# Patient Record
Sex: FEMALE | Race: WHITE | NOT HISPANIC OR LATINO
[De-identification: names, ages, dates, MRNs, and addresses within clinical notes are randomized per-mention and may not be internally consistent; named-entity substitution may affect disease eponyms.]

---

## 2017-04-17 ENCOUNTER — APPOINTMENT (OUTPATIENT)
Dept: PHYSICAL MEDICINE AND REHAB | Facility: CLINIC | Age: 77
End: 2017-04-17

## 2017-04-25 ENCOUNTER — APPOINTMENT (OUTPATIENT)
Dept: PHYSICAL MEDICINE AND REHAB | Facility: CLINIC | Age: 77
End: 2017-04-25

## 2017-04-25 VITALS
TEMPERATURE: 99.1 F | HEART RATE: 89 BPM | DIASTOLIC BLOOD PRESSURE: 85 MMHG | SYSTOLIC BLOOD PRESSURE: 138 MMHG | OXYGEN SATURATION: 96 %

## 2017-04-25 DIAGNOSIS — M65.319 TRIGGER THUMB, UNSPECIFIED THUMB: ICD-10-CM

## 2017-04-25 DIAGNOSIS — M65.331 TRIGGER FINGER, RIGHT MIDDLE FINGER: ICD-10-CM

## 2017-04-25 RX ORDER — VORTIOXETINE 20 MG/1
TABLET, FILM COATED ORAL
Refills: 0 | Status: ACTIVE | COMMUNITY

## 2017-06-01 ENCOUNTER — APPOINTMENT (OUTPATIENT)
Dept: PHYSICAL MEDICINE AND REHAB | Facility: CLINIC | Age: 77
End: 2017-06-01

## 2017-06-05 ENCOUNTER — EMERGENCY (EMERGENCY)
Facility: HOSPITAL | Age: 77
LOS: 1 days | Discharge: HOME | End: 2017-06-05
Attending: EMERGENCY MEDICINE | Admitting: EMERGENCY MEDICINE
Payer: MEDICARE

## 2017-06-05 VITALS
OXYGEN SATURATION: 94 % | DIASTOLIC BLOOD PRESSURE: 81 MMHG | TEMPERATURE: 100 F | RESPIRATION RATE: 18 BRPM | SYSTOLIC BLOOD PRESSURE: 138 MMHG | HEART RATE: 80 BPM

## 2017-06-05 DIAGNOSIS — H26.9 UNSPECIFIED CATARACT: Chronic | ICD-10-CM

## 2017-06-05 DIAGNOSIS — Z90.89 ACQUIRED ABSENCE OF OTHER ORGANS: ICD-10-CM

## 2017-06-05 DIAGNOSIS — E78.5 HYPERLIPIDEMIA, UNSPECIFIED: ICD-10-CM

## 2017-06-05 DIAGNOSIS — E03.9 HYPOTHYROIDISM, UNSPECIFIED: ICD-10-CM

## 2017-06-05 DIAGNOSIS — L03.115 CELLULITIS OF RIGHT LOWER LIMB: ICD-10-CM

## 2017-06-05 DIAGNOSIS — Z90.89 ACQUIRED ABSENCE OF OTHER ORGANS: Chronic | ICD-10-CM

## 2017-06-05 DIAGNOSIS — Z98.890 OTHER SPECIFIED POSTPROCEDURAL STATES: Chronic | ICD-10-CM

## 2017-06-05 DIAGNOSIS — I10 ESSENTIAL (PRIMARY) HYPERTENSION: ICD-10-CM

## 2017-06-05 DIAGNOSIS — Z79.899 OTHER LONG TERM (CURRENT) DRUG THERAPY: ICD-10-CM

## 2017-06-05 DIAGNOSIS — M79.604 PAIN IN RIGHT LEG: ICD-10-CM

## 2017-06-05 PROCEDURE — 99283 EMERGENCY DEPT VISIT LOW MDM: CPT | Mod: GC

## 2017-06-05 NOTE — ED ADULT NURSE NOTE - DISCHARGE TEACHING
pt is to f/u with pcp tomorrow and take doxycycline as directed and return for any new or worsening s/s

## 2017-06-05 NOTE — ED ADULT NURSE NOTE - OBJECTIVE STATEMENT
76 y.o female pmh hypothyroidism, kidney disease, and depression c/o R. ankle and foot cellulitis. pt states she is currently on flagyl q. 8 hours for intestinal infection and noticed that the inside of her right ankle is progressively getting red, spreading to the back of her ankle. pt states she had cellulitis before in the same area with similar s/s. came to ED because she did not want it to get worse and was unsure if she required different abx. r. lower leg and inner ankle with increased redness and warmth upon assessment when compared to the left. denies any recent fever, chills, body aches, lethargy, cp, sob or difficulty ambulating. son at bedside. VS stable, pt afebrile. safety and fall precautions maintained.

## 2017-06-06 VITALS
DIASTOLIC BLOOD PRESSURE: 77 MMHG | SYSTOLIC BLOOD PRESSURE: 145 MMHG | RESPIRATION RATE: 18 BRPM | OXYGEN SATURATION: 100 % | TEMPERATURE: 99 F | HEART RATE: 78 BPM

## 2017-06-06 PROCEDURE — 99283 EMERGENCY DEPT VISIT LOW MDM: CPT

## 2017-06-06 RX ADMIN — Medication 100 MILLIGRAM(S): at 02:38

## 2017-06-06 NOTE — ED PROVIDER NOTE - OBJECTIVE STATEMENT
76 year old female, hypothyroid, depression, HTN, hyperlipidemia, who presents to the ED for RLE redness and pain for 2 days. Patient is concerned that she has cellulitis as this has happened before. Was started on Flagyl for 2 weeks for diarrhea, generalized . No fevers or chills. No nausea, vomiting. No chest pain, shortness of breath, abdominal pain.     primary medical doctor: Eligio

## 2017-06-06 NOTE — ED PROVIDER NOTE - PLAN OF CARE
1) Take doxycycline as prescribed, avoid direct sunlight and take with food  2) Take Tylenol 325mg tablet, take 2 tablets every 6-8 hours as needed for pain   3) Follow up with your primary medical doctor, Dr. Del Valle, tomorrow as scheduled   4) For your diarrhea follow up with Dr. Kaur in 2-3 days for reevaluation, if you cannot see him please follow up in the clinic, call [phone] for appointment,   5) Return to the ED for severe pain, fever greater than 100.4, worsening redness, unable to walk, diarrhea, constipation, abdominal pain, chest pain, shortness of breath, or if you have any other new, worsening, or concerning symptoms.

## 2017-06-06 NOTE — ED PROVIDER NOTE - ATTENDING CONTRIBUTION TO CARE
patient presenting with  RLE redness and pain for 2 days. Patient is concerned that she has cellulitis as this has happened before. no systemic symptoms. no streaking or lymphadenopathy. plan for trial of oral abx and strict return precautions.

## 2017-06-06 NOTE — ED PROVIDER NOTE - CARE PLAN
Principal Discharge DX:	Cellulitis  Instructions for follow-up, activity and diet:	1) Take doxycycline as prescribed, avoid direct sunlight and take with food  2) Take Tylenol 325mg tablet, take 2 tablets every 6-8 hours as needed for pain   3) Follow up with your primary medical doctor, Dr. Del Valle, tomorrow as scheduled   4) For your diarrhea follow up with Dr. Kaur in 2-3 days for reevaluation, if you cannot see him please follow up in the clinic, call [phone] for appointment,   5) Return to the ED for severe pain, fever greater than 100.4, worsening redness, unable to walk, diarrhea, constipation, abdominal pain, chest pain, shortness of breath, or if you have any other new, worsening, or concerning symptoms. Principal Discharge DX:	Cellulitis  Instructions for follow-up, activity and diet:	1) Take doxycycline as prescribed, avoid direct sunlight and take with food  2) Take Tylenol 325mg tablet, take 2 tablets every 6-8 hours as needed for pain   3) Follow up with your primary medical doctor, Dr. Del Valle, tomorrow as scheduled   4) For your diarrhea follow up with Dr. Kaur in 2-3 days for reevaluation, if you cannot see him please follow up in the clinic, call [phone] for appointment,   5) Return to the ED for severe pain, fever greater than 100.4, worsening redness, unable to walk, diarrhea, constipation, abdominal pain, chest pain, shortness of breath, or if you have any other new, worsening, or concerning symptoms.

## 2017-06-06 NOTE — ED PROVIDER NOTE - MEDICAL DECISION MAKING DETAILS
no fevers or chills. LLE redness likely early cellulitis, on flagyl which is not good coverage. patient has appointment tomorrow with primary medical doctor. Will start PO doxy and d/c with strict return precautions,

## 2018-10-18 ENCOUNTER — CHART COPY (OUTPATIENT)
Age: 78
End: 2018-10-18

## 2018-10-18 PROBLEM — N28.9 DISORDER OF KIDNEY AND URETER, UNSPECIFIED: Chronic | Status: ACTIVE | Noted: 2017-06-05

## 2018-10-18 PROBLEM — E03.9 HYPOTHYROIDISM, UNSPECIFIED: Chronic | Status: ACTIVE | Noted: 2017-06-05

## 2018-10-18 PROBLEM — F32.9 MAJOR DEPRESSIVE DISORDER, SINGLE EPISODE, UNSPECIFIED: Chronic | Status: ACTIVE | Noted: 2017-06-05

## 2018-10-25 ENCOUNTER — APPOINTMENT (OUTPATIENT)
Dept: PHYSICAL MEDICINE AND REHAB | Facility: CLINIC | Age: 78
End: 2018-10-25
Payer: MEDICARE

## 2018-10-25 VITALS — DIASTOLIC BLOOD PRESSURE: 75 MMHG | HEART RATE: 98 BPM | SYSTOLIC BLOOD PRESSURE: 125 MMHG

## 2018-10-25 DIAGNOSIS — M21.70 UNEQUAL LIMB LENGTH (ACQUIRED), UNSPECIFIED SITE: ICD-10-CM

## 2018-10-25 PROCEDURE — 99213 OFFICE O/P EST LOW 20 MIN: CPT

## 2018-10-30 ENCOUNTER — FORM ENCOUNTER (OUTPATIENT)
Age: 78
End: 2018-10-30

## 2018-10-31 ENCOUNTER — APPOINTMENT (OUTPATIENT)
Dept: RADIOLOGY | Facility: CLINIC | Age: 78
End: 2018-10-31
Payer: MEDICARE

## 2018-10-31 ENCOUNTER — OUTPATIENT (OUTPATIENT)
Dept: OUTPATIENT SERVICES | Facility: HOSPITAL | Age: 78
LOS: 1 days | End: 2018-10-31
Payer: MEDICARE

## 2018-10-31 DIAGNOSIS — Z98.890 OTHER SPECIFIED POSTPROCEDURAL STATES: Chronic | ICD-10-CM

## 2018-10-31 DIAGNOSIS — H26.9 UNSPECIFIED CATARACT: Chronic | ICD-10-CM

## 2018-10-31 DIAGNOSIS — Z00.8 ENCOUNTER FOR OTHER GENERAL EXAMINATION: ICD-10-CM

## 2018-10-31 DIAGNOSIS — Z90.89 ACQUIRED ABSENCE OF OTHER ORGANS: Chronic | ICD-10-CM

## 2018-10-31 PROCEDURE — 73502 X-RAY EXAM HIP UNI 2-3 VIEWS: CPT

## 2018-10-31 PROCEDURE — 73502 X-RAY EXAM HIP UNI 2-3 VIEWS: CPT | Mod: 26,RT

## 2019-06-12 ENCOUNTER — APPOINTMENT (OUTPATIENT)
Dept: PHYSICAL MEDICINE AND REHAB | Facility: CLINIC | Age: 79
End: 2019-06-12
Payer: MEDICARE

## 2019-06-12 VITALS — HEART RATE: 105 BPM | SYSTOLIC BLOOD PRESSURE: 113 MMHG | OXYGEN SATURATION: 98 % | DIASTOLIC BLOOD PRESSURE: 71 MMHG

## 2019-06-12 PROCEDURE — 99213 OFFICE O/P EST LOW 20 MIN: CPT

## 2019-06-12 NOTE — REVIEW OF SYSTEMS
[Joint Pain] : joint pain [Difficulty Walking] : difficulty walking [Negative] : Respiratory [Fever] : no fever [Incontinence] : no incontinence [Muscle Weakness] : no muscle weakness

## 2019-06-12 NOTE — ASSESSMENT
[FreeTextEntry1] : Patient is a 76-year-old female history of bipolar disorder, bilateral knee osteoarthritis, right hip osteoarthritis with some antalgic gait due to her hip osteoarthritis. No radicular or myelopathic dysfunction noted on clinical exam. Discussed benefits of using a SAC in the left hand and demonstrated proper use. Prescription provided full course of outpatient physical therapy with emphasis on balance exercises, core strengthening exercises and gait training with SAC, see Rx. Prescription provided for an adjustable SAC. Will consider refocusing physical therapy on left shoulder once gait is improved

## 2019-06-12 NOTE — PHYSICAL EXAM
[FreeTextEntry1] : General: Well developed female in no apparent distress. Patient is awake, alert, oriented x3. Cooperative.\par Extremities: No pedal edema.\par \par Cerebellar: No nystagmus, no dysmetria FNF.\par Cranial nerves: Extraoral motions intact, tongue midline, no facial asymmetry.\par \par Left shoulder: flexion to 120deg, ext rot to 40deg. (+)crepitus. (+)reverse Booth, (+)Booth test. (-)TTP\par \par Motor:\par Both upper extremities: Tone normal, active range of motion within functional limits with 5/5 motor power throughout. Thumbs digit opposition intact bilaterally.\par Both lower; tone normal, active range of motion within functional limits with 5/5 motor power throughout.\par Right hip with 5° internal rotation, 30° external rotation without pain. Left hip with 30° internal rotation, 50° external rotation without pain.\par \par Sensory: Intact to light touch and pinprick and both lower extremities. Proprioception intact at both MTP joints.\par Muscle stretch reflexes: +2 biceps, +2 brachioradialis and triceps, symmetric. Negative inverted radial reflex. +2 KJ, +2 AJ and symmetric. Negative Babinski.\par \par Functional status: Heel and toe walk intact with close contact guard. Tandem gait with close contact guard. Patient ambulates with good heel strike bilaterally. Negative Trendelenburg test. Patient does trake short step length and slight trendleberg to the right.

## 2019-07-29 ENCOUNTER — APPOINTMENT (OUTPATIENT)
Dept: PHYSICAL MEDICINE AND REHAB | Facility: CLINIC | Age: 79
End: 2019-07-29
Payer: MEDICARE

## 2019-07-29 VITALS — DIASTOLIC BLOOD PRESSURE: 68 MMHG | OXYGEN SATURATION: 98 % | HEART RATE: 87 BPM | SYSTOLIC BLOOD PRESSURE: 110 MMHG

## 2019-07-29 PROCEDURE — 99213 OFFICE O/P EST LOW 20 MIN: CPT

## 2019-07-29 RX ORDER — LEVOTHYROXINE SODIUM 0.1 MG/1
100 TABLET ORAL
Qty: 90 | Refills: 0 | Status: ACTIVE | COMMUNITY
Start: 2019-02-19

## 2019-07-29 NOTE — REVIEW OF SYSTEMS
[Joint Pain] : joint pain [Difficulty Walking] : difficulty walking [Negative] : Gastrointestinal [Fever] : no fever [Incontinence] : no incontinence [Muscle Weakness] : no muscle weakness

## 2019-07-29 NOTE — PHYSICAL EXAM
[FreeTextEntry1] : General: Well developed female in no apparent distress. Patient is awake, alert, oriented x3. Cooperative.\par Extremities: No pedal edema.\par \par Cerebellar: No nystagmus, no dysmetria FNF.\par Cranial nerves: Extraoral motions intact, tongue midline, no facial asymmetry.\par \par Left shoulder: flexion to 120deg, ext rot to 40deg. (+)crepitus. (+)reverse Booth, (+)Booth test. (-)TTP\par \par Motor:\par Both upper extremities: Tone normal, active range of motion within functional limits with 5/5 motor power throughout. Thumbs digit opposition intact bilaterally.\par Both lower; tone normal, active range of motion within functional limits with 5/5 motor power throughout.\par Right hip with 5° internal rotation, 30° external rotation without pain. Left hip with 30° internal rotation, 50° external rotation without pain.\par \par Sensory: Intact to light touch and pinprick and both lower extremities. Proprioception intact at both MTP joints.\par Muscle stretch reflexes: +2 KJ, +2 AJ and symmetric. Negative Babinski.\par \par Functional status: Heel and toe walk intact with close contact guard. Tandem gait with close contact guard. Patient ambulates with good heel strike bilaterally. Negative Trendelenburg test. Patient does take short step length and min trendleberg to the right.

## 2019-07-29 NOTE — HISTORY OF PRESENT ILLNESS
[FreeTextEntry1] : Patient is a 78-year-old female history of bipolar disorder, chronic neck pain who presents today with complaints of balance impairment with mild to minimal right hip pain. No pain at rest.  Patient has been ambulating independently without an assistive device. Patient denies any focal motor weakness numbness, or bowel/ bladder incontinence. Patient currently denies neck pain or back pain. Patient does note history of left shoulder fracture and arthritis of the left shoulder. No falls report. Patient states that she was not able to start physical therapy since last visit. Patient has a scheduled initial evaluation for August 5, 2019.

## 2019-07-29 NOTE — ASSESSMENT
[FreeTextEntry1] : Patient is a 78-year-old female history of bipolar disorder, bilateral knee osteoarthritis, right hip osteoarthritis with some antalgic gait due to her hip osteoarthritis. No radicular or myelopathic dysfunction noted on clinical exam. Discussed benefits of using a SAC in the left hand and demonstrated proper use. Prescription provided for course of outpatient physical therapy with emphasis on balance exercises, core strengthening exercises and gait training with SAC, see Rx. Will consider refocusing physical therapy on left shoulder once gait is improved

## 2019-08-08 NOTE — ED PROVIDER NOTE - SKIN TURGOR
LMOM patient does not need to hold medications for blood work. Patient to continue same medication schedule. Patient may take a few sips of water with meds. Patient to fast for 8-10 hours.  
PT NEEDS TO GO FOR BLOOD WORK . HE HAS TO FAST FOR HE NEEDS TO KNOW IF IT'S OK TO BE OFF HIS MEDS. HE IS REQUESTING A CALL BACK..  
resilient/elastic

## 2019-09-16 ENCOUNTER — APPOINTMENT (OUTPATIENT)
Dept: PHYSICAL MEDICINE AND REHAB | Facility: CLINIC | Age: 79
End: 2019-09-16

## 2019-10-10 ENCOUNTER — OUTPATIENT (OUTPATIENT)
Dept: OUTPATIENT SERVICES | Facility: HOSPITAL | Age: 79
LOS: 1 days | Discharge: HOME | End: 2019-10-10
Payer: MEDICARE

## 2019-10-10 DIAGNOSIS — H26.9 UNSPECIFIED CATARACT: Chronic | ICD-10-CM

## 2019-10-10 DIAGNOSIS — Z98.890 OTHER SPECIFIED POSTPROCEDURAL STATES: Chronic | ICD-10-CM

## 2019-10-10 DIAGNOSIS — Z90.89 ACQUIRED ABSENCE OF OTHER ORGANS: Chronic | ICD-10-CM

## 2019-10-15 PROCEDURE — 99205 OFFICE O/P NEW HI 60 MIN: CPT

## 2019-12-30 PROCEDURE — 90870 ELECTROCONVULSIVE THERAPY: CPT

## 2019-12-30 RX ORDER — MIDAZOLAM HYDROCHLORIDE 1 MG/ML
2 INJECTION, SOLUTION INTRAMUSCULAR; INTRAVENOUS ONCE
Refills: 0 | Status: DISCONTINUED | OUTPATIENT
Start: 2019-12-30 | End: 2019-12-30

## 2019-12-30 RX ORDER — FLUMAZENIL 0.1 MG/ML
0.2 VIAL (ML) INTRAVENOUS ONCE
Refills: 0 | Status: COMPLETED | OUTPATIENT
Start: 2019-12-30 | End: 2019-12-30

## 2019-12-30 RX ADMIN — Medication 0.2 MILLIGRAM(S): at 11:58

## 2020-01-03 PROCEDURE — 90870 ELECTROCONVULSIVE THERAPY: CPT

## 2020-01-03 RX ORDER — FLUMAZENIL 0.1 MG/ML
0.2 VIAL (ML) INTRAVENOUS ONCE
Refills: 0 | Status: COMPLETED | OUTPATIENT
Start: 2020-01-03 | End: 2020-01-03

## 2020-01-03 RX ADMIN — Medication 0.2 MILLIGRAM(S): at 09:46

## 2020-01-07 PROCEDURE — 90870 ELECTROCONVULSIVE THERAPY: CPT

## 2020-01-07 RX ORDER — FLUMAZENIL 0.1 MG/ML
0.2 VIAL (ML) INTRAVENOUS ONCE
Refills: 0 | Status: COMPLETED | OUTPATIENT
Start: 2020-01-07 | End: 2020-01-07

## 2020-01-07 RX ORDER — MIDAZOLAM HYDROCHLORIDE 1 MG/ML
1 INJECTION, SOLUTION INTRAMUSCULAR; INTRAVENOUS ONCE
Refills: 0 | Status: DISCONTINUED | OUTPATIENT
Start: 2020-01-07 | End: 2020-01-07

## 2020-01-07 RX ADMIN — MIDAZOLAM HYDROCHLORIDE 1 MILLIGRAM(S): 1 INJECTION, SOLUTION INTRAMUSCULAR; INTRAVENOUS at 10:26

## 2020-01-07 RX ADMIN — Medication 0.2 MILLIGRAM(S): at 10:22

## 2020-01-10 PROCEDURE — 90870 ELECTROCONVULSIVE THERAPY: CPT

## 2020-01-10 RX ORDER — FLUMAZENIL 0.1 MG/ML
0.2 VIAL (ML) INTRAVENOUS ONCE
Refills: 0 | Status: COMPLETED | OUTPATIENT
Start: 2020-01-10 | End: 2020-01-10

## 2020-01-10 RX ORDER — MIDAZOLAM HYDROCHLORIDE 1 MG/ML
1 INJECTION, SOLUTION INTRAMUSCULAR; INTRAVENOUS ONCE
Refills: 0 | Status: DISCONTINUED | OUTPATIENT
Start: 2020-01-10 | End: 2020-01-10

## 2020-01-10 RX ADMIN — Medication 0.2 MILLIGRAM(S): at 09:52

## 2020-01-10 RX ADMIN — MIDAZOLAM HYDROCHLORIDE 1 MILLIGRAM(S): 1 INJECTION, SOLUTION INTRAMUSCULAR; INTRAVENOUS at 09:55

## 2020-01-14 PROCEDURE — 90870 ELECTROCONVULSIVE THERAPY: CPT

## 2020-01-14 RX ORDER — FLUMAZENIL 0.1 MG/ML
0.2 VIAL (ML) INTRAVENOUS ONCE
Refills: 0 | Status: COMPLETED | OUTPATIENT
Start: 2020-01-14 | End: 2020-01-14

## 2020-01-14 RX ORDER — MIDAZOLAM HYDROCHLORIDE 1 MG/ML
1 INJECTION, SOLUTION INTRAMUSCULAR; INTRAVENOUS ONCE
Refills: 0 | Status: DISCONTINUED | OUTPATIENT
Start: 2020-01-14 | End: 2020-01-14

## 2020-01-14 RX ADMIN — Medication 0.2 MILLIGRAM(S): at 09:55

## 2020-01-14 RX ADMIN — MIDAZOLAM HYDROCHLORIDE 1 MILLIGRAM(S): 1 INJECTION, SOLUTION INTRAMUSCULAR; INTRAVENOUS at 09:59

## 2020-01-17 PROCEDURE — 90870 ELECTROCONVULSIVE THERAPY: CPT

## 2020-01-17 RX ORDER — MIDAZOLAM HYDROCHLORIDE 1 MG/ML
1 INJECTION, SOLUTION INTRAMUSCULAR; INTRAVENOUS ONCE
Refills: 0 | Status: DISCONTINUED | OUTPATIENT
Start: 2020-01-17 | End: 2020-01-17

## 2020-01-17 RX ORDER — FLUMAZENIL 0.1 MG/ML
0.2 VIAL (ML) INTRAVENOUS ONCE
Refills: 0 | Status: COMPLETED | OUTPATIENT
Start: 2020-01-17 | End: 2020-01-17

## 2020-01-17 RX ADMIN — MIDAZOLAM HYDROCHLORIDE 1 MILLIGRAM(S): 1 INJECTION, SOLUTION INTRAMUSCULAR; INTRAVENOUS at 10:33

## 2020-01-17 RX ADMIN — Medication 0.2 MILLIGRAM(S): at 10:26

## 2020-01-21 PROCEDURE — 90870 ELECTROCONVULSIVE THERAPY: CPT

## 2020-01-21 RX ORDER — MIDAZOLAM HYDROCHLORIDE 1 MG/ML
1 INJECTION, SOLUTION INTRAMUSCULAR; INTRAVENOUS ONCE
Refills: 0 | Status: DISCONTINUED | OUTPATIENT
Start: 2020-01-21 | End: 2020-01-21

## 2020-01-21 RX ORDER — FLUMAZENIL 0.1 MG/ML
0.2 VIAL (ML) INTRAVENOUS ONCE
Refills: 0 | Status: COMPLETED | OUTPATIENT
Start: 2020-01-21 | End: 2020-01-21

## 2020-01-21 RX ADMIN — MIDAZOLAM HYDROCHLORIDE 1 MILLIGRAM(S): 1 INJECTION, SOLUTION INTRAMUSCULAR; INTRAVENOUS at 10:20

## 2020-01-21 RX ADMIN — Medication 0.2 MILLIGRAM(S): at 10:15

## 2020-01-22 DIAGNOSIS — F31.81 BIPOLAR II DISORDER: ICD-10-CM

## 2020-01-23 PROCEDURE — 90870 ELECTROCONVULSIVE THERAPY: CPT

## 2020-01-23 RX ORDER — FLUMAZENIL 0.1 MG/ML
0.2 VIAL (ML) INTRAVENOUS ONCE
Refills: 0 | Status: COMPLETED | OUTPATIENT
Start: 2020-01-23 | End: 2020-01-23

## 2020-01-23 RX ORDER — MIDAZOLAM HYDROCHLORIDE 1 MG/ML
1 INJECTION, SOLUTION INTRAMUSCULAR; INTRAVENOUS ONCE
Refills: 0 | Status: DISCONTINUED | OUTPATIENT
Start: 2020-01-23 | End: 2020-01-23

## 2020-01-23 RX ADMIN — MIDAZOLAM HYDROCHLORIDE 1 MILLIGRAM(S): 1 INJECTION, SOLUTION INTRAMUSCULAR; INTRAVENOUS at 09:05

## 2020-01-23 RX ADMIN — Medication 0.2 MILLIGRAM(S): at 08:58

## 2020-01-27 PROCEDURE — 90870 ELECTROCONVULSIVE THERAPY: CPT

## 2020-01-27 RX ORDER — FLUMAZENIL 0.1 MG/ML
0.2 VIAL (ML) INTRAVENOUS ONCE
Refills: 0 | Status: COMPLETED | OUTPATIENT
Start: 2020-01-27 | End: 2020-01-27

## 2020-01-27 RX ORDER — MIDAZOLAM HYDROCHLORIDE 1 MG/ML
1 INJECTION, SOLUTION INTRAMUSCULAR; INTRAVENOUS ONCE
Refills: 0 | Status: DISCONTINUED | OUTPATIENT
Start: 2020-01-27 | End: 2020-01-27

## 2020-01-27 RX ADMIN — Medication 0.2 MILLIGRAM(S): at 08:39

## 2020-01-27 RX ADMIN — MIDAZOLAM HYDROCHLORIDE 1 MILLIGRAM(S): 1 INJECTION, SOLUTION INTRAMUSCULAR; INTRAVENOUS at 08:42

## 2020-01-29 PROCEDURE — 90870 ELECTROCONVULSIVE THERAPY: CPT

## 2020-01-29 RX ORDER — FLUMAZENIL 0.1 MG/ML
0.2 VIAL (ML) INTRAVENOUS ONCE
Refills: 0 | Status: COMPLETED | OUTPATIENT
Start: 2020-01-29 | End: 2020-01-29

## 2020-01-29 RX ORDER — MIDAZOLAM HYDROCHLORIDE 1 MG/ML
1 INJECTION, SOLUTION INTRAMUSCULAR; INTRAVENOUS ONCE
Refills: 0 | Status: DISCONTINUED | OUTPATIENT
Start: 2020-01-29 | End: 2020-01-29

## 2020-01-29 RX ADMIN — Medication 0.2 MILLIGRAM(S): at 08:17

## 2020-01-29 RX ADMIN — MIDAZOLAM HYDROCHLORIDE 1 MILLIGRAM(S): 1 INJECTION, SOLUTION INTRAMUSCULAR; INTRAVENOUS at 08:24

## 2020-07-14 NOTE — ED ADULT NURSE NOTE - THOUGHTS OF HOMICIDE/VIOLENCE TOWARDS OTHERS YN, MLM
well developed, well nourished , in no acute distress , ambulating without difficulty , normal communication ability
No

## 2020-12-07 DIAGNOSIS — M25.531 PAIN IN RIGHT WRIST: ICD-10-CM

## 2020-12-08 ENCOUNTER — OUTPATIENT (OUTPATIENT)
Dept: OUTPATIENT SERVICES | Facility: HOSPITAL | Age: 80
LOS: 1 days | End: 2020-12-08
Payer: MEDICARE

## 2020-12-08 ENCOUNTER — APPOINTMENT (OUTPATIENT)
Dept: RADIOLOGY | Facility: CLINIC | Age: 80
End: 2020-12-08
Payer: MEDICARE

## 2020-12-08 DIAGNOSIS — Z90.89 ACQUIRED ABSENCE OF OTHER ORGANS: Chronic | ICD-10-CM

## 2020-12-08 DIAGNOSIS — Z98.890 OTHER SPECIFIED POSTPROCEDURAL STATES: Chronic | ICD-10-CM

## 2020-12-08 DIAGNOSIS — H26.9 UNSPECIFIED CATARACT: Chronic | ICD-10-CM

## 2020-12-08 DIAGNOSIS — Z00.8 ENCOUNTER FOR OTHER GENERAL EXAMINATION: ICD-10-CM

## 2020-12-08 PROCEDURE — 73110 X-RAY EXAM OF WRIST: CPT | Mod: 26,RT

## 2020-12-08 PROCEDURE — 73110 X-RAY EXAM OF WRIST: CPT

## 2020-12-09 ENCOUNTER — APPOINTMENT (OUTPATIENT)
Dept: PHYSICAL MEDICINE AND REHAB | Facility: CLINIC | Age: 80
End: 2020-12-09
Payer: MEDICARE

## 2020-12-09 PROCEDURE — 99213 OFFICE O/P EST LOW 20 MIN: CPT

## 2020-12-09 NOTE — PHYSICAL EXAM
[FreeTextEntry1] : General: Well developed female in no apparent distress. Patient is awake, alert, oriented x3. Cooperative.\par Extremities: min pedal edema.\par \par right wrist: no ecchymosis, no edema, no erythema. (+) CMC grind test, (+)TTP at the CMC joint. No pain with ROM at wrist. Hand  5/5mp\par \par both knees no effusion, mild crepitus\par \par Motor:\par Both upper extremities: Tone normal, active range of motion within functional limits with 5/5 motor power throughout. Thumbs digit opposition intact bilaterally.\par Both lower; tone normal, active range of motion within functional limits with 5/5 motor power throughout.\par \par Sensory: Intact to light touch BUE\par \par Functional status: Patient ambulates with good heel strike bilaterally.

## 2020-12-09 NOTE — ASSESSMENT
[FreeTextEntry1] : Patient is an 80-year-old female history of CKD, bipolar disorder, chronic neck pain with complaints of pain toward the base of the right thumb. Reviewed x-ray results with patient, no fracture or dislocation. Patient with moderate right CMC DJD with flair. Prescription provided for a right thumb spica splint. No NSAIDs secondary to CKD (Creat 2.2), encourage patient to discontinue Advil and followup with PMD. Recommend standing regimen of Tylenol 500 mg p.o. t.i.d. If Tylenol does not help knee pain, will consider a series of 3 Hyalgan injections to both knees..

## 2020-12-09 NOTE — HISTORY OF PRESENT ILLNESS
[FreeTextEntry1] : Patient is an 80-year-old female history of CKD, bipolar disorder, chronic neck pain who presents today after suffering a fall at home on Saturday morning December 5, 2020. Patient states she tripped over a CAT and hit her head with some bleeding noted, no ER visit, no loss of consciousness, no HA. Patient's main complaint is right wrist pain on the radial side of the wrist toward the base of the thumb. Pain is exacerbated with moving her thumb and gripping objects. Pain score at rest 2/10, pain score up to 7-8/10. Patient does feel that her pain has improved. Minimal swelling, no warmth, no redness. No ecchymosis. Patient has been self treating with Advil 2 times per day. Patient had x-ray of her right wrist on December 7.

## 2020-12-09 NOTE — REVIEW OF SYSTEMS
[Joint Pain] : joint pain [Negative] : Gastrointestinal [Fever] : no fever [Muscle Weakness] : no muscle weakness [Skin Wound] : no skin wound [Difficulty Walking] : no difficulty walking

## 2021-10-06 ENCOUNTER — APPOINTMENT (OUTPATIENT)
Dept: PHYSICAL MEDICINE AND REHAB | Facility: CLINIC | Age: 81
End: 2021-10-06
Payer: MEDICARE

## 2021-10-06 VITALS
DIASTOLIC BLOOD PRESSURE: 88 MMHG | OXYGEN SATURATION: 98 % | SYSTOLIC BLOOD PRESSURE: 133 MMHG | HEART RATE: 95 BPM | TEMPERATURE: 96.9 F

## 2021-10-06 PROCEDURE — 99213 OFFICE O/P EST LOW 20 MIN: CPT

## 2021-10-06 NOTE — REVIEW OF SYSTEMS
[Incontinence] : incontinence [Joint Pain] : joint pain [Difficulty Walking] : difficulty walking [Negative] : Gastrointestinal [Fever] : no fever [Muscle Weakness] : no muscle weakness

## 2021-10-06 NOTE — HISTORY OF PRESENT ILLNESS
[FreeTextEntry1] : Patient is an 81-year-old female history CKD, bipolar disorder, chronic neck pain, bilateral knee OA who reports generalize flare of her arthritis in August, 2021 which last' 3 weeks. Patient denies any joint swelling, redness and warmth. Currently she continues to complain of pain predominantly at the right groin with standing and walking, no pain at rest. Patient has less pain at the at knees,typically more towards the medial aspect. Patient denies focal motor weakness numbness. Patient has had chronic bladder incontinence for many years. Patient self treated with Tylenol, no NSAIDS secondary to CKD. Functionally the patient independent ambulated with a SAC.

## 2021-10-06 NOTE — PHYSICAL EXAM
[FreeTextEntry1] : General: Well developed female in no apparent distress. Patient is awake, alert, oriented x3. Cooperative.\par Extremities: min pedal edema.\par \par both knees no effusion, mild crepitus\par right hip with 5-10 deg int rotation, 30 deg ext rotation\par left hip with 45 deg internal rotation, 60 deg ext rotation, without pain\par \par Motor:\par Both upper extremities: Tone normal, active range of motion within functional limits with 5/5 motor power throughout. Thumbs digit opposition intact bilaterally.\par Both lower; tone normal, active range of motion within functional limits with 5/5 motor power throughout.\par \par Sensory: Intact to light touch BUE\par \par Functional status: Patient ambulates with SAC good heel strike bilaterally.

## 2021-10-09 ENCOUNTER — OUTPATIENT (OUTPATIENT)
Dept: OUTPATIENT SERVICES | Facility: HOSPITAL | Age: 81
LOS: 1 days | End: 2021-10-09
Payer: MEDICARE

## 2021-10-09 ENCOUNTER — APPOINTMENT (OUTPATIENT)
Dept: RADIOLOGY | Facility: CLINIC | Age: 81
End: 2021-10-09
Payer: MEDICARE

## 2021-10-09 DIAGNOSIS — Z90.89 ACQUIRED ABSENCE OF OTHER ORGANS: Chronic | ICD-10-CM

## 2021-10-09 DIAGNOSIS — H26.9 UNSPECIFIED CATARACT: Chronic | ICD-10-CM

## 2021-10-09 DIAGNOSIS — M16.11 UNILATERAL PRIMARY OSTEOARTHRITIS, RIGHT HIP: ICD-10-CM

## 2021-10-09 DIAGNOSIS — Z98.890 OTHER SPECIFIED POSTPROCEDURAL STATES: Chronic | ICD-10-CM

## 2021-10-09 PROCEDURE — 73502 X-RAY EXAM HIP UNI 2-3 VIEWS: CPT | Mod: 26,RT

## 2021-10-09 PROCEDURE — 73502 X-RAY EXAM HIP UNI 2-3 VIEWS: CPT

## 2021-11-10 ENCOUNTER — APPOINTMENT (OUTPATIENT)
Dept: PHYSICAL MEDICINE AND REHAB | Facility: CLINIC | Age: 81
End: 2021-11-10
Payer: MEDICARE

## 2021-11-10 VITALS
OXYGEN SATURATION: 97 % | DIASTOLIC BLOOD PRESSURE: 88 MMHG | TEMPERATURE: 97 F | HEART RATE: 107 BPM | SYSTOLIC BLOOD PRESSURE: 170 MMHG

## 2021-11-10 DIAGNOSIS — M17.0 BILATERAL PRIMARY OSTEOARTHRITIS OF KNEE: ICD-10-CM

## 2021-11-10 PROCEDURE — 99213 OFFICE O/P EST LOW 20 MIN: CPT

## 2021-11-10 NOTE — HISTORY OF PRESENT ILLNESS
[FreeTextEntry1] : Patient is an 81-year-old female history CKD, bipolar disorder, chronic neck pain, bilateral knee OA, right knee OA who was last seen Oct. 6, 2021. Patient obtained x-ray of the right hip which demonstrated moderate right hip osteoarthritis. Patient had been taking Tylenol 500 mg 2-3 times a day regularly which has improved her hip and knee pain. Patient reports no pain sitting in the office and did not have pain walking in from the parking lot. Patient's main complaint is that she feels that her right hip will episodically "give out". This tends to occur when she is carrying something or turning. These episodes occur 3-4 times per month. No falls reported. Patient denies focal motor weakness, numbness, (+) chronic bladder incontinence. No NSAIDS secondary to CKD. Functionally the patient independent ambulated with +/- SAC.

## 2021-11-10 NOTE — PHYSICAL EXAM
[FreeTextEntry1] : General: Well developed female in no apparent distress. Patient is awake, alert, oriented x3. Cooperative.\par Extremities: min pedal edema.\par \par both knees no effusion, mild crepitus\par right hip with 5-10 deg int rotation, 30 deg ext rotation without pain.\par left hip with 45 deg internal rotation, 60 deg ext rotation, without pain\par \par Motor:\par Both upper extremities: Tone normal, active range of motion within functional limits with 5/5 motor power throughout. Thumbs digit opposition intact bilaterally.\par Both lower extremities; tone normal, active range of motion within functional limits with 5/5 motor power throughout.\par \par Sensory: Intact to light touch BUE\par \par Functional status: Patient ambulates with SAC good heel strike bilaterally. Negative Trendlenberg test (B).

## 2021-11-10 NOTE — REVIEW OF SYSTEMS
[Incontinence] : incontinence [Difficulty Walking] : difficulty walking [Negative] : Gastrointestinal [Fever] : no fever [Joint Pain] : no joint pain [Muscle Weakness] : no muscle weakness

## 2021-11-10 NOTE — ASSESSMENT
[FreeTextEntry1] : Patient is an 81-year-old female history CKD, bipolar disorder, chronic neck pain, bilateral knee OA whose right hip OA pain is significantly improved on a standing regimen of Tylenol. Discussed with patient/Dtr that her feeling of her right hip giving out may be due to the diminished range of motion at her hip. Emphasize the importance of starting outpatient physical therapy with emphasis on gait training with various assistive devices to improve balance and security during ambulation. Physical therapy will also work on core strengthening exercises and balance exercises, see Rx. If knee pain exacerbated would consider a series of 3 Hyalgan injections to both knees. No steroids or NSAIDS.\par \par I spent a total of 20 minutes on the date of the encounter evaluating and treating the patient including a discussion of treatment options.

## 2022-02-03 ENCOUNTER — APPOINTMENT (OUTPATIENT)
Dept: PHYSICAL MEDICINE AND REHAB | Facility: CLINIC | Age: 82
End: 2022-02-03
Payer: MEDICARE

## 2022-02-03 VITALS — DIASTOLIC BLOOD PRESSURE: 76 MMHG | OXYGEN SATURATION: 97 % | HEART RATE: 101 BPM | SYSTOLIC BLOOD PRESSURE: 126 MMHG

## 2022-02-03 DIAGNOSIS — R26.9 UNSPECIFIED ABNORMALITIES OF GAIT AND MOBILITY: ICD-10-CM

## 2022-02-03 DIAGNOSIS — M25.511 PAIN IN RIGHT SHOULDER: ICD-10-CM

## 2022-02-03 DIAGNOSIS — M16.11 UNILATERAL PRIMARY OSTEOARTHRITIS, RIGHT HIP: ICD-10-CM

## 2022-02-03 PROCEDURE — 99213 OFFICE O/P EST LOW 20 MIN: CPT

## 2022-02-03 NOTE — REVIEW OF SYSTEMS
[Joint Pain] : joint pain [Joint Stiffness] : joint stiffness [Negative] : Gastrointestinal [Fever] : no fever [Incontinence] : no incontinence [Muscle Weakness] : no muscle weakness [Difficulty Walking] : no difficulty walking

## 2022-02-03 NOTE — ASSESSMENT
[FreeTextEntry1] : Patient is an 81-year-old female history of CKD, bipolar disorder, chronic neck pain, bilateral knee OA, right hip OA whose balance, gait mechanics and overall conditioning have significantly improved with home physical therapy. Recommend home physical therapy to continue addressing her right hip and balance issues. Clinical exam notable for left shoulder capsular tightness, patient reports a history of a shoulder fracture ~5-6 years ago. Will obtain an x-ray of the left shoulder, 2 views. Once she completes her home physical therapy for her balance and hip, will redirect a home physical therapy to address her left shoulder. No NSAIDs or steroids.\par \par I spent a total of 20 minutes on the date of the encounter evaluating and treating the patient including a discussion of treatment options.

## 2022-02-03 NOTE — HISTORY OF PRESENT ILLNESS
[FreeTextEntry1] : Patient is an 81-year-old female history of CKD, bipolar disorder, chronic neck pain, bilateral knee OA, right hip OA who was last seen November 10, 2021. Patient did not start outpatient physical therapy secondary to Covid pandemic. Patient started home for physical therapy which she's been receiving 2 times per week for the past 5 weeks. Patient feels she's made excellent gains with home therapy. Patient feels her balance has improved, she feels her strength has improved and she is more flexible and able to pick objects off the floor independently. Episodes of the feeling of her "leg going to give out" significantly decreased as well. No falls since last visit. No near falls. Patient denies focal motor weakness and numbness. Functionally the patient is ambulating independently +/- straight axis cane. Patient has been complaining of left shoulder pain and stiffness

## 2022-02-03 NOTE — PHYSICAL EXAM
[FreeTextEntry1] : General: Well developed female in no apparent distress. Patient is awake, alert, oriented x3. Cooperative.\par Extremities: no pedal edema.\par \par right shoulder: (-)TTP at anterior humeral head, anterior acromium, ACJ. Shoulder flexion to 90°, shoulder external rotation to 30° with pain on and range of motion. Full passive shoulder internal rotation without pain\par \par both knees no effusion, mild crepitus\par right hip with 5-10 deg int rotation, 30 deg ext rotation without pain.\par left hip with 45 deg internal rotation, 60 deg ext rotation, without pain\par \par Motor:\par Both upper extremities: Tone normal, active range of motion within functional limits with 5/5 motor power throughout. Thumbs digit opposition intact bilaterally.\par Both lower extremities; tone normal, active range of motion within functional limits with 5/5 motor power throughout.\par \par Sensory: Intact to light touch BUE\par \par Functional status: Patient ambulates without AD, good heel strike bilaterally. Negative Trendlenberg test (B).

## 2022-02-08 DIAGNOSIS — M25.512 PAIN IN LEFT SHOULDER: ICD-10-CM

## 2022-02-15 ENCOUNTER — APPOINTMENT (OUTPATIENT)
Dept: RADIOLOGY | Facility: CLINIC | Age: 82
End: 2022-02-15

## 2022-03-03 ENCOUNTER — APPOINTMENT (OUTPATIENT)
Dept: RADIOLOGY | Facility: CLINIC | Age: 82
End: 2022-03-03
Payer: MEDICARE

## 2022-03-03 ENCOUNTER — NON-APPOINTMENT (OUTPATIENT)
Age: 82
End: 2022-03-03

## 2022-03-03 PROCEDURE — 73030 X-RAY EXAM OF SHOULDER: CPT | Mod: LT

## 2022-04-11 ENCOUNTER — APPOINTMENT (OUTPATIENT)
Dept: PHYSICAL MEDICINE AND REHAB | Facility: CLINIC | Age: 82
End: 2022-04-11
Payer: MEDICARE

## 2022-04-11 VITALS
SYSTOLIC BLOOD PRESSURE: 148 MMHG | OXYGEN SATURATION: 97 % | DIASTOLIC BLOOD PRESSURE: 79 MMHG | TEMPERATURE: 95 F | HEART RATE: 104 BPM

## 2022-04-11 DIAGNOSIS — M19.031 PRIMARY OSTEOARTHRITIS, RIGHT WRIST: ICD-10-CM

## 2022-04-11 DIAGNOSIS — M19.012 PRIMARY OSTEOARTHRITIS, LEFT SHOULDER: ICD-10-CM

## 2022-04-11 PROCEDURE — 99213 OFFICE O/P EST LOW 20 MIN: CPT

## 2022-04-11 RX ORDER — HYALURONATE SODIUM 10 MG/ML
20 VIAL (ML) INTRAARTICULAR
Qty: 3 | Refills: 0 | Status: ACTIVE | OUTPATIENT
Start: 2022-04-11

## 2022-04-11 NOTE — ASSESSMENT
[FreeTextEntry1] : Patient is an 81-year-old female history of CKD, bipolar disorder, chronic neck pain, bilateral knee OA, right hip OA, hx of a left shoulder fracture 5 to 6 years ago.  Reviewed x-ray results of left shoulder with patient which demonstrated moderate left glenohumeral degenerative joint disease.  No NSAIDs or steroids.  Patient to start a standing regimen of Tylenol 500 mg p.o. 3 times daily for 2 to 3 weeks.  Patient prefers home physical therapy over outpatient physical therapy and prescription provided.  Patient informed that the course is likely to be short.  We will seek authorization for a series of 3 Hyalgan injections to the left shoulder.  Patient has been also complaining of pain at the base of the thumb and has a history of CMC arthritis.  Prescription provided for a comfort thumb support.\par \par I spent a total of 20 minutes on the date of the encounter evaluating and treating the patient including reviewing her x-ray results and discussion of treatment options.

## 2022-04-11 NOTE — HISTORY OF PRESENT ILLNESS
[FreeTextEntry1] : Patient is an 81-year-old female history of CKD, bipolar disorder, chronic neck pain, bilateral knee OA, right hip OA who was last seen Feb. 3, 2022. Patient completed home PT for her balance program. did not start outpatient physical therapy secondary to Covid pandemic. Patient feels her balance has improved, though she reports one fall 2 weeks ago losing her balance transferring from bed, no injury or ER visit.  No near falls.  Patient's main complaint is continued left shoulder pain.  Pain is exacerbated with lifting her arm and putting on her jacket.  Pain involves the entire shoulder.  No pain at rest, pain score up to 2–5/10. Patient reports hx of left shoulder fx.  Patient denies focal motor weakness and numbness. Functionally the patient is ambulating independently with a RW.

## 2022-04-11 NOTE — PHYSICAL EXAM
[FreeTextEntry1] : General: Well developed female in no apparent distress. Patient is awake, alert, oriented x3. Cooperative.\par Extremities: no pedal edema.\par \par right shoulder: (-)TTP at anterior humeral head, anterior acromium, ACJ. Shoulder flexion to 90°, shoulder external rotation to 30° with pain on and range of motion. Full passive shoulder internal rotation without pain\par \par Motor:\par Both upper extremities: Tone normal, active range of motion within functional limits with 5/5 motor power throughout. Thumbs digit opposition intact bilaterally.\par Both lower extremities; tone normal, active range of motion within functional limits with 5/5 motor power throughout.\par \par Sensory: Intact to light touch BUE\par \par Functional status: Patient ambulates with SAC, good heel strike bilaterally.

## 2022-07-29 ENCOUNTER — INPATIENT (INPATIENT)
Facility: HOSPITAL | Age: 82
LOS: 4 days | Discharge: NURSING, REHAB OR LONG-TERM CARE | DRG: 481 | End: 2022-08-03
Attending: ORTHOPAEDIC SURGERY | Admitting: ORTHOPAEDIC SURGERY
Payer: MEDICARE

## 2022-07-29 ENCOUNTER — TRANSCRIPTION ENCOUNTER (OUTPATIENT)
Age: 82
End: 2022-07-29

## 2022-07-29 VITALS
HEIGHT: 66 IN | SYSTOLIC BLOOD PRESSURE: 137 MMHG | DIASTOLIC BLOOD PRESSURE: 82 MMHG | RESPIRATION RATE: 18 BRPM | OXYGEN SATURATION: 97 % | HEART RATE: 76 BPM | WEIGHT: 169.98 LBS

## 2022-07-29 DIAGNOSIS — S72.001A FRACTURE OF UNSPECIFIED PART OF NECK OF RIGHT FEMUR, INITIAL ENCOUNTER FOR CLOSED FRACTURE: ICD-10-CM

## 2022-07-29 DIAGNOSIS — Z98.890 OTHER SPECIFIED POSTPROCEDURAL STATES: Chronic | ICD-10-CM

## 2022-07-29 DIAGNOSIS — E03.9 HYPOTHYROIDISM, UNSPECIFIED: ICD-10-CM

## 2022-07-29 DIAGNOSIS — F32.9 MAJOR DEPRESSIVE DISORDER, SINGLE EPISODE, UNSPECIFIED: ICD-10-CM

## 2022-07-29 DIAGNOSIS — H26.9 UNSPECIFIED CATARACT: Chronic | ICD-10-CM

## 2022-07-29 DIAGNOSIS — N18.9 CHRONIC KIDNEY DISEASE, UNSPECIFIED: ICD-10-CM

## 2022-07-29 DIAGNOSIS — Z90.89 ACQUIRED ABSENCE OF OTHER ORGANS: Chronic | ICD-10-CM

## 2022-07-29 DIAGNOSIS — S72.141A DISPLACED INTERTROCHANTERIC FRACTURE OF RIGHT FEMUR, INITIAL ENCOUNTER FOR CLOSED FRACTURE: ICD-10-CM

## 2022-07-29 LAB
ALBUMIN SERPL ELPH-MCNC: 4 G/DL — SIGNIFICANT CHANGE UP (ref 3.3–5)
ALP SERPL-CCNC: 78 U/L — SIGNIFICANT CHANGE UP (ref 40–120)
ALT FLD-CCNC: 8 U/L — LOW (ref 10–45)
ANION GAP SERPL CALC-SCNC: 12 MMOL/L — SIGNIFICANT CHANGE UP (ref 5–17)
ANION GAP SERPL CALC-SCNC: 14 MMOL/L — SIGNIFICANT CHANGE UP (ref 5–17)
APPEARANCE UR: CLEAR — SIGNIFICANT CHANGE UP
APTT BLD: 26.7 SEC — LOW (ref 27.5–35.5)
AST SERPL-CCNC: 14 U/L — SIGNIFICANT CHANGE UP (ref 10–40)
BASOPHILS # BLD AUTO: 0.08 K/UL — SIGNIFICANT CHANGE UP (ref 0–0.2)
BASOPHILS NFR BLD AUTO: 0.7 % — SIGNIFICANT CHANGE UP (ref 0–2)
BILIRUB SERPL-MCNC: 0.3 MG/DL — SIGNIFICANT CHANGE UP (ref 0.2–1.2)
BILIRUB UR-MCNC: NEGATIVE — SIGNIFICANT CHANGE UP
BLD GP AB SCN SERPL QL: NEGATIVE — SIGNIFICANT CHANGE UP
BUN SERPL-MCNC: 46 MG/DL — HIGH (ref 7–23)
BUN SERPL-MCNC: 54 MG/DL — HIGH (ref 7–23)
CALCIUM SERPL-MCNC: 8.3 MG/DL — LOW (ref 8.4–10.5)
CALCIUM SERPL-MCNC: 9.1 MG/DL — SIGNIFICANT CHANGE UP (ref 8.4–10.5)
CHLORIDE SERPL-SCNC: 106 MMOL/L — SIGNIFICANT CHANGE UP (ref 96–108)
CHLORIDE SERPL-SCNC: 107 MMOL/L — SIGNIFICANT CHANGE UP (ref 96–108)
CO2 SERPL-SCNC: 18 MMOL/L — LOW (ref 22–31)
CO2 SERPL-SCNC: 19 MMOL/L — LOW (ref 22–31)
COLOR SPEC: COLORLESS — SIGNIFICANT CHANGE UP
CREAT ?TM UR-MCNC: 34 MG/DL — SIGNIFICANT CHANGE UP
CREAT SERPL-MCNC: 3.01 MG/DL — HIGH (ref 0.5–1.3)
CREAT SERPL-MCNC: 3.29 MG/DL — HIGH (ref 0.5–1.3)
DIFF PNL FLD: NEGATIVE — SIGNIFICANT CHANGE UP
EGFR: 13 ML/MIN/1.73M2 — LOW
EGFR: 15 ML/MIN/1.73M2 — LOW
EOSINOPHIL # BLD AUTO: 0.06 K/UL — SIGNIFICANT CHANGE UP (ref 0–0.5)
EOSINOPHIL NFR BLD AUTO: 0.5 % — SIGNIFICANT CHANGE UP (ref 0–6)
FLUAV AG NPH QL: SIGNIFICANT CHANGE UP
FLUBV AG NPH QL: SIGNIFICANT CHANGE UP
GLUCOSE SERPL-MCNC: 103 MG/DL — HIGH (ref 70–99)
GLUCOSE SERPL-MCNC: 74 MG/DL — SIGNIFICANT CHANGE UP (ref 70–99)
GLUCOSE UR QL: NEGATIVE — SIGNIFICANT CHANGE UP
HCT VFR BLD CALC: 36.8 % — SIGNIFICANT CHANGE UP (ref 34.5–45)
HGB BLD-MCNC: 11.7 G/DL — SIGNIFICANT CHANGE UP (ref 11.5–15.5)
IMM GRANULOCYTES NFR BLD AUTO: 0.8 % — SIGNIFICANT CHANGE UP (ref 0–1.5)
INR BLD: 1 RATIO — SIGNIFICANT CHANGE UP (ref 0.88–1.16)
KETONES UR-MCNC: NEGATIVE — SIGNIFICANT CHANGE UP
LEUKOCYTE ESTERASE UR-ACNC: NEGATIVE — SIGNIFICANT CHANGE UP
LYMPHOCYTES # BLD AUTO: 0.96 K/UL — LOW (ref 1–3.3)
LYMPHOCYTES # BLD AUTO: 8 % — LOW (ref 13–44)
MCHC RBC-ENTMCNC: 29.7 PG — SIGNIFICANT CHANGE UP (ref 27–34)
MCHC RBC-ENTMCNC: 31.8 GM/DL — LOW (ref 32–36)
MCV RBC AUTO: 93.4 FL — SIGNIFICANT CHANGE UP (ref 80–100)
MONOCYTES # BLD AUTO: 1.1 K/UL — HIGH (ref 0–0.9)
MONOCYTES NFR BLD AUTO: 9.2 % — SIGNIFICANT CHANGE UP (ref 2–14)
NEUTROPHILS # BLD AUTO: 9.71 K/UL — HIGH (ref 1.8–7.4)
NEUTROPHILS NFR BLD AUTO: 80.8 % — HIGH (ref 43–77)
NITRITE UR-MCNC: NEGATIVE — SIGNIFICANT CHANGE UP
NRBC # BLD: 0 /100 WBCS — SIGNIFICANT CHANGE UP (ref 0–0)
PH UR: 6.5 — SIGNIFICANT CHANGE UP (ref 5–8)
PLATELET # BLD AUTO: 159 K/UL — SIGNIFICANT CHANGE UP (ref 150–400)
POTASSIUM SERPL-MCNC: 3.7 MMOL/L — SIGNIFICANT CHANGE UP (ref 3.5–5.3)
POTASSIUM SERPL-MCNC: 4 MMOL/L — SIGNIFICANT CHANGE UP (ref 3.5–5.3)
POTASSIUM SERPL-SCNC: 3.7 MMOL/L — SIGNIFICANT CHANGE UP (ref 3.5–5.3)
POTASSIUM SERPL-SCNC: 4 MMOL/L — SIGNIFICANT CHANGE UP (ref 3.5–5.3)
PROT ?TM UR-MCNC: 11 MG/DL — SIGNIFICANT CHANGE UP (ref 0–12)
PROT SERPL-MCNC: 7.2 G/DL — SIGNIFICANT CHANGE UP (ref 6–8.3)
PROT UR-MCNC: ABNORMAL
PROT/CREAT UR-RTO: 0.3 RATIO — HIGH (ref 0–0.2)
PROTHROM AB SERPL-ACNC: 11.6 SEC — SIGNIFICANT CHANGE UP (ref 10.5–13.4)
RBC # BLD: 3.94 M/UL — SIGNIFICANT CHANGE UP (ref 3.8–5.2)
RBC # FLD: 14 % — SIGNIFICANT CHANGE UP (ref 10.3–14.5)
RH IG SCN BLD-IMP: POSITIVE — SIGNIFICANT CHANGE UP
RSV RNA NPH QL NAA+NON-PROBE: SIGNIFICANT CHANGE UP
SARS-COV-2 RNA SPEC QL NAA+PROBE: SIGNIFICANT CHANGE UP
SODIUM SERPL-SCNC: 137 MMOL/L — SIGNIFICANT CHANGE UP (ref 135–145)
SODIUM SERPL-SCNC: 139 MMOL/L — SIGNIFICANT CHANGE UP (ref 135–145)
SP GR SPEC: 1.01 — LOW (ref 1.01–1.02)
UROBILINOGEN FLD QL: NEGATIVE — SIGNIFICANT CHANGE UP
WBC # BLD: 12.01 K/UL — HIGH (ref 3.8–10.5)
WBC # FLD AUTO: 12.01 K/UL — HIGH (ref 3.8–10.5)

## 2022-07-29 PROCEDURE — 72125 CT NECK SPINE W/O DYE: CPT | Mod: 26,MA

## 2022-07-29 PROCEDURE — 99285 EMERGENCY DEPT VISIT HI MDM: CPT | Mod: 25,GC

## 2022-07-29 PROCEDURE — 73610 X-RAY EXAM OF ANKLE: CPT | Mod: 26,RT

## 2022-07-29 PROCEDURE — 73522 X-RAY EXAM HIPS BI 3-4 VIEWS: CPT | Mod: 26

## 2022-07-29 PROCEDURE — 73630 X-RAY EXAM OF FOOT: CPT | Mod: 26,RT

## 2022-07-29 PROCEDURE — 73552 X-RAY EXAM OF FEMUR 2/>: CPT | Mod: 26,RT

## 2022-07-29 PROCEDURE — 71045 X-RAY EXAM CHEST 1 VIEW: CPT | Mod: 26

## 2022-07-29 PROCEDURE — 70450 CT HEAD/BRAIN W/O DYE: CPT | Mod: 26,MA

## 2022-07-29 PROCEDURE — 73560 X-RAY EXAM OF KNEE 1 OR 2: CPT | Mod: 26,RT

## 2022-07-29 PROCEDURE — 73590 X-RAY EXAM OF LOWER LEG: CPT | Mod: 26,RT

## 2022-07-29 PROCEDURE — 93010 ELECTROCARDIOGRAM REPORT: CPT

## 2022-07-29 RX ORDER — ACETAMINOPHEN 500 MG
975 TABLET ORAL EVERY 8 HOURS
Refills: 0 | Status: DISCONTINUED | OUTPATIENT
Start: 2022-07-29 | End: 2022-07-30

## 2022-07-29 RX ORDER — MAGNESIUM HYDROXIDE 400 MG/1
30 TABLET, CHEWABLE ORAL DAILY
Refills: 0 | Status: DISCONTINUED | OUTPATIENT
Start: 2022-07-29 | End: 2022-07-30

## 2022-07-29 RX ORDER — VENLAFAXINE HCL 75 MG
300 CAPSULE, EXT RELEASE 24 HR ORAL DAILY
Refills: 0 | Status: DISCONTINUED | OUTPATIENT
Start: 2022-07-29 | End: 2022-07-29

## 2022-07-29 RX ORDER — DIVALPROEX SODIUM 500 MG/1
250 TABLET, DELAYED RELEASE ORAL AT BEDTIME
Refills: 0 | Status: DISCONTINUED | OUTPATIENT
Start: 2022-07-29 | End: 2022-07-30

## 2022-07-29 RX ORDER — DIVALPROEX SODIUM 500 MG/1
250 TABLET, DELAYED RELEASE ORAL DAILY
Refills: 0 | Status: DISCONTINUED | OUTPATIENT
Start: 2022-07-29 | End: 2022-07-30

## 2022-07-29 RX ORDER — ACETAMINOPHEN 500 MG
975 TABLET ORAL ONCE
Refills: 0 | Status: COMPLETED | OUTPATIENT
Start: 2022-07-29 | End: 2022-07-29

## 2022-07-29 RX ORDER — VENLAFAXINE HCL 75 MG
375 CAPSULE, EXT RELEASE 24 HR ORAL DAILY
Refills: 0 | Status: DISCONTINUED | OUTPATIENT
Start: 2022-07-29 | End: 2022-07-30

## 2022-07-29 RX ORDER — VENLAFAXINE HCL 75 MG
75 CAPSULE, EXT RELEASE 24 HR ORAL DAILY
Refills: 0 | Status: DISCONTINUED | OUTPATIENT
Start: 2022-07-29 | End: 2022-07-29

## 2022-07-29 RX ORDER — TETANUS TOXOID, REDUCED DIPHTHERIA TOXOID AND ACELLULAR PERTUSSIS VACCINE, ADSORBED 5; 2.5; 8; 8; 2.5 [IU]/.5ML; [IU]/.5ML; UG/.5ML; UG/.5ML; UG/.5ML
0.5 SUSPENSION INTRAMUSCULAR ONCE
Refills: 0 | Status: COMPLETED | OUTPATIENT
Start: 2022-07-29 | End: 2022-07-29

## 2022-07-29 RX ORDER — OXYCODONE HYDROCHLORIDE 5 MG/1
5 TABLET ORAL EVERY 4 HOURS
Refills: 0 | Status: DISCONTINUED | OUTPATIENT
Start: 2022-07-29 | End: 2022-07-30

## 2022-07-29 RX ORDER — LANOLIN ALCOHOL/MO/W.PET/CERES
3 CREAM (GRAM) TOPICAL AT BEDTIME
Refills: 0 | Status: DISCONTINUED | OUTPATIENT
Start: 2022-07-29 | End: 2022-07-30

## 2022-07-29 RX ORDER — SODIUM CHLORIDE 9 MG/ML
1000 INJECTION INTRAMUSCULAR; INTRAVENOUS; SUBCUTANEOUS
Refills: 0 | Status: DISCONTINUED | OUTPATIENT
Start: 2022-07-29 | End: 2022-07-30

## 2022-07-29 RX ORDER — HEPARIN SODIUM 5000 [USP'U]/ML
5000 INJECTION INTRAVENOUS; SUBCUTANEOUS ONCE
Refills: 0 | Status: COMPLETED | OUTPATIENT
Start: 2022-07-29 | End: 2022-07-29

## 2022-07-29 RX ORDER — BUPROPION HYDROCHLORIDE 150 MG/1
150 TABLET, EXTENDED RELEASE ORAL DAILY
Refills: 0 | Status: DISCONTINUED | OUTPATIENT
Start: 2022-07-29 | End: 2022-07-30

## 2022-07-29 RX ORDER — VENLAFAXINE HCL 75 MG
37.5 CAPSULE, EXT RELEASE 24 HR ORAL DAILY
Refills: 0 | Status: DISCONTINUED | OUTPATIENT
Start: 2022-07-29 | End: 2022-07-29

## 2022-07-29 RX ORDER — OXYCODONE HYDROCHLORIDE 5 MG/1
2.5 TABLET ORAL EVERY 4 HOURS
Refills: 0 | Status: DISCONTINUED | OUTPATIENT
Start: 2022-07-29 | End: 2022-07-30

## 2022-07-29 RX ORDER — DIVALPROEX SODIUM 500 MG/1
500 TABLET, DELAYED RELEASE ORAL DAILY
Refills: 0 | Status: DISCONTINUED | OUTPATIENT
Start: 2022-07-29 | End: 2022-07-29

## 2022-07-29 RX ORDER — BUPROPION HYDROCHLORIDE 150 MG/1
75 TABLET, EXTENDED RELEASE ORAL AT BEDTIME
Refills: 0 | Status: DISCONTINUED | OUTPATIENT
Start: 2022-07-29 | End: 2022-07-30

## 2022-07-29 RX ORDER — SENNA PLUS 8.6 MG/1
2 TABLET ORAL AT BEDTIME
Refills: 0 | Status: DISCONTINUED | OUTPATIENT
Start: 2022-07-29 | End: 2022-07-30

## 2022-07-29 RX ORDER — BUPROPION HYDROCHLORIDE 150 MG/1
150 TABLET, EXTENDED RELEASE ORAL DAILY
Refills: 0 | Status: DISCONTINUED | OUTPATIENT
Start: 2022-07-29 | End: 2022-07-29

## 2022-07-29 RX ORDER — LEVOTHYROXINE SODIUM 125 MCG
100 TABLET ORAL DAILY
Refills: 0 | Status: DISCONTINUED | OUTPATIENT
Start: 2022-07-29 | End: 2022-07-30

## 2022-07-29 RX ORDER — BUPROPION HYDROCHLORIDE 150 MG/1
200 TABLET, EXTENDED RELEASE ORAL DAILY
Refills: 0 | Status: DISCONTINUED | OUTPATIENT
Start: 2022-07-29 | End: 2022-07-29

## 2022-07-29 RX ADMIN — BUPROPION HYDROCHLORIDE 75 MILLIGRAM(S): 150 TABLET, EXTENDED RELEASE ORAL at 22:53

## 2022-07-29 RX ADMIN — Medication 975 MILLIGRAM(S): at 13:04

## 2022-07-29 RX ADMIN — TETANUS TOXOID, REDUCED DIPHTHERIA TOXOID AND ACELLULAR PERTUSSIS VACCINE, ADSORBED 0.5 MILLILITER(S): 5; 2.5; 8; 8; 2.5 SUSPENSION INTRAMUSCULAR at 06:25

## 2022-07-29 RX ADMIN — Medication 975 MILLIGRAM(S): at 22:53

## 2022-07-29 RX ADMIN — DIVALPROEX SODIUM 250 MILLIGRAM(S): 500 TABLET, DELAYED RELEASE ORAL at 22:53

## 2022-07-29 RX ADMIN — Medication 1 MILLIGRAM(S): at 18:11

## 2022-07-29 RX ADMIN — DIVALPROEX SODIUM 250 MILLIGRAM(S): 500 TABLET, DELAYED RELEASE ORAL at 11:56

## 2022-07-29 RX ADMIN — Medication 375 MILLIGRAM(S): at 11:56

## 2022-07-29 RX ADMIN — Medication 975 MILLIGRAM(S): at 06:25

## 2022-07-29 RX ADMIN — Medication 975 MILLIGRAM(S): at 23:23

## 2022-07-29 RX ADMIN — SENNA PLUS 2 TABLET(S): 8.6 TABLET ORAL at 22:53

## 2022-07-29 RX ADMIN — Medication 100 MICROGRAM(S): at 11:55

## 2022-07-29 RX ADMIN — BUPROPION HYDROCHLORIDE 150 MILLIGRAM(S): 150 TABLET, EXTENDED RELEASE ORAL at 11:55

## 2022-07-29 NOTE — ED ADULT NURSE REASSESSMENT NOTE - NS ED NURSE REASSESS COMMENT FT1
Patient remains A&Ox4, in no distress, denies CP or SOB, respirations even and unlabored on room air, at present expressing R hip pain relief but feels slight pain to R knee, would like to hold off on any more medications. Encouraged to notify RN if pain worsens or she changes her mind about receiving additional medicine for pain. Patient aware of plan of care.

## 2022-07-29 NOTE — ED PROVIDER NOTE - PHYSICAL EXAMINATION
General: Alert and Orientated x 3. No apparent distress.  Head: Normocephalic and atraumatic.  Eyes: PERRLA with EOMI.  Neck: Supple. Trachea midline.   Cardiac: Normal S1 and S2 w/ RRR. No murmurs appreciated.   Pulmonary: Vesicular breath sounds bilaterally. No increased WOB. No wheezes or crackles.  Abdominal: Soft, non-tender. (+) bowel sounds appreciated in all 4 quadrants. No hepatosplenomegaly.   Neurologic: No focal sensory or motor deficits. cn2-12 grossly intact. unable to assess gait.   Musculoskeletal: RLE: Length discrepancy when compared to L. Externally rotates. Unable to range due to pain. Sensation intact. TTP at greater trochanter. Pedal pulse intact.   Skin: Color appropriate for race. Intact, warm, and well-perfused. R elbow abrasion.   Psychiatric: Appropriate mood and affect. No apparent risk to self or others.

## 2022-07-29 NOTE — CONSULT NOTE ADULT - PROBLEM SELECTOR RECOMMENDATION 9
Patient scheduled for an Open reduction and internal fixation of the right hip  No contraindication to scheduled procedure  NPO after MN  DVT and GI prophylaxis   pain meds as needed  follow for oversedation  GI regime to prevent constipation

## 2022-07-29 NOTE — H&P ADULT - HISTORY OF PRESENT ILLNESS
81[facility]pping getting groceries from her driveway during the night c/o severe R hip pain and inability to ambulate.  Patient denies headstrike or LOC. Patient denies radiation of pain. Patient denies numbness/tingling/burning in the RLE. No other bone/joint complaints. Patient is a community ambulator at baseline with use of both cane and occasionally a walker    PAST MEDICAL & SURGICAL HISTORY:  Depression      Hypothyroid      Kidney disease      Cataract      History of rectal surgery      History of tonsillectomy        MEDICATIONS  (STANDING):  acetaminophen     Tablet .. 975 milliGRAM(s) Oral every 8 hours  senna 2 Tablet(s) Oral at bedtime  sodium chloride 0.9%. 1000 milliLiter(s) (100 mL/Hr) IV Continuous <Continuous>    MEDICATIONS  (PRN):  magnesium hydroxide Suspension 30 milliLiter(s) Oral daily PRN Constipation  melatonin 3 milliGRAM(s) Oral at bedtime PRN Insomnia  oxyCODONE    IR 2.5 milliGRAM(s) Oral every 4 hours PRN Moderate Pain (4 - 6)  oxyCODONE    IR 5 milliGRAM(s) Oral every 4 hours PRN Severe Pain (7 - 10)    Allergies    azithromycin (Rash)    Intolerances                              11.7   12.01 )-----------( 159      ( 29 Jul 2022 06:40 )             36.8     07-29    139  |  107  |  54<H>  ----------------------------<  103<H>  4.0   |  18<L>  |  3.29<H>    Ca    9.1      29 Jul 2022 06:40    TPro  7.2  /  Alb  4.0  /  TBili  0.3  /  DBili  x   /  AST  14  /  ALT  8<L>  /  AlkPhos  78  07-29        T(C): 37.3 (07-29-22 @ 06:41), Max: 37.3 (07-29-22 @ 06:41)  HR: 77 (07-29-22 @ 07:23) (76 - 77)  BP: 131/68 (07-29-22 @ 07:23) (129/56 - 141/75)  RR: 17 (07-29-22 @ 07:23) (17 - 18)  SpO2: 99% (07-29-22 @ 07:23) (97% - 99%)  Wt(kg): --    PE   RLE:  Skin intact; No ecchymosis/soft tissue swelling  Compartments soft; + TTP about hip. No TTP to knee/leg/ankle/foot   ROM lmited 2/2 pain   Unable to SLR; + Log Roll/Heel Strike  Motor intact GS/TA/FHL/EHL  SILT L2-S1  +dp    LLE/BUE:   No bony TTP; Good ROM w/o pain; Exam Unremarkable    Imaging:  XR demonstrating R basicervical FN fx    81y Female with R basicervical FN fx    - Pain control  - NPO at midnight/IVF  - FU labs  - Medical clearance  - Plan for OR for IMN 7/30

## 2022-07-29 NOTE — ED PROVIDER NOTE - ATTENDING CONTRIBUTION TO CARE
Attending MD Flores: I personally have seen and examined this patient.  Resident note reviewed and agree on plan of care and except where noted.  See below for details.     seen in [place]    81F with PMH/PSH including HTN HLD hypothyroidism presents to the ED with R hip pain s/p fall.  Reports was outside and tripped getting groceries barefoot from her driveway during the night.  Denies preceding dizziness, weakness, sensory changes.  Denies LOC, hitting head.  Reports she pressed her own life alert. Reports typically uses cane or walker.  Denies abdominal pain, nausea, vomiting, diarrhea, bloody or black stools. Denies dysuria, hematuria, change in urinary habits including frequency, urgency. Denies chest pain, shortness of breath, palpitations. Denies recent illness, fevers, chills.    Exam:   General: NAD  HENT: head NCAT, airway patent  Eyes: no conjunctival injection   Lungs: lungs CTAB with good inspiratory effort, no wheezing, no rhonchi, no rales  Cardiac: +S1S2  GI: abdomen soft with +BS, NT, ND  : no CVAT  MSK: FROM at neck, no tenderness to midline palpation, no stepoffs along length of spine, RLE foreshortened, +tenderness to R hip, pelvis stable, +2 DPs, no breaks in skin, compartments soft, R elbow abrasion  Neuro: CN 2-12 grossly intact, moving all extremities spontaneously, sensory grossly intact, no gross neuro deficits    A/P: 81F with RLE pain s/p fall suspect fx, will obtain labs XRs for bony injury eval, will give pain control, ortho as needed, CTH/CTCSp, dave  Psych: normal mood and affect

## 2022-07-29 NOTE — ED ADULT NURSE NOTE - NSIMPLEMENTINTERV_GEN_ALL_ED
Implemented All Fall Risk Interventions:  [place] to call system. Call bell, personal items and telephone within reach. Instruct patient to call for assistance. Room bathroom lighting operational. Non-slip footwear when patient is off stretcher. Physically safe environment: no spills, clutter or unnecessary equipment. Stretcher in lowest position, wheels locked, appropriate side rails in place. Provide visual cue, wrist band, yellow gown, etc. Monitor gait and stability. Monitor for mental status changes and reorient to person, place, and time. Review medications for side effects contributing to fall risk. Reinforce activity limits and safety measures with patient and family.

## 2022-07-29 NOTE — CONSULT NOTE ADULT - SUBJECTIVE AND OBJECTIVE BOX
[facility]  [phone]  NEPHROLOGY      INITIAL CONSULT NOTE  --------------------------------------------------------------------------------  HPI:      81y Female with hx of depression with Lithium use for almost 10 years no Li use recently presents s/p mech fall after tripping getting groceries from her driveway during the night c/o severe R hip pain and inability to ambulate.  Dx with R FN fx. now for OR.  has hx of ckd and when her cr is at 4 "then it is too high ".   has known ckd. states increase thirst and urination         PAST HISTORY  --------------------------------------------------------------------------------  PAST MEDICAL & SURGICAL HISTORY:  Depression      Hypothyroid      Kidney disease      Cataract      History of rectal surgery      History of tonsillectomy        FAMILY HISTORY:    PAST SOCIAL HISTORY:    ALLERGIES & MEDICATIONS  --------------------------------------------------------------------------------  Allergies    azithromycin (Rash)    Intolerances      Standing Inpatient Medications  acetaminophen     Tablet .. 975 milliGRAM(s) Oral every 8 hours  buPROPion . 75 milliGRAM(s) Oral at bedtime  buPROPion XL (24-Hour) . 150 milliGRAM(s) Oral daily  diVALproex  milliGRAM(s) Oral daily  diVALproex  milliGRAM(s) Oral daily  levothyroxine 100 MICROGram(s) Oral daily  LORazepam     Tablet 0.5 milliGRAM(s) Oral daily  LORazepam     Tablet 1 milliGRAM(s) Oral daily  senna 2 Tablet(s) Oral at bedtime  sodium chloride 0.9%. 1000 milliLiter(s) IV Continuous <Continuous>  venlafaxine XR. 375 milliGRAM(s) Oral daily    PRN Inpatient Medications  magnesium hydroxide Suspension 30 milliLiter(s) Oral daily PRN  melatonin 3 milliGRAM(s) Oral at bedtime PRN  oxyCODONE    IR 2.5 milliGRAM(s) Oral every 4 hours PRN  oxyCODONE    IR 5 milliGRAM(s) Oral every 4 hours PRN      REVIEW OF SYSTEMS  --------------------------------------------------------------------------------  Gen: No  fevers/chills   Skin: No rashes  Head/Eyes/Ears/Mouth: No headache; Normal hearing;  No sinus pain/discomfort, sore throat  Respiratory: No dyspnea, cough, wheezing  CV: No chest pain, orthopnea  GI: No abdominal pain, diarrhea, nausea, vomiting, melena  : No dysuria, decrease urination or hesitancy urinating  hematuria, nocturia  MSK:  + right hip  joint pain/swelling  Neuro: No dizziness/lightheadedness\  also with no edema         VITALS/PHYSICAL EXAM  --------------------------------------------------------------------------------  T(C): 36.9 (07-29-22 @ 17:16), Max: 37.3 (07-29-22 @ 06:41)  HR: 74 (07-29-22 @ 17:16) (74 - 79)  BP: 109/68 (07-29-22 @ 17:16) (99/79 - 141/75)  RR: 18 (07-29-22 @ 17:16) (17 - 18)  SpO2: 98% (07-29-22 @ 17:16) (96% - 99%)  Wt(kg): --  Height (cm): 167.6 (07-29-22 @ 03:16)  Weight (kg): 77.1 (07-29-22 @ 03:16)  BMI (kg/m2): 27.4 (07-29-22 @ 03:16)  BSA (m2): 1.87 (07-29-22 @ 03:16)      07-29-22 @ 07:01  -  07-29-22 @ 19:05  --------------------------------------------------------  IN: 260 mL / OUT: 700 mL / NET: -440 mL      Physical Exam:  	Gen: Non toxic comfortable appearing   	no jvd  	Pulm: decrease bs  no rales or ronchi or wheezing  	CV: RRR, S1S2; no rub  	Abd: +BS, soft, nontender/nondistended  	: No suprapubic tenderness  	UE: Warm, no cyanosis  no clubbing,  no edema  	LE: Warm, no cyanosis  no clubbing, no edema except ecchymosis left toes   	Neuro: alert and oriented. speech coherent   	Psych:  emotion labile   	Skin: Warm, no decrease skin turgor   	  LABS/STUDIES  --------------------------------------------------------------------------------              11.7   12.01 >-----------<  159      [07-29-22 @ 06:40]              36.8     139  |  107  |  54  ----------------------------<  103      [07-29-22 @ 06:40]  4.0   |  18  |  3.29        Ca     9.1     [07-29-22 @ 06:40]    TPro  7.2  /  Alb  4.0  /  TBili  0.3  /  DBili  x   /  AST  14  /  ALT  8   /  AlkPhos  78  [07-29-22 @ 06:40]    PT/INR: PT 11.6 , INR 1.00       [07-29-22 @ 06:40]  PTT: 26.7       [07-29-22 @ 06:40]      Creatinine Trend:  SCr 3.29 [07-29 @ 06:40]

## 2022-07-29 NOTE — CONSULT NOTE ADULT - PROBLEM SELECTOR RECOMMENDATION 2
continue to monitor Creatinine  start gentle hydration  Avoid nephrotoxic agents  Nephrology to see the Pt

## 2022-07-29 NOTE — ED ADULT NURSE NOTE - OBJECTIVE STATEMENT
80 y/o female BIBEMS c/o fall. As per EMS report "Pt coming in from home s/p mechanical slip and fall while getting groceries out of her car at 2am this morning, pt was found in between 2 cars, no LOC, no blood thinners, pt endorsing RLE pain, wouldn't extend legs, son at bedside". Pt presents to [facility] A&Ox3, pt endorsing mechanical slip and fall in driveway this morning while getting groceries out of the car at 2 am, pt fell landing on R. side and was unable to get up, used life alert button to call EMS, pt unable to extend R. leg due to pain, endorsing R. hip pain, ecchymosis noted to R. shin. PMHx kidney disease, hypothyroidism, depression. Pt denies chest pain, SOB/difficulty breathing, fever/chills, HA, abd pain, N/V/D, lightheadedness/dizziness, numbness/tingling. Pt A&Ox3, breathing spontaneous and unlabored, bed locked and in lowest position, report given to Primary RN Rona.

## 2022-07-29 NOTE — CONSULT NOTE ADULT - SUBJECTIVE AND OBJECTIVE BOX
Patient is a 82 y/o F w/ PMHx sig for HTN, HLD, and Hypothyroidism who presents to the ED with R hip pain. Around 2AM patient was walking outside house when she fell on to her right side. Used life alert to get help. No head injury loc, amnesia. Unwitnessed. Unable to ambulate since then. No hx of hip surgery. Severe R hip pain, non-radiating. Also c/o R elbow abrasion. Patient was brought to [facility] for further evaluation and treatment. In the ED she was found to have a right hip fracture. Patient is scheduled for an Open reduction and internal fixation of the right hip. Patient seen in ED resting comfortably.       PAST MEDICAL & SURGICAL HISTORY:  Depression      Hypothyroid      chronic Kidney disease      Cataract      History of rectal surgery      History of tonsillectomy            MEDICATIONS  (STANDING):  acetaminophen     Tablet .. 975 milliGRAM(s) Oral every 8 hours  buPROPion . 75 milliGRAM(s) Oral at bedtime  buPROPion XL (24-Hour) . 150 milliGRAM(s) Oral daily  diVALproex  milliGRAM(s) Oral daily  diVALproex  milliGRAM(s) Oral daily  levothyroxine 100 MICROGram(s) Oral daily  LORazepam     Tablet 0.5 milliGRAM(s) Oral daily  LORazepam     Tablet 1 milliGRAM(s) Oral daily  senna 2 Tablet(s) Oral at bedtime  sodium chloride 0.9%. 1000 milliLiter(s) (100 mL/Hr) IV Continuous <Continuous>  venlafaxine XR. 375 milliGRAM(s) Oral daily    MEDICATIONS  (PRN):  magnesium hydroxide Suspension 30 milliLiter(s) Oral daily PRN Constipation  melatonin 3 milliGRAM(s) Oral at bedtime PRN Insomnia  oxyCODONE    IR 2.5 milliGRAM(s) Oral every 4 hours PRN Moderate Pain (4 - 6)  oxyCODONE    IR 5 milliGRAM(s) Oral every 4 hours PRN Severe Pain (7 - 10)    Social Hx:  Tobacco: Neg  ETOH: Neg  Drugs:  Neg    Family Hx:  As per my conversation with the patient, non contributory     ROS  CONSTITUTIONAL: No weakness, fevers or chills  EYES/ENT: No visual changes;  No vertigo or throat pain   NECK: No pain or stiffness  RESPIRATORY: No cough, wheezing, hemoptysis; No shortness of breath  CARDIOVASCULAR: No chest pain or palpitations  GASTROINTESTINAL: No abdominal or epigastric pain. No nausea, vomiting, or hematemesis; No diarrhea or constipation. No melena or hematochezia.  GENITOURINARY: No dysuria, frequency or hematuria  NEUROLOGICAL: No numbness or weakness  SKIN: No itching, burning, rashes, or lesions   MUSCULOSKELETAL: right hip pain      INTERVAL HPI/OVERNIGHT EVENTS:  T(C): 36.5 (07-29-22 @ 14:38), Max: 37.3 (07-29-22 @ 06:41)  HR: 75 (07-29-22 @ 14:38) (74 - 79)  BP: 106/61 (07-29-22 @ 14:38) (99/79 - 141/75)  RR: 18 (07-29-22 @ 14:38) (17 - 18)  SpO2: 96% (07-29-22 @ 14:38) (96% - 99%)  Wt(kg): --  I&O's Summary      PHYSICAL EXAM:  GENERAL: NAD, well-groomed, well-developed  HEAD:  Atraumatic, Normocephalic  EYES: EOMI, PERRLA, conjunctiva and sclera clear  ENMT: No tonsillar erythema, exudates, or enlargement; Moist mucous membranes, Good dentition, No lesions  NECK: Supple, No JVD, Normal thyroid  NERVOUS SYSTEM:  Alert & Oriented X3, Good concentration; Motor Strength 5/5 B/L upper and lower extremities; DTRs 2+ intact and symmetric  CHEST/LUNG: Clear to percussion bilaterally; No rales, rhonchi, wheezing, or rubs  HEART: Regular rate and rhythm; No murmurs, rubs, or gallops  ABDOMEN: Soft, Nontender, Nondistended; Bowel sounds present  EXTREMITIES:  2+ Peripheral Pulses, No clubbing, cyanosis, or edema  LYMPH: No lymphadenopathy noted  SKIN: No rashes or lesions        LABS:                        11.7   12.01 )-----------( 159      ( 29 Jul 2022 06:40 )             36.8     07-29    139  |  107  |  54<H>  ----------------------------<  103<H>  4.0   |  18<L>  |  3.29<H>    Ca    9.1      29 Jul 2022 06:40    TPro  7.2  /  Alb  4.0  /  TBili  0.3  /  DBili  x   /  AST  14  /  ALT  8<L>  /  AlkPhos  78  07-29    PT/INR - ( 29 Jul 2022 06:40 )   PT: 11.6 sec;   INR: 1.00 ratio         PTT - ( 29 Jul 2022 06:40 )  PTT:26.7 sec    CAPILLARY BLOOD GLUCOSE                Radiology reports: Patient is a 82 y/o F w/ PMHx sig for HTN, HLD, and Hypothyroidism who presents to the ED with R hip pain. Around 2AM patient was walking outside house when she fell on to her right side. Used life alert to get help. No head injury loc, amnesia. Unwitnessed. Unable to ambulate since then. No hx of hip surgery. Severe R hip pain, non-radiating. Also c/o R elbow abrasion. Patient was brought to [facility] for further evaluation and treatment. In the ED she was found to have a right hip fracture. Patient is scheduled for an Open reduction and internal fixation of the right hip. Patient seen in ED resting comfortably.       PAST MEDICAL & SURGICAL HISTORY:  Depression      Hypothyroid      chronic Kidney disease      Cataract      History of rectal surgery      History of tonsillectomy            MEDICATIONS  (STANDING):  acetaminophen     Tablet .. 975 milliGRAM(s) Oral every 8 hours  buPROPion . 75 milliGRAM(s) Oral at bedtime  buPROPion XL (24-Hour) . 150 milliGRAM(s) Oral daily  diVALproex  milliGRAM(s) Oral daily  diVALproex  milliGRAM(s) Oral daily  levothyroxine 100 MICROGram(s) Oral daily  LORazepam     Tablet 0.5 milliGRAM(s) Oral daily  LORazepam     Tablet 1 milliGRAM(s) Oral daily  senna 2 Tablet(s) Oral at bedtime  sodium chloride 0.9%. 1000 milliLiter(s) (100 mL/Hr) IV Continuous <Continuous>  venlafaxine XR. 375 milliGRAM(s) Oral daily    MEDICATIONS  (PRN):  magnesium hydroxide Suspension 30 milliLiter(s) Oral daily PRN Constipation  melatonin 3 milliGRAM(s) Oral at bedtime PRN Insomnia  oxyCODONE    IR 2.5 milliGRAM(s) Oral every 4 hours PRN Moderate Pain (4 - 6)  oxyCODONE    IR 5 milliGRAM(s) Oral every 4 hours PRN Severe Pain (7 - 10)    Social Hx:  Tobacco: Neg  ETOH: Neg  Drugs:  Neg    Family Hx:  As per my conversation with the patient, non contributory     ROS  CONSTITUTIONAL: No weakness, fevers or chills  EYES/ENT: No visual changes;  No vertigo or throat pain   NECK: No pain or stiffness  RESPIRATORY: No cough, wheezing, hemoptysis; No shortness of breath  CARDIOVASCULAR: No chest pain or palpitations  GASTROINTESTINAL: No abdominal or epigastric pain. No nausea, vomiting, or hematemesis; No diarrhea or constipation. No melena or hematochezia.  GENITOURINARY: No dysuria, frequency or hematuria  NEUROLOGICAL: No numbness or weakness  SKIN: No itching, burning, rashes, or lesions   MUSCULOSKELETAL: right hip pain      INTERVAL HPI/OVERNIGHT EVENTS:  T(C): 36.5 (07-29-22 @ 14:38), Max: 37.3 (07-29-22 @ 06:41)  HR: 75 (07-29-22 @ 14:38) (74 - 79)  BP: 106/61 (07-29-22 @ 14:38) (99/79 - 141/75)  RR: 18 (07-29-22 @ 14:38) (17 - 18)  SpO2: 96% (07-29-22 @ 14:38) (96% - 99%)  Wt(kg): --  I&O's Summary      PHYSICAL EXAM:  GENERAL: NAD, well-groomed, well-developed  HEAD:  Atraumatic, Normocephalic  EYES: EOMI, PERRLA, conjunctiva and sclera clear  ENMT: No tonsillar erythema, exudates, or enlargement; Moist mucous membranes, Good dentition, No lesions  NECK: Supple, No JVD, Normal thyroid  NERVOUS SYSTEM:  Alert & Oriented X3, Good concentration; Motor Strength 5/5 B/L upper and lower extremities; DTRs 2+ intact and symmetric  CHEST/LUNG: Clear to percussion bilaterally; No rales, rhonchi, wheezing, or rubs  HEART: Regular rate and rhythm; No murmurs, rubs, or gallops  ABDOMEN: Soft, Nontender, Nondistended; Bowel sounds present  EXTREMITIES:  2+ Peripheral Pulses, No clubbing, cyanosis, or edema  LYMPH: No lymphadenopathy noted  SKIN: No rashes or lesions        LABS:                        11.7   12.01 )-----------( 159      ( 29 Jul 2022 06:40 )             36.8     07-29    139  |  107  |  54<H>  ----------------------------<  103<H>  4.0   |  18<L>  |  3.29<H>    Ca    9.1      29 Jul 2022 06:40    TPro  7.2  /  Alb  4.0  /  TBili  0.3  /  DBili  x   /  AST  14  /  ALT  8<L>  /  AlkPhos  78  07-29    PT/INR - ( 29 Jul 2022 06:40 )   PT: 11.6 sec;   INR: 1.00 ratio         PTT - ( 29 Jul 2022 06:40 )  PTT:26.7 sec    EKG Sinus rhythm @ 75 1st degree AVB LAFB, LVH

## 2022-07-29 NOTE — ED PROVIDER NOTE - OBJECTIVE STATEMENT
Patient is a 82 y/o F w/ PMHx sig for HTN, HLD, and Hypothryoidism who presents to the ED with R hip pain. Around 2AM patient was walking outside house when she fell on to her right side. Used life alert to get help. No head inury, loc, amnesia. Unwitnessed. Unable to ambulate since then. No hx of hip surgery. Severe R hip pain, non-radiating. Also c/o R elbow abrasion. Unknown last tetanus.

## 2022-07-29 NOTE — ED PROVIDER NOTE - CLINICAL SUMMARY MEDICAL DECISION MAKING FREE TEXT BOX
80 y/o F p/w R hip pain s/p fall, unwitnessed. Vitals stable. Physical exam w/ limb length discrepancy and externally rotated extremity. Concern for fracture. will get xr of entire rle and b/l hips. ct head and cervical spine. pre-op

## 2022-07-29 NOTE — CONSULT NOTE ADULT - ASSESSMENT
81y Female with hx of depression with Lithium use for almost 10 years no Li use recently presents s/p mech fall. Dx with R FN fx. now for OR. states has hx of ckd and when her cr is at 4 "then it is too high ".  states has known ckd. also with  increase thirst and urination     1- CKD IV suspected   2- polydipsia   3- hx Lithium use  4- leukocytosis       to have urinalysis and urine pro/cr ratio     to have renal sono when stable   will stand at risk for worsening renal function this was d/w pt and her family at bedside  avoid nsaids and nephrotoxic agents   pt is on nacl ivf check repeat na level to evaluate for hypernatremia in this pt who has hx of lithium use and polydipsia   urinalysis to also r/o UTI   d/w ortho team     
82yo woman presents after a fall at home with a right hip fracture. Patient is scheduled for an Open reduction and internal fixation of the right hip.

## 2022-07-30 LAB
ANION GAP SERPL CALC-SCNC: 11 MMOL/L — SIGNIFICANT CHANGE UP (ref 5–17)
ANION GAP SERPL CALC-SCNC: 12 MMOL/L — SIGNIFICANT CHANGE UP (ref 5–17)
APTT BLD: 27.5 SEC — SIGNIFICANT CHANGE UP (ref 27.5–35.5)
BLD GP AB SCN SERPL QL: NEGATIVE — SIGNIFICANT CHANGE UP
BUN SERPL-MCNC: 40 MG/DL — HIGH (ref 7–23)
BUN SERPL-MCNC: 42 MG/DL — HIGH (ref 7–23)
CALCIUM SERPL-MCNC: 8.2 MG/DL — LOW (ref 8.4–10.5)
CALCIUM SERPL-MCNC: 8.3 MG/DL — LOW (ref 8.4–10.5)
CHLORIDE SERPL-SCNC: 109 MMOL/L — HIGH (ref 96–108)
CHLORIDE SERPL-SCNC: 109 MMOL/L — HIGH (ref 96–108)
CO2 SERPL-SCNC: 17 MMOL/L — LOW (ref 22–31)
CO2 SERPL-SCNC: 18 MMOL/L — LOW (ref 22–31)
CREAT SERPL-MCNC: 2.74 MG/DL — HIGH (ref 0.5–1.3)
CREAT SERPL-MCNC: 2.87 MG/DL — HIGH (ref 0.5–1.3)
EGFR: 16 ML/MIN/1.73M2 — LOW
EGFR: 17 ML/MIN/1.73M2 — LOW
GLUCOSE SERPL-MCNC: 59 MG/DL — LOW (ref 70–99)
GLUCOSE SERPL-MCNC: 82 MG/DL — SIGNIFICANT CHANGE UP (ref 70–99)
HCT VFR BLD CALC: 33.2 % — LOW (ref 34.5–45)
HCT VFR BLD CALC: 35 % — SIGNIFICANT CHANGE UP (ref 34.5–45)
HGB BLD-MCNC: 10.5 G/DL — LOW (ref 11.5–15.5)
HGB BLD-MCNC: 11 G/DL — LOW (ref 11.5–15.5)
INR BLD: 1.03 RATIO — SIGNIFICANT CHANGE UP (ref 0.88–1.16)
MCHC RBC-ENTMCNC: 29.7 PG — SIGNIFICANT CHANGE UP (ref 27–34)
MCHC RBC-ENTMCNC: 30.1 PG — SIGNIFICANT CHANGE UP (ref 27–34)
MCHC RBC-ENTMCNC: 31.4 GM/DL — LOW (ref 32–36)
MCHC RBC-ENTMCNC: 31.6 GM/DL — LOW (ref 32–36)
MCV RBC AUTO: 94.1 FL — SIGNIFICANT CHANGE UP (ref 80–100)
MCV RBC AUTO: 95.6 FL — SIGNIFICANT CHANGE UP (ref 80–100)
NRBC # BLD: 0 /100 WBCS — SIGNIFICANT CHANGE UP (ref 0–0)
NRBC # BLD: 0 /100 WBCS — SIGNIFICANT CHANGE UP (ref 0–0)
PLATELET # BLD AUTO: 148 K/UL — LOW (ref 150–400)
PLATELET # BLD AUTO: 148 K/UL — LOW (ref 150–400)
POTASSIUM SERPL-MCNC: 3.9 MMOL/L — SIGNIFICANT CHANGE UP (ref 3.5–5.3)
POTASSIUM SERPL-MCNC: 4.3 MMOL/L — SIGNIFICANT CHANGE UP (ref 3.5–5.3)
POTASSIUM SERPL-SCNC: 3.9 MMOL/L — SIGNIFICANT CHANGE UP (ref 3.5–5.3)
POTASSIUM SERPL-SCNC: 4.3 MMOL/L — SIGNIFICANT CHANGE UP (ref 3.5–5.3)
PROTHROM AB SERPL-ACNC: 12 SEC — SIGNIFICANT CHANGE UP (ref 10.5–13.4)
RBC # BLD: 3.53 M/UL — LOW (ref 3.8–5.2)
RBC # BLD: 3.66 M/UL — LOW (ref 3.8–5.2)
RBC # FLD: 14 % — SIGNIFICANT CHANGE UP (ref 10.3–14.5)
RBC # FLD: 14.2 % — SIGNIFICANT CHANGE UP (ref 10.3–14.5)
RH IG SCN BLD-IMP: POSITIVE — SIGNIFICANT CHANGE UP
SODIUM SERPL-SCNC: 137 MMOL/L — SIGNIFICANT CHANGE UP (ref 135–145)
SODIUM SERPL-SCNC: 139 MMOL/L — SIGNIFICANT CHANGE UP (ref 135–145)
WBC # BLD: 11.26 K/UL — HIGH (ref 3.8–10.5)
WBC # BLD: 7.82 K/UL — SIGNIFICANT CHANGE UP (ref 3.8–10.5)
WBC # FLD AUTO: 11.26 K/UL — HIGH (ref 3.8–10.5)
WBC # FLD AUTO: 7.82 K/UL — SIGNIFICANT CHANGE UP (ref 3.8–10.5)

## 2022-07-30 PROCEDURE — 27245 TREAT THIGH FRACTURE: CPT | Mod: RT

## 2022-07-30 DEVICE — IMPLANTABLE DEVICE: Type: IMPLANTABLE DEVICE | Site: RIGHT | Status: FUNCTIONAL

## 2022-07-30 DEVICE — SCREW LCK 5X36MM TI: Type: IMPLANTABLE DEVICE | Site: RIGHT | Status: FUNCTIONAL

## 2022-07-30 DEVICE — BLADE HELICAL 11MMX95MM: Type: IMPLANTABLE DEVICE | Site: RIGHT | Status: FUNCTIONAL

## 2022-07-30 RX ORDER — ENOXAPARIN SODIUM 100 MG/ML
40 INJECTION SUBCUTANEOUS EVERY 24 HOURS
Refills: 0 | Status: DISCONTINUED | OUTPATIENT
Start: 2022-07-30 | End: 2022-07-30

## 2022-07-30 RX ORDER — ACETAMINOPHEN 500 MG
975 TABLET ORAL EVERY 8 HOURS
Refills: 0 | Status: DISCONTINUED | OUTPATIENT
Start: 2022-07-30 | End: 2022-07-30

## 2022-07-30 RX ORDER — CEFAZOLIN SODIUM 1 G
2000 VIAL (EA) INJECTION EVERY 8 HOURS
Refills: 0 | Status: DISCONTINUED | OUTPATIENT
Start: 2022-07-30 | End: 2022-07-30

## 2022-07-30 RX ORDER — ENOXAPARIN SODIUM 100 MG/ML
30 INJECTION SUBCUTANEOUS EVERY 24 HOURS
Refills: 0 | Status: DISCONTINUED | OUTPATIENT
Start: 2022-07-31 | End: 2022-08-03

## 2022-07-30 RX ORDER — POLYETHYLENE GLYCOL 3350 17 G/17G
17 POWDER, FOR SOLUTION ORAL DAILY
Refills: 0 | Status: DISCONTINUED | OUTPATIENT
Start: 2022-07-30 | End: 2022-08-03

## 2022-07-30 RX ORDER — ACETAMINOPHEN 500 MG
1000 TABLET ORAL ONCE
Refills: 0 | Status: COMPLETED | OUTPATIENT
Start: 2022-07-30 | End: 2022-07-30

## 2022-07-30 RX ORDER — ONDANSETRON 8 MG/1
4 TABLET, FILM COATED ORAL ONCE
Refills: 0 | Status: DISCONTINUED | OUTPATIENT
Start: 2022-07-30 | End: 2022-07-30

## 2022-07-30 RX ORDER — BUPROPION HYDROCHLORIDE 150 MG/1
150 TABLET, EXTENDED RELEASE ORAL DAILY
Refills: 0 | Status: DISCONTINUED | OUTPATIENT
Start: 2022-07-30 | End: 2022-07-31

## 2022-07-30 RX ORDER — MAGNESIUM HYDROXIDE 400 MG/1
30 TABLET, CHEWABLE ORAL DAILY
Refills: 0 | Status: DISCONTINUED | OUTPATIENT
Start: 2022-07-30 | End: 2022-08-03

## 2022-07-30 RX ORDER — SODIUM CHLORIDE 9 MG/ML
1000 INJECTION INTRAMUSCULAR; INTRAVENOUS; SUBCUTANEOUS
Refills: 0 | Status: DISCONTINUED | OUTPATIENT
Start: 2022-07-30 | End: 2022-07-31

## 2022-07-30 RX ORDER — ACETAMINOPHEN 500 MG
975 TABLET ORAL EVERY 8 HOURS
Refills: 0 | Status: DISCONTINUED | OUTPATIENT
Start: 2022-07-31 | End: 2022-08-03

## 2022-07-30 RX ORDER — OXYCODONE HYDROCHLORIDE 5 MG/1
2.5 TABLET ORAL EVERY 4 HOURS
Refills: 0 | Status: DISCONTINUED | OUTPATIENT
Start: 2022-07-30 | End: 2022-07-31

## 2022-07-30 RX ORDER — VENLAFAXINE HCL 75 MG
375 CAPSULE, EXT RELEASE 24 HR ORAL DAILY
Refills: 0 | Status: DISCONTINUED | OUTPATIENT
Start: 2022-07-30 | End: 2022-08-03

## 2022-07-30 RX ORDER — OXYCODONE HYDROCHLORIDE 5 MG/1
5 TABLET ORAL EVERY 4 HOURS
Refills: 0 | Status: DISCONTINUED | OUTPATIENT
Start: 2022-07-30 | End: 2022-07-31

## 2022-07-30 RX ORDER — SENNA PLUS 8.6 MG/1
2 TABLET ORAL AT BEDTIME
Refills: 0 | Status: DISCONTINUED | OUTPATIENT
Start: 2022-07-30 | End: 2022-08-03

## 2022-07-30 RX ORDER — DIVALPROEX SODIUM 500 MG/1
250 TABLET, DELAYED RELEASE ORAL AT BEDTIME
Refills: 0 | Status: DISCONTINUED | OUTPATIENT
Start: 2022-07-30 | End: 2022-07-31

## 2022-07-30 RX ORDER — TRAMADOL HYDROCHLORIDE 50 MG/1
50 TABLET ORAL EVERY 8 HOURS
Refills: 0 | Status: DISCONTINUED | OUTPATIENT
Start: 2022-07-30 | End: 2022-07-31

## 2022-07-30 RX ORDER — LEVOTHYROXINE SODIUM 125 MCG
100 TABLET ORAL DAILY
Refills: 0 | Status: DISCONTINUED | OUTPATIENT
Start: 2022-07-30 | End: 2022-08-03

## 2022-07-30 RX ORDER — HYDROMORPHONE HYDROCHLORIDE 2 MG/ML
0.25 INJECTION INTRAMUSCULAR; INTRAVENOUS; SUBCUTANEOUS
Refills: 0 | Status: DISCONTINUED | OUTPATIENT
Start: 2022-07-30 | End: 2022-07-30

## 2022-07-30 RX ORDER — CEFAZOLIN SODIUM 1 G
2000 VIAL (EA) INJECTION ONCE
Refills: 0 | Status: COMPLETED | OUTPATIENT
Start: 2022-07-30 | End: 2022-07-30

## 2022-07-30 RX ORDER — ONDANSETRON 8 MG/1
4 TABLET, FILM COATED ORAL EVERY 6 HOURS
Refills: 0 | Status: DISCONTINUED | OUTPATIENT
Start: 2022-07-30 | End: 2022-08-03

## 2022-07-30 RX ORDER — LANOLIN ALCOHOL/MO/W.PET/CERES
3 CREAM (GRAM) TOPICAL AT BEDTIME
Refills: 0 | Status: DISCONTINUED | OUTPATIENT
Start: 2022-07-30 | End: 2022-08-03

## 2022-07-30 RX ADMIN — Medication 100 MICROGRAM(S): at 06:00

## 2022-07-30 RX ADMIN — DIVALPROEX SODIUM 250 MILLIGRAM(S): 500 TABLET, DELAYED RELEASE ORAL at 22:43

## 2022-07-30 RX ADMIN — Medication 1000 MILLIGRAM(S): at 23:14

## 2022-07-30 RX ADMIN — Medication 400 MILLIGRAM(S): at 22:44

## 2022-07-30 RX ADMIN — Medication 975 MILLIGRAM(S): at 06:01

## 2022-07-30 RX ADMIN — Medication 975 MILLIGRAM(S): at 06:31

## 2022-07-30 RX ADMIN — Medication 1000 MILLIGRAM(S): at 16:25

## 2022-07-30 RX ADMIN — Medication 375 MILLIGRAM(S): at 13:10

## 2022-07-30 RX ADMIN — Medication 100 MILLIGRAM(S): at 20:54

## 2022-07-30 RX ADMIN — Medication 400 MILLIGRAM(S): at 15:57

## 2022-07-30 RX ADMIN — BUPROPION HYDROCHLORIDE 150 MILLIGRAM(S): 150 TABLET, EXTENDED RELEASE ORAL at 13:09

## 2022-07-30 RX ADMIN — SENNA PLUS 2 TABLET(S): 8.6 TABLET ORAL at 22:43

## 2022-07-30 RX ADMIN — DIVALPROEX SODIUM 250 MILLIGRAM(S): 500 TABLET, DELAYED RELEASE ORAL at 06:01

## 2022-07-30 NOTE — CHART NOTE - NSCHARTNOTEFT_GEN_A_CORE
POC    Resting without complaint in PACU  Family member @ bedside    No Chest Pain, SOB, N/V.    T(C): 36 (07-30-22 @ 09:14), Max: 36.9 (07-29-22 @ 17:16)  HR: 99 (07-30-22 @ 10:15) (74 - 104)  BP: 119/58 (07-30-22 @ 10:15) (99/79 - 156/69)  RR: 16 (07-30-22 @ 10:15) (16 - 18)  SpO2: 98% (07-30-22 @ 10:15) (94% - 99%)  Wt(kg): --    Exam:  Alert and [place], No Acute Distress  Card: +S1/S2, RRR  Pulm: CTAB  Abdomen soft / benign  Arnold  (n)   EXT        Dressing (s) x 2C/D  [x ]            Calves soft       (+) DF  PF  FHL/ EHL 5/5        No Sensory Deficits noted        1+ pulses                          11.0<L>  11.26<H> )-----------( 148<L>    ( 30 Jul 2022 10:03 )             35.0      07-30    137  |  109<H>  |  42<H>  ----------------------------<  59<L>  3.9   |  17<L>  |  2.87<H>    A/P: S/p Intramedullary nailing of femur    -PT/OT-WBAT-  -Chk AM Labs  -DVT PPx: lovenox QD  -Pain Control PO/IV Pain Rx  -Continue Current Tx  -Dispo planning: TBD      ***See Above  Dejon COHN  Orthopedics  B: [id number]  S: [phone] POC    Resting without complaint in PACU  Family member @ bedside    No Chest Pain, SOB, N/V.    T(C): 36 (07-30-22 @ 09:14), Max: 36.9 (07-29-22 @ 17:16)  HR: 99 (07-30-22 @ 10:15) (74 - 104)  BP: 119/58 (07-30-22 @ 10:15) (99/79 - 156/69)  RR: 16 (07-30-22 @ 10:15) (16 - 18)  SpO2: 98% (07-30-22 @ 10:15) (94% - 99%)  Wt(kg): --    Exam:  Alert and [place], No Acute Distress  Card: +S1/S2, RRR  Pulm: CTAB  Abdomen soft / benign  Arnold  (n)   EXT        Dressing (s) x 2C/D  [x ]            Calves soft       (+) DF  PF  FHL/ EHL 5/5        No Sensory Deficits noted        1+ pulses                          11.0<L>  11.26<H> )-----------( 148<L>    ( 30 Jul 2022 10:03 )             35.0      07-30    137  |  109<H>  |  42<H>  ----------------------------<  59<L>  3.9   |  17<L>  |  2.87<H>    A/P: S/p Intramedullary nailing of femur    -PT/OT-WBAT-  -Chk AM Labs  -DVT PPx: lovenox QD  -Pain Control PO/IV Pain Rx  -Continue Current Tx      Follow up NA levels      d/w Renal  -Dispo planning: TBD      ***See Above  Dejon COHN  Orthopedics  B: [id number]  S: [phone]

## 2022-07-31 LAB
ANION GAP SERPL CALC-SCNC: 18 MMOL/L — HIGH (ref 5–17)
B-OH-BUTYR SERPL-SCNC: 0.1 MMOL/L — SIGNIFICANT CHANGE UP
BUN SERPL-MCNC: 47 MG/DL — HIGH (ref 7–23)
CALCIUM SERPL-MCNC: 8.5 MG/DL — SIGNIFICANT CHANGE UP (ref 8.4–10.5)
CHLORIDE SERPL-SCNC: 107 MMOL/L — SIGNIFICANT CHANGE UP (ref 96–108)
CO2 SERPL-SCNC: 11 MMOL/L — LOW (ref 22–31)
CREAT SERPL-MCNC: 2.97 MG/DL — HIGH (ref 0.5–1.3)
EGFR: 15 ML/MIN/1.73M2 — LOW
GAS PNL BLDA: SIGNIFICANT CHANGE UP
GLUCOSE SERPL-MCNC: 105 MG/DL — HIGH (ref 70–99)
HCT VFR BLD CALC: 30.1 % — LOW (ref 34.5–45)
HGB BLD-MCNC: 9.4 G/DL — LOW (ref 11.5–15.5)
MCHC RBC-ENTMCNC: 30.3 PG — SIGNIFICANT CHANGE UP (ref 27–34)
MCHC RBC-ENTMCNC: 31.2 GM/DL — LOW (ref 32–36)
MCV RBC AUTO: 97.1 FL — SIGNIFICANT CHANGE UP (ref 80–100)
NRBC # BLD: 0 /100 WBCS — SIGNIFICANT CHANGE UP (ref 0–0)
PLATELET # BLD AUTO: 132 K/UL — LOW (ref 150–400)
POTASSIUM SERPL-MCNC: 4.9 MMOL/L — SIGNIFICANT CHANGE UP (ref 3.5–5.3)
POTASSIUM SERPL-SCNC: 4.9 MMOL/L — SIGNIFICANT CHANGE UP (ref 3.5–5.3)
RBC # BLD: 3.1 M/UL — LOW (ref 3.8–5.2)
RBC # FLD: 14.3 % — SIGNIFICANT CHANGE UP (ref 10.3–14.5)
SODIUM SERPL-SCNC: 135 MMOL/L — SIGNIFICANT CHANGE UP (ref 135–145)
WBC # BLD: 11.77 K/UL — HIGH (ref 3.8–10.5)
WBC # FLD AUTO: 11.77 K/UL — HIGH (ref 3.8–10.5)

## 2022-07-31 RX ORDER — BUPROPION HYDROCHLORIDE 150 MG/1
150 TABLET, EXTENDED RELEASE ORAL ONCE
Refills: 0 | Status: COMPLETED | OUTPATIENT
Start: 2022-07-31 | End: 2022-07-31

## 2022-07-31 RX ORDER — OXYCODONE HYDROCHLORIDE 5 MG/1
2.5 TABLET ORAL EVERY 4 HOURS
Refills: 0 | Status: DISCONTINUED | OUTPATIENT
Start: 2022-07-31 | End: 2022-08-03

## 2022-07-31 RX ORDER — DIVALPROEX SODIUM 500 MG/1
250 TABLET, DELAYED RELEASE ORAL DAILY
Refills: 0 | Status: DISCONTINUED | OUTPATIENT
Start: 2022-07-31 | End: 2022-07-31

## 2022-07-31 RX ORDER — SODIUM BICARBONATE 1 MEQ/ML
0.07 SYRINGE (ML) INTRAVENOUS
Qty: 75 | Refills: 0 | Status: DISCONTINUED | OUTPATIENT
Start: 2022-07-31 | End: 2022-08-01

## 2022-07-31 RX ORDER — BUPROPION HYDROCHLORIDE 150 MG/1
150 TABLET, EXTENDED RELEASE ORAL
Refills: 0 | Status: DISCONTINUED | OUTPATIENT
Start: 2022-08-01 | End: 2022-08-03

## 2022-07-31 RX ORDER — DIVALPROEX SODIUM 500 MG/1
500 TABLET, DELAYED RELEASE ORAL DAILY
Refills: 0 | Status: DISCONTINUED | OUTPATIENT
Start: 2022-07-31 | End: 2022-08-03

## 2022-07-31 RX ORDER — TRAMADOL HYDROCHLORIDE 50 MG/1
25 TABLET ORAL EVERY 6 HOURS
Refills: 0 | Status: DISCONTINUED | OUTPATIENT
Start: 2022-07-31 | End: 2022-08-03

## 2022-07-31 RX ORDER — DIVALPROEX SODIUM 500 MG/1
250 TABLET, DELAYED RELEASE ORAL DAILY
Refills: 0 | Status: DISCONTINUED | OUTPATIENT
Start: 2022-07-31 | End: 2022-08-03

## 2022-07-31 RX ADMIN — ENOXAPARIN SODIUM 30 MILLIGRAM(S): 100 INJECTION SUBCUTANEOUS at 17:45

## 2022-07-31 RX ADMIN — SENNA PLUS 2 TABLET(S): 8.6 TABLET ORAL at 21:25

## 2022-07-31 RX ADMIN — Medication 975 MILLIGRAM(S): at 14:52

## 2022-07-31 RX ADMIN — Medication 100 MICROGRAM(S): at 06:17

## 2022-07-31 RX ADMIN — Medication 975 MILLIGRAM(S): at 21:26

## 2022-07-31 RX ADMIN — Medication 70 MEQ/KG/HR: at 11:46

## 2022-07-31 RX ADMIN — DIVALPROEX SODIUM 500 MILLIGRAM(S): 500 TABLET, DELAYED RELEASE ORAL at 21:25

## 2022-07-31 RX ADMIN — Medication 975 MILLIGRAM(S): at 15:43

## 2022-07-31 RX ADMIN — Medication 375 MILLIGRAM(S): at 11:50

## 2022-07-31 RX ADMIN — Medication 975 MILLIGRAM(S): at 06:16

## 2022-07-31 RX ADMIN — TRAMADOL HYDROCHLORIDE 25 MILLIGRAM(S): 50 TABLET ORAL at 12:56

## 2022-07-31 RX ADMIN — Medication 1 MILLIGRAM(S): at 21:25

## 2022-07-31 RX ADMIN — Medication 975 MILLIGRAM(S): at 21:56

## 2022-07-31 RX ADMIN — TRAMADOL HYDROCHLORIDE 25 MILLIGRAM(S): 50 TABLET ORAL at 13:55

## 2022-07-31 RX ADMIN — DIVALPROEX SODIUM 250 MILLIGRAM(S): 500 TABLET, DELAYED RELEASE ORAL at 11:49

## 2022-07-31 RX ADMIN — Medication 975 MILLIGRAM(S): at 06:46

## 2022-07-31 RX ADMIN — BUPROPION HYDROCHLORIDE 150 MILLIGRAM(S): 150 TABLET, EXTENDED RELEASE ORAL at 10:07

## 2022-07-31 NOTE — PROGRESS NOTE ADULT - NUTRITIONAL ASSESSMENT
PT/OT-WBAT RLE  Appreciate Nephrology following, pending Renal US.  Appreciate Medicine following.  IS  DVT PPx: Lovenox and SCDs  Pain Control  Continue Current Tx.  Dispo planning pending PT recommendations    Sergio Avila PA-C  Orthopedic Surgery Team  Team Pager: [phone]

## 2022-07-31 NOTE — PHYSICAL THERAPY INITIAL EVALUATION ADULT - ADDITIONAL COMMENTS
[place], [place] stairs to negotiate. Previously independent with all functional mobility, owns RW/cane; has [employer]HA 4-6/day on Monday/Thursday to assist with light housework.

## 2022-07-31 NOTE — OCCUPATIONAL THERAPY INITIAL EVALUATION ADULT - PERTINENT HX OF CURRENT PROBLEM, REHAB EVAL
81[facility]pping getting groceries from her driveway during the night c/o severe R hip pain and inability to ambulate.  Patient denies headstrike or LOC. Patient denies radiation of pain. See below

## 2022-07-31 NOTE — PHYSICAL THERAPY INITIAL EVALUATION ADULT - GENERAL OBSERVATIONS, REHAB EVAL
pt received semi-supine in bed in NAD, pre-medicated by JAKE Arellano, family at bedside, +IV, pure-wick, continuous pulse ox monitoring, 2LO2 NC

## 2022-07-31 NOTE — PHYSICAL THERAPY INITIAL EVALUATION ADULT - PRECAUTIONS/LIMITATIONS, REHAB EVAL
Pt denies numbness/tingling/burning in the RLE. No other bone/joint complaints. Pt is a community ambulator at baseline with use of both cane and occasionally a walker. Xray RLE: Acute intertrochanteric fracture of the right femur with mild superior displacement of the distal fracture fragment. No left hip fracture.  Moderate right hip osteoarthritis. Head CT: No displaced calvarial fracture or acute intracranial hemorrhage. Cervical spondylosis.Cervical spine CT: No acute fracture. CXR: (-) XRayPelvis: Redemonstrated slightly offset proximal right femoral intertrochanteric fracture. No dislocations or additional fractures.Intact pelvic and obturator rings and symmetrically aligned and spaced SI joints. Degenerative pubic symphysis narrowing. Moderately advanced right hip osteoarthritis. Preserved left hip joint space.Generalized osteopenia otherwise no discrete lytic or blastic lesions./fall precautions

## 2022-07-31 NOTE — OCCUPATIONAL THERAPY INITIAL EVALUATION ADULT - STANDING BALANCE: STATIC
poor balance
premature rupture of membranes prior to labor/septic
Mohs Method Verbiage: An incision at a 45 degree angle following the standard Mohs approach was done and the specimen was harvested as a microscopic controlled layer.

## 2022-07-31 NOTE — PHYSICAL THERAPY INITIAL EVALUATION ADULT - ACTIVE RANGE OF MOTION EXAMINATION, REHAB EVAL
RLE limited by pain/bilateral upper extremity Active ROM was WFL (within functional limits)/bilateral  lower extremity Active ROM was WFL (within functional limits)

## 2022-07-31 NOTE — OCCUPATIONAL THERAPY INITIAL EVALUATION ADULT - PRECAUTIONS/LIMITATIONS, REHAB EVAL
Patient denies numbness/tingling/burning in the RLE. No other bone/joint complaints. Patient is a community ambulator at baseline with use of both cane and occasionally a walker. Pt s/p R femur IMN Patient denies numbness/tingling/burning in the RLE. No other bone/joint complaints. Patient is a community ambulator at baseline with use of both cane and occasionally a walker. Pt s/p R femur IMN/fall precautions

## 2022-07-31 NOTE — OCCUPATIONAL THERAPY INITIAL EVALUATION ADULT - ADDITIONAL COMMENTS
Xray RLE: Acute intertrochanteric fracture of the right femur with mild superior displacement of the distal fracture fragment. No left hip fracture.  Moderate right hip osteoarthritis.

## 2022-07-31 NOTE — PHYSICAL THERAPY INITIAL EVALUATION ADULT - PERTINENT HX OF CURRENT PROBLEM, REHAB EVAL
81y F presents s/p mech fall after tripping getting groceries from her driveway during the night c/o severe R hip pain and inability to ambulate.  Patient denies headstrike or LOC. Patient denies radiation of pain. See below

## 2022-07-31 NOTE — OCCUPATIONAL THERAPY INITIAL EVALUATION ADULT - LIVES WITH, PROFILE
Pt lives alone in condo, no steps to enter, walk in shower with grab bars and bench. Pt I in ADLs and ambulation prior to admission. Pt states she has aide 2 days a week, unsure how many hours, assists with IADLs

## 2022-08-01 ENCOUNTER — TRANSCRIPTION ENCOUNTER (OUTPATIENT)
Age: 82
End: 2022-08-01

## 2022-08-01 LAB
ANION GAP SERPL CALC-SCNC: 11 MMOL/L — SIGNIFICANT CHANGE UP (ref 5–17)
BUN SERPL-MCNC: 49 MG/DL — HIGH (ref 7–23)
CALCIUM SERPL-MCNC: 8.4 MG/DL — SIGNIFICANT CHANGE UP (ref 8.4–10.5)
CHLORIDE SERPL-SCNC: 108 MMOL/L — SIGNIFICANT CHANGE UP (ref 96–108)
CO2 SERPL-SCNC: 18 MMOL/L — LOW (ref 22–31)
CREAT SERPL-MCNC: 3.12 MG/DL — HIGH (ref 0.5–1.3)
EGFR: 14 ML/MIN/1.73M2 — LOW
GLUCOSE SERPL-MCNC: 69 MG/DL — LOW (ref 70–99)
POTASSIUM SERPL-MCNC: 3.5 MMOL/L — SIGNIFICANT CHANGE UP (ref 3.5–5.3)
POTASSIUM SERPL-SCNC: 3.5 MMOL/L — SIGNIFICANT CHANGE UP (ref 3.5–5.3)
SODIUM SERPL-SCNC: 137 MMOL/L — SIGNIFICANT CHANGE UP (ref 135–145)

## 2022-08-01 PROCEDURE — 76775 US EXAM ABDO BACK WALL LIM: CPT | Mod: 26

## 2022-08-01 RX ORDER — BUPROPION HYDROCHLORIDE 150 MG/1
1 TABLET, EXTENDED RELEASE ORAL
Qty: 0 | Refills: 0 | DISCHARGE
Start: 2022-08-01

## 2022-08-01 RX ORDER — BUPROPION HYDROCHLORIDE 150 MG/1
200 TABLET, EXTENDED RELEASE ORAL
Qty: 0 | Refills: 0 | DISCHARGE

## 2022-08-01 RX ORDER — SODIUM BICARBONATE 1 MEQ/ML
650 SYRINGE (ML) INTRAVENOUS THREE TIMES A DAY
Refills: 0 | Status: DISCONTINUED | OUTPATIENT
Start: 2022-08-01 | End: 2022-08-03

## 2022-08-01 RX ORDER — DIVALPROEX SODIUM 500 MG/1
0 TABLET, DELAYED RELEASE ORAL
Qty: 0 | Refills: 0 | DISCHARGE

## 2022-08-01 RX ORDER — LANOLIN ALCOHOL/MO/W.PET/CERES
1 CREAM (GRAM) TOPICAL
Qty: 0 | Refills: 0 | DISCHARGE
Start: 2022-08-01

## 2022-08-01 RX ORDER — SENNA PLUS 8.6 MG/1
2 TABLET ORAL
Qty: 0 | Refills: 0 | DISCHARGE
Start: 2022-08-01

## 2022-08-01 RX ORDER — VENLAFAXINE HCL 75 MG
0 CAPSULE, EXT RELEASE 24 HR ORAL
Qty: 0 | Refills: 0 | DISCHARGE

## 2022-08-01 RX ORDER — LEVOTHYROXINE SODIUM 125 MCG
0 TABLET ORAL
Qty: 0 | Refills: 0 | DISCHARGE

## 2022-08-01 RX ORDER — LEVOTHYROXINE SODIUM 125 MCG
1 TABLET ORAL
Qty: 0 | Refills: 0 | DISCHARGE
Start: 2022-08-01

## 2022-08-01 RX ORDER — ACETAMINOPHEN 500 MG
0 TABLET ORAL
Qty: 0 | Refills: 0 | DISCHARGE

## 2022-08-01 RX ORDER — VENLAFAXINE HCL 75 MG
2 CAPSULE, EXT RELEASE 24 HR ORAL
Refills: 0 | DISCHARGE
Start: 2022-08-01

## 2022-08-01 RX ORDER — POLYETHYLENE GLYCOL 3350 17 G/17G
17 POWDER, FOR SOLUTION ORAL
Qty: 0 | Refills: 0 | DISCHARGE
Start: 2022-08-01

## 2022-08-01 RX ADMIN — TRAMADOL HYDROCHLORIDE 25 MILLIGRAM(S): 50 TABLET ORAL at 11:43

## 2022-08-01 RX ADMIN — ENOXAPARIN SODIUM 30 MILLIGRAM(S): 100 INJECTION SUBCUTANEOUS at 06:18

## 2022-08-01 RX ADMIN — Medication 650 MILLIGRAM(S): at 22:04

## 2022-08-01 RX ADMIN — Medication 100 MICROGRAM(S): at 06:14

## 2022-08-01 RX ADMIN — Medication 975 MILLIGRAM(S): at 22:04

## 2022-08-01 RX ADMIN — Medication 975 MILLIGRAM(S): at 16:51

## 2022-08-01 RX ADMIN — Medication 1 MILLIGRAM(S): at 11:19

## 2022-08-01 RX ADMIN — MAGNESIUM HYDROXIDE 30 MILLILITER(S): 400 TABLET, CHEWABLE ORAL at 16:59

## 2022-08-01 RX ADMIN — SENNA PLUS 2 TABLET(S): 8.6 TABLET ORAL at 22:04

## 2022-08-01 RX ADMIN — DIVALPROEX SODIUM 250 MILLIGRAM(S): 500 TABLET, DELAYED RELEASE ORAL at 11:19

## 2022-08-01 RX ADMIN — Medication 375 MILLIGRAM(S): at 11:20

## 2022-08-01 RX ADMIN — Medication 975 MILLIGRAM(S): at 22:22

## 2022-08-01 RX ADMIN — Medication 70 MEQ/KG/HR: at 06:49

## 2022-08-01 RX ADMIN — DIVALPROEX SODIUM 500 MILLIGRAM(S): 500 TABLET, DELAYED RELEASE ORAL at 16:50

## 2022-08-01 RX ADMIN — Medication 975 MILLIGRAM(S): at 06:15

## 2022-08-01 RX ADMIN — Medication 975 MILLIGRAM(S): at 06:45

## 2022-08-01 RX ADMIN — Medication 975 MILLIGRAM(S): at 16:57

## 2022-08-01 RX ADMIN — BUPROPION HYDROCHLORIDE 150 MILLIGRAM(S): 150 TABLET, EXTENDED RELEASE ORAL at 06:15

## 2022-08-01 RX ADMIN — TRAMADOL HYDROCHLORIDE 25 MILLIGRAM(S): 50 TABLET ORAL at 11:19

## 2022-08-01 RX ADMIN — POLYETHYLENE GLYCOL 3350 17 GRAM(S): 17 POWDER, FOR SOLUTION ORAL at 11:18

## 2022-08-01 NOTE — PROGRESS NOTE ADULT - NS ATTEND AMEND GEN_ALL_CORE FT
Gynecology    SUBJECTIVE:     Chief Complaint: Breast Mass (left breast) and Urinary Frequency       History of Present Illness:  66 year old who presents with symptoms of incontinence as well as a breast lump that has been presents for about one month.  Patient has had cysts in her breasts in the past, but this one feels different.  It is about 1 cm in size, nontender.  She did notice a little yellow breast discharge as well.  No skin changes.  Her sister had breast cancer.    She also reports urge urinary incontinence.  Reports that she leaks urine- sometimes her full bladder with urgency.  She does think it may be related to her lemon drop cocktails that she likes to drink.     Review of Systems:  Review of Systems   Genitourinary: Positive for urgency. Negative for dysuria, frequency, pelvic pain, vaginal bleeding, vaginal discharge and vaginal pain.   Integumentary:  Positive for breast mass and nipple discharge. Negative for breast skin changes and breast tenderness.   Breast: Positive for mass and nipple discharge.Negative for lump, mastodynia, skin changes and tenderness       OBJECTIVE:     Physical Exam:  Physical Exam   Constitutional: She is oriented to person, place, and time. She appears well-developed and well-nourished.   Pulmonary/Chest: Effort normal. Right breast exhibits no inverted nipple, no mass, no nipple discharge, no skin change and no tenderness. Left breast exhibits mass. Left breast exhibits no inverted nipple, no nipple discharge, no skin change and no tenderness. No breast swelling, tenderness, discharge or bleeding. Breasts are symmetrical.       Genitourinary: No breast swelling, tenderness, discharge or bleeding.   Neurological: She is alert and oriented to person, place, and time.   Psychiatric: She has a normal mood and affect. Her behavior is normal. Judgment and thought content normal.   Nursing note and vitals reviewed.      ASSESSMENT:       ICD-10-CM ICD-9-CM    1. Breast  lump on left side at 2 o'clock position N63.21 611.72 Mammo Digital Diagnostic Left with Hugh   2. Urge urinary incontinence N39.41 788.31 Urine culture          Plan:      Luz Bailey was seen today for breast mass and urinary frequency.    Diagnoses and all orders for this visit:    Breast lump on left side at 2 o'clock position  -     Mammo Digital Diagnostic Left with Hugh; Future    Urge urinary incontinence  -     Urine culture    - diagnostic mammogram ordered  - urine culture; discussed bladder irritants (sent to [facility]) and timed voiding; reviewed anticholinergic options if behavior modifications are ineffective    Orders Placed This Encounter   Procedures    Urine culture    Mammo Digital Diagnostic Left with Hugh       Follow up for annual or prn.    Barbara Moss     Seen, examined with, formulated plan with and  agree with above as scribed by NP Ryan [Awilda]     cr steady range  family informed us baseline cr 2.86 recently   acidosis cont bicarb drip as above today

## 2022-08-01 NOTE — DISCHARGE NOTE PROVIDER - NSDCFUADDAPPT_GEN_ALL_CORE_FT
Follow up with Dr Love after discharge from rehab Follow up with Dr Love after discharge from rehab.

## 2022-08-01 NOTE — DISCHARGE NOTE PROVIDER - CARE PROVIDER_API CALL
Jose Love (MD)  Orthopaedic Surgery  [address], [address]  [place] [address]  Phone: [phone]  Fax: [phone]  Follow Up Time:     Juana Vallecillo (DO)  Nephrology  [address]  [place] [address]  Phone: [phone]  Fax: [phone]  Follow Up Time:

## 2022-08-01 NOTE — DISCHARGE NOTE PROVIDER - NSDCMRMEDTOKEN_GEN_ALL_CORE_FT
buPROPion 150 mg/24 hours (XL) oral tablet, extended release: 1 tab(s) orally   levothyroxine 100 mcg (0.1 mg) oral tablet: 1 tab(s) orally once a day  melatonin 3 mg oral tablet: 1 tab(s) orally once a day (at bedtime), As needed, Insomnia  polyethylene glycol 3350 oral powder for reconstitution: 17 gram(s) orally once a day  senna leaf extract oral tablet: 2 tab(s) orally once a day (at bedtime)  venlafaxine 75 mg oral capsule, extended release: 5 cap(s) orally once a day   acetaminophen 325 mg oral tablet: 3 tab(s) orally every 8 hours x 1 week and then as needed for mild pain  bisacodyl 10 mg rectal suppository: 1 suppository(ies) rectal once a day, As needed, Constipation  buPROPion 150 mg/24 hours (XL) oral tablet, extended release: 1 tab(s) orally once a day  divalproex sodium 250 mg oral tablet, extended release: 1 tab(s) orally once a day  divalproex sodium 500 mg oral tablet, extended release: 1 tab(s) orally once a day  enoxaparin: 30 milligram(s) subcutaneous once a day  lactulose 10 g/15 mL oral syrup: 30 milliliter(s) orally every 8 hours, As needed, Constipation  levothyroxine 100 mcg (0.1 mg) oral tablet: 1 tab(s) orally once a day  LORazepam 1 mg oral tablet: 1 tab(s) orally once a day  magnesium hydroxide 8% oral suspension: 30 milliliter(s) orally once a day, As needed, Constipation  melatonin 3 mg oral tablet: 1 tab(s) orally once a day (at bedtime), As needed, Insomnia  oxyCODONE: 2.5 milligram(s) orally every 4 hours, As Needed for severe pain  polyethylene glycol 3350 oral powder for reconstitution: 17 gram(s) orally once a day  senna leaf extract oral tablet: 2 tab(s) orally once a day (at bedtime)  sodium bicarbonate 650 mg oral tablet: 1 tab(s) orally 3 times a day  traMADol 50 mg oral tablet: 0.5 tab(s) orally every 6 hours, As needed, Moderate Pain (4 - 6)  venlafaxine 75 mg oral capsule, extended release: 5 cap(s) orally once a day

## 2022-08-01 NOTE — DISCHARGE NOTE PROVIDER - NSDCACTIVITY_GEN_ALL_CORE
Do not drive or operate machinery/Stairs allowed/Walking - Indoors allowed/No heavy lifting/straining/Walking - Outdoors allowed Showering with assist/Do not drive or operate machinery/Do not make important decisions/Stairs allowed/Walking - Indoors allowed/No heavy lifting/straining/Walking - Outdoors allowed

## 2022-08-01 NOTE — DISCHARGE NOTE PROVIDER - NSDCFUADDINST_GEN_ALL_CORE_FT
Keep dressing clean.  Dressing changes as needed.   Remove dressing POD#14(8/13/22)  D/C staples and sterile strip incisions  Lovenox 30mg sq daily x 6 weeks for DVT prophylaxis    Physical therapy for ambulation WBAT Keep dressing clean.  Dressing changes as needed.   Remove dressing POD#14(8/13/22)  D/C staples and sterile strip incisions  Lovenox 30mg sq daily x 6 weeks for DVT prophylaxis  Weight bearing as tolerated to RLE

## 2022-08-01 NOTE — DISCHARGE NOTE PROVIDER - HOSPITAL COURSE
History of Present Illness:   81y Female presents s/p mech fall after tripping getting groceries from her driveway during the night c/o severe R hip pain and inability to ambulate.  Patient denies headstrike or LOC. Patient denies radiation of pain. Patient denies numbness/tingling/burning in the RLE. No other bone/joint complaints. Patient is a community ambulator at baseline with use of both cane and occasionally a walker    PAST MEDICAL & SURGICAL HISTORY:  Depression  Hypothyroid  Kidney disease  Cataract      Admitted to [facility] with the dx of right hip fracture 7/29/22.  Medical clearance obtained.  Labs revealed elevated BUN/CR 54/3.29.  Nephrology consult obtained and the impression was CKD with TYRONE.  Placed on IVF,renal songram ordered.  S/P IMN right hip on 7/30/22.  Patient tolerated procedure well.  Physical therapy for ambulation WBAT.  PT recommended EMILIO.  ABG on 7/31/22  revealed acidosis.  Renal placed patient on IV Bicarb x 24hrs. Electrolytes being monitor.  When medically and renal cleared will discharge to rehab	    History of Present Illness:   81y Female presents s/p mech fall after tripping getting groceries from her driveway during the night c/o severe R hip pain and inability to ambulate.  Patient denies headstrike or LOC. Patient denies radiation of pain. Patient denies numbness/tingling/burning in the RLE. No other bone/joint complaints. Patient is a community ambulator at baseline with use of both cane and occasionally a walker    PAST MEDICAL & SURGICAL HISTORY:  Depression  Hypothyroid  Kidney disease  Cataract      Admitted to [facility] with the dx of right hip fracture 7/29/22.  Medical clearance obtained.  Labs revealed elevated BUN/CR 54/3.29.  Nephrology consult obtained and the impression was CKD with TYRONE.  Placed on IVF,renal songram ordered.  S/P IMN right hip on 7/30/22.  Patient tolerated procedure well.  Physical therapy for ambulation WBAT.  PT recommended EMILIO.  ABG on 7/31/22  revealed acidosis.  Renal placed patient on IV Bicarb x 24hrs and then changed to PO Bicarb.  Patients Bun/Cr improved and as per nephrology medically cleared for rehab.  Remainder of hospital stay unremarkable.

## 2022-08-01 NOTE — DISCHARGE NOTE PROVIDER - CARE PROVIDERS DIRECT ADDRESSES
,[email or web address]

## 2022-08-01 NOTE — DISCHARGE NOTE PROVIDER - NSDCCPCAREPLAN_GEN_ALL_CORE_FT
PRINCIPAL DISCHARGE DIAGNOSIS  Diagnosis: Intertrochanteric fracture of right hip  Assessment and Plan of Treatment:

## 2022-08-02 LAB
ANION GAP SERPL CALC-SCNC: 12 MMOL/L — SIGNIFICANT CHANGE UP (ref 5–17)
BUN SERPL-MCNC: 44 MG/DL — HIGH (ref 7–23)
CALCIUM SERPL-MCNC: 8.7 MG/DL — SIGNIFICANT CHANGE UP (ref 8.4–10.5)
CHLORIDE SERPL-SCNC: 108 MMOL/L — SIGNIFICANT CHANGE UP (ref 96–108)
CO2 SERPL-SCNC: 20 MMOL/L — LOW (ref 22–31)
CREAT SERPL-MCNC: 2.83 MG/DL — HIGH (ref 0.5–1.3)
EGFR: 16 ML/MIN/1.73M2 — LOW
GLUCOSE SERPL-MCNC: 57 MG/DL — LOW (ref 70–99)
POTASSIUM SERPL-MCNC: 4.1 MMOL/L — SIGNIFICANT CHANGE UP (ref 3.5–5.3)
POTASSIUM SERPL-SCNC: 4.1 MMOL/L — SIGNIFICANT CHANGE UP (ref 3.5–5.3)
SARS-COV-2 RNA SPEC QL NAA+PROBE: SIGNIFICANT CHANGE UP
SODIUM SERPL-SCNC: 140 MMOL/L — SIGNIFICANT CHANGE UP (ref 135–145)

## 2022-08-02 RX ORDER — LACTULOSE 10 G/15ML
20 SOLUTION ORAL EVERY 8 HOURS
Refills: 0 | Status: DISCONTINUED | OUTPATIENT
Start: 2022-08-02 | End: 2022-08-03

## 2022-08-02 RX ORDER — BNT162B2 0.23 MG/2.25ML
0.3 INJECTION, SUSPENSION INTRAMUSCULAR ONCE
Refills: 0 | Status: COMPLETED | OUTPATIENT
Start: 2022-08-02 | End: 2022-08-02

## 2022-08-02 RX ADMIN — Medication 650 MILLIGRAM(S): at 05:03

## 2022-08-02 RX ADMIN — Medication 375 MILLIGRAM(S): at 11:41

## 2022-08-02 RX ADMIN — POLYETHYLENE GLYCOL 3350 17 GRAM(S): 17 POWDER, FOR SOLUTION ORAL at 11:45

## 2022-08-02 RX ADMIN — Medication 10 MILLIGRAM(S): at 14:16

## 2022-08-02 RX ADMIN — MAGNESIUM HYDROXIDE 30 MILLILITER(S): 400 TABLET, CHEWABLE ORAL at 11:44

## 2022-08-02 RX ADMIN — Medication 650 MILLIGRAM(S): at 21:24

## 2022-08-02 RX ADMIN — Medication 975 MILLIGRAM(S): at 05:03

## 2022-08-02 RX ADMIN — Medication 975 MILLIGRAM(S): at 19:33

## 2022-08-02 RX ADMIN — DIVALPROEX SODIUM 500 MILLIGRAM(S): 500 TABLET, DELAYED RELEASE ORAL at 21:23

## 2022-08-02 RX ADMIN — SENNA PLUS 2 TABLET(S): 8.6 TABLET ORAL at 21:24

## 2022-08-02 RX ADMIN — TRAMADOL HYDROCHLORIDE 25 MILLIGRAM(S): 50 TABLET ORAL at 18:47

## 2022-08-02 RX ADMIN — BUPROPION HYDROCHLORIDE 150 MILLIGRAM(S): 150 TABLET, EXTENDED RELEASE ORAL at 05:04

## 2022-08-02 RX ADMIN — DIVALPROEX SODIUM 250 MILLIGRAM(S): 500 TABLET, DELAYED RELEASE ORAL at 11:41

## 2022-08-02 RX ADMIN — ENOXAPARIN SODIUM 30 MILLIGRAM(S): 100 INJECTION SUBCUTANEOUS at 05:03

## 2022-08-02 RX ADMIN — BNT162B2 0.3 MILLILITER(S): 0.23 INJECTION, SUSPENSION INTRAMUSCULAR at 18:56

## 2022-08-02 RX ADMIN — Medication 975 MILLIGRAM(S): at 18:47

## 2022-08-02 RX ADMIN — TRAMADOL HYDROCHLORIDE 25 MILLIGRAM(S): 50 TABLET ORAL at 19:34

## 2022-08-02 RX ADMIN — Medication 650 MILLIGRAM(S): at 14:20

## 2022-08-02 RX ADMIN — Medication 1 MILLIGRAM(S): at 21:24

## 2022-08-02 RX ADMIN — Medication 975 MILLIGRAM(S): at 06:48

## 2022-08-02 RX ADMIN — Medication 100 MICROGRAM(S): at 05:03

## 2022-08-03 ENCOUNTER — TRANSCRIPTION ENCOUNTER (OUTPATIENT)
Age: 82
End: 2022-08-03

## 2022-08-03 VITALS
DIASTOLIC BLOOD PRESSURE: 65 MMHG | TEMPERATURE: 98 F | OXYGEN SATURATION: 95 % | HEART RATE: 78 BPM | RESPIRATION RATE: 18 BRPM | SYSTOLIC BLOOD PRESSURE: 104 MMHG

## 2022-08-03 LAB
ANION GAP SERPL CALC-SCNC: 11 MMOL/L — SIGNIFICANT CHANGE UP (ref 5–17)
BUN SERPL-MCNC: 39 MG/DL — HIGH (ref 7–23)
CALCIUM SERPL-MCNC: 8.8 MG/DL — SIGNIFICANT CHANGE UP (ref 8.4–10.5)
CHLORIDE SERPL-SCNC: 112 MMOL/L — HIGH (ref 96–108)
CO2 SERPL-SCNC: 22 MMOL/L — SIGNIFICANT CHANGE UP (ref 22–31)
CREAT SERPL-MCNC: 2.67 MG/DL — HIGH (ref 0.5–1.3)
EGFR: 17 ML/MIN/1.73M2 — LOW
GLUCOSE SERPL-MCNC: 62 MG/DL — LOW (ref 70–99)
POTASSIUM SERPL-MCNC: 4.2 MMOL/L — SIGNIFICANT CHANGE UP (ref 3.5–5.3)
POTASSIUM SERPL-SCNC: 4.2 MMOL/L — SIGNIFICANT CHANGE UP (ref 3.5–5.3)
SODIUM SERPL-SCNC: 145 MMOL/L — SIGNIFICANT CHANGE UP (ref 135–145)

## 2022-08-03 PROCEDURE — 83605 ASSAY OF LACTIC ACID: CPT

## 2022-08-03 PROCEDURE — 85730 THROMBOPLASTIN TIME PARTIAL: CPT

## 2022-08-03 PROCEDURE — 86900 BLOOD TYPING SEROLOGIC ABO: CPT

## 2022-08-03 PROCEDURE — 82330 ASSAY OF CALCIUM: CPT

## 2022-08-03 PROCEDURE — 85018 HEMOGLOBIN: CPT

## 2022-08-03 PROCEDURE — 90715 TDAP VACCINE 7 YRS/> IM: CPT

## 2022-08-03 PROCEDURE — 80053 COMPREHEN METABOLIC PANEL: CPT

## 2022-08-03 PROCEDURE — 84132 ASSAY OF SERUM POTASSIUM: CPT

## 2022-08-03 PROCEDURE — 82803 BLOOD GASES ANY COMBINATION: CPT

## 2022-08-03 PROCEDURE — 84156 ASSAY OF PROTEIN URINE: CPT

## 2022-08-03 PROCEDURE — 72125 CT NECK SPINE W/O DYE: CPT | Mod: MA

## 2022-08-03 PROCEDURE — 97162 PT EVAL MOD COMPLEX 30 MIN: CPT

## 2022-08-03 PROCEDURE — U0003: CPT

## 2022-08-03 PROCEDURE — 71045 X-RAY EXAM CHEST 1 VIEW: CPT

## 2022-08-03 PROCEDURE — 76000 FLUOROSCOPY <1 HR PHYS/QHP: CPT

## 2022-08-03 PROCEDURE — 97116 GAIT TRAINING THERAPY: CPT

## 2022-08-03 PROCEDURE — 73552 X-RAY EXAM OF FEMUR 2/>: CPT

## 2022-08-03 PROCEDURE — 82010 KETONE BODYS QUAN: CPT

## 2022-08-03 PROCEDURE — 36600 WITHDRAWAL OF ARTERIAL BLOOD: CPT

## 2022-08-03 PROCEDURE — 82947 ASSAY GLUCOSE BLOOD QUANT: CPT

## 2022-08-03 PROCEDURE — 84295 ASSAY OF SERUM SODIUM: CPT

## 2022-08-03 PROCEDURE — 70450 CT HEAD/BRAIN W/O DYE: CPT | Mod: MA

## 2022-08-03 PROCEDURE — 85610 PROTHROMBIN TIME: CPT

## 2022-08-03 PROCEDURE — 81001 URINALYSIS AUTO W/SCOPE: CPT

## 2022-08-03 PROCEDURE — 73560 X-RAY EXAM OF KNEE 1 OR 2: CPT

## 2022-08-03 PROCEDURE — 90471 IMMUNIZATION ADMIN: CPT

## 2022-08-03 PROCEDURE — 86850 RBC ANTIBODY SCREEN: CPT

## 2022-08-03 PROCEDURE — U0005: CPT

## 2022-08-03 PROCEDURE — 85027 COMPLETE CBC AUTOMATED: CPT

## 2022-08-03 PROCEDURE — 80048 BASIC METABOLIC PNL TOTAL CA: CPT

## 2022-08-03 PROCEDURE — C1769: CPT

## 2022-08-03 PROCEDURE — 73590 X-RAY EXAM OF LOWER LEG: CPT

## 2022-08-03 PROCEDURE — 73610 X-RAY EXAM OF ANKLE: CPT

## 2022-08-03 PROCEDURE — 85025 COMPLETE CBC W/AUTO DIFF WBC: CPT

## 2022-08-03 PROCEDURE — 87637 SARSCOV2&INF A&B&RSV AMP PRB: CPT

## 2022-08-03 PROCEDURE — 36415 COLL VENOUS BLD VENIPUNCTURE: CPT

## 2022-08-03 PROCEDURE — C1889: CPT

## 2022-08-03 PROCEDURE — 73630 X-RAY EXAM OF FOOT: CPT

## 2022-08-03 PROCEDURE — 76775 US EXAM ABDO BACK WALL LIM: CPT

## 2022-08-03 PROCEDURE — 85014 HEMATOCRIT: CPT

## 2022-08-03 PROCEDURE — 97166 OT EVAL MOD COMPLEX 45 MIN: CPT

## 2022-08-03 PROCEDURE — C1713: CPT

## 2022-08-03 PROCEDURE — 82570 ASSAY OF URINE CREATININE: CPT

## 2022-08-03 PROCEDURE — 72170 X-RAY EXAM OF PELVIS: CPT

## 2022-08-03 PROCEDURE — 82435 ASSAY OF BLOOD CHLORIDE: CPT

## 2022-08-03 PROCEDURE — 99285 EMERGENCY DEPT VISIT HI MDM: CPT | Mod: 25

## 2022-08-03 PROCEDURE — 73522 X-RAY EXAM HIPS BI 3-4 VIEWS: CPT

## 2022-08-03 PROCEDURE — 97530 THERAPEUTIC ACTIVITIES: CPT

## 2022-08-03 PROCEDURE — 86901 BLOOD TYPING SEROLOGIC RH(D): CPT

## 2022-08-03 RX ORDER — SODIUM BICARBONATE 1 MEQ/ML
1 SYRINGE (ML) INTRAVENOUS
Qty: 0 | Refills: 0 | DISCHARGE
Start: 2022-08-03

## 2022-08-03 RX ORDER — MAGNESIUM HYDROXIDE 400 MG/1
30 TABLET, CHEWABLE ORAL
Qty: 0 | Refills: 0 | DISCHARGE
Start: 2022-08-03

## 2022-08-03 RX ORDER — LACTULOSE 10 G/15ML
30 SOLUTION ORAL
Qty: 0 | Refills: 0 | DISCHARGE
Start: 2022-08-03

## 2022-08-03 RX ORDER — DIVALPROEX SODIUM 500 MG/1
1 TABLET, DELAYED RELEASE ORAL
Qty: 0 | Refills: 0 | DISCHARGE
Start: 2022-08-03

## 2022-08-03 RX ORDER — TRAMADOL HYDROCHLORIDE 50 MG/1
0.5 TABLET ORAL
Qty: 0 | Refills: 0 | DISCHARGE
Start: 2022-08-03

## 2022-08-03 RX ORDER — ENOXAPARIN SODIUM 100 MG/ML
30 INJECTION SUBCUTANEOUS
Qty: 0 | Refills: 0 | DISCHARGE
Start: 2022-08-03 | End: 2022-08-26

## 2022-08-03 RX ORDER — ENOXAPARIN SODIUM 100 MG/ML
30 INJECTION SUBCUTANEOUS
Qty: 0 | Refills: 0 | DISCHARGE
Start: 2022-08-03 | End: 2022-09-10

## 2022-08-03 RX ORDER — OXYCODONE HYDROCHLORIDE 5 MG/1
2.5 TABLET ORAL
Qty: 0 | Refills: 0 | DISCHARGE
Start: 2022-08-03

## 2022-08-03 RX ORDER — ACETAMINOPHEN 500 MG
3 TABLET ORAL
Qty: 0 | Refills: 0 | DISCHARGE
Start: 2022-08-03

## 2022-08-03 RX ADMIN — POLYETHYLENE GLYCOL 3350 17 GRAM(S): 17 POWDER, FOR SOLUTION ORAL at 12:19

## 2022-08-03 RX ADMIN — LACTULOSE 20 GRAM(S): 10 SOLUTION ORAL at 12:21

## 2022-08-03 RX ADMIN — Medication 100 MICROGRAM(S): at 05:31

## 2022-08-03 RX ADMIN — Medication 650 MILLIGRAM(S): at 05:30

## 2022-08-03 RX ADMIN — Medication 975 MILLIGRAM(S): at 01:32

## 2022-08-03 RX ADMIN — Medication 3 MILLIGRAM(S): at 01:02

## 2022-08-03 RX ADMIN — ENOXAPARIN SODIUM 30 MILLIGRAM(S): 100 INJECTION SUBCUTANEOUS at 05:31

## 2022-08-03 RX ADMIN — Medication 650 MILLIGRAM(S): at 16:38

## 2022-08-03 RX ADMIN — Medication 975 MILLIGRAM(S): at 01:02

## 2022-08-03 RX ADMIN — BUPROPION HYDROCHLORIDE 150 MILLIGRAM(S): 150 TABLET, EXTENDED RELEASE ORAL at 05:30

## 2022-08-03 RX ADMIN — DIVALPROEX SODIUM 250 MILLIGRAM(S): 500 TABLET, DELAYED RELEASE ORAL at 12:20

## 2022-08-03 RX ADMIN — TRAMADOL HYDROCHLORIDE 25 MILLIGRAM(S): 50 TABLET ORAL at 16:50

## 2022-08-03 RX ADMIN — Medication 975 MILLIGRAM(S): at 09:34

## 2022-08-03 RX ADMIN — Medication 975 MILLIGRAM(S): at 10:20

## 2022-08-03 RX ADMIN — Medication 375 MILLIGRAM(S): at 12:20

## 2022-08-03 NOTE — PROGRESS NOTE ADULT - PROBLEM SELECTOR PLAN 2
continue to monitor Creatinine  Avoid nephrotoxic agents  Nephrology following  Probably due to lithium use in the past  renal function has been stable  To start sodium bicarb
continue to monitor Creatinine  Avoid nephrotoxic agents  Nephrology following  Probably due to lithium use in the past  renal function has been stable
continue to monitor Creatinine  Avoid nephrotoxic agents  Nephrology following  renal sono post op
continue to monitor Creatinine  Avoid nephrotoxic agents  Nephrology following  Probably due to lithium use in the past  renal function has been stable  Bicarb as per nephrology
continue to monitor Creatinine  Avoid nephrotoxic agents  Nephrology following  Probably due to lithium use in the past  renal function has been stable  To start sodium bicarb

## 2022-08-03 NOTE — PROGRESS NOTE ADULT - ATTENDING COMMENTS
PT. DVT PPX.  DC planning
For IMN R hip.  R/B/A discussed
PT. DVT ppx. f/u labs. DC planning
PT. DVT ppx. f/u labs. DC planning. f/u renal
PT. DVT ppx. f/u medicine.  DC planning

## 2022-08-03 NOTE — PROGRESS NOTE ADULT - PROVIDER SPECIALTY LIST ADULT
Nephrology
Orthopedics
Orthopedics
Internal Medicine
Nephrology
Orthopedics
Internal Medicine

## 2022-08-03 NOTE — DISCHARGE NOTE NURSING/CASE MANAGEMENT/SOCIAL WORK - NSDCVIVACCINE_GEN_ALL_CORE_FT
COVID-19, mRNA, LNP-S, PF, 30 mcg/0.3 mL dose, robin-sucrose ([employer]); 02-Aug-2022 18:56; Eileen Palomino (RN); [employer]; [id number] (Exp. Date: 20-Sep-2022); IntraMuscular; Deltoid Left.; 0.3 milliLiter(s);   Tdap; 29-Jul-2022 06:25; Rona Garg (RN); [employer]; [id number] (Exp. Date: 18-Apr-2024); IntraMuscular; Deltoid Left.; 0.5 milliLiter(s); VIS (VIS Published: 09-May-2013, VIS Presented: 29-Jul-2022);

## 2022-08-03 NOTE — PROGRESS NOTE ADULT - PROBLEM SELECTOR PLAN 3
continue home meds  Psych eval as needed.

## 2022-08-03 NOTE — PROGRESS NOTE ADULT - TIME BILLING
Discussed treatment plan with patient at bedside.  DC planning to rehab
Discussed treatment plan with patient and son at bedside.
Discussed treatment plan with patient and son at bedside.
Discussed treatment plan with patient at bedside.  DC planning to rehab
Discussed treatment plan with staff and patient at bedside.

## 2022-08-03 NOTE — DISCHARGE NOTE NURSING/CASE MANAGEMENT/SOCIAL WORK - NSDCPEFALRISK_GEN_ALL_CORE
For information on Fall & Injury Prevention, visit: [email or web address] OR  [email or web address] OR  https://www.cdc.gov/steadi/patient.html

## 2022-08-03 NOTE — PROGRESS NOTE ADULT - SUBJECTIVE AND OBJECTIVE BOX
ORTHO  Patient is a 81y old  Female who presents with a chief complaint of Hip fracture (29 Jul 2022 15:21)    Pt. resting without complaint    VS-  T(C): 36.9 (07-30-22 @ 07:30), Max: 36.9 (07-29-22 @ 17:16)  HR: 78 (07-30-22 @ 07:30) (74 - 79)  BP: 110/60 (07-30-22 @ 07:30) (99/79 - 118/72)  RR: 18 (07-30-22 @ 07:30) (18 - 18)  SpO2: 95% (07-30-22 @ 07:30) (94% - 99%)  Wt(kg): --    M.S. A&O  Extremity- Right hip mod tenderness, LE- shortened  Neuro-              Motor-(+) Ankle- DF/PF              Sensation- grossly intact to light touch              Calves- soft, nontender- PAS                               10.5   7.82  )-----------( 148      ( 30 Jul 2022 05:31 )             33.2     07-30    137  |  109<H>  |  42<H>  ----------------------------<  59<L>  3.9   |  17<L>  |  2.87<H>    Ca    8.3<L>      30 Jul 2022 05:32    TPro  7.2  /  Alb  4.0  /  TBili  0.3  /  DBili  x   /  AST  14  /  ALT  8<L>  /  AlkPhos  78  07-29                         
ORTHOPAEDICS DAILY PROGRESS NOTE:       SUBJECTIVE/ROS: POD 3. Seen and examined. Pain well controlled. Has been working with Pt. Denies CP/SOB/N/V.         MEDICATIONS  (STANDING):  acetaminophen     Tablet .. 975 milliGRAM(s) Oral every 8 hours  buPROPion XL (24-Hour) . 150 milliGRAM(s) Oral <User Schedule>  diVALproex  milliGRAM(s) Oral daily  diVALproex  milliGRAM(s) Oral daily  enoxaparin Injectable 30 milliGRAM(s) SubCutaneous every 24 hours  levothyroxine 100 MICROGram(s) Oral daily  LORazepam     Tablet 1 milliGRAM(s) Oral daily  polyethylene glycol 3350 17 Gram(s) Oral daily  senna 2 Tablet(s) Oral at bedtime  sodium bicarbonate 650 milliGRAM(s) Oral three times a day  venlafaxine XR. 375 milliGRAM(s) Oral daily    MEDICATIONS  (PRN):  magnesium hydroxide Suspension 30 milliLiter(s) Oral daily PRN Constipation  melatonin 3 milliGRAM(s) Oral at bedtime PRN Insomnia  ondansetron Injectable 4 milliGRAM(s) IV Push every 6 hours PRN Nausea and/or Vomiting  oxyCODONE    IR 2.5 milliGRAM(s) Oral every 4 hours PRN Severe Pain (7 - 10)  traMADol 25 milliGRAM(s) Oral every 6 hours PRN Moderate Pain (4 - 6)      OBJECTIVE:    Vital Signs Last 24 Hrs  T(C): 36.6 (02 Aug 2022 04:59), Max: 36.7 (01 Aug 2022 09:42)  T(F): 97.9 (02 Aug 2022 04:59), Max: 98 (01 Aug 2022 09:42)  HR: 79 (02 Aug 2022 04:59) (79 - 88)  BP: 104/59 (02 Aug 2022 04:59) (91/44 - 110/68)  BP(mean): --  RR: 18 (02 Aug 2022 04:59) (18 - 18)  SpO2: 98% (02 Aug 2022 04:59) (93% - 98%)    Parameters below as of 02 Aug 2022 04:59  Patient On (Oxygen Delivery Method): nasal cannula        I&O's Detail    31 Jul 2022 07:01  -  01 Aug 2022 07:00  --------------------------------------------------------  IN:    IV PiggyBack: 150 mL    Oral Fluid: 400 mL    Sodium Bicarbonate: 1190 mL  Total IN: 1740 mL    OUT:    Voided (mL): 700 mL  Total OUT: 700 mL    Total NET: 1040 mL      01 Aug 2022 07:01  -  02 Aug 2022 06:11  --------------------------------------------------------  IN:    Oral Fluid: 520 mL  Total IN: 520 mL    OUT:    Stool (mL): 1 mL    Voided (mL): 750 mL  Total OUT: 751 mL    Total NET: -231 mL          Daily     Daily     LABS:                        9.4    11.77 )-----------( 132      ( 31 Jul 2022 07:33 )             30.1     08-01    137  |  108  |  49<H>  ----------------------------<  69<L>  3.5   |  18<L>  |  3.12<H>    Ca    8.4      01 Aug 2022 07:25                    PHYSICAL EXAM:  nad  nonlabored resp  rle  dressing c/d/i  +gastroc/ta/ehl/fhl  silt s/s/sp/dp/t  +dp  
[facility]   [phone]  RENAL FOLLOW UP NOTE  --------------------------------------------------------------------------------  Chief Complaint:    24 hour events/subjective:        PAST HISTORY  --------------------------------------------------------------------------------  No significant changes to PMH, PSH, FHx, SHx, unless otherwise noted    ALLERGIES & MEDICATIONS  --------------------------------------------------------------------------------  Allergies    azithromycin (Rash)    Intolerances      Standing Inpatient Medications  acetaminophen   IVPB .. 1000 milliGRAM(s) IV Intermittent once  buPROPion XL (24-Hour) . 150 milliGRAM(s) Oral daily  diVALproex  milliGRAM(s) Oral at bedtime  levothyroxine 100 MICROGram(s) Oral daily  LORazepam     Tablet 0.5 milliGRAM(s) Oral <User Schedule>  LORazepam     Tablet 1 milliGRAM(s) Oral <User Schedule>  polyethylene glycol 3350 17 Gram(s) Oral daily  senna 2 Tablet(s) Oral at bedtime  sodium chloride 0.9%. 1000 milliLiter(s) IV Continuous <Continuous>  venlafaxine XR. 375 milliGRAM(s) Oral daily    PRN Inpatient Medications  magnesium hydroxide Suspension 30 milliLiter(s) Oral daily PRN  melatonin 3 milliGRAM(s) Oral at bedtime PRN  ondansetron Injectable 4 milliGRAM(s) IV Push every 6 hours PRN  oxyCODONE    IR 2.5 milliGRAM(s) Oral every 4 hours PRN  oxyCODONE    IR 5 milliGRAM(s) Oral every 4 hours PRN  traMADol 50 milliGRAM(s) Oral every 8 hours PRN      REVIEW OF SYSTEMS  --------------------------------------------------------------------------------    Gen: denies fatigue, fevers/chills,  CVS: denies chest pain/palpitations  Resp: denies SOB/Cough  GI: Denies N/V/Abd pain  : Denies dysuria/oliguria/hematuria    All other systems were reviewed and are negative, except as noted.    VITALS/PHYSICAL EXAM  --------------------------------------------------------------------------------  T(C): 36.4 (07-30-22 @ 20:06), Max: 37 (07-30-22 @ 12:00)  HR: 89 (07-30-22 @ 20:06) (78 - 104)  BP: 107/69 (07-30-22 @ 20:06) (107/69 - 156/69)  RR: 18 (07-30-22 @ 20:06) (16 - 18)  SpO2: 97% (07-30-22 @ 20:06) (93% - 100%)  Wt(kg): --  Height (cm): 167.6 (07-30-22 @ 07:30)  Weight (kg): 77.1 (07-30-22 @ 07:30)  BMI (kg/m2): 27.4 (07-30-22 @ 07:30)  BSA (m2): 1.87 (07-30-22 @ 07:30)      07-29-22 @ 07:01  -  07-30-22 @ 07:00  --------------------------------------------------------  IN: 1260 mL / OUT: 1400 mL / NET: -140 mL    07-30-22 @ 07:01  -  07-30-22 @ 22:34  --------------------------------------------------------  IN: 475 mL / OUT: 700 mL / NET: -225 mL      Physical Exam:  	    Gen: Non toxic comfortable appearing   	no jvd  	Pulm: decrease bs  no rales or ronchi or wheezing  	CV: RRR, S1S2; no rub  	Abd: +BS, soft, nontender/nondistended  	: No suprapubic tenderness  	UE: Warm, no cyanosis  no clubbing,  no edema  	LE: Warm, no cyanosis  no clubbing, no edema except ecchymosis left toes   	Neuro: alert and oriented. speech coherent       LABS/STUDIES  --------------------------------------------------------------------------------              11.0   11.26 >-----------<  148      [07-30-22 @ 10:03]              35.0     139  |  109  |  40  ----------------------------<  82      [07-30-22 @ 10:02]  4.3   |  18  |  2.74        Ca     8.2     [07-30-22 @ 10:02]    TPro  7.2  /  Alb  4.0  /  TBili  0.3  /  DBili  x   /  AST  14  /  ALT  8   /  AlkPhos  78  [07-29-22 @ 06:40]    PT/INR: PT 12.0 , INR 1.03       [07-30-22 @ 05:32]  PTT: 27.5       [07-30-22 @ 05:32]      Creatinine Trend:  SCr 2.74 [07-30 @ 10:02]  SCr 2.87 [07-30 @ 05:32]  SCr 3.01 [07-29 @ 21:44]  SCr 3.29 [07-29 @ 06:40]              Urinalysis - [07-29-22 @ 21:45]      Color Colorless / Appearance Clear / SG 1.009 / pH 6.5      Gluc Negative / Ketone Negative  / Bili Negative / Urobili Negative       Blood Negative / Protein Trace / Leuk Est Negative / Nitrite Negative      RBC 0 / WBC 1 / Hyaline  / Gran  / Sq Epi  / Non Sq Epi 1 / Bacteria Negative    Urine Creatinine 34      [07-29-22 @ 21:45]  Urine Protein 11      [07-29-22 @ 21:45]        
[facility]   [phone]  RENAL FOLLOW UP NOTE  --------------------------------------------------------------------------------  Chief Complaint:    24 hour events/subjective:    patient seen and examined with Dr. Avel rodrígeuz    PAST HISTORY  --------------------------------------------------------------------------------  No significant changes to PMH, PSH, FHx, SHx, unless otherwise noted    ALLERGIES & MEDICATIONS  --------------------------------------------------------------------------------  Allergies    azithromycin (Rash)    Intolerances      Standing Inpatient Medications  acetaminophen     Tablet .. 975 milliGRAM(s) Oral every 8 hours  buPROPion XL (24-Hour) . 150 milliGRAM(s) Oral <User Schedule>  diVALproex  milliGRAM(s) Oral daily  diVALproex  milliGRAM(s) Oral daily  enoxaparin Injectable 30 milliGRAM(s) SubCutaneous every 24 hours  levothyroxine 100 MICROGram(s) Oral daily  LORazepam     Tablet 1 milliGRAM(s) Oral daily  polyethylene glycol 3350 17 Gram(s) Oral daily  senna 2 Tablet(s) Oral at bedtime  sodium bicarbonate  Infusion 0.068 mEq/kG/Hr IV Continuous <Continuous>  venlafaxine XR. 375 milliGRAM(s) Oral daily    PRN Inpatient Medications  magnesium hydroxide Suspension 30 milliLiter(s) Oral daily PRN  melatonin 3 milliGRAM(s) Oral at bedtime PRN  ondansetron Injectable 4 milliGRAM(s) IV Push every 6 hours PRN  oxyCODONE    IR 2.5 milliGRAM(s) Oral every 4 hours PRN  traMADol 25 milliGRAM(s) Oral every 6 hours PRN      REVIEW OF SYSTEMS  --------------------------------------------------------------------------------    Gen: denies fevers/chills,  CVS: denies chest pain/palpitations  Resp: denies SOB/Cough  GI: Denies N/V/Abd pain  : Denies dysuria/oliguria/hematuria    VITALS/PHYSICAL EXAM  --------------------------------------------------------------------------------  T(C): 36.6 (08-01-22 @ 16:04), Max: 36.9 (07-31-22 @ 20:08)  HR: 82 (08-01-22 @ 16:04) (80 - 95)  BP: 91/44 (08-01-22 @ 16:04) (91/44 - 142/77)  RR: 18 (08-01-22 @ 16:04) (18 - 18)  SpO2: 93% (08-01-22 @ 16:04) (93% - 98%)  Wt(kg): --        07-31-22 @ 07:01  -  08-01-22 @ 07:00  --------------------------------------------------------  IN: 1740 mL / OUT: 700 mL / NET: 1040 mL    08-01-22 @ 07:01  -  08-01-22 @ 17:08  --------------------------------------------------------  IN: 520 mL / OUT: 751 mL / NET: -231 mL      Physical Exam:  	              Gen: Non toxic comfortable appearing   	Pulm: decrease bs  no rales or ronchi or wheezing  	CV: No JVD. RRR, S1S2; no rub  	Abd: +BS, soft, nontender/nondistended  	: No suprapubic tenderness  	UE: Warm, no cyanosis  no clubbing, no edema  	LE: Warm, no cyanosis  no clubbing, no edema except ecchymosis left toes   	Neuro: alert and oriented. speech coherent     LABS/STUDIES  --------------------------------------------------------------------------------              9.4    11.77 >-----------<  132      [07-31-22 @ 07:33]              30.1     137  |  108  |  49  ----------------------------<  69      [08-01-22 @ 07:25]  3.5   |  18  |  3.12        Ca     8.4     [08-01-22 @ 07:25]            Creatinine Trend:  SCr 3.12 [08-01 @ 07:25]  SCr 2.97 [07-31 @ 07:34]  SCr 2.74 [07-30 @ 10:02]  SCr 2.87 [07-30 @ 05:32]  SCr 3.01 [07-29 @ 21:44]              Urinalysis - [07-29-22 @ 21:45]      Color Colorless / Appearance Clear / SG 1.009 / pH 6.5      Gluc Negative / Ketone Negative  / Bili Negative / Urobili Negative       Blood Negative / Protein Trace / Leuk Est Negative / Nitrite Negative      RBC 0 / WBC 1 / Hyaline  / Gran  / Sq Epi  / Non Sq Epi 1 / Bacteria Negative    Urine Creatinine 34      [07-29-22 @ 21:45]  Urine Protein 11      [07-29-22 @ 21:45]      ACC: [id number] EXAM:  US KIDNEY(S)                          PROCEDURE DATE:  08/01/2022          INTERPRETATION:  CLINICAL INFORMATION: Creatinine is 2.87    COMPARISON: No previous imaging of the kidneys.    TECHNIQUE: Sonography of the kidneys andbladder.    FINDINGS:  Right kidney: 6.8 cm. No renal mass, hydronephrosis or calculi.   Echogenic. Atrophic.    Left kidney: Not visualized. Patient was unable to turn.    Urinary bladder: Within normal limits.    IMPRESSION:  Atrophic right kidney.No right-sided hydronephrosis.    Left kidney cannot be visualized. Please see above.    --- End of Report ---            HELADIO SOTO MD; Attending Radiologist  This document has been electronically signed. Aug  1 2022  3:17PM    
[facility]   [phone]  RENAL FOLLOW UP NOTE  --------------------------------------------------------------------------------  Chief Complaint:    24 hour events/subjective:    patient seen and examined.   srinivasan rodríguez cp  c/o constipation    PAST HISTORY  --------------------------------------------------------------------------------  No significant changes to PMH, PSH, FHx, SHx, unless otherwise noted    ALLERGIES & MEDICATIONS  --------------------------------------------------------------------------------  Allergies    azithromycin (Rash)    Intolerances      Standing Inpatient Medications  acetaminophen     Tablet .. 975 milliGRAM(s) Oral every 8 hours  buPROPion XL (24-Hour) . 150 milliGRAM(s) Oral <User Schedule>  coronavirus (EUA) Booster Vaccine (PFIZER) 0.3 milliLiter(s) IntraMuscular once  diVALproex  milliGRAM(s) Oral daily  diVALproex  milliGRAM(s) Oral daily  enoxaparin Injectable 30 milliGRAM(s) SubCutaneous every 24 hours  levothyroxine 100 MICROGram(s) Oral daily  LORazepam     Tablet 1 milliGRAM(s) Oral daily  polyethylene glycol 3350 17 Gram(s) Oral daily  senna 2 Tablet(s) Oral at bedtime  sodium bicarbonate 650 milliGRAM(s) Oral three times a day  venlafaxine XR. 375 milliGRAM(s) Oral daily    PRN Inpatient Medications  bisacodyl Suppository 10 milliGRAM(s) Rectal daily PRN  magnesium hydroxide Suspension 30 milliLiter(s) Oral daily PRN  melatonin 3 milliGRAM(s) Oral at bedtime PRN  ondansetron Injectable 4 milliGRAM(s) IV Push every 6 hours PRN  oxyCODONE    IR 2.5 milliGRAM(s) Oral every 4 hours PRN  traMADol 25 milliGRAM(s) Oral every 6 hours PRN      REVIEW OF SYSTEMS  --------------------------------------------------------------------------------    Gen: denies fevers/chills,  CVS: denies chest pain/palpitations  Resp: denies SOB/Cough  GI: Denies N/V/Abd pain  : Denies dysuria/oliguria/hematuria    VITALS/PHYSICAL EXAM  --------------------------------------------------------------------------------  T(C): 36.8 (08-02-22 @ 12:13), Max: 36.8 (08-02-22 @ 08:17)  HR: 83 (08-02-22 @ 12:13) (76 - 84)  BP: 125/67 (08-02-22 @ 12:13) (91/44 - 125/67)  RR: 18 (08-02-22 @ 12:13) (18 - 18)  SpO2: 100% (08-02-22 @ 12:13) (93% - 100%)  Wt(kg): --        08-01-22 @ 07:01  -  08-02-22 @ 07:00  --------------------------------------------------------  IN: 520 mL / OUT: 1451 mL / NET: -931 mL    08-02-22 @ 07:01  -  08-02-22 @ 15:22  --------------------------------------------------------  IN: 400 mL / OUT: 600 mL / NET: -200 mL      Physical Exam:  	              Gen: Non toxic comfortable appearing   	Pulm: decrease bs  no rales or ronchi or wheezing  	CV: No JVD. RRR, S1S2; no rub  	Abd: +BS, soft, nontender/nondistended  	: No suprapubic tenderness  	UE: Warm, no cyanosis  no clubbing, no edema  	LE: Warm, no cyanosis  no clubbing, no edema except ecchymosis left toes     LABS/STUDIES  --------------------------------------------------------------------------------    140  |  108  |  44  ----------------------------<  57      [08-02-22 @ 07:16]  4.1   |  20  |  2.83        Ca     8.7     [08-02-22 @ 07:16]            Creatinine Trend:  SCr 2.83 [08-02 @ 07:16]  SCr 3.12 [08-01 @ 07:25]  SCr 2.97 [07-31 @ 07:34]  SCr 2.74 [07-30 @ 10:02]  SCr 2.87 [07-30 @ 05:32]              Urinalysis - [07-29-22 @ 21:45]      Color Colorless / Appearance Clear / SG 1.009 / pH 6.5      Gluc Negative / Ketone Negative  / Bili Negative / Urobili Negative       Blood Negative / Protein Trace / Leuk Est Negative / Nitrite Negative      RBC 0 / WBC 1 / Hyaline  / Gran  / Sq Epi  / Non Sq Epi 1 / Bacteria Negative    Urine Creatinine 34      [07-29-22 @ 21:45]  Urine Protein 11      [07-29-22 @ 21:45]        
ORTHO ATTENDING POST OP    S/P IM FIXATION  R  HIP  WBAT R   LE  lovenox 40 QD  venodynes  ancef 1 g x 24 h  OOB to chair in AM  rehab consult   f/u medicine  CBC in RR and AM   
Post op Day [4 ]    Patient resting without complaints.  No chest pain, SOB, N/V.    T(C): 36.6 (08-03-22 @ 04:49), Max: 36.8 (08-02-22 @ 08:17)  HR: 84 (08-03-22 @ 04:49) (76 - 85)  BP: 118/77 (08-03-22 @ 04:49) (102/65 - 134/68)  RR: 18 (08-03-22 @ 04:49) (18 - 18)  SpO2: 100% (08-03-22 @ 04:49) (96% - 100%)  Wt(kg): --    Exam:  Alert and Oriented, No Acute Distress  R hip dsgs c/d/i with soft/compressible compartments  Calves Soft, Non-tender bilaterally  +PF/DF/EHL/FHL  SILT  +DP Pulse       08-02    140  |  108  |  44<H>  ----------------------------<  57<L>  4.1   |  20<L>  |  2.83<H>    Ca    8.7      02 Aug 2022 07:16        
Patient is a 82 y/o F w/ PMHx sig for HTN, HLD, and Hypothyroidism who presents to the ED with R hip pain. Around 2AM patient was walking outside house when she fell on to her right side. Used life alert to get help. No head injury loc, amnesia. Unwitnessed. Unable to ambulate since then. No hx of hip surgery. Severe R hip pain, non-radiating. Also c/o R elbow abrasion. Patient was brought to [facility] for further evaluation and treatment. In the ED she was found to have a right hip fracture. Patient is s/p an Open reduction and internal fixation of the right hip. Patient seen post op tolerated the procedure well     MEDICATIONS  (STANDING):  acetaminophen   IVPB .. 1000 milliGRAM(s) IV Intermittent once  acetaminophen   IVPB .. 1000 milliGRAM(s) IV Intermittent once  buPROPion XL (24-Hour) . 150 milliGRAM(s) Oral daily  ceFAZolin   IVPB 2000 milliGRAM(s) IV Intermittent once  diVALproex  milliGRAM(s) Oral at bedtime  levothyroxine 100 MICROGram(s) Oral daily  LORazepam     Tablet 0.5 milliGRAM(s) Oral <User Schedule>  LORazepam     Tablet 1 milliGRAM(s) Oral <User Schedule>  polyethylene glycol 3350 17 Gram(s) Oral daily  senna 2 Tablet(s) Oral at bedtime  sodium chloride 0.9%. 1000 milliLiter(s) (125 mL/Hr) IV Continuous <Continuous>  venlafaxine XR. 375 milliGRAM(s) Oral daily    MEDICATIONS  (PRN):  HYDROmorphone  Injectable 0.25 milliGRAM(s) IV Push every 10 minutes PRN Moderate Pain (4 - 6)  magnesium hydroxide Suspension 30 milliLiter(s) Oral daily PRN Constipation  melatonin 3 milliGRAM(s) Oral at bedtime PRN Insomnia  ondansetron Injectable 4 milliGRAM(s) IV Push once PRN Nausea and/or Vomiting  ondansetron Injectable 4 milliGRAM(s) IV Push every 6 hours PRN Nausea and/or Vomiting  oxyCODONE    IR 2.5 milliGRAM(s) Oral every 4 hours PRN Moderate Pain (4 - 6)  oxyCODONE    IR 5 milliGRAM(s) Oral every 4 hours PRN Severe Pain (7 - 10)  traMADol 50 milliGRAM(s) Oral every 8 hours PRN Mild Pain (1 - 3)          VITALS:   T(C): 36.4 (22 @ 11:00), Max: 36.9 (22 @ 17:16)  HR: 96 (22 @ 11:30) (74 - 104)  BP: 122/62 (22 @ 11:30) (99/79 - 156/69)  RR: 16 (22 @ 11:30) (16 - 18)  SpO2: 100% (22 @ 11:30) (94% - 100%)  Wt(kg): --      PHYSICAL EXAM:  GENERAL: NAD, well-groomed, well-developed  HEAD:  Atraumatic, Normocephalic  EYES: EOMI, PERRLA, conjunctiva and sclera clear  ENMT: No tonsillar erythema, exudates, or enlargement; Moist mucous membranes, Good dentition, No lesions  NECK: Supple, No JVD, Normal thyroid  NERVOUS SYSTEM:  Alert & Oriented X3, Good concentration; Motor Strength 5/5 B/L upper and lower extremities; DTRs 2+ intact and symmetric  CHEST/LUNG: Clear to percussion bilaterally; No rales, rhonchi, wheezing, or rubs  HEART: Regular rate and rhythm; No murmurs, rubs, or gallops  ABDOMEN: Soft, Nontender, Nondistended; Bowel sounds present  EXTREMITIES:  2+ Peripheral Pulses, No clubbing, cyanosis, or edema  LYMPH: No lymphadenopathy noted  SKIN: No rashes or lesions    LABS:        CBC Full  -  ( 2022 10:03 )  WBC Count : 11.26 K/uL  RBC Count : 3.66 M/uL  Hemoglobin : 11.0 g/dL  Hematocrit : 35.0 %  Platelet Count - Automated : 148 K/uL  Mean Cell Volume : 95.6 fl  Mean Cell Hemoglobin : 30.1 pg  Mean Cell Hemoglobin Concentration : 31.4 gm/dL  Auto Neutrophil # : x  Auto Lymphocyte # : x  Auto Monocyte # : x  Auto Eosinophil # : x  Auto Basophil # : x  Auto Neutrophil % : x  Auto Lymphocyte % : x  Auto Monocyte % : x  Auto Eosinophil % : x  Auto Basophil % : x        139  |  109<H>  |  40<H>  ----------------------------<  82  4.3   |  18<L>  |  2.74<H>    Ca    8.2<L>      2022 10:02    TPro  7.2  /  Alb  4.0  /  TBili  0.3  /  DBili  x   /  AST  14  /  ALT  8<L>  /  AlkPhos  78  07-29    LIVER FUNCTIONS - ( 2022 06:40 )  Alb: 4.0 g/dL / Pro: 7.2 g/dL / ALK PHOS: 78 U/L / ALT: 8 U/L / AST: 14 U/L / GGT: x           PT/INR - ( 2022 05:32 )   PT: 12.0 sec;   INR: 1.03 ratio         PTT - ( 2022 05:32 )  PTT:27.5 sec  Urinalysis Basic - ( 2022 21:45 )    Color: Colorless / Appearance: Clear / S.009 / pH: x  Gluc: x / Ketone: Negative  / Bili: Negative / Urobili: Negative   Blood: x / Protein: Trace / Nitrite: Negative   Leuk Esterase: Negative / RBC: 0 /hpf / WBC 1 /HPF   Sq Epi: x / Non Sq Epi: 1 /hpf / Bacteria: Negative      CAPILLARY BLOOD GLUCOSE          RADIOLOGY & ADDITIONAL TESTS:      
[facility]   [phone]  RENAL FOLLOW UP NOTE  --------------------------------------------------------------------------------  Chief Complaint:    24 hour events/subjective:    seen earlier   c/o pain hip      PAST HISTORY  --------------------------------------------------------------------------------  No significant changes to PMH, PSH, FHx, SHx, unless otherwise noted    ALLERGIES & MEDICATIONS  --------------------------------------------------------------------------------  Allergies    azithromycin (Rash)    Intolerances      Standing Inpatient Medications  acetaminophen     Tablet .. 975 milliGRAM(s) Oral every 8 hours  diVALproex  milliGRAM(s) Oral daily  diVALproex  milliGRAM(s) Oral daily  enoxaparin Injectable 30 milliGRAM(s) SubCutaneous every 24 hours  levothyroxine 100 MICROGram(s) Oral daily  LORazepam     Tablet 1 milliGRAM(s) Oral daily  polyethylene glycol 3350 17 Gram(s) Oral daily  senna 2 Tablet(s) Oral at bedtime  sodium bicarbonate  Infusion 0.068 mEq/kG/Hr IV Continuous <Continuous>  venlafaxine XR. 375 milliGRAM(s) Oral daily    PRN Inpatient Medications  magnesium hydroxide Suspension 30 milliLiter(s) Oral daily PRN  melatonin 3 milliGRAM(s) Oral at bedtime PRN  ondansetron Injectable 4 milliGRAM(s) IV Push every 6 hours PRN  oxyCODONE    IR 2.5 milliGRAM(s) Oral every 4 hours PRN  traMADol 25 milliGRAM(s) Oral every 6 hours PRN      REVIEW OF SYSTEMS  --------------------------------------------------------------------------------    Gen: denies fevers/chills,  CVS: denies chest pain/palpitations  Resp: denies SOB/Cough  GI: Denies N/V/Abd pain  : Denies dysuria      VITALS/PHYSICAL EXAM  --------------------------------------------------------------------------------  T(C): 36.6 (07-31-22 @ 16:08), Max: 36.8 (07-31-22 @ 08:54)  HR: 95 (07-31-22 @ 16:08) (78 - 95)  BP: 115/72 (07-31-22 @ 16:08) (99/62 - 125/60)  RR: 18 (07-31-22 @ 16:08) (18 - 18)  SpO2: 94% (07-31-22 @ 16:08) (93% - 100%)  Wt(kg): --  Height (cm): 167.6 (07-30-22 @ 07:30)  Weight (kg): 77.1 (07-30-22 @ 07:30)  BMI (kg/m2): 27.4 (07-30-22 @ 07:30)  BSA (m2): 1.87 (07-30-22 @ 07:30)      07-30-22 @ 07:01  -  07-31-22 @ 07:00  --------------------------------------------------------  IN: 1025 mL / OUT: 1400 mL / NET: -375 mL    07-31-22 @ 07:01  -  07-31-22 @ 17:46  --------------------------------------------------------  IN: 350 mL / OUT: 0 mL / NET: 350 mL      Physical Exam:  	    Gen: Non toxic comfortable appearing   	no jvd  	Pulm: decrease bs  no rales or ronchi or wheezing  	CV: RRR, S1S2; no rub  	Abd: +BS, soft, nontender/nondistended  	: No suprapubic tenderness  	UE: Warm, no cyanosis  no clubbing,  no edema  	LE: Warm, no cyanosis  no clubbing, no edema except ecchymosis left toes   	Neuro: alert and oriented. speech coherent       LABS/STUDIES  --------------------------------------------------------------------------------              9.4    11.77 >-----------<  132      [07-31-22 @ 07:33]              30.1     135  |  107  |  47  ----------------------------<  105      [07-31-22 @ 07:34]  4.9   |  11  |  2.97        Ca     8.5     [07-31-22 @ 07:34]      PT/INR: PT 12.0 , INR 1.03       [07-30-22 @ 05:32]  PTT: 27.5       [07-30-22 @ 05:32]      Creatinine Trend:  SCr 2.97 [07-31 @ 07:34]  SCr 2.74 [07-30 @ 10:02]  SCr 2.87 [07-30 @ 05:32]  SCr 3.01 [07-29 @ 21:44]  SCr 3.29 [07-29 @ 06:40]              Urinalysis - [07-29-22 @ 21:45]      Color Colorless / Appearance Clear / SG 1.009 / pH 6.5      Gluc Negative / Ketone Negative  / Bili Negative / Urobili Negative       Blood Negative / Protein Trace / Leuk Est Negative / Nitrite Negative      RBC 0 / WBC 1 / Hyaline  / Gran  / Sq Epi  / Non Sq Epi 1 / Bacteria Negative    Urine Creatinine 34      [07-29-22 @ 21:45]  Urine Protein 11      [07-29-22 @ 21:45]        
Patient is a 80 y/o F w/ PMHx sig for HTN, HLD, and Hypothyroidism who presents to the ED with R hip pain. Around 2AM patient was walking outside house when she fell on to her right side. Used life alert to get help. No head injury loc, amnesia. Unwitnessed. Unable to ambulate since then. No hx of hip surgery. Severe R hip pain, non-radiating. Also c/o R elbow abrasion. Patient was brought to [facility] for further evaluation and treatment. In the ED she was found to have a right hip fracture. Patient is s/p an Open reduction and internal fixation of the right hip. Patient seen post op tolerated the procedure well. Patient to start working with physical therapy.     MEDICATIONS  (STANDING):  acetaminophen     Tablet .. 975 milliGRAM(s) Oral every 8 hours  buPROPion XL (24-Hour) . 150 milliGRAM(s) Oral once  diVALproex  milliGRAM(s) Oral daily  diVALproex  milliGRAM(s) Oral daily  enoxaparin Injectable 30 milliGRAM(s) SubCutaneous every 24 hours  levothyroxine 100 MICROGram(s) Oral daily  LORazepam     Tablet 1 milliGRAM(s) Oral daily  polyethylene glycol 3350 17 Gram(s) Oral daily  senna 2 Tablet(s) Oral at bedtime  sodium chloride 0.9%. 1000 milliLiter(s) (125 mL/Hr) IV Continuous <Continuous>  venlafaxine XR. 375 milliGRAM(s) Oral daily    MEDICATIONS  (PRN):  magnesium hydroxide Suspension 30 milliLiter(s) Oral daily PRN Constipation  melatonin 3 milliGRAM(s) Oral at bedtime PRN Insomnia  ondansetron Injectable 4 milliGRAM(s) IV Push every 6 hours PRN Nausea and/or Vomiting  oxyCODONE    IR 2.5 milliGRAM(s) Oral every 4 hours PRN Severe Pain (7 - 10)  traMADol 25 milliGRAM(s) Oral every 6 hours PRN Moderate Pain (4 - 6)          VITALS:   T(C): 36.6 (22 @ 04:00), Max: 37 (22 @ 12:00)  HR: 80 (22 @ 04:00) (78 - 103)  BP: 111/62 (22 @ 04:00) (105/69 - 144/68)  RR: 18 (22 @ 04:00) (16 - 18)  SpO2: 99% (22 @ 04:00) (93% - 100%)  Wt(kg): --    PHYSICAL EXAM:  GENERAL: NAD, well-groomed, well-developed  HEAD:  Atraumatic, Normocephalic  EYES: EOMI, PERRLA, conjunctiva and sclera clear  ENMT: No tonsillar erythema, exudates, or enlargement; Moist mucous membranes, Good dentition, No lesions  NECK: Supple, No JVD, Normal thyroid  NERVOUS SYSTEM:  Alert & Oriented X3, Good concentration; Motor Strength 5/5 B/L upper and lower extremities; DTRs 2+ intact and symmetric  CHEST/LUNG: Clear to percussion bilaterally; No rales, rhonchi, wheezing, or rubs  HEART: Regular rate and rhythm; No murmurs, rubs, or gallops  ABDOMEN: Soft, Nontender, Nondistended; Bowel sounds present  EXTREMITIES:  2+ Peripheral Pulses, No clubbing, cyanosis, or edema  LYMPH: No lymphadenopathy noted  SKIN: No rashes or lesions    LABS:        CBC Full  -  ( 2022 07:33 )  WBC Count : 11.77 K/uL  RBC Count : 3.10 M/uL  Hemoglobin : 9.4 g/dL  Hematocrit : 30.1 %  Platelet Count - Automated : 132 K/uL  Mean Cell Volume : 97.1 fl  Mean Cell Hemoglobin : 30.3 pg  Mean Cell Hemoglobin Concentration : 31.2 gm/dL  Auto Neutrophil # : x  Auto Lymphocyte # : x  Auto Monocyte # : x  Auto Eosinophil # : x  Auto Basophil # : x  Auto Neutrophil % : x  Auto Lymphocyte % : x  Auto Monocyte % : x  Auto Eosinophil % : x  Auto Basophil % : x        135  |  107  |  47<H>  ----------------------------<  105<H>  4.9   |  11<L>  |  2.97<H>    Ca    8.5      2022 07:34        PT/INR - ( 2022 05:32 )   PT: 12.0 sec;   INR: 1.03 ratio         PTT - ( 2022 05:32 )  PTT:27.5 sec  Urinalysis Basic - ( 2022 21:45 )    Color: Colorless / Appearance: Clear / S.009 / pH: x  Gluc: x / Ketone: Negative  / Bili: Negative / Urobili: Negative   Blood: x / Protein: Trace / Nitrite: Negative   Leuk Esterase: Negative / RBC: 0 /hpf / WBC 1 /HPF   Sq Epi: x / Non Sq Epi: 1 /hpf / Bacteria: Negative      CAPILLARY BLOOD GLUCOSE          RADIOLOGY & ADDITIONAL TESTS:      
Patient is a 81y old  Female who presents with a chief complaint of R Femoral Neck Fx (31 Jul 2022 09:56)      POST OPERATIVE DAY #: 2  Patient comfortable  No complaints    VS:  T(C): 36.3 (08-01-22 @ 04:30), Max: 36.9 (07-31-22 @ 20:08)  T(F): 97.4 (08-01-22 @ 04:30), Max: 98.4 (07-31-22 @ 20:08)  HR: 88 (08-01-22 @ 04:30) (81 - 95)  BP: 142/77 (08-01-22 @ 04:30) (99/62 - 142/77)  RR: 18 (08-01-22 @ 04:30) (18 - 18)  SpO2: 97% (08-01-22 @ 04:30) (93% - 98%)  Wt(kg): --      PHYSICAL EXAM:  NAD, Alert  EXT:   Rt Hip: Dressings intact, slight bloody drainage noted on proximal dressing  No Calf Tenderness  (+) DF/PF; (+) Distal Pulses;  Sensation: No gross deficits noted  Pulses 2+   B/L, PAS     LABS:                        9.4<L>  11.77<H> )-----------( 132<L>    ( 31 Jul 2022 07:33 )             30.1<L>    07-31    135  |  107  |  47<H>  ----------------------------<  105<H>  4.9   |  11<L>  |  2.97<H>          RADIOLOGY & ADDITIONAL TESTS:      
Post op Day [1]    Patient resting without complaints.  No chest pain, SOB, N/V.    T(C): 36.8 (07-31-22 @ 08:54), Max: 37 (07-30-22 @ 12:00)  HR: 81 (07-31-22 @ 08:54) (78 - 100)  BP: 99/62 (07-31-22 @ 08:54) (99/62 - 131/64)  RR: 18 (07-31-22 @ 08:54) (16 - 18)  SpO2: 93% (07-31-22 @ 08:54) (93% - 100%)      Exam:  Alert and Oriented, No Acute Distress  Lower Extremities: R Hi[  Dressing: C/D/I w/ gauze and surgical film  Calves Soft, Non-tender bilaterally  +PF/DF/EHL/FHL  SILT  +DP Pulse                            9.4    11.77 )-----------( 132      ( 31 Jul 2022 07:33 )             30.1    07-31    135  |  107  |  47<H>  ----------------------------<  105<H>  4.9   |  11<L>  |  2.97<H>    Ca    8.5      31 Jul 2022 07:34        
Patient is a 82 y/o F w/ PMHx sig for HTN, HLD, and Hypothyroidism who presents to the ED with R hip pain. Around 2AM patient was walking outside house when she fell on to her right side. Used life alert to get help. No head injury loc, amnesia. Unwitnessed. Unable to ambulate since then. No hx of hip surgery. Severe R hip pain, non-radiating. Also c/o R elbow abrasion. Patient was [facility] for further evaluation and treatment. In the ED she was found to have a right hip fracture. Patient is s/p an Open reduction and internal fixation of the right hip. Patient seen post op tolerated the procedure well. Patient to start working with physical therapy.       MEDICATIONS  (STANDING):  acetaminophen     Tablet .. 975 milliGRAM(s) Oral every 8 hours  buPROPion XL (24-Hour) . 150 milliGRAM(s) Oral <User Schedule>  coronavirus (EUA) Booster Vaccine (PFIZER) 0.3 milliLiter(s) IntraMuscular once  diVALproex  milliGRAM(s) Oral daily  diVALproex  milliGRAM(s) Oral daily  enoxaparin Injectable 30 milliGRAM(s) SubCutaneous every 24 hours  levothyroxine 100 MICROGram(s) Oral daily  LORazepam     Tablet 1 milliGRAM(s) Oral daily  polyethylene glycol 3350 17 Gram(s) Oral daily  senna 2 Tablet(s) Oral at bedtime  sodium bicarbonate 650 milliGRAM(s) Oral three times a day  venlafaxine XR. 375 milliGRAM(s) Oral daily    MEDICATIONS  (PRN):  bisacodyl Suppository 10 milliGRAM(s) Rectal daily PRN Constipation  magnesium hydroxide Suspension 30 milliLiter(s) Oral daily PRN Constipation  melatonin 3 milliGRAM(s) Oral at bedtime PRN Insomnia  ondansetron Injectable 4 milliGRAM(s) IV Push every 6 hours PRN Nausea and/or Vomiting  oxyCODONE    IR 2.5 milliGRAM(s) Oral every 4 hours PRN Severe Pain (7 - 10)  traMADol 25 milliGRAM(s) Oral every 6 hours PRN Moderate Pain (4 - 6)          VITALS:   T(C): 36.8 (08-02-22 @ 12:13), Max: 36.8 (08-02-22 @ 08:17)  HR: 83 (08-02-22 @ 12:13) (76 - 88)  BP: 125/67 (08-02-22 @ 12:13) (91/44 - 125/67)  RR: 18 (08-02-22 @ 12:13) (18 - 18)  SpO2: 100% (08-02-22 @ 12:13) (93% - 100%)  Wt(kg): --    PHYSICAL EXAM:  GENERAL: NAD, well-groomed, well-developed  HEAD:  Atraumatic, Normocephalic  EYES: EOMI, PERRLA, conjunctiva and sclera clear  ENMT: No tonsillar erythema, exudates, or enlargement; Moist mucous membranes, Good dentition, No lesions  NECK: Supple, No JVD, Normal thyroid  NERVOUS SYSTEM:  Alert & Oriented X3, Good concentration; Motor Strength 5/5 B/L upper and lower extremities; DTRs 2+ intact and symmetric  CHEST/LUNG: Clear to percussion bilaterally; No rales, rhonchi, wheezing, or rubs  HEART: Regular rate and rhythm; No murmurs, rubs, or gallops  ABDOMEN: Soft, Nontender, Nondistended; Bowel sounds present  EXTREMITIES:  2+ Peripheral Pulses, No clubbing, cyanosis, or edema  LYMPH: No lymphadenopathy noted  SKIN: No rashes or lesions    LABS:          08-02    140  |  108  |  44<H>  ----------------------------<  57<L>  4.1   |  20<L>  |  2.83<H>    Ca    8.7      02 Aug 2022 07:16            CAPILLARY BLOOD GLUCOSE          RADIOLOGY & ADDITIONAL TESTS:      
Patient is a 80 y/o F w/ PMHx sig for HTN, HLD, and Hypothyroidism who presents to the ED with R hip pain. Around 2AM patient was walking outside house when she fell on to her right side. Used life alert to get help. No head injury loc, amnesia. Unwitnessed. Unable to ambulate since then. No hx of hip surgery. Severe R hip pain, non-radiating. Also c/o R elbow abrasion. Patient was brought to [facility] for further evaluation and treatment. In the ED she was found to have a right hip fracture. Patient is s/p an Open reduction and internal fixation of the right hip. Patient seen post op tolerated the procedure well. Patient to start working with physical therapy.       MEDICATIONS  (STANDING):  acetaminophen     Tablet .. 975 milliGRAM(s) Oral every 8 hours  buPROPion XL (24-Hour) . 150 milliGRAM(s) Oral <User Schedule>  diVALproex  milliGRAM(s) Oral daily  diVALproex  milliGRAM(s) Oral daily  enoxaparin Injectable 30 milliGRAM(s) SubCutaneous every 24 hours  levothyroxine 100 MICROGram(s) Oral daily  LORazepam     Tablet 1 milliGRAM(s) Oral daily  polyethylene glycol 3350 17 Gram(s) Oral daily  senna 2 Tablet(s) Oral at bedtime  sodium bicarbonate 650 milliGRAM(s) Oral three times a day  venlafaxine XR. 375 milliGRAM(s) Oral daily    MEDICATIONS  (PRN):  bisacodyl Suppository 10 milliGRAM(s) Rectal daily PRN Constipation  lactulose Syrup 20 Gram(s) Oral every 8 hours PRN Constipation  magnesium hydroxide Suspension 30 milliLiter(s) Oral daily PRN Constipation  melatonin 3 milliGRAM(s) Oral at bedtime PRN Insomnia  ondansetron Injectable 4 milliGRAM(s) IV Push every 6 hours PRN Nausea and/or Vomiting  oxyCODONE    IR 2.5 milliGRAM(s) Oral every 4 hours PRN Severe Pain (7 - 10)  traMADol 25 milliGRAM(s) Oral every 6 hours PRN Moderate Pain (4 - 6)          VITALS:   T(C): 36.7 (08-03-22 @ 13:41), Max: 36.8 (08-02-22 @ 23:47)  HR: 78 (08-03-22 @ 13:41) (78 - 85)  BP: 104/65 (08-03-22 @ 13:41) (103/54 - 134/68)  RR: 18 (08-03-22 @ 13:41) (18 - 18)  SpO2: 95% (08-03-22 @ 13:41) (95% - 100%)  Wt(kg): --    PHYSICAL EXAM:  GENERAL: NAD, well-groomed, well-developed  HEAD:  Atraumatic, Normocephalic  EYES: EOMI, PERRLA, conjunctiva and sclera clear  ENMT: No tonsillar erythema, exudates, or enlargement; Moist mucous membranes, Good dentition, No lesions  NECK: Supple, No JVD, Normal thyroid  NERVOUS SYSTEM:  Alert & Oriented X3, Good concentration; Motor Strength 5/5 B/L upper and lower extremities; DTRs 2+ intact and symmetric  CHEST/LUNG: Clear to percussion bilaterally; No rales, rhonchi, wheezing, or rubs  HEART: Regular rate and rhythm; No murmurs, rubs, or gallops  ABDOMEN: Soft, Nontender, Nondistended; Bowel sounds present  EXTREMITIES:  2+ Peripheral Pulses, No clubbing, cyanosis, or edema  LYMPH: No lymphadenopathy noted  SKIN: No rashes or lesions    LABS:          08-03    145  |  112<H>  |  39<H>  ----------------------------<  62<L>  4.2   |  22  |  2.67<H>    Ca    8.8      03 Aug 2022 09:27            CAPILLARY BLOOD GLUCOSE          RADIOLOGY & ADDITIONAL TESTS:      
Patient is a 80 y/o F w/ PMHx sig for HTN, HLD, and Hypothyroidism who presents to the ED with R hip pain. Around 2AM patient was walking outside house when she fell on to her right side. Used life alert to get help. No head injury loc, amnesia. Unwitnessed. Unable to ambulate since then. No hx of hip surgery. Severe R hip pain, non-radiating. Also c/o R elbow abrasion. Patient was brought to [facility] for further evaluation and treatment. In the ED she was found to have a right hip fracture. Patient is s/p an Open reduction and internal fixation of the right hip. Patient seen post op tolerated the procedure well. Patient to start working with physical therapy.     MEDICATIONS  (STANDING):  acetaminophen     Tablet .. 975 milliGRAM(s) Oral every 8 hours  buPROPion XL (24-Hour) . 150 milliGRAM(s) Oral <User Schedule>  diVALproex  milliGRAM(s) Oral daily  diVALproex  milliGRAM(s) Oral daily  enoxaparin Injectable 30 milliGRAM(s) SubCutaneous every 24 hours  levothyroxine 100 MICROGram(s) Oral daily  LORazepam     Tablet 1 milliGRAM(s) Oral daily  polyethylene glycol 3350 17 Gram(s) Oral daily  senna 2 Tablet(s) Oral at bedtime  sodium bicarbonate  Infusion 0.068 mEq/kG/Hr (70 mL/Hr) IV Continuous <Continuous>  venlafaxine XR. 375 milliGRAM(s) Oral daily    MEDICATIONS  (PRN):  magnesium hydroxide Suspension 30 milliLiter(s) Oral daily PRN Constipation  melatonin 3 milliGRAM(s) Oral at bedtime PRN Insomnia  ondansetron Injectable 4 milliGRAM(s) IV Push every 6 hours PRN Nausea and/or Vomiting  oxyCODONE    IR 2.5 milliGRAM(s) Oral every 4 hours PRN Severe Pain (7 - 10)  traMADol 25 milliGRAM(s) Oral every 6 hours PRN Moderate Pain (4 - 6)          VITALS:   T(C): 36.5 (08-01-22 @ 14:48), Max: 36.9 (07-31-22 @ 20:08)  HR: 88 (08-01-22 @ 14:48) (80 - 95)  BP: 110/68 (08-01-22 @ 14:48) (104/67 - 142/77)  RR: 18 (08-01-22 @ 14:48) (18 - 18)  SpO2: 95% (08-01-22 @ 14:48) (94% - 98%)  Wt(kg): --    PHYSICAL EXAM:  GENERAL: NAD, well-groomed, well-developed  HEAD:  Atraumatic, Normocephalic  EYES: EOMI, PERRLA, conjunctiva and sclera clear  ENMT: No tonsillar erythema, exudates, or enlargement; Moist mucous membranes, Good dentition, No lesions  NECK: Supple, No JVD, Normal thyroid  NERVOUS SYSTEM:  Alert & Oriented X3, Good concentration; Motor Strength 5/5 B/L upper and lower extremities; DTRs 2+ intact and symmetric  CHEST/LUNG: Clear to percussion bilaterally; No rales, rhonchi, wheezing, or rubs  HEART: Regular rate and rhythm; No murmurs, rubs, or gallops  ABDOMEN: Soft, Nontender, Nondistended; Bowel sounds present  EXTREMITIES:  2+ Peripheral Pulses, No clubbing, cyanosis, or edema  LYMPH: No lymphadenopathy noted  SKIN: No rashes or lesions      LABS:  ABG - ( 31 Jul 2022 13:35 )  pH, Arterial: 7.35  pH, Blood: x     /  pCO2: 28    /  pO2: 81    / HCO3: 16    / Base Excess: -9.1  /  SaO2: 98.2                  CBC Full  -  ( 31 Jul 2022 07:33 )  WBC Count : 11.77 K/uL  RBC Count : 3.10 M/uL  Hemoglobin : 9.4 g/dL  Hematocrit : 30.1 %  Platelet Count - Automated : 132 K/uL  Mean Cell Volume : 97.1 fl  Mean Cell Hemoglobin : 30.3 pg  Mean Cell Hemoglobin Concentration : 31.2 gm/dL  Auto Neutrophil # : x  Auto Lymphocyte # : x  Auto Monocyte # : x  Auto Eosinophil # : x  Auto Basophil # : x  Auto Neutrophil % : x  Auto Lymphocyte % : x  Auto Monocyte % : x  Auto Eosinophil % : x  Auto Basophil % : x    08-01    137  |  108  |  49<H>  ----------------------------<  69<L>  3.5   |  18<L>  |  3.12<H>    Ca    8.4      01 Aug 2022 07:25            CAPILLARY BLOOD GLUCOSE          RADIOLOGY & ADDITIONAL TESTS:

## 2022-08-03 NOTE — PROGRESS NOTE ADULT - ASSESSMENT
80yo woman presents after a fall at home with a right hip fracture. Patient is s/p an Open reduction and internal fixation of the right hip. 
80yo woman presents after a fall at home with a right hip fracture. Patient is scheduled for an Open reduction and internal fixation of the right hip. 
81y Female with hx of depression with Lithium use for almost 10 years no Li use recently presents s/p mech fall. Dx with R FN fx. now for OR. states has hx of ckd and when her cr is at 4 "then it is too high ".  states has known ckd. also with  increase thirst and urination     1- CKD III with TYRONE   2- polydipsia  has not effected na   3- hx Lithium use  4- leukocytosis   5- acidosis       cr steady range   abg requested and started on 1/2NS + 75 meq nahco3-/L   avoid nsaids.   anemia trend hb   d/w ortho team 
81y Female with hx of depression with Lithium use for almost 10 years no Li use recently presents s/p mech fall. Dx with R FN fx. now for OR. states has hx of ckd and when her cr is at 4 "then it is too high ".  states has known ckd. also with  increase thirst and urination     1- CKD III with TYRONE   2- polydipsia  has not effected na   3- hx Lithium use  4- leukocytosis   5- acidosis       creatinine steady, baseline creatinine 2.8 g/dl  renal sono with atrophic right kidney  acidosis improving  sodium bicarb tabs 650 mg TID  avoid nsaids.   anemia trend hb   trend creatinine and electrolytes daily
S/P IMN R Hip    Plan    dressing chg today  ck lytes  Receiving IV Bicarb drip x 24hrs as per nephology  Renal sonogram  OOB/PT/WBAT  D/C planning EMILIO Crowe PA-C   Beeper    [id number]  
81y Female with hx of depression with Lithium use for almost 10 years no Li use recently presents s/p mech fall. Dx with R FN fx. now for OR. states has hx of ckd and when her cr is at 4 "then it is too high ".  states has known ckd. also with  increase thirst and urination     1- CKD III with TYRONE   2- polydipsia   3- hx Lithium use  4- leukocytosis       cr is improving cont ivf   hypothyroid synthroid 100 mcg   dw pt daughter at bedside   avoid nsaids.   d/w ortho team       
A/p: 81y Female s/p R Femoral neck Fx IM Nail ORIF.  VSS. NAD.    
S/P IMN R Hip    Plan  pain control  OOB/PT/WBAT  appreciate nephro recs  dvt ppx  D/C planning EMILIO
81y Female with hx of depression with Lithium use for almost 10 years no Li use recently presents s/p mech fall. Dx with R FN fx. now for OR. states has hx of ckd and when her cr is at 4 "then it is too high ".  states has known ckd. also with  increase thirst and urination     1- CKD III with TYRONE   2- polydipsia  has not effected na   3- hx Lithium use  4- leukocytosis   5- acidosis       cr steady range   renal sono with atrophic right kidney  acidosis improving  transition to sodium bicarb tabs 650 mg TID  avoid nsaids.   anemia trend hb   trend creatinine and electrolytes daily
  Impression: Stable       Plan:   Continue present treatment                 Preop, NPO, surgery today                  Continue to monitor    Scotty Ordaz PA-C  Orthopaedic Surgery  Team pager [phone]  [phone]   
A/p: 81yFemale s/p R hip IM nail.  VSS. NAD.    
80yo woman presents after a fall at home with a right hip fracture. Patient is scheduled for an Open reduction and internal fixation of the right hip. 
82yo woman presents after a fall at home with a right hip fracture. Patient is s/p an Open reduction and internal fixation of the right hip. 
82yo woman presents after a fall at home with a right hip fracture. Patient is scheduled for an Open reduction and internal fixation of the right hip.

## 2022-08-03 NOTE — PROGRESS NOTE ADULT - PROBLEM SELECTOR PROBLEM 1
Hip fracture, right

## 2022-08-03 NOTE — PROGRESS NOTE ADULT - PROBLEM SELECTOR PLAN 4
continue current dose of levothyroxine.

## 2022-08-03 NOTE — CHART NOTE - NSCHARTNOTEFT_GEN_A_CORE
Spoke with Dr. Vallecillo from Nephrology, discussed patient plan for discharge to sub acute rehab today and as per Dr. Vallecillo patient is okay for discharge today.    Sergio Avila PA-C  Orthopedic Surgery Team  Team Pager #[phone]

## 2022-08-03 NOTE — PROGRESS NOTE ADULT - PROBLEM SELECTOR PLAN 1
Patient seen post op tolerated the procedure well  Pain has been managed  To start physical therapy as tolerated  DVT and GI prophylaxis
PT/OT-WBAT  IS  DVT PPx  Pain Control  Continue Current Tx.  Consultants appreciated    Tera Kimble PA-C  Team Pager: #[phone]
Patient seen post op tolerated the procedure well  Pain has been managed  To start physical therapy as tolerated  DVT and GI prophylaxis
continue physical therapy as tolerated  PO as tolerated  DVT and GI prophylaxis

## 2022-08-15 ENCOUNTER — APPOINTMENT (OUTPATIENT)
Dept: ORTHOPEDIC SURGERY | Facility: CLINIC | Age: 82
End: 2022-08-15

## 2022-08-15 PROCEDURE — 99024 POSTOP FOLLOW-UP VISIT: CPT

## 2022-09-12 ENCOUNTER — APPOINTMENT (OUTPATIENT)
Dept: ORTHOPEDIC SURGERY | Facility: CLINIC | Age: 82
End: 2022-09-12
Payer: MEDICARE

## 2022-09-12 VITALS
HEART RATE: 80 BPM | DIASTOLIC BLOOD PRESSURE: 70 MMHG | HEIGHT: 65 IN | SYSTOLIC BLOOD PRESSURE: 113 MMHG | BODY MASS INDEX: 26.66 KG/M2 | WEIGHT: 160 LBS

## 2022-09-12 PROCEDURE — 99024 POSTOP FOLLOW-UP VISIT: CPT

## 2022-09-12 PROCEDURE — 73502 X-RAY EXAM HIP UNI 2-3 VIEWS: CPT

## 2022-10-05 ENCOUNTER — EMERGENCY (EMERGENCY)
Facility: HOSPITAL | Age: 82
LOS: 1 days | Discharge: HOME | End: 2022-10-05
Attending: STUDENT IN AN ORGANIZED HEALTH CARE EDUCATION/TRAINING PROGRAM
Payer: MEDICARE

## 2022-10-05 VITALS
TEMPERATURE: 98 F | RESPIRATION RATE: 16 BRPM | SYSTOLIC BLOOD PRESSURE: 135 MMHG | HEART RATE: 96 BPM | OXYGEN SATURATION: 97 % | DIASTOLIC BLOOD PRESSURE: 65 MMHG

## 2022-10-05 VITALS
SYSTOLIC BLOOD PRESSURE: 136 MMHG | HEIGHT: 66 IN | HEART RATE: 86 BPM | OXYGEN SATURATION: 98 % | TEMPERATURE: 98 F | RESPIRATION RATE: 16 BRPM | DIASTOLIC BLOOD PRESSURE: 61 MMHG

## 2022-10-05 DIAGNOSIS — Z90.89 ACQUIRED ABSENCE OF OTHER ORGANS: Chronic | ICD-10-CM

## 2022-10-05 DIAGNOSIS — H26.9 UNSPECIFIED CATARACT: Chronic | ICD-10-CM

## 2022-10-05 DIAGNOSIS — Z98.890 OTHER SPECIFIED POSTPROCEDURAL STATES: Chronic | ICD-10-CM

## 2022-10-05 PROCEDURE — G1004: CPT

## 2022-10-05 PROCEDURE — 72125 CT NECK SPINE W/O DYE: CPT | Mod: 26,MG

## 2022-10-05 PROCEDURE — 99284 EMERGENCY DEPT VISIT MOD MDM: CPT | Mod: FS

## 2022-10-05 PROCEDURE — 72125 CT NECK SPINE W/O DYE: CPT | Mod: MG

## 2022-10-05 PROCEDURE — 73552 X-RAY EXAM OF FEMUR 2/>: CPT

## 2022-10-05 PROCEDURE — 72170 X-RAY EXAM OF PELVIS: CPT

## 2022-10-05 PROCEDURE — 72170 X-RAY EXAM OF PELVIS: CPT | Mod: 26

## 2022-10-05 PROCEDURE — 70450 CT HEAD/BRAIN W/O DYE: CPT | Mod: MG

## 2022-10-05 PROCEDURE — 70450 CT HEAD/BRAIN W/O DYE: CPT | Mod: 26,MG

## 2022-10-05 PROCEDURE — 99284 EMERGENCY DEPT VISIT MOD MDM: CPT | Mod: 25

## 2022-10-05 PROCEDURE — 73552 X-RAY EXAM OF FEMUR 2/>: CPT | Mod: 26,RT

## 2022-10-05 RX ORDER — ACETAMINOPHEN 500 MG
650 TABLET ORAL ONCE
Refills: 0 | Status: COMPLETED | OUTPATIENT
Start: 2022-10-05 | End: 2022-10-05

## 2022-10-05 RX ADMIN — Medication 650 MILLIGRAM(S): at 14:17

## 2022-10-05 NOTE — ED ADULT NURSE NOTE - OBJECTIVE STATEMENT
82 y female presents from [facility] s/p mechanical fall. denies LOC/ blood thinners. patient reports to getting up from bathroom and falling backwards; hit head. denies HA, acute vision changes, n/v. a&ox3. skin warm and dry- no signs of external trauma. neuro intact- 5/5 strength and sensation bilaterally. breathing comfortably in bed- no distress. speaking in full clear sentences. safety maintained- bed locked in lowest position. report given to JAKE Kate from break coverage RN at 1330.

## 2022-10-05 NOTE — ED PROVIDER NOTE - PROGRESS NOTE DETAILS
CTs and xray not acutely actionable. Pt feels well. Able to ambulate w/ walker (baseline since fall). Results explained to daughter Efren and pt at bedside. Pt wishes to go back to [facility] w/ her daughter, spoke w/ Margareth from [facility] who is aware pt returning, fine with daughter driving pt. Pt stable for discharge back to [facility]. Attending aware and cleared for discharge. - FABIAN HernándezC

## 2022-10-05 NOTE — ED PROVIDER NOTE - OBJECTIVE STATEMENT
81 yo female pmhx HTN, HLD, hypothyroidism, CKD, recent admission 07/2022 for R fem neck fx s/p ORIF presents to ED [facility] s/p mechanical fall at rehab. Pt states attempted to get up on her own to use the bathroom, at same time, states phone rang in room, turned around to get it and feet got tangled, states fell backwards onto carpeted floor, hit back of head on ground. No LOC, called out to staff who helped her up. Current c/o "bump" to back of head and dull ache in head. Denies hip pain, back pain, speech/visual changes, n/v/d, bowel/bladder incontinence, weakness, cp, sob, dizziness, lightheadedness, preceding sxs.

## 2022-10-05 NOTE — ED PROVIDER NOTE - ENMT, MLM
Detail Level: Detailed Quality 111:Pneumonia Vaccination Status For Older Adults: Pneumococcal Vaccination Previously Received Quality 474: Zoster Vaccination Status: Shingrix Vaccination Administered or Previously Received Head w/ 1.0 cm area of ecchymosis to R posterior parietal scalp with overlying swelling. No open laceration or wound. Airway patent, Nasal mucosa clear. Mouth with normal mucosa. Throat has no vesicles, no oropharyngeal exudates and uvula is midline.

## 2022-10-05 NOTE — ED ADULT NURSE NOTE - NSFALLRSKINDICTYPE_ED_ALL_ED
Progress Note - Dr. Tristian Yip - Internal Medicine  PCP: Tayo Palomo Lot, [address] [facility] / [facility] [address] Vashti Marinelli 1 Day: 2  Code Status: Full Code  Current Diet: ADULT DIET; Regular        CC: follow up on medical issues    Subjective:   Fabian Rodríguez is a 79 y.o. female. Pt seen and examined  Chart reviewed since last visit, labs and imaging below        Doing ok  A bit more alert  U/a pos for uti  Started on rocephin  Still on 3L o2, covid +    She denies chest pain, denies shortness of breath, denies nausea,  denies emesis. 10 system Review of Systems is reviewed with patient, and pertinent positives are noted in HPI above . Otherwise, Review of systems is negative. I have reviewed the patient's medical and social history in detail and updated the computerized patient record. To recap: She  has a past medical history of Arthritis, CHF (congestive heart failure) ([facility] [address] COPD (chronic obstructive pulmonary disease) ([facility] [address] Dementia ([facility] [address] Depression, and Hypertension. . She  has a past surgical history that includes Hysterectomy. . She  reports that she has quit smoking. She has never used smokeless tobacco. She reports that she does not drink alcohol. .        Active Hospital Problems    Diagnosis Date Noted    UTI (urinary tract infection) [N39.0] 02/01/2022    Depression [F32. A] 01/30/2022    Dementia ([facility] [address] [F03.90] 01/30/2022    Acute respiratory failure (HCC) [J96.00] 01/30/2022    COVID-19 [U07.1] 01/05/2021    Acute respiratory failure due to COVID-19 (HCC) [U07.1, J96.00] 01/04/2021    HTN (hypertension) [I10] 09/23/2018       Current Facility-Administered Medications: cefTRIAXone (ROCEPHIN) 1000 mg in sterile water 10 mL IV syringe, 1,000 mg, IntraVENous, Q24H  mineral oil-hydrophilic petrolatum (AQUAPHOR) ointment, , Topical, BID PRN  enoxaparin (LOVENOX) injection 40 mg, 40 mg, SubCUTAneous, BID  baricitinib (OLUMIANT) tablet 2 mg, 2 mg, Oral, Daily  venlafaxine (EFFEXOR XR) extended release capsule 150 mg, 150 mg, Oral, Daily  albuterol sulfate  (90 Base) MCG/ACT inhaler 2 puff, 2 puff, Inhalation, Q6H PRN  budesonide-formoterol (SYMBICORT) 160-4.5 MCG/ACT inhaler 2 puff, 2 puff, Inhalation, BID  gabapentin (NEURONTIN) capsule 100 mg, 100 mg, Oral, TID  traZODone (DESYREL) tablet 50 mg, 50 mg, Oral, Nightly  cetirizine (ZYRTEC) tablet 5 mg, 5 mg, Oral, Daily  0.9 % sodium chloride infusion, , IntraVENous, Continuous  sodium chloride flush 0.9 % injection 5-40 mL, 5-40 mL, IntraVENous, 2 times per day  sodium chloride flush 0.9 % injection 10 mL, 10 mL, IntraVENous, PRN  0.9 % sodium chloride infusion, 25 mL, IntraVENous, PRN  ondansetron (ZOFRAN-ODT) disintegrating tablet 4 mg, 4 mg, Oral, Q8H PRN **OR** ondansetron (ZOFRAN) injection 4 mg, 4 mg, IntraVENous, Q6H PRN  magnesium hydroxide (MILK OF MAGNESIA) 400 MG/5ML suspension 30 mL, 30 mL, Oral, Daily PRN  acetaminophen (TYLENOL) tablet 650 mg, 650 mg, Oral, Q6H PRN **OR** acetaminophen (TYLENOL) suppository 650 mg, 650 mg, Rectal, Q6H PRN  hydrALAZINE (APRESOLINE) injection 10 mg, 10 mg, IntraVENous, Q6H PRN  0.9 % sodium chloride bolus, 500 mL, IntraVENous, PRN  potassium chloride (KLOR-CON M) extended release tablet 40 mEq, 40 mEq, Oral, PRN **OR** potassium bicarb-citric acid (EFFER-K) effervescent tablet 40 mEq, 40 mEq, Oral, PRN **OR** potassium chloride 10 mEq/100 mL IVPB (Peripheral Line), 10 mEq, IntraVENous, PRN  dexamethasone (PF) (DECADRON) injection 6 mg, 6 mg, IntraVENous, Q24H  influenza quadrivalent split vaccine (FLUZONE;FLUARIX;FLULAVAL;AFLURIA) injection 0.5 mL, 0.5 mL, IntraMUSCular, Prior to discharge  ipratropium-albuterol (DUONEB) nebulizer solution 1 ampule, 1 ampule, Inhalation, TID         Objective:  /67   Pulse 81   Temp 97.4 °F (36.3 °C) (Axillary)   Resp 18   Ht 4' 11\" (1.499 m)   Wt 195 lb 6.4 oz (88.6 kg)   SpO2 94%   BMI 39.47 kg/m²      Patient Vitals for the past 24 hrs:   BP Temp Temp src Pulse Resp SpO2 Weight   02/01/22 0758 112/67 97.4 °F (36.3 °C) Axillary 81 18 94 % --   02/01/22 0441 117/66 97.6 °F (36.4 °C) Oral 77 18 97 % 195 lb 6.4 oz (88.6 kg)   01/31/22 2357 101/61 97.7 °F (36.5 °C) Oral 77 18 90 % --   01/31/22 2140 -- -- -- -- -- 94 % --   01/31/22 2030 118/66 97.5 °F (36.4 °C) Oral 78 18 91 % --   01/31/22 1637 -- -- -- -- 18 90 % --   01/31/22 1545 108/70 98.4 °F (36.9 °C) Axillary 84 16 90 % --   01/31/22 1211 (!) 109/53 98.5 °F (36.9 °C) Axillary 92 16 93 % --   01/31/22 1146 -- -- -- -- -- 94 % --   01/31/22 0817 107/72 97.8 °F (36.6 °C) Axillary 78 16 95 % --     Patient Vitals for the past 96 hrs (Last 3 readings):   Weight   02/01/22 0441 195 lb 6.4 oz (88.6 kg)   01/31/22 0557 192 lb 14.4 oz (87.5 kg)   01/30/22 1642 220 lb (99.8 kg)           Intake/Output Summary (Last 24 hours) at 2/1/2022 0809  Last data filed at 1/31/2022 1237  Gross per 24 hour   Intake --   Output 300 ml   Net -300 ml         Physical Exam:   Vitals as above  General appearance: alert, appears stated age and cooperative    Head: Normocephalic, without obvious abnormality, atraumatic    Lungs: crackles bilaterally    Heart: regular rate and rhythm, S1, S2 normal, no murmur    Abdomen: soft, non-tender; bowel sounds normal; no masses, no organomegaly    Extremities: extremities normal, atraumatic, no cyanosis, no edema      Labs:  Lab Results   Component Value Date    WBC 7.7 02/01/2022    HGB 12.6 02/01/2022    HCT 38.7 02/01/2022     02/01/2022    CHOL 203 (H) 08/23/2018    TRIG 144 08/23/2018    HDL 63 (H) 08/23/2018    ALT 6 (L) 01/31/2022    AST 12 (L) 01/31/2022     02/01/2022    K 4.8 02/01/2022     02/01/2022    CREATININE 1.2 02/01/2022    BUN 31 (H) 02/01/2022    CO2 25 02/01/2022    TSH 3.35 08/23/2018    INR 1.05 01/03/2021    LABMICR YES 01/31/2022     Lab Results   Component Value Date    CKTOTAL 97 01/03/2021    TROPONINI <0.01 01/30/2022 Recent Imaging Results are Reviewed:  XR CHEST PORTABLE    Result Date: 1/31/2022  EXAMINATION: ONE XRAY VIEW OF THE CHEST 1/31/2022 8:57 am COMPARISON: X-ray dated 01/30/2022 HISTORY: ORDERING SYSTEM PROVIDED HISTORY: pneumonia TECHNOLOGIST PROVIDED HISTORY: Reason for exam:->pneumonia Reason for Exam: pneumonia FINDINGS: Right basilar atelectasis. No focal lung consolidation. No pleural effusion or pneumothorax. Cardiac silhouette is unremarkable. Degenerative disease of the visualized osseous structures. Right basilar atelectasis. XR CHEST PORTABLE    Result Date: 1/30/2022  EXAMINATION: ONE XRAY VIEW OF THE CHEST 1/30/2022 4:46 pm COMPARISON: 01/04/2021. HISTORY: ORDERING SYSTEM PROVIDED HISTORY: SOB TECHNOLOGIST PROVIDED HISTORY: Reason for exam:->SOB Reason for Exam: Concern For COVID-19 (from home via [facility] c/o flu-like symptoms x 2 weeks with increased fever and shortness of breath today.) FINDINGS: Minimal left basilar likely scarring versus atelectasis. No focal consolidations, pleural effusions or pulmonary edema. No pneumothoraces. Cardiac and mediastinal silhouettes are within normal limits. No acute bony abnormality. No acute abnormality. Lab Results   Component Value Date    GLUCOSE 129 02/01/2022     Lab Results   Component Value Date    POCGLU 126 09/26/2018     /67   Pulse 81   Temp 97.4 °F (36.3 °C) (Axillary)   Resp 18   Ht 4' 11\" (1.499 m)   Wt 195 lb 6.4 oz (88.6 kg)   SpO2 94%   BMI 39.47 kg/m²     Assessment and Plan:  Principal Problem:    Acute respiratory failure due to COVID-19 [facility]) -Established problem. Stable. Still on o2  Plan:  Cont iv steroids, baricitnib. Active Problems:    HTN (hypertension) -Established problem. Stable. 112/67  Plan: cont home meds    COVID-19 - Established problem. Stable. Plan: as above    Depression -Established problem. Stable. Mood ok  Plan: Continue present orders/plan.      Dementia ([address] -Established problem. Stable. Plan: Continue present orders/plan. UTI (urinary tract infection) -New Problem to me. Plan: iv rocephin started, await cx.  Trend wbc    Pt/ot rec SNF when pt ready to go      (Please note that portions of this note were completed with a voice recognition program.  Efforts were made to edit the dictations but occasionally words are mis-transcribed.)        Jaqui Gamble MD  2/1/2022 Impaired Gait/Need for Mobility Assisted Device

## 2022-10-05 NOTE — ED PROVIDER NOTE - ATTENDING APP SHARED VISIT CONTRIBUTION OF CARE
I, Bernie Sims, performed a history and physical exam of the patient and discussed their management with the resident and /or advanced care provider. I reviewed the resident and /or ACP's note and agree with the documented findings and plan of care. I was present and available for all procedures.    ---    Please see MDM

## 2022-10-05 NOTE — ED PROVIDER NOTE - PATIENT PORTAL LINK FT
You can access the FollowMyHealth Patient Portal offered by [facility] by registering at the following website: [email or web address]. By joining [employer]’s FollowMyHealth portal, you will also be able to view your health information using other applications (apps) compatible with our system.

## 2022-10-05 NOTE — ED PROVIDER NOTE - SKIN, MLM
R posterior scalp ecchymosis as above. Otherwise skin normal color for race, warm, dry and intact. No evidence of rash.

## 2022-10-05 NOTE — ED PROVIDER NOTE - NS ED ATTENDING STATEMENT MOD
This was a shared visit with the NELIDA. I reviewed and verified the documentation and independently performed the documented:

## 2022-10-05 NOTE — ED PROVIDER NOTE - MUSCULOSKELETAL, MLM
Spine appears normal, no midline vertebral or paraspinal ttp diffuse spine. No deformity of extremities. No muscle or joint tenderness UE/LE b/l. Full AROM UE/LE b/l with 5/5 strength. Sensation intact.

## 2022-10-05 NOTE — ED PROVIDER NOTE - CLINICAL SUMMARY MEDICAL DECISION MAKING FREE TEXT BOX
82-year-old female with PMHx of HTN, HLD, hypothyroidism and CKD presents with recent ORIF of the right femoral neck on 7/2022 presents for mechanical fall at rehab.  She reports turning around getting her feet tangled and fell back onto carpeted floor.  No LOC and on any anticoagulation.  On exam, patient is ambulatory with walker and has no significant tenderness over the scalp, midline of C-spine, midline of T and L-spine or hip.  Neurovascularly intact.  Concern for fracture, contusion, dislocation.  Plan: X-ray, CT

## 2022-10-05 NOTE — ED PROVIDER NOTE - NSFOLLOWUPINSTRUCTIONS_ED_ALL_ED_FT
1. Follow up with your primary doctor in 1-2 days.    2. Rest, stay hydrated. Continue current home medications as previously prescribed.     3. For pain, take Tylenol (3 regular strength 325 mg tabs or 2 Extra strength 500 mg tablets) every 8 hours as needed.     4. Return to the Emergency Department if you develop any new/worsening symptoms including worsening pain despite over-the-counter pain medication usage as above, vomiting, dizziness/lightheadedness, difficulty walking, numbness/tingling, weakness, speech/vision changes or any other concerning symptoms.

## 2022-10-17 ENCOUNTER — APPOINTMENT (OUTPATIENT)
Dept: PHYSICAL MEDICINE AND REHAB | Facility: CLINIC | Age: 82
End: 2022-10-17

## 2022-10-31 ENCOUNTER — APPOINTMENT (OUTPATIENT)
Dept: ORTHOPEDIC SURGERY | Facility: CLINIC | Age: 82
End: 2022-10-31

## 2022-10-31 PROCEDURE — 73502 X-RAY EXAM HIP UNI 2-3 VIEWS: CPT

## 2022-10-31 PROCEDURE — 99213 OFFICE O/P EST LOW 20 MIN: CPT

## 2022-12-13 NOTE — ED PROVIDER NOTE - PROGRESS NOTE DETAILS
Omid, PGY2 - Received sign-out on patient. Introduced myself and updated patient on the medical evaluation process. Patient aware and in agreement. Waiting for ortho Omid, PGY2 - Received sign-out on patient. Introduced myself and updated patient on the medical evaluation process. Patient aware and in agreement. Waiting for ortho. Patient has hx of CKD, explains elevated creatinine No abnormalities noted

## 2023-02-07 ENCOUNTER — APPOINTMENT (OUTPATIENT)
Dept: ORTHOPEDIC SURGERY | Facility: CLINIC | Age: 83
End: 2023-02-07
Payer: MEDICARE

## 2023-02-07 PROCEDURE — 73502 X-RAY EXAM HIP UNI 2-3 VIEWS: CPT

## 2023-02-07 PROCEDURE — 99024 POSTOP FOLLOW-UP VISIT: CPT

## 2023-03-27 NOTE — ED PROVIDER NOTE - CROS ED GI ALL NEG
- - - Was The Patient On Physician Recommended Anticoagulation Therapy?: Please Select the Appropriate Response

## 2023-07-13 ENCOUNTER — EMERGENCY (EMERGENCY)
Facility: HOSPITAL | Age: 83
LOS: 1 days | Discharge: HOME | End: 2023-07-13
Attending: EMERGENCY MEDICINE
Payer: MEDICARE

## 2023-07-13 VITALS
DIASTOLIC BLOOD PRESSURE: 58 MMHG | TEMPERATURE: 99 F | HEART RATE: 86 BPM | OXYGEN SATURATION: 98 % | SYSTOLIC BLOOD PRESSURE: 110 MMHG | RESPIRATION RATE: 18 BRPM

## 2023-07-13 VITALS
SYSTOLIC BLOOD PRESSURE: 127 MMHG | HEIGHT: 65 IN | RESPIRATION RATE: 18 BRPM | DIASTOLIC BLOOD PRESSURE: 78 MMHG | WEIGHT: 145.06 LBS | TEMPERATURE: 98 F | HEART RATE: 84 BPM | OXYGEN SATURATION: 99 %

## 2023-07-13 DIAGNOSIS — Z90.89 ACQUIRED ABSENCE OF OTHER ORGANS: Chronic | ICD-10-CM

## 2023-07-13 DIAGNOSIS — H26.9 UNSPECIFIED CATARACT: Chronic | ICD-10-CM

## 2023-07-13 DIAGNOSIS — Z98.890 OTHER SPECIFIED POSTPROCEDURAL STATES: Chronic | ICD-10-CM

## 2023-07-13 PROCEDURE — 73552 X-RAY EXAM OF FEMUR 2/>: CPT | Mod: 26,RT

## 2023-07-13 PROCEDURE — 73552 X-RAY EXAM OF FEMUR 2/>: CPT

## 2023-07-13 PROCEDURE — 73590 X-RAY EXAM OF LOWER LEG: CPT

## 2023-07-13 PROCEDURE — 99284 EMERGENCY DEPT VISIT MOD MDM: CPT | Mod: 25

## 2023-07-13 PROCEDURE — 73502 X-RAY EXAM HIP UNI 2-3 VIEWS: CPT | Mod: 26,RT

## 2023-07-13 PROCEDURE — 70450 CT HEAD/BRAIN W/O DYE: CPT | Mod: MA

## 2023-07-13 PROCEDURE — 71045 X-RAY EXAM CHEST 1 VIEW: CPT | Mod: 26

## 2023-07-13 PROCEDURE — 93005 ELECTROCARDIOGRAM TRACING: CPT

## 2023-07-13 PROCEDURE — 70450 CT HEAD/BRAIN W/O DYE: CPT | Mod: 26,MA

## 2023-07-13 PROCEDURE — 73502 X-RAY EXAM HIP UNI 2-3 VIEWS: CPT

## 2023-07-13 PROCEDURE — 73590 X-RAY EXAM OF LOWER LEG: CPT | Mod: 26,RT

## 2023-07-13 PROCEDURE — 71045 X-RAY EXAM CHEST 1 VIEW: CPT

## 2023-07-13 PROCEDURE — 99285 EMERGENCY DEPT VISIT HI MDM: CPT | Mod: FS

## 2023-07-13 PROCEDURE — 99283 EMERGENCY DEPT VISIT LOW MDM: CPT

## 2023-07-13 PROCEDURE — 73562 X-RAY EXAM OF KNEE 3: CPT

## 2023-07-13 PROCEDURE — 73562 X-RAY EXAM OF KNEE 3: CPT | Mod: 26,RT

## 2023-07-13 RX ORDER — ACETAMINOPHEN 500 MG
975 TABLET ORAL ONCE
Refills: 0 | Status: COMPLETED | OUTPATIENT
Start: 2023-07-13 | End: 2023-07-13

## 2023-07-13 RX ADMIN — Medication 975 MILLIGRAM(S): at 13:38

## 2023-07-13 NOTE — ED PROVIDER NOTE - CARE PROVIDER_API CALL
Jose Love  Orthopaedic Surgery  [address], [address]  [place] [address]  Phone: [phone]  Fax: [phone]  Follow Up Time:

## 2023-07-13 NOTE — ED PROVIDER NOTE - PATIENT PORTAL LINK FT
You can access the FollowMyHealth Patient Portal offered by [facility] by registering at the following website: [email or web address]. By joining [employer]’s FollowMyHealth portal, you will also be able to view your health information using other applications (apps) compatible with our system.

## 2023-07-13 NOTE — ED PROVIDER NOTE - OBJECTIVE STATEMENT
81 y/o female with PMHx of HTN, HLD, hypothyroidism, CKD, right femur fx s/p ORIF 7/2022 presents to the ED s/p mechanical trip and fall. Patient states that she was sitting in her wheelchair today at the [facility] when she reached over to water plants and accidently fell out of the wheelchair. She states that she landed on the right side of her body. She did not hit her head or pass out. She was on the ground for about 15 minutes until an aide found her. She states that she has pain to the right knee. She does not have any other pain or injury. Does not take any blood thinners. Denies any recent illness, headache, fever, chills, chest pain, cough, n/v/d.

## 2023-07-13 NOTE — ED PROVIDER NOTE - SHIFT CHANGE DETAILS
Attending MD Flores: 82F from assisted living, was watering plants, fell out of wheelchair, +head trauma, no LOC, RLE ecchymosis, XRs reviewed, nonactionable, CXR noted, no respiratory symptoms, pending CTH

## 2023-07-13 NOTE — ED ADULT TRIAGE NOTE - GLASGOW COMA SCALE: EYE OPENING, MLM
(E4) spontaneous
I attest my time as attending is greater than 50% of the total combined time spent on qualifying patient care activities by the PA/NP and attending.

## 2023-07-13 NOTE — ED ADULT NURSE REASSESSMENT NOTE - NS ED NURSE REASSESS COMMENT FT1
Received report from Amrit JOSEPH. Pt. pending dispo at this time. Pt. does not appear to be in any acute distress - nonlabored breathing noted. Skin pink warm and dry. Family member at bedside. Safety and comfort provided.

## 2023-07-13 NOTE — ED ADULT NURSE NOTE - OBJECTIVE STATEMENT
82 y.o F BIB EMS from [place] p/w c/o unwitnessed fall. A+Ox4. Pt states was watering her plants while sitting in her wheel chair and reached forward, slipping out of wheel chair and landing on R hip/ R frontal head. No LOC/ blood thinners. Bruise noted on R knee, no other abrasions/ lacerations noted. Baseline ROM/ sensation intact. PMH femur fracture, hypothyroidism. Allergic to azithromycin.

## 2023-07-13 NOTE — ED PROVIDER NOTE - PR
She can try the volunteer office at [facility] AND [facility] [address]

## 2023-07-13 NOTE — ED ADULT NURSE NOTE - NS_ED_NURSE_TEACHING_TOPIC_ED_A_ED
This writer attempted to contact Carolin on 09/13/17      Reason for call pt will need to schedule either a office visit or a telephone visit with Dr. Townsend before medications can be sent to pt's pharmacy and left detailed message.      If patient calls back:   Schedule Office Visit appointment or telephone appt asap with PCP or any other available provider that can address pt's cough sore throat and a cold.        Tom Calvin   F/U PCP and ortho

## 2023-07-13 NOTE — ED PROVIDER NOTE - PHYSICAL EXAMINATION
CONSTITUTIONAL: Patient is awake, alert and oriented x 3. Patient is well appearing and in no acute distress.  HEAD: NCAT  ENT: Airway patent, Nasal mucosa clear.  NECK: Supple,   LUNGS: CTA B/L,  HEART: RRR.+S1S2  ABDOMEN: Soft, non-tender to palpation throughout all four quadrants,   MSK: No edema or calf tenderness b/l, FROM upper ext b/l, FROM of left LE, limited ROM of right hip/knee due to pain; edema noted to right knee;   SKIN: ecchymosis to right knee;   NEURO: No focal deficits, Sensation intact;

## 2023-07-13 NOTE — CONSULT NOTE ADULT - SUBJECTIVE AND OBJECTIVE BOX
82y Female presents with   R  knee pain s/p mechanical fall. Pt reports she was gardening when she fell to her side and landed on R knee.  Denies numbness/tingling in the affected extremity. Denies head strike/LOC/other orthopedic injuries at this time. Patient ambulates without assistance   at baseline.    PAST MEDICAL & SURGICAL HISTORY:  Depression      Hypothyroid      Kidney disease      Cataract      History of rectal surgery      History of tonsillectomy        Home Medications:  acetaminophen 325 mg oral tablet: 3 tab(s) orally every 8 hours x 1 week and then as needed for mild pain (03 Aug 2022 11:50)  bisacodyl 10 mg rectal suppository: 1 suppository(ies) rectal once a day, As needed, Constipation (03 Aug 2022 11:50)  buPROPion 150 mg/24 hours (XL) oral tablet, extended release: 1 tab(s) orally once a day (03 Aug 2022 11:50)  divalproex sodium 250 mg oral tablet, extended release: 1 tab(s) orally once a day (03 Aug 2022 11:50)  divalproex sodium 500 mg oral tablet, extended release: 1 tab(s) orally once a day (03 Aug 2022 11:50)  enoxaparin: 30 milligram(s) subcutaneous once a day (03 Aug 2022 11:52)  lactulose 10 g/15 mL oral syrup: 30 milliliter(s) orally every 8 hours, As needed, Constipation (03 Aug 2022 11:50)  levothyroxine 100 mcg (0.1 mg) oral tablet: 1 tab(s) orally once a day (01 Aug 2022 07:27)  LORazepam 1 mg oral tablet: 1 tab(s) orally once a day (03 Aug 2022 11:50)  magnesium hydroxide 8% oral suspension: 30 milliliter(s) orally once a day, As needed, Constipation (03 Aug 2022 11:50)  melatonin 3 mg oral tablet: 1 tab(s) orally once a day (at bedtime), As needed, Insomnia (01 Aug 2022 07:27)  oxyCODONE: 2.5 milligram(s) orally every 4 hours, As Needed for severe pain (03 Aug 2022 11:50)  polyethylene glycol 3350 oral powder for reconstitution: 17 gram(s) orally once a day (01 Aug 2022 07:27)  senna leaf extract oral tablet: 2 tab(s) orally once a day (at bedtime) (01 Aug 2022 07:27)  sodium bicarbonate 650 mg oral tablet: 1 tab(s) orally 3 times a day (03 Aug 2022 11:50)  traMADol 50 mg oral tablet: 0.5 tab(s) orally every 6 hours, As needed, Moderate Pain (4 - 6) (03 Aug 2022 11:50)  venlafaxine 75 mg oral capsule, extended release: 5 cap(s) orally once a day (01 Aug 2022 07:27)    Allergies    azithromycin (Rash)    Intolerances                        Vital Signs Last 24 Hrs  T(C): 36.9 (13 Jul 2023 15:43), Max: 36.9 (13 Jul 2023 15:43)  T(F): 98.4 (13 Jul 2023 15:43), Max: 98.4 (13 Jul 2023 15:43)  HR: 80 (13 Jul 2023 15:43) (80 - 84)  BP: 122/72 (13 Jul 2023 15:43) (122/72 - 127/78)  BP(mean): --  RR: 18 (13 Jul 2023 15:43) (18 - 18)  SpO2: 100% (13 Jul 2023 15:43) (99% - 100%)    Parameters below as of 13 Jul 2023 15:43  Patient On (Oxygen Delivery Method): room air        PHYSICAL EXAM  General: NAD, Awake and Alert    Right Lower Extremity:   Skin intact    - Ecchymosis of the proximal tibia    - Swelling of the proximal tibia  Minimal TTP at knee  NTTP over the bony prominences of the hip/ankle/foot/toes  L2-S1 SILT  +EHL/FHL/TA/GSC  + Straight leg raise  +DP pulses  Calf nontender  Compartments soft and compressible      .secondary      IMAGING:  XR  R Knee, Femur, Hip s/p R IMN without any fracture or dislocation              Assessment/Plan:  82y Female with  R knee pain s/p R IMN. Pt reassured no fx and hardware appears stable. Pain controlled and pt felt comfortable with discharge    -Pain control as needed  -  WBAT RLE  -No acute orthopedic intervention indicated at this time  -FU w/ Dr Love 1-2 wks after discharge

## 2023-07-13 NOTE — ED PROVIDER NOTE - CARE PLAN
1 Principal Discharge DX:	Accident due to mechanical fall without injury   Principal Discharge DX:	Right leg injury  Secondary Diagnosis:	Fall

## 2023-07-13 NOTE — ED PROVIDER NOTE - ATTENDING APP SHARED VISIT CONTRIBUTION OF CARE
Patient is an 81 yo F with history of HTN, hyperlipidemia, hypothyroidism, CKD here for evaluation after mechanical fall.    HTN, HLD, hypothyroidism, CKD, right femur fx s/p ORIF 7/2022 presents to the ED s/p mechanical trip and fall. Patient states that she was sitting in her wheelchair today at the [facility] when she reached over to water plants and accidently fell out of the wheelchair. She states that she landed on the right side of her body. She did not hit her head or pass out. She was on the ground for about 15 minutes until an aide found her. She states that she has pain to the right knee. She does not have any other pain or injury. Does not take any blood thinners. Denies any recent illness, headache, fever, chills, chest pain, cough, n/v/d. Patient is an 83 yo F with history of HTN, hyperlipidemia, hypothyroidism, CKD here for evaluation after mechanical fall.   Patient is coming today from [facility]. Patient states that she was in her wheelchair and watering her plants, going down the table.  Near the end of the table, she had to reach and she ended up she ended up falling out of the wheelchair.  She had the right side of her head.  She denies any loss of consciousness, nausea, vomiting.  She also hit her right knee and leg.   Patient reports that she was on the ground for about 15 minutes before the aide was able to help to help her.  She denies being on any blood thinners.  Patient has a history of right femur fracture s/p ORIF 7/2022.  At the time of my evaluation, patient denies any headache, vision changes, chest pain, shortness of breath.  She denies any dizziness.  She does have pain in her right knee and lower leg.  She denies any hip pain.  Patient denies any recent illnesses.  She denies any fevers, chills.    VS noted  Gen. no acute distress, Non toxic   HEENT: PERRL, EOMI, mmm, atraumatic  Spine: No midline C-T-L spine tenderness  Lungs: CTAB/L no C/ W /R   CVS: RRR   Abd; Soft non tender, non distended   Pelvis: stable, no ttp to bilateral hips, full ROM  Ext: right knee: bruising and ecchymosis below right knee on anterior leg, no laxity, mild swelling  Skin: no rash  Neuro AAOx3 non focal clear speech  a/p:   Status post mechanical fall.  Patient fell out of her wheelchair.  She had no preceding symptoms of dizziness, chest pain, palpitations.  She did hit her head.  She also sustained a sustained injury to her right knee and right lower leg.  On exam, patient has ecchymosis to her right anterior leg and pain with range of motion of her right knee.  Plan for pain control, x-ray imaging of the right leg.  Will get CT head given age.  If work-up is negative likely discharge back to [facility].  Patient's son is at bedside.  - Ankita DUQUE

## 2023-07-13 NOTE — ED PROVIDER NOTE - CLINICAL SUMMARY MEDICAL DECISION MAKING FREE TEXT BOX
Ankita DUQUE:  83 yo F with history of HTN, hyperlipidemia, hypothyroidism, CKD here for evaluation after mechanical fall.   Patient is coming today from [place]. Patient states that she was in her wheelchair and watering her plants, going down the table.  Near the end of the table, she had to reach and she ended up she ended up falling out of the wheelchair.  She had the right side of her head.  She denies any loss of consciousness, nausea, vomiting.  She also hit her right knee and leg.   Patient reports that she was on the ground for about 15 minutes before the aide was able to help to help her.  She denies being on any blood thinners.  Patient has a history of right femur fracture s/p ORIF 7/2022.  At the time of my evaluation, patient denies any headache, vision changes, chest pain, shortness of breath.  She denies any dizziness.  She does have pain in her right knee and lower leg.  She denies any hip pain.  Patient denies any recent illnesses.  She denies any fevers, chills. Patient fell out of her wheelchair.  She had no preceding symptoms of dizziness, chest pain, palpitations.  She did hit her head.  She also sustained a sustained injury to her right knee and right lower leg.  On exam, patient has ecchymosis to her right anterior leg and pain with range of motion of her right knee.  Plan for pain control, x-ray imaging of the right leg.  Will get CT head given age.  If work-up is negative likely discharge back to [place].  Patient's son is at bedside.

## 2023-07-13 NOTE — ED PROVIDER NOTE - PROGRESS NOTE DETAILS
Attending MD Flores: Patient re-evaluated and no acute issues at  this time.  Radiology tests reviewed with patient and family.  Patient stable for discharge. Follow up instructions given, importance of follow up emphasized, return to ED parameters reviewed and patient verbalized understanding.  All questions answered, all concerns addressed.

## 2023-07-13 NOTE — CONSULT NOTE ADULT - ATTENDING COMMENTS
R knee pain. 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 WBAT. RICE. NSAID. compressive wrap. f/u out pt. R hip hardware is unchanged and the prior R hip fx is healed

## 2023-07-13 NOTE — ED PROVIDER NOTE - NSFOLLOWUPINSTRUCTIONS_ED_ALL_ED_FT
1. Follow up with your PCP within 2-3 days. Follow up with orthopedic Dr. Love in 1-2 weeks for follow up. Call to schedule your appointment.   2. Continue home medications.   3. Take Tylenol as needed for pain.   4. Return to the emergency department if you have worsening pain, dizziness ,weakness, frequent falls or any other concerning symptoms.

## 2023-07-13 NOTE — ED ADULT NURSE NOTE - SUICIDE SCREENING QUESTION 2
Pharmacist chart review completed for refill of xtandi indicates no medication or significant health changes since last pharmacist counseling. No questions for the pharmacist. Communicated with patient over phone. Medication shipped on 7/21/21 via [employer] to [address] [place] [phone] for delivery in 1-2 business days.    [facility]  Phone: [phone]  [email or web address]           No

## 2023-07-31 ENCOUNTER — APPOINTMENT (OUTPATIENT)
Dept: ORTHOPEDIC SURGERY | Facility: CLINIC | Age: 83
End: 2023-07-31
Payer: MEDICARE

## 2023-07-31 VITALS
SYSTOLIC BLOOD PRESSURE: 110 MMHG | BODY MASS INDEX: 24.16 KG/M2 | HEIGHT: 65 IN | HEART RATE: 88 BPM | DIASTOLIC BLOOD PRESSURE: 71 MMHG | WEIGHT: 145 LBS

## 2023-07-31 DIAGNOSIS — S72.141D DISPLACED INTERTROCHANTERIC FRACTURE OF RIGHT FEMUR, SUBSEQUENT ENCOUNTER FOR CLOSED FRACTURE WITH ROUTINE HEALING: ICD-10-CM

## 2023-07-31 PROCEDURE — 99214 OFFICE O/P EST MOD 30 MIN: CPT

## 2023-07-31 PROCEDURE — 73502 X-RAY EXAM HIP UNI 2-3 VIEWS: CPT

## 2023-08-04 PROBLEM — S72.141D CLOSED DISPLACED INTERTROCHANTERIC FRACTURE OF RIGHT FEMUR WITH ROUTINE HEALING, SUBSEQUENT ENCOUNTER: Status: ACTIVE | Noted: 2022-08-12

## 2023-08-25 NOTE — ED ADULT TRIAGE NOTE - INTERNATIONAL TRAVEL
(CARDIZEM CD) 360 MG extended release capsule (Taking)    Sig - Route: Take 1 capsule by mouth daily - Oral    apixaban (ELIQUIS) 5 MG TABS tablet (Taking)    Sig - Route: Take 1 tablet by mouth 2 times daily - Oral    hydroCHLOROthiazide (MICROZIDE) 12.5 MG capsule (Taking)    Class: Historical Med                Past Medical History, Past Surgical History, Family history, Social History, and Medications were all reviewed with the patient today and updated as necessary. 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 HYSTERECTOMY (CERVIX STATUS UNKNOWN)  2011    urethra tube clipped during surgery and pt. had follow up bladder surgery. LAP,CHOLECYSTECTOMY      NEUROLOGICAL SURGERY  3/2009    lumbar herniated disc repair    ORTHOPEDIC SURGERY      total knee replacement-right     Family History   Problem Relation Age of Onset    Breast Cancer Paternal Grandmother     Heart Disease Father     Hypertension Mother     Diabetes Mother     Cancer Mother     Other Neg Hx     Post-op Cognitive Dysfunction Neg Hx     Emergence Delirium Neg Hx     Post-op Nausea/Vomiting Neg Hx     Delayed Awakening Neg Hx     Pseudochol.  Deficiency Neg Hx     Malig Hypertherm Neg Hx     Hypertension Father      Social History     Tobacco Use    Smoking status: Never    Smokeless tobacco: Never   Substance Use
No

## 2023-09-22 ENCOUNTER — EMERGENCY (EMERGENCY)
Facility: HOSPITAL | Age: 83
LOS: 1 days | Discharge: NURSING, REHAB OR LONG-TERM CARE | End: 2023-09-22
Attending: EMERGENCY MEDICINE | Admitting: EMERGENCY MEDICINE
Payer: MEDICARE

## 2023-09-22 VITALS
RESPIRATION RATE: 18 BRPM | SYSTOLIC BLOOD PRESSURE: 130 MMHG | HEART RATE: 90 BPM | DIASTOLIC BLOOD PRESSURE: 74 MMHG | TEMPERATURE: 98 F | OXYGEN SATURATION: 99 %

## 2023-09-22 VITALS
DIASTOLIC BLOOD PRESSURE: 82 MMHG | OXYGEN SATURATION: 100 % | WEIGHT: 145.06 LBS | HEART RATE: 106 BPM | TEMPERATURE: 99 F | SYSTOLIC BLOOD PRESSURE: 141 MMHG | RESPIRATION RATE: 18 BRPM

## 2023-09-22 DIAGNOSIS — Z98.890 OTHER SPECIFIED POSTPROCEDURAL STATES: Chronic | ICD-10-CM

## 2023-09-22 DIAGNOSIS — H26.9 UNSPECIFIED CATARACT: Chronic | ICD-10-CM

## 2023-09-22 DIAGNOSIS — Z90.89 ACQUIRED ABSENCE OF OTHER ORGANS: Chronic | ICD-10-CM

## 2023-09-22 PROCEDURE — 12002 RPR S/N/AX/GEN/TRNK2.6-7.5CM: CPT

## 2023-09-22 PROCEDURE — 99284 EMERGENCY DEPT VISIT MOD MDM: CPT | Mod: 25

## 2023-09-22 PROCEDURE — 70450 CT HEAD/BRAIN W/O DYE: CPT | Mod: 26,MA

## 2023-09-22 PROCEDURE — 70450 CT HEAD/BRAIN W/O DYE: CPT | Mod: MA

## 2023-09-22 RX ORDER — LACTULOSE 10 G/15ML
30 SOLUTION ORAL
Refills: 0 | DISCHARGE

## 2023-09-22 RX ORDER — ACETAMINOPHEN 500 MG
2 TABLET ORAL
Refills: 0 | DISCHARGE

## 2023-09-22 RX ORDER — LANOLIN ALCOHOL/MO/W.PET/CERES
1 CREAM (GRAM) TOPICAL
Refills: 0 | DISCHARGE

## 2023-09-22 RX ORDER — LEVOTHYROXINE SODIUM 125 MCG
1 TABLET ORAL
Refills: 0 | DISCHARGE

## 2023-09-22 RX ORDER — VENLAFAXINE HCL 75 MG
1 CAPSULE, EXT RELEASE 24 HR ORAL
Refills: 0 | DISCHARGE

## 2023-09-22 NOTE — ED PROVIDER NOTE - PATIENT PORTAL LINK FT
You can access the FollowMyHealth Patient Portal offered by [facility] by registering at the following website: [email or web address]. By joining [facility]’s FollowMyHealth portal, you will also be able to view your health information using other applications (apps) compatible with our system.

## 2023-09-22 NOTE — ED PROVIDER NOTE - CLINICAL SUMMARY MEDICAL DECISION MAKING FREE TEXT BOX
83-year-old female with mechanical fall resulting in scalp laceration.  CT head rule out intracranial pathology, lack repair.

## 2023-09-22 NOTE — ED ADULT NURSE NOTE - LATERALITY
64M from [place] (in [place]) admitted with fever, leucocytosis and meeting "sepsis" criteria. His PMH is remarkable for recurrent/persistent pancreatitis since July 2018. He was initially admitted to [facility] with pancreatitis complicated by abscess versus pseudocyst. Subsequently he has had 2-3 admissions at [facility] for recurrent episodes of abdominal pain (related to the pancreatic fluid collection- not necessarily pancreatitis) and has received antibiotics (one course up to 3 weeks- which son believes was ertapenem). He has had W/U including EUS. Abdominal imaging has shown peritoneal nodules, without lymphadenopathy and fluid collection is complex and may include a mass. No definitive etiology of the pancreatic disease has been established, malignancy is being addressed. The presentation is atypical for primary pancreatic Ca, differential may include intraductal lesions or possible infection. TB can cause peritoneal disease, and while he is from an endemic area, this would be an atypical presentation (no fever, adenopathy, scant ascites, no obvious ileocecal or lung disease).   MRI abdomen shows acute on chronic pancreatitis, peripancreatic fluid (possible pseudocyst), no necrosis, some adrenal nodules, no peritoneal nodules, suggested portal vein thrombosis, not confirmed by ultrasound.  Quantiferon gold- poor mitogen response, so not interpretable. Prior work-up and evaluations conducted at [facility] apparantly included a negative biopsy by report    Likely fever and elevated white count most likely due to acute on chronic pancreatitis, no necrosis on MRI, clinically improved, BCs negative  Can DC zosyn, observe off antibiotics   -Advance diet as tolerates.  I will be away on 10/3, my colleagues will be available at [phone]    Srinath Cassidy MD  pager [phone]  office [phone] 64M from [place] (in [place] 30 years) admitted with fever, leucocytosis and meeting "sepsis" criteria. His PMH is remarkable for recurrent/persistent pancreatitis since July 2018. He was initially admitted to [place] with pancreatitis complicated by abscess versus pseudocyst. Subsequently he has had 2-3 admissions at [facility] for recurrent episodes of abdominal pain (related to the pancreatic fluid collection- not necessarily pancreatitis) and has received antibiotics (one course up to 3 weeks- which son believes was ertapenem). He has had W/U including EUS. Abdominal imaging has shown peritoneal nodules, without lymphadenopathy and fluid collection is complex and may include a mass. No definitive etiology of the pancreatic disease has been established, malignancy is being addressed. The presentation is atypical for primary pancreatic Ca, differential may include intraductal lesions or possible infection. TB can cause peritoneal disease, and while he is from an endemic area, this would be an atypical presentation (no fever, adenopathy, scant ascites, no obvious ileocecal or lung disease).   MRI abdomen shows acute on chronic pancreatitis, peripancreatic fluid (possible pseudocyst), no necrosis, some adrenal nodules, no peritoneal nodules, suggested portal vein thrombosis, not confirmed by ultrasound.  Quantiferon gold- poor mitogen response, so not interpretable- NOT indicative of TB. Prior work-up and evaluations conducted at [facility] apparantly included a negative biopsy by report    Likely fever and elevated white count most likely due to acute on chronic pancreatitis, no necrosis on MRI, clinically improved, BCs negative  Can DC zosyn, observe off antibiotics   QTF is non-diagnostic- not due to elevated TB response but due to poor mitogen response, that is NOT indicative of TB.  CXR without infiltrate, no cough. Itraabdominal nodules are undiagnosed, malignancy versus TB versus fungal or other etiology. Best method of diagnosis would be biopsy, yield of sputum AFB would be low.   -Advance diet as tolerates.  I will be away on 10/3, my colleagues will be available at [phone]    Srinath Cassidy MD  pager [phone]  office [phone] right

## 2023-09-22 NOTE — ED ADULT NURSE NOTE - NSFALLRISKINTERV_ED_ALL_ED

## 2023-09-22 NOTE — ED ADULT NURSE NOTE - ISOLATION TYPE:
CONST: no fevers, no chills  EYES: +vision changes  ENT: no sore throat  CV: no chest pain, no leg swelling  RESP: no shortness of breath, no cough  ABD: no abdominal pain, no nausea, no vomiting, no diarrhea  : no dysuria, no flank pain, no hematuria  MSK: no back pain, no extremity pain  NEURO: +L arm/leg weakness  HEME: no easy bleeding  SKIN:  no rash None

## 2023-09-22 NOTE — ED ADULT NURSE NOTE - OBJECTIVE STATEMENT
Pt accidentally fell and hit the head on the corner of a table in the room. Lac stapled by MD, wound cleaned

## 2023-09-22 NOTE — ED PROVIDER NOTE - NSFOLLOWUPINSTRUCTIONS_ED_ALL_ED_FT
Please return in 10 days for staple removal. Please return in 10 days for staple removal.    Sutures, Staples, or Adhesive Wound Closure  Doctors use stitches (sutures), staples, skin glue (tissue adhesive), and skin tape (adhesive strips) to hold your skin together while it heals (wound closure). What your doctor will use depends on your wound. Your doctor also may use more than one way to close your wound.    In most cases, your wound will be closed right away (primary skin closure). Sometimes, it may be closed later so that it can be cleaned and then heal on its own (delayed wound closure).    What are the types of wound closure?  Skin glue    To use skin glue, your doctor will:  Hold the edges of the wound together.  Paint the glue onto your skin. You may need more than one layer.  Cover your wound with a bandage (dressing) after the glue is dry.  Skin glue may be used for:  Small wounds that are not deep (superficial wounds).  Wounds on the face.  Children's wounds.  Skin glue is not used inside of wounds, or on wounds that are:  Deep.  Uneven.  Bleeding.  Some benefits of skin glue are:  It leaves nothing that needs to be taken off.  You do not need medicine to numb the area.  You have less pain than with other types of closure.  Skin tape    Skin tape is:  Made of paper that is sticky (adhesive) and has many small holes in it.  Placed across your wound edges like a normal bandage.  Used to close very shallow wounds.  Sometimes used with sutures to help improve closure.  Sutures    Sutures come in many different materials, strengths, and sizes. Some sutures break down as your wound heals (absorbable). Other sutures need to be taken out (nonabsorbable).    To use sutures, your doctor will:  Sew your skin together with sutures and a needle.  Use one long stitch or separate stitches.  Tie and cut the sutures at the end.  Sutures can be used for all types of wounds, including under the skin. They can cause a skin reaction that can lead to infection.    Staples    Staples are often used to close cuts from surgery (incisions). To use staples, your doctor will:  Hold the edges of your wound close together.  Place a staple across the wound.  Use a tool to secure the staple to the skin.  Repeat this with as many staples as needed.  Staples are faster to use than sutures, and they cause less reaction from your skin. Staples need to be taken out using a tool that bends the staples away from your skin.    Follow these instructions at home:  Medicines    Take over-the-counter and prescription medicines only as told by your doctor.  If you were prescribed an antibiotic medicine, take it as told by your doctor. Do not stop taking it even if you start to feel better.  Wound care    Two stitched wounds. One is normal. The other is red with pus and infected.  Follow instructions from your doctor about how to take care of your wound and bandage.  Wash your hands with soap and water for at least 20 seconds before and after touching your wound or bandage. If you cannot use soap and water, use hand .  Do not try to take off or take out your wound closures unless your doctor tells you to do that. You may need a follow-up visit for your doctor to take out your closures.  Closures may stay in place for 2 weeks or longer.  Absorbable sutures may break down after a few days or weeks.  If skin tape edges start to loosen and curl up, you may trim the loose edges.  Do not pick at your wound. Picking can cause an infection or cause your wound to open up again.  Apply ointments or creams only as told by your doctor.  Check your wound every day for signs of infection. Check for:  Redness, swelling, or pain.  Fluid or blood.  New warmth, a rash, or hardness at the wound site.  Pus or a bad smell.  General instructions    Do not take baths, swim, or use a hot tub. Ask your doctor about taking showers or sponge baths.  Do not soak your wound in water.  Eat foods that include protein, vitamin A, and vitamin C. These nutrients help your wound heal.  Drink enough fluid to keep your pee (urine) pale yellow.  Keep all follow-up visits.  Contact a doctor if:  You have a fever or chills.  You have redness, swelling, or pain around your wound.  You have fluid or blood coming from your wound.  You have new warmth, a rash, or hardness around your wound.  You see that your wound becomes thick, raised, and darker in color after your sutures come out (scarring).  Get help right away if:  The edges of your wound start to separate.  Your wound opens up again.  You notice pus or a bad smell coming from your wound.  Summary  What your doctor uses to hold your skin together while it heals (wound closure) depends on your wound.  Your doctor may use stitches (sutures), staples, skin glue (tissue adhesive), or skin tape (adhesive strips).  Do not try to take off or take out your wound closures unless your doctor tells you to do that.  Do not soak your wound in water.  This information is not intended to replace advice given to you by your health care provider. Make sure you discuss any questions you have with your health care provider.

## 2023-09-22 NOTE — ED PROVIDER NOTE - OBJECTIVE STATEMENT
83-year-old female presents to the ED from [facility] resulting in head laceration.  Patient is on no blood thinners denies headache or LOC.  Tetanus up-to-date.

## 2023-12-11 NOTE — ED PROVIDER NOTE - INCIDENT LOCATION
Talk about HPV vaccine at home and make RN visit if you want to get it!!!        Your Child's Health  12 Year Old      Vikas Yoon  December 11, 2023    There were no vitals taken for this visit.  Weight:       Your Teen Check-Up Visit  Body Image  Teens are faced with a lot of pressure from the media to look a certain way. Many of the body images portrayed in the media are not healthy. The age old combination of healthy eating and being physically active is the best way to stay at a healthy weight.    Eating, Drinking & Exercise  As teens spend more time away from home, the temptation to eat more high-fat, high calorie convenience foods is common. (It's also tempting to eat more than you need, especially when hanging out with friends, but it's important to stop eating when you feel full.) Learn about how to make healthy choices when not at home, and consider carrying healthy snacks from home rather than relying on vending machines and fast food when out. (The [employer].gov is a great educational website about nutrition.) Getting enough vitamin D and calcium is important for good bone health. Teens need to get 3 to 4 servings of a good source of calcium daily (low-fat or skim milk, yogurt, cheese, calcium-fortified orange juice or other calcium-fortified foods). Vitamin D is needed along with calcium to optimize bone health; 600 IU of vitamin D daily is recommended. Most people cannot meet their vitamin D needs with their usual diet, so a multivitamin with iron supplement is a good way to get it. (A pure vitamin D supplement with 400 or 600 IU is also okay.)    Choosing to drink plenty of water and avoiding or limiting sodas, energy drinks, juices and other sweet drinks (iced tea, etc) is an easy and healthy choice to make. It is common for teenagers to skip breakfast due to time constraints and simply not feeling hungry in the morning. However, eating something healthy in the morning is important in  order to function best at school and avoid overeating later in the day. Teens should set a goal of being physically active for at least 60 minutes a day. This can be tough because of more homework and because gym classes are often not required in the higher grades. If you're not involved in sports, find activities that you like, such as biking, swimming, and dancing. [When participating in sports, make sure to use appropriate safety equipment (helmet, mouth guard, eye protection, wrist guards, elbow and knee pads, athletic supporter).] Choose biking and walking as your mode of transportation whenever it is safe to do so. Choosing to spend time on your phone or computer is a lot easier and a lot more tempting than putting your electronics down and making yourself move - but it is really important to your overall health.      Sleep   Teenagers need a lot of sleep. It can be difficult because most teens don't feel the need to go to sleep until later in the evening, and then they have to get up for school before they've had adequate rest. Keeping your phone, TV, and other electronic media out of your room and avoiding using them in the hour so before bedtime can help you sleep better. Also, avoid drinking a lot of caffeine, especially later in the day.     Dental  It is important to take good care of your permanent teeth - this is the last set you are going to get! Brush at least twice a day (always before bed) with fluoridated toothpaste, floss regularly and see a dentist twice a year. If your home water supply is from a well, let us know - fluoride supplements may be recommended up to 16 years of age.    Violence & Bullying  Violence is out there. Any exposure to violence (as a victim, witness or perpetrator) is dangerous and harmful (emotionally and physically). Do your best to avoid situations where you know there is a risk of violence, bullying or fighting. Try to stay in a group when you are going between places  or on outings. If you are being teased or bullied, stand up to the person doing it if you can--remember, bullies want to see their victims upset, so be calm, don’t appear flustered, tell them to stop it and walk away. Laugh at them if you can; joking will throw a bully off guard because that is not the response they expect. If you or someone you know is being bullied or harmed, ask a guidance counselor, , health care provider or other trusted adult about what you can do to resolve the situation. Most schools have strict policies in place to protect against bullying. Don’t start skipping school to avoid a bully; talk to someone because things can be better.     Be very careful about what you post online--to protect yourself from being attacked as well as to make sure something you post will not be interpreted as hurtful or critical. Nothing is truly private in cyberspace; make sure you do not post anything online (texts, photos, emails) that you would not be comfortable having read out loud in a public place.     If you are dating, violence should NEVER be tolerated in a relationship-- this includes physical violence, emotional abuse (shaming, embarrassing, threatening, controlling) or sexual abuse (forcing a partner to participate in a sex act when they do not or cannot consent to it).  If you are uncomfortable with anything in your relationship, talk about it now. If you can’t talk to your partner, talk to a trusted adult, guidance counselor, teacher, or health care provider. If you (or a friend) need help right away, there are hotlines are available. One is through [employer]ob[employer]respect: call 1-644.531.4891, chat at loveisrespect.org or text “loveis” to 59374 (available 24/7/365). You can also call the National Domestic Violence 1-143.991.5795.     Healthy Emotions  Being a teen can be tough. Demands of school, relationship challenges (friends, family, dating), and new responsibilities and expectations can  lead to stress that may sometimes seem overwhelming. Depression and anxiety can affect teens; they can interfere with your day to day functioning and keep you from enjoying things that you usually enjoy. When you're feeling stressed out and overwhelmed, the most important thing to do is talk to someone that you trust and who cares about you. Alcohol, drugs or self-harming acts will not solve any of your problems and will ultimately only make things worse. If you (or a friend) are ever feeling overwhelmed by sadness or fear or anxiety to the point that don't feel you can do what you need to do from day to day or that you wished you were not alive, you need to ask for help. If you don't have a trusted adult in your life, talk to a friend, a teacher, a counselor or health care provider. There is an anonymous emergency hotline (through National Suicide Prevention Lifeline) for teens who are feeling desperate enough to hurt themselves: chat at https://suicidepreventionlifeline.org/ or call 1-304.906.1777. Hopefully you will get through your teenage years without any significant emotional difficulties, but you may have friends who struggle. Be there for them, and help them get help if you can see that they need it.    Sex, Drugs, & Rock n Roll  Thinking about sex and relationships at your age is normal. Teens may have questions about their sexual orientation and gender identity. You can always talk to your healthcare providers when you have questions about these issues. You should know that everyone is entitled to complete, nonjudgmental and confidential health at this clinic. We also encourage you to talk to your family and friends, if you believe they will be supportive. If you are unable to talk to your family or if you need additional support, there are plenty of resources which we can refer you to. Good online resources include https://www.cdc.gov/lgbthealth/youth-resources.htm and  http://www.[facility].org/programs/youth/.    You are undoubtedly aware of the risks associated with having sex. Because teens who have babies or father a child are faced with the tremendous responsibilities and challenges of caring for a child, their own goals and dreams become more difficult to fulfill and may even be altered because of the responsibility of caring for a child. Also, pregnancy can carry high medical risks in young teens. There are several contraception (birth control) options for teens to help prevent pregnancy - but the most effective one is still choosing not to have sex. (Large national studies of teenagers show that most teens who had sex at a young age wish they had waited longer.) Sexually-transmitted infections (STIs) are another major risk of having sex. It is estimated that about 20 million new STIs occur every year, and about half of those are in teens and young adults between 15 to 24 years of age. Female teens, in particular, are at risk for complications from STIs; some of which can cause them to have problems with their fertility (their ability to have children) later in life when they want to be pregnant. To protect against STI's, condoms need to be used during every sexual encounter, even if a female is using another form of contraception. For teens who choose to be sexually active, it is very important that they have regular health care check-ups to discuss contraception and perform STI screening if indicated. Plus, you should see a health care provided any time you are concerned about a possible problem (pregnancy, STI) or want to discuss birth control. Despite what you see on TV and in the media, you should know that most teenagers your age choose NOT to have sex. There is a lot of pressure out there for teens to experiment sexually, so make decisions to avoid places (and people) where you know that kind of pressure might be put on you. For reliable information about sex and birth  control, good websites are safeteens.org and bedsider.org.    If you make the decision to become sexually active, you should know that in the [facility], teens who are age 14 or older are entitled to confidential reproductive health care - that means that you can talk to your providers and receive care for sex-related issues (contraception, STIs, pregnancy) without your parents being notified. We encourage teens to talk to their parents when they are considering starting to have sex, but not everyone can--that is why this law exists. (The only exception to confidential care would be if your providers believe you are at risk for harming yourself or someone else.) Sexually active teens should learn about emergency contraception (\"the morning after pill \"or \"Plan B) which can significantly reduce the chance of pregnancy if a young woman takes it shortly after having unprotected sex. You should also know that even if you make the decision to have sex once, you do not have to keep doing it if you didn’t feel like it was the right decision for you.     Most teens have heard plenty by now about the dangers and health risks of alcohol, drugs and smoking. Some people think vaping is a safe alternative to smoking, but there is no proof that it is. Any inhaled substance is going to cause harm to your lungs and most can cause harm to the brain as well. You should not take prescription medicines if they were not prescribed for you, and you should never let anyone else take your prescription drugs. Avoid situations where you know there is likely to be alcohol and drugs which you will be pressured to try; hang out with friends who share your decision not to use these substances. In addition to the dangers associated with drug use, legal involvement and drug testing policies mean using drugs today can interfere with sports, job and college opportunities. You can talk to your health care provider about drug use if you have  questions or concerns; good online resources include https://teens.drugabuse.gov/ and https://www.thecoolspot.gov/real_life4.aspx  .    Industries have rules about protecting workers from loud noises because they are known to cause hearing loss. Nearly 6% of teenagers were identified as having a mild level of permanent hearing loss due to loud music and ear bud use in a national study. To protect your hearing, avoid prolonged use of earbuds and music at loud volumes and protect yourself with earplugs or other hearing protection devices) when exposed to loud noises (concerts, lawnmowers, snowblowers, etc.).    Safety on Wheels  The leading cause of death for adolescents is motor vehicle accidents. Wearing a seatbelt significantly reduces your chance of serious injury or death when riding in a car. If you ever find yourself in a situation where do not feel safe with the  of your car (whether that is yourself or someone else), the right decision is to call for a ride from someone else. This could be a matter of life or death-do not hesitate to make this type of call.    Texting, calling or using any kind of electronic device is distracting to a  and the cause of many serious accidents. Whether it is you or someone else driving, drivers should never be using mobile devices when they are behind the wheel. Put your phone out of reach or in the trunk if you do not trust yourself to ignore it while driving.    Safety in the Sun  Protecting yourself from the sun’s UV radiation is your responsibility - your parent is no longer going to alen after you to apply sunscreen. Whether you tan or burn, spending time in the sun without sunscreen protection increases your risk of skin cancer and premature skin aging. To protect yourself, always wear a generous amount of sunscreen with an SPF of 15 or higher, reapply it frequently, avoid prolonged time in the sun between 11 AM and 3 PM (when the sun is the hottest), and  wear sunglasses and sun protective clothing when in the sun. Use of tanning beds further increases the risk of skin cancer; tanning bed use is illegal for teens under age 16 years old in [place] and many  want  to increase that law to apply to everyone under 18 years of age because of the health risks.    Helmets should always be worn in riding a skateboard, bike, motorcycle, or ATV. They should also be worn when skating or skateboarding. When boating or doing water sports, always wear a [employer] approved lifejacket, even if you feel you are a good swimmer.    Gun Safety  Firearms are dangerous. If someone you know has a firearm, you should not attempt handle it or use it. Gun safety courses are usually available for teens who want to hunt, but even with training, you should never handle or use a firearm without experienced supervision. Carrying a handgun for protection is not recommended because it is dangerous, and it is illegal under the age of 21.      MEDICATION FOR FEVER OR PAIN:   Acetaminophen liquid (e.g., Tylenol or Tempra) may be given every four hours as needed for pain or fever.  Acetaminophen liquid is less concentrated than the infant dropper bottle type.  Be sure to check which product CONCENTRATION you are using.      CHILDREN’S Tylenol/Acetaminophen  (160 MG/5 mL)    Child’s Weight:  Dose:  36 - 47 pounds:    240 mg (7.5 mL (1 1/2 Teaspoons))  48 - 59 pounds:    320 mg (10.0 mL (2 Teaspoons))  60 - 71 pounds:    400 mg (12.5 mL (2 1/2 Teaspoons))  72 - 95 pounds:    480 mg (15.0 mL (3 Teaspoons))  Greater than 96 pounds:   640 mg (20.0 mL (4 Teaspoons))    CHILDREN’S Tylenol/Acetaminophen MELTAWAYS ( 80 MG tablets)    Child’s Weight:  Dose:  36 - 47 pounds:    240 mg (3 meltaway tablets)  48 - 59 pounds:    320 mg (4 meltaway tablets)  60 - 71 pounds:    400 mg (5 meltaway tablets)  72 - 95 pounds:    480 mg (6 meltaway tablets)  Greater than 96 pounds:   640 mg (8 meltaway  tablets)     () Tylenol/Acetaminophen MELTAWAYS (160 MG tablets)    Child’s Weight:  Dose:  36 - 47 pounds:    240 mg (1 1/2 meltaway tablets)  48 - 59 pounds:    320 mg (2 meltaway tablets)  60 - 71 pounds:    400 mg (2 1/2 meltaway tablets)  72 - 95 pounds:    480 mg (3 meltaway tablets)  Greater than 96 pounds:   640 mg (4 meltaway tablets)      CHILDREN'S Ibuprofen (e.g., Advil or Motrin) may be given every six hours as needed for pain or fever.    48 - 59 pounds:                       200 mg (2 Teaspoons)  60 - 71 pounds:                       250 mg (2 1/2 Teaspoons)  72 - 95 pounds:                       300 mg (3 Teaspoons)    NEXT VISIT: 1 year      Thank you for entrusting your care to [facility][note 3].    Also, check out “Children’s Health” on the [facility][note 3] Blog for updates on timely topics regarding children’s health!   nursing home

## 2024-06-22 ENCOUNTER — EMERGENCY (EMERGENCY)
Facility: HOSPITAL | Age: 84
LOS: 1 days | Discharge: NURSING, REHAB OR LONG-TERM CARE | End: 2024-06-22
Attending: EMERGENCY MEDICINE | Admitting: EMERGENCY MEDICINE
Payer: MEDICARE

## 2024-06-22 VITALS
OXYGEN SATURATION: 98 % | DIASTOLIC BLOOD PRESSURE: 74 MMHG | RESPIRATION RATE: 14 BRPM | TEMPERATURE: 98 F | SYSTOLIC BLOOD PRESSURE: 122 MMHG | HEIGHT: 66 IN | WEIGHT: 139.99 LBS | HEART RATE: 89 BPM

## 2024-06-22 DIAGNOSIS — H26.9 UNSPECIFIED CATARACT: Chronic | ICD-10-CM

## 2024-06-22 DIAGNOSIS — Z98.890 OTHER SPECIFIED POSTPROCEDURAL STATES: Chronic | ICD-10-CM

## 2024-06-22 DIAGNOSIS — Z90.89 ACQUIRED ABSENCE OF OTHER ORGANS: Chronic | ICD-10-CM

## 2024-06-22 PROCEDURE — 99284 EMERGENCY DEPT VISIT MOD MDM: CPT | Mod: 25

## 2024-06-22 PROCEDURE — 99285 EMERGENCY DEPT VISIT HI MDM: CPT | Mod: FS

## 2024-06-22 PROCEDURE — 73030 X-RAY EXAM OF SHOULDER: CPT

## 2024-06-22 PROCEDURE — 70450 CT HEAD/BRAIN W/O DYE: CPT | Mod: 26,MC

## 2024-06-22 PROCEDURE — 73030 X-RAY EXAM OF SHOULDER: CPT | Mod: 26,LT

## 2024-06-22 PROCEDURE — 73562 X-RAY EXAM OF KNEE 3: CPT

## 2024-06-22 PROCEDURE — 73562 X-RAY EXAM OF KNEE 3: CPT | Mod: 26,LT

## 2024-06-22 PROCEDURE — 70450 CT HEAD/BRAIN W/O DYE: CPT | Mod: MC

## 2024-06-22 RX ORDER — ACETAMINOPHEN 500 MG
650 TABLET ORAL ONCE
Refills: 0 | Status: COMPLETED | OUTPATIENT
Start: 2024-06-22 | End: 2024-06-22

## 2024-06-22 RX ADMIN — Medication 650 MILLIGRAM(S): at 12:08

## 2024-06-22 NOTE — ED PROVIDER NOTE - CLINICAL SUMMARY MEDICAL DECISION MAKING FREE TEXT BOX
83-year-old female with history of anemia, hypothyroidism, depression, GERD, bipolar brought in by ambulance from [facility] for fall off of toilet prior to arrival.  Patient hit her head on the floor.  No LOC.  No blood thinners.  Patient complaining of mild headache, left shoulder pain and left knee pain after fall.  No additional injury.  Patient unsure how she fell off the toilet bowl however denies LOC, dizziness, chest pain.  Denies fever, chills, shortness of breath, abdominal pain, nausea, vomiting, upper or lower extremity weakness or paresthesias.    VSS Afebrile, NAD  HEENT - clear, NC/AT  PERRL EOMI  Neck supple, NT  lungs clear  Cor S1S2 RR - MGR  Abd soft nontender, no mass or HSM, no rebound  Ext FROM intact, no edema, AT  Neuro Intact, no deficits.  Skin Warm and dry no rash.    Imp- Fall, head injury  Plan CT, if neg DC with Head injury instructions

## 2024-06-22 NOTE — ED ADULT NURSE NOTE - OBJECTIVE STATEMENT
82 yo F pmh 82 yo F pmh of hypothyroidism, kidney disease and depression brought in via EMS to ED from the [facility] s/p mechanical fall c/o lef sided head pain, left knee pain and left shoulder pain. Pt denies LOC, CP, back pain, SOB, fevers/chills, n/v/d, lightheadedness, dizziness, changes in urinary or bowel habits.  A&Ox4, able to follow commands and answer questions correctly.  Skin w/d, noted bruising to the left frontal scalp and anterior, with no limited ROM.  Skin otherwise w/d/i.  Safety maintained.  Son present at bedside.

## 2024-06-22 NOTE — ED PROVIDER NOTE - PATIENT PORTAL LINK FT
You can access the FollowMyHealth Patient Portal offered by [facility] by registering at the following website: [email or web address]. By joining [email or web address]’s FollowMyHealth portal, you will also be able to view your health information using other applications (apps) compatible with our system.

## 2024-06-22 NOTE — ED PROVIDER NOTE - MUSCULOSKELETAL MINIMAL EXAM
+ minimal tenderness to left shoulder with FROM, + minimal tenderness to left knee with FROM. no midline cervical/thoracic/lumbar tenderness. no pelvic tenderness. FROM of all extremities. radial and dp pulses equal and intact bilaterally.

## 2024-06-22 NOTE — ED PROVIDER NOTE - DIFFERENTIAL DIAGNOSIS
ddx includes but not limited to: brain bleed, skull fracture, knee fx, shoulder fx, contusion Differential Diagnosis

## 2024-06-22 NOTE — ED ADULT NURSE NOTE - NSFALLHARMRISKINTERV_ED_ALL_ED

## 2024-06-22 NOTE — ED PROVIDER NOTE - CARE PROVIDER_API CALL
Petey Tubbs  Otolaryngology  [address]  [place] [address]  Phone: [phone]  Fax: [phone]  Follow Up Time: 1-3 Days

## 2024-06-22 NOTE — ED ADULT TRIAGE NOTE - PRO INTERPRETER NEED 2
Heart Scan Initial results provided to patient.    Very Low Risk: Reviewed result score of 0 as initial result    Discussed risk factors, patient history and importance of discussing results with PCP or ordering physician.    Provided patient with Simple 7 patient education materials.    Patient verbalized questions and understanding of importance of follow up.    Navigator contact information provided.           English

## 2024-06-22 NOTE — ED ADULT NURSE NOTE - CAS TRG GENERAL NORM CIRC DET
Problem: Oxygenation/Respiratory Function  Goal: Optimized respiratory function and gas exchange  Outcome: Outcome Not Met, Continue to Monitor  Note: Infant on 1.5L NC, 21-28%. Occasional desats throughout night, especially around feedings.     Problem: Skin Integrity  Goal: Skin integrity is maintained or improved (skin will be intact without erythma or breakdown)  Outcome: Outcome Not Met, Continue to Monitor  Note: Redness and pinpoint rash on buttocks. No breakdown. Antifungal cream being applied at every diaper change.      Problem: Nutrition  Goal: Consumes sufficient dietary intake without complications  Outcome: Outcome Not Met, Continue to Monitor  Note: Infant consuming partial feeds overnight.       Strong peripheral pulses/Capillary refill less/equal to 2 seconds

## 2024-06-22 NOTE — ED PROVIDER NOTE - OBJECTIVE STATEMENT
83-year-old female with history of anemia, hypothyroidism, depression, GERD, bipolar brought in by ambulance from [facility] for fall off of toilet prior to arrival.  Patient hit her head on the floor.  No LOC.  No blood thinners.  Patient complaining of mild headache, left shoulder pain and left knee pain after fall.  No additional injury.  Patient unsure how she fell off the toilet bowl however denies LOC, dizziness, chest pain.  Denies fever, chills, shortness of breath, abdominal pain, nausea, vomiting, upper or lower extremity weakness or paresthesias.

## 2024-07-31 ENCOUNTER — EMERGENCY (EMERGENCY)
Facility: HOSPITAL | Age: 84
LOS: 1 days | Discharge: HOME | End: 2024-07-31
Attending: EMERGENCY MEDICINE
Payer: MEDICARE

## 2024-07-31 VITALS
HEIGHT: 66 IN | OXYGEN SATURATION: 99 % | SYSTOLIC BLOOD PRESSURE: 123 MMHG | TEMPERATURE: 98 F | WEIGHT: 145.06 LBS | RESPIRATION RATE: 19 BRPM | HEART RATE: 83 BPM | DIASTOLIC BLOOD PRESSURE: 78 MMHG

## 2024-07-31 VITALS
SYSTOLIC BLOOD PRESSURE: 137 MMHG | RESPIRATION RATE: 18 BRPM | HEART RATE: 89 BPM | DIASTOLIC BLOOD PRESSURE: 59 MMHG | OXYGEN SATURATION: 98 % | TEMPERATURE: 98 F

## 2024-07-31 DIAGNOSIS — Z90.89 ACQUIRED ABSENCE OF OTHER ORGANS: Chronic | ICD-10-CM

## 2024-07-31 DIAGNOSIS — Z98.890 OTHER SPECIFIED POSTPROCEDURAL STATES: Chronic | ICD-10-CM

## 2024-07-31 DIAGNOSIS — H26.9 UNSPECIFIED CATARACT: Chronic | ICD-10-CM

## 2024-07-31 PROCEDURE — 99285 EMERGENCY DEPT VISIT HI MDM: CPT | Mod: FS,25

## 2024-07-31 PROCEDURE — 73090 X-RAY EXAM OF FOREARM: CPT | Mod: 26,LT

## 2024-07-31 PROCEDURE — 73070 X-RAY EXAM OF ELBOW: CPT

## 2024-07-31 PROCEDURE — 73070 X-RAY EXAM OF ELBOW: CPT | Mod: 26,RT

## 2024-07-31 PROCEDURE — 99284 EMERGENCY DEPT VISIT MOD MDM: CPT | Mod: 25

## 2024-07-31 PROCEDURE — 73564 X-RAY EXAM KNEE 4 OR MORE: CPT

## 2024-07-31 PROCEDURE — 70450 CT HEAD/BRAIN W/O DYE: CPT | Mod: MC

## 2024-07-31 PROCEDURE — 72170 X-RAY EXAM OF PELVIS: CPT

## 2024-07-31 PROCEDURE — 73564 X-RAY EXAM KNEE 4 OR MORE: CPT | Mod: 26,RT

## 2024-07-31 PROCEDURE — 72125 CT NECK SPINE W/O DYE: CPT | Mod: MC

## 2024-07-31 PROCEDURE — 73110 X-RAY EXAM OF WRIST: CPT | Mod: 26,LT

## 2024-07-31 PROCEDURE — 72170 X-RAY EXAM OF PELVIS: CPT | Mod: 26

## 2024-07-31 PROCEDURE — 12004 RPR S/N/AX/GEN/TRK7.6-12.5CM: CPT

## 2024-07-31 PROCEDURE — 73090 X-RAY EXAM OF FOREARM: CPT

## 2024-07-31 PROCEDURE — 70450 CT HEAD/BRAIN W/O DYE: CPT | Mod: 26,MC

## 2024-07-31 PROCEDURE — 73590 X-RAY EXAM OF LOWER LEG: CPT

## 2024-07-31 PROCEDURE — 73120 X-RAY EXAM OF HAND: CPT

## 2024-07-31 PROCEDURE — 73590 X-RAY EXAM OF LOWER LEG: CPT | Mod: 26,RT

## 2024-07-31 PROCEDURE — 73110 X-RAY EXAM OF WRIST: CPT

## 2024-07-31 PROCEDURE — 72125 CT NECK SPINE W/O DYE: CPT | Mod: 26,MC

## 2024-07-31 PROCEDURE — 73120 X-RAY EXAM OF HAND: CPT | Mod: 26,LT

## 2024-07-31 RX ORDER — LIDOCAINE HYDROCHLORIDE 20 MG/ML
10 INJECTION, SOLUTION EPIDURAL; INFILTRATION; INTRACAUDAL; PERINEURAL ONCE
Refills: 0 | Status: COMPLETED | OUTPATIENT
Start: 2024-07-31 | End: 2024-07-31

## 2024-07-31 RX ADMIN — LIDOCAINE HYDROCHLORIDE 10 MILLILITER(S): 20 INJECTION, SOLUTION EPIDURAL; INFILTRATION; INTRACAUDAL; PERINEURAL at 13:43

## 2024-07-31 NOTE — ED PROVIDER NOTE - NSFOLLOWUPCLINICS_GEN_ALL_ED_FT
[facility]  Plastic Surgery  [address]  [place] [address]  Phone: [phone]  Fax:

## 2024-07-31 NOTE — ED PROVIDER NOTE - PRO INTERPRETER NEED 2
[Potential consequences of obesity discussed] : Potential consequences of obesity discussed [Benefits of weight loss discussed] : Benefits of weight loss discussed [Encouraged to increase physical activity] : Encouraged to increase physical activity English

## 2024-07-31 NOTE — ED PROVIDER NOTE - PHYSICAL EXAMINATION
CONSTITUTIONAL: Patient is awake, alert and oriented x 3. Patient is well appearing and in no acute distress.  HEAD: NCAT  EYES: PERRL bilaterally, EOMI,   ENT: Airway patent, Nasal mucosa clear.   NECK: Supple,   LUNGS: CTA B/L,   HEART: RRR.+S1S2    MSK: (+) ttp right lateral forearm, ttp to left dorsal aspect of hand, FROM upper and lower ext b/l, distal pulses intact, no midline ttp of cervical, thoracic or lumbar spine;   SKIN: ecchymosis to right lateral thigh, (+) ecchymosis to left dorsum of hand, (+) large skin tear to right forearm, small abrasion to dorsum of left hand;   NEURO: No focal deficits, Sensation intact; CONSTITUTIONAL: Patient is awake, alert and oriented x 3. Patient is well appearing and in no acute distress.  HEAD: NCAT  EYES: PERRL bilaterally, EOMI,   ENT: Airway patent, Nasal mucosa clear.   NECK: Supple,   LUNGS: CTA B/L,   HEART: RRR.+S1S2    MSK: (+) ttp right lateral forearm, ttp to left dorsal aspect of hand, FROM upper and lower ext b/l, distal pulses intact, no midline ttp of cervical, thoracic or lumbar spine;   SKIN: ecchymosis to right lateral shin, (+) ecchymosis to left dorsum of hand, (+) large skin tear to right forearm, small abrasion to dorsum of left hand;   NEURO: No focal deficits, Sensation intact;

## 2024-07-31 NOTE — ED ADULT NURSE NOTE - NSFALLRISKINTERV_ED_ALL_ED

## 2024-07-31 NOTE — CHART NOTE - NSCHARTNOTEFT_GEN_A_CORE
EMERGENCY : MATHEW consulted by ED PA for patient and daughter requesting homecare arrangements be made for wound care for laceration repair performed in ED. LCSW reviewed patient’s chart. As per chart review patient is a “84 y/o female with pmhx of anemia, hypothyroidism, depression, GERD, bipolar presents to the ed s/p mechanical trip and fall last night. Patient was sitting in her wheelchair watching TV last night and she accidently slid out of the wheelchair. She is unsure if she hit her head or lost consciousness. She lives at the [facility]. She called her daughter and was brought to the hospital today. She states that she has skin tears and bruising to right forearm and left hand. She has some pain and bruising to right lateral thigh. Denies any other pain or injury. Mostly is wheelchair bound lately. Denies any headache, fever, chills, cough, n/v/d. tdap in 2022.”    GAILW met with patient and patient’s daughter and primary contact, Efren Chandler PH: [phone]. Patient is A&ox3, defers to daughter. Daughter states patient resides in the [facility] assisted living in [place], [place]. She states patient receives assistance with adl’s from staff but in addition has 5 hours of private hire HHA’s that are arranged with cluster care through the [facility]. She states that patient primarily uses her wheelchair, but also has a rolling walker. She states that patient is also set up with home physical therapy at the [facility] as well. She states she has been encouraging patient to increase private hire HHA hours and will continue to do so but that patient typically does not feel she requires additional assistance. Patient’s daughter requests a visiting nurse for wound care. LCSW provided education on [facility] referral process and typically visits are limited and for teaching purposes to which she verbalizes understanding. She states she can learn or that one of patient’s HHA’s can learn as well and she is also receptive to resources for private hire RN if they feel additional care is required. Patient’s PCP is through [facility] who comes to the patient’s home for visits. PCP is Dr. Carrie Masters PH: [phone]. SHALINI provided her with private hire RN resources for [employer] PH: [phone]. As well as additional private hire RN resources. SW spoke with Jenni at the [facility] in [place] who confirms that it is an independent living facility and any homecare agency can be used for wound care. LCSW sent referral via [facility] to [facility]. LCSW received response and referral rejected stating it is not a skilled need. LCSW then sent referral to [facility] as per request of patient’s daughter. Answer pending, handoff provided to incoming LMSW colleague for follow up on homecare referral. SW continues to follow and remain available.

## 2024-07-31 NOTE — ED PROCEDURE NOTE - ATTENDING APP SHARED VISIT CONTRIBUTION OF CARE
I, EM Attending, Enrique Connell was available for consultation and supervision for the procedure that was performed by the Resident/Fellow Physician or NELIAD.

## 2024-07-31 NOTE — ED PROVIDER NOTE - ATTENDING APP SHARED VISIT CONTRIBUTION OF CARE
I, Enrique Connell MD, Emergency Medicine Attending Physician, personally saw and examined the patient and I personally made/approve the management plan and take responsibility for the patient management.    MDM: 83-year-old female who has a history of anemia, hypothyroidism, depression, GERD, bipolar disorder who presents with right forearm and left hand skin tears after she slid out of her wheelchair while watching Olympics on TV last night, states that she dozed off.  States she has pain to the right scalp, right forearm and left hand.  Patient is wheelchair bound mostly, but able to ambulate with assistance, and was able to ambulate after the incident.  Tdap updated in 2022.  Denies being on antiplatelets or anticoagulants.    ROS: denies LOC, syncope, neck pain, chest pain, shortness of breath, abdominal pain, back pain, numbness, weakness, vomiting, lightheadedness, vision changes.    Vitals stable.  NAD, NCAT, GCS 15, PERRL, EOMI without diplopia or discomfort, trachea midline, no stridor, CTAB, NRRR.  No chest wall tenderness, deformity or crepitus.  No abdominal tenderness or guarding.  No signs of external trauma to chest and abdomen, no ecchymosis.  No tenderness or deformity to head, maxillo-facial, or midline of cervical/thoracic/lumbar vertebrae.  (+) Right forearm skin tear with flap, and left dorsum of hand with superficial small abrasions.  (+) Right lateral shin with mild ecchymosis.  Examination of pelvis and bilateral hip shows stable pelvis, no shortening or abnormal rotation, normal range of motion of hip, negative logroll, FADIR, and scour test.  Examination of bilateral knee shows normal range of motion, extensor mechanism intact, no bony tenderness or deformity, no gross ligamentous laxity.  No tenderness, deformity or reduced ROM to all joints of all four extremities.  Neurovascularly intact in all 4 extremities with 5/5 strength, normal sensation, equal pulses and brisk capillary refill, soft compartments.  Cranial nerves III-XII intact, no pronator drift, normal speech and gait.     My independent interpretation of the right forearm/elbow x-ray images shows no acute fracture or dislocation.   More details to be seen in the official radiologist read.     My independent interpretation of the right knee and tib-fib x-ray images shows no acute fracture or dislocation.   More details to be seen in the official radiologist read.     My independent interpretation of the left hand and wrist x-ray images shows no acute fracture or dislocation, but shows significant basal joint arthrosis, and also has scapholunate joint widening.  However patient is asymptomatic with testing of the SL ligament.  More details to be seen in the official radiologist read. I, Enrique Connell MD, Emergency Medicine Attending Physician, personally saw and examined the patient and I personally made/approve the management plan and take responsibility for the patient management.    MDM: 83-year-old female who has a history of anemia, hypothyroidism, depression, GERD, bipolar disorder who presents with right forearm and left hand skin tears after she slid out of her wheelchair while watching Olympics on TV last night, states that she dozed off.  States she has pain to the right scalp, right forearm and left hand.  Patient is wheelchair bound mostly, but able to ambulate with assistance, and was able to ambulate after the incident.  Tdap updated in 2022.  Denies being on antiplatelets or anticoagulants.    ROS: denies LOC, syncope, neck pain, chest pain, shortness of breath, abdominal pain, back pain, numbness, weakness, vomiting, lightheadedness, vision changes.    Vitals stable.  NAD, NCAT, GCS 15, PERRL, EOMI without diplopia or discomfort, trachea midline, no stridor, CTAB, NRRR.  No chest wall tenderness, deformity or crepitus.  No abdominal tenderness or guarding.  No signs of external trauma to chest and abdomen, no ecchymosis.  No tenderness or deformity to head, maxillo-facial, or midline of cervical/thoracic/lumbar vertebrae.  (+) Right forearm skin tear with flap, and left dorsum of hand with superficial small abrasions.  (+) Right lateral shin with mild ecchymosis.  Examination of pelvis and bilateral hip shows stable pelvis, no shortening or abnormal rotation, normal range of motion of hip, negative logroll, FADIR, and scour test.  Examination of bilateral knee shows normal range of motion, extensor mechanism intact, no bony tenderness or deformity, no gross ligamentous laxity.  No tenderness, deformity or reduced ROM to all joints of all four extremities.  Neurovascularly intact in all 4 extremities with 5/5 strength, normal sensation, equal pulses and brisk capillary refill, soft compartments.  Cranial nerves III-XII intact, no pronator drift, normal speech and gait.     My independent interpretation of the right forearm/elbow x-ray images shows no acute fracture or dislocation.   More details to be seen in the official radiologist read.     My independent interpretation of the right knee and tib-fib x-ray images shows no acute fracture or dislocation.   More details to be seen in the official radiologist read.     My independent interpretation of the left hand and wrist x-ray images shows no acute fracture or dislocation, but shows significant basal joint arthrosis, and also has scapholunate joint widening.  However patient is asymptomatic with testing of the SL ligament.  More details to be seen in the official radiologist read.    CT imaging reviewed, no ICH or C-spine Fx.     Patient seen by SW/ROSA who arranged for visiting RN to help with wound care and dressing changes and education.     At 1605, The patient was reassessed and they state that their condition has improved. Stable vitals. Repeat neuro exam is benign without any neuro deficits. Repeat cardiovascular examination shows NRRR, equal pulses in all 4 extremities. Repeat pulmonary examination shows equal bilateral lung sounds. Repeat abdominal examination shows no significant tenderness, no peritoneal signs including rebound or guarding. The patient was able to eat, drink, and ambulate with her baseline assistance in the ED. States she feels steady and safe to go back to [facility]. Patient is stable for discharge. Results and D/C instructions were printed and discussed with the patient. Additional verbal instructions were given. The patient agrees to return to the ED immediately for any new or concerning symptoms, or if they get worse. They show good understanding of their D/C instructions, printed results, and return precautions including alarm symptoms specific to their complaint and condition. They agree to follow-up with their PMD for repeat evaluation in the next 2-3 days. At the time of D/C, pt remained in stable condition with stable vital signs.

## 2024-07-31 NOTE — ED ADULT TRIAGE NOTE - CHIEF COMPLAINT QUOTE
slipped out of wheelchair at midnight/possibly fell asleep while watching TV, R arm/L hand skin tears - dressed by dtr, denies neck/body pain, no AC use

## 2024-07-31 NOTE — ED ADULT NURSE NOTE - NSFALLASSESSNEED_ED_ALL_ED
Patient called to be informed that an is needed for refill request. Patient scheduled provider next available in clinic, patient voiced understanding of appointment time and date.  ----- Message from Tamela Esparza sent at 10/9/2023  2:11 PM CDT -----  Contact: Anup   Requesting an RX refill or new RX.  Is this a refill or new RX: New   RX name and strength (copy/paste from chart):  glimepiride (AMARYL) 4 MG tablet  Is this a 30 day or 90 day RX: 90 day   Pharmacy name and phone # (copy/paste from chart):        [facility] [address] [place] [id number]  Phone: [phone] Fax: [phone]      The doctors have asked that we provide their patients with the following 2 reminders -- prescription refills can take up to 72 hours, and a friendly reminder that in the future you can use your [facility] account to request refills: yes      
no

## 2024-07-31 NOTE — ED ADULT NURSE NOTE - OBJECTIVE STATEMENT
83 y.o. female coming in from home via private car for fall x last night. pt states that she had a trip and fall last night, endorses head strike, denies LOC, noted to have lac on right forearm and left hand, bleeding noted at lac sites, denies AC use. PMH hypothyroid. A&Ox3, vss, pt denies CP/SOB/weakness/dizziness prior to fall, denies fevers/chills/N/V/D/urinary symptoms, bed in lowest position, call bell in reach and teaching on use completed. no other complaints at this time.

## 2024-07-31 NOTE — ED PROVIDER NOTE - PATIENT PORTAL LINK FT
You can access the FollowMyHealth Patient Portal offered by [facility] by registering at the following website: [email or web address]. By joining [facility]’s FollowMyHealth portal, you will also be able to view your health information using other applications (apps) compatible with our system.

## 2024-07-31 NOTE — ED PROVIDER NOTE - PROGRESS NOTE DETAILS
Sigrid will speak to [facility] staff and try to set up visiting nurse services to help with wound care as per daughters request. Instructions for wound care are as follows:   Please change dressing every 24 hours. Apply xeroform bandage covered by non-stick dressing and gauze wrap for the first 3 days. After 24 hours you may wash area with regular soap and water. Make sure wound is completely dry prior to covering with dressing. After 3 days please cover wound with a nonstick dressing and gauze wrap. Please change this dressing daily. Patient will need to return to ED or follow up with plastic surgery for suture removal in 7-10 days. Explained all recommendations to patient and daughter at bedside as well. Terri Carrasco PA-C Discussed all results and recommendations with patient and daughter. Stable for dc home. Terri Carrasco PA-C

## 2024-07-31 NOTE — ED PROVIDER NOTE - OBJECTIVE STATEMENT
84 y/o female with pmhx of anemia, hypothyroidism, depression, GERD, bipolar presents to the ed s/p mechanical trip and fall last night. Patient was sitting in her wheelchair watching TV last night and she accidently slid out of the wheelchair. She is unsure if she hit her head or lost consciousness. She lives at the [facility]. She called her daughter and was brought to the hospital today. She states that she has skin tears and bruising to right forearm and left hand. She has some pain and bruising to right lateral thigh. Denies any other pain or injury. Mostly is wheelchair bound lately. Denies any headache, fever, chills, cough, n/v/d. 84 y/o female with pmhx of anemia, hypothyroidism, depression, GERD, bipolar presents to the ed s/p mechanical trip and fall last night. Patient was sitting in her wheelchair watching TV last night and she accidently slid out of the wheelchair. She is unsure if she hit her head or lost consciousness. She lives at the [facility]. She called her daughter and was brought to the hospital today. She states that she has skin tears and bruising to right forearm and left hand. She has some pain and bruising to right lateral thigh. Denies any other pain or injury. Mostly is wheelchair bound lately. Denies any headache, fever, chills, cough, n/v/d. tdap in 2022. 82 y/o female with pmhx of anemia, hypothyroidism, depression, GERD, bipolar presents to the ed s/p mechanical trip and fall last night. Patient was sitting in her wheelchair watching TV last night and she accidently slid out of the wheelchair. She is unsure if she hit her head or lost consciousness. She lives at the [facility]. She called her daughter and was brought to the hospital today. She states that she has skin tears and bruising to right forearm and left hand. She has some pain and bruising to right lateral shin. Denies any other pain or injury. Mostly is wheelchair bound lately. Denies any headache, fever, chills, cough, n/v/d. tdap in 2022.

## 2024-07-31 NOTE — ED PROVIDER NOTE - NSFOLLOWUPINSTRUCTIONS_ED_ALL_ED_FT
1. Follow up with your PCP within 2-3 days.   2. Rest. Hydrate.   3. Take tylenol as needed for pain.   4. Keep wound clean and dry. Change dressing daily. Use soap and water to clean skin. Dry completely prior to covering wound. Do NOT wet for first 24 hours.   5. Sutures may be removed in 10 days. Please return to the emergency department, urgent care or plastic surgery to have sutures removed.   6. You may follow up with plastic surgery to ensure wound is healing properly. Please call to schedule an appointment.   7. Return to the emergency department if you have worsening pain, swelling, bleeding, redness, fevers or all other concerning symptoms.

## 2024-08-06 ENCOUNTER — APPOINTMENT (OUTPATIENT)
Dept: PLASTIC SURGERY | Facility: CLINIC | Age: 84
End: 2024-08-06

## 2024-08-06 PROCEDURE — 99202 OFFICE O/P NEW SF 15 MIN: CPT

## 2024-08-07 PROBLEM — S41.112S: Status: ACTIVE | Noted: 2024-08-07

## 2024-08-07 PROBLEM — S41.111S: Status: ACTIVE | Noted: 2024-08-07

## 2024-08-12 NOTE — PHYSICAL EXAM
[NI] : Normal [de-identified] : NAD, AxOx3  [de-identified] : nonlabored breathing  [de-identified] : normal HR [de-identified] : Right forearm with 6x3cm closed wound  Nylon sutures in place  no evidence of infection, fluid collection, or skin breakdown  Ecchymosis present  Left dorsal hand with small skin tears x3 clean without evidence of infection  [de-identified] : grossly intact  [de-identified] : as above [de-identified] : normal affect

## 2024-08-12 NOTE — PHYSICAL EXAM
[NI] : Normal [de-identified] : NAD, AxOx3  [de-identified] : nonlabored breathing  [de-identified] : normal HR [de-identified] : Right forearm with 6x3cm closed wound  Nylon sutures in place  no evidence of infection, fluid collection, or skin breakdown  Ecchymosis present  Left dorsal hand with small skin tears x3 clean without evidence of infection  [de-identified] : as above [de-identified] : grossly intact  [de-identified] : normal affect

## 2024-08-12 NOTE — ADDENDUM
[FreeTextEntry1] :  I, Elaine Ruelas, documented this note as a scribe on behalf of Dr. Lauren Shikowitz-Behr, MD on 08/06/2024.

## 2024-08-12 NOTE — HISTORY OF PRESENT ILLNESS
[FreeTextEntry1] : JOANNA MALDONADO is an 83 year old female with history of fall  out of her wheelchair onto the carpet, sustaining a significant skin tear to her right forearm. This was sutured in the ED and the patient was referred to plastic surgery for wound care  follow-up.    PMHx- HTN   PSHx- anal fissurectomy, repair of perirectal fistula    Currently taking - Accupril, aspirin, buspirone, Divalproex, Hyalgan, levothyroxine, lorazepam, Synthroid, Trintellix    Allergies- erythromycin    Never smoker

## 2024-08-12 NOTE — END OF VISIT
[Time Spent: ___ minutes] : I have spent [unfilled] minutes of time on the encounter. [FreeTextEntry3] : All medical record entries made by the Scribe were at my, Dr. Lauren Shikowitz-Behr, MD, direction and personally dictated by me on 08/06/2024. I have reviewed the chart and agree that the record accurately reflects my personal performance of the history, physical exam, assessment and plan. I have also personally directed, reviewed, and agreed with the chart.

## 2024-08-12 NOTE — PHYSICAL EXAM
[NI] : Normal [de-identified] : NAD, AxOx3  [de-identified] : nonlabored breathing  [de-identified] : normal HR [de-identified] : Right forearm with 6x3cm closed wound  Nylon sutures in place  no evidence of infection, fluid collection, or skin breakdown  Ecchymosis present  Left dorsal hand with small skin tears x3 clean without evidence of infection  [de-identified] : as above [de-identified] : grossly intact  [de-identified] : normal affect

## 2024-08-13 ENCOUNTER — APPOINTMENT (OUTPATIENT)
Dept: PLASTIC SURGERY | Facility: CLINIC | Age: 84
End: 2024-08-13

## 2024-08-13 DIAGNOSIS — S41.112S LACERATION W/OUT FOREIGN BODY OF LEFT UPPER ARM, SEQUELA: ICD-10-CM

## 2024-08-13 PROCEDURE — 99213 OFFICE O/P EST LOW 20 MIN: CPT

## 2024-08-13 RX ORDER — CEFADROXIL 500 MG/1
500 CAPSULE ORAL TWICE DAILY
Qty: 10 | Refills: 0 | Status: ACTIVE | COMMUNITY
Start: 2024-08-13 | End: 1900-01-01

## 2024-08-13 NOTE — HISTORY OF PRESENT ILLNESS
[FreeTextEntry1] : JOANNA MALDONADO is an 83 year old female with history of fall  out of her wheelchair onto the carpet, sustaining a significant skin tear to her right forearm. This was sutured in the ED. Patient presents today for suture removal.

## 2024-08-13 NOTE — PHYSICAL EXAM
[de-identified] : NAD, Axox3  [de-identified] : nonlabored breathing  [de-identified] : no edema  [de-identified] : Right forearm- incision clean and dry. There is some superficial sloughing of the distal aspect of the laceration. Mild erythema. No drainage Nylon sutures in place Left dorsal hand with minimal superficial wound. No evidence of infection [de-identified] : normal affect

## 2024-08-20 ENCOUNTER — APPOINTMENT (OUTPATIENT)
Dept: PLASTIC SURGERY | Facility: CLINIC | Age: 84
End: 2024-08-20
Payer: MEDICARE

## 2024-08-20 VITALS
HEIGHT: 65 IN | BODY MASS INDEX: 24.16 KG/M2 | HEART RATE: 81 BPM | DIASTOLIC BLOOD PRESSURE: 72 MMHG | SYSTOLIC BLOOD PRESSURE: 116 MMHG | WEIGHT: 145 LBS | RESPIRATION RATE: 16 BRPM | OXYGEN SATURATION: 97 %

## 2024-08-20 DIAGNOSIS — S41.111S LACERATION W/OUT FOREIGN BODY OF RIGHT UPPER ARM, SEQUELA: ICD-10-CM

## 2024-08-20 PROCEDURE — 99213 OFFICE O/P EST LOW 20 MIN: CPT

## 2024-08-21 NOTE — PHYSICAL EXAM
[de-identified] : NAd, AxOx3  [de-identified] : nonlabored breathing  [de-identified] : RUE incision is clean and dry  Some superficial sloughing distal aspect  Remaining nylons in place  No evidence of infection  [de-identified] : as above [de-identified] : normal affect

## 2024-08-21 NOTE — PHYSICAL EXAM
[de-identified] : NAd, AxOx3  [de-identified] : nonlabored breathing  [de-identified] : RUE incision is clean and dry  Some superficial sloughing distal aspect  Remaining nylons in place  No evidence of infection  [de-identified] : as above [de-identified] : normal affect

## 2024-08-21 NOTE — ADDENDUM
[FreeTextEntry1] :  I, Elaine Ruelas, documented this note as a scribe on behalf of Dr. Lauren Shikowitz-Behr, MD on 08/20/2024.

## 2024-08-21 NOTE — PHYSICAL EXAM
[de-identified] : NAd, AxOx3  [de-identified] : nonlabored breathing  [de-identified] : RUE incision is clean and dry  Some superficial sloughing distal aspect  Remaining nylons in place  No evidence of infection  [de-identified] : as above [de-identified] : normal affect

## 2024-08-21 NOTE — HISTORY OF PRESENT ILLNESS
[FreeTextEntry1] : JOANNA MALDONADO is an 83 year old female with history of fall out of her wheelchair onto the carpet, sustaining a significant skin tear to her right forearm. This was sutured in the ED. She presents today for follow-up. Patient with no complaints today.

## 2024-08-21 NOTE — END OF VISIT
[FreeTextEntry3] : All medical record entries made by the Scribe were at my, Dr. Lauren Shikowitz-Behr, MD, direction and personally dictated by me on 08/20/2024. I have reviewed the chart and agree that the record accurately reflects my personal performance of the history, physical exam, assessment and plan. I have also personally directed, reviewed, and agreed with the chart.

## 2024-09-11 ENCOUNTER — INPATIENT (INPATIENT)
Facility: HOSPITAL | Age: 84
LOS: 12 days | Discharge: NURSING, REHAB OR LONG-TERM CARE | DRG: 982 | End: 2024-09-24
Attending: INTERNAL MEDICINE | Admitting: STUDENT IN AN ORGANIZED HEALTH CARE EDUCATION/TRAINING PROGRAM
Payer: MEDICARE

## 2024-09-11 VITALS
WEIGHT: 145.06 LBS | OXYGEN SATURATION: 97 % | DIASTOLIC BLOOD PRESSURE: 76 MMHG | HEART RATE: 90 BPM | TEMPERATURE: 98 F | HEIGHT: 66 IN | RESPIRATION RATE: 20 BRPM | SYSTOLIC BLOOD PRESSURE: 140 MMHG

## 2024-09-11 DIAGNOSIS — L03.211 CELLULITIS OF FACE: ICD-10-CM

## 2024-09-11 DIAGNOSIS — H26.9 UNSPECIFIED CATARACT: Chronic | ICD-10-CM

## 2024-09-11 DIAGNOSIS — E03.9 HYPOTHYROIDISM, UNSPECIFIED: ICD-10-CM

## 2024-09-11 DIAGNOSIS — L03.90 CELLULITIS, UNSPECIFIED: ICD-10-CM

## 2024-09-11 DIAGNOSIS — Z29.9 ENCOUNTER FOR PROPHYLACTIC MEASURES, UNSPECIFIED: ICD-10-CM

## 2024-09-11 DIAGNOSIS — F32.9 MAJOR DEPRESSIVE DISORDER, SINGLE EPISODE, UNSPECIFIED: ICD-10-CM

## 2024-09-11 DIAGNOSIS — Z90.89 ACQUIRED ABSENCE OF OTHER ORGANS: Chronic | ICD-10-CM

## 2024-09-11 DIAGNOSIS — M60.9 MYOSITIS, UNSPECIFIED: ICD-10-CM

## 2024-09-11 DIAGNOSIS — N18.4 CHRONIC KIDNEY DISEASE, STAGE 4 (SEVERE): ICD-10-CM

## 2024-09-11 DIAGNOSIS — R22.1 LOCALIZED SWELLING, MASS AND LUMP, NECK: ICD-10-CM

## 2024-09-11 DIAGNOSIS — K21.9 GASTRO-ESOPHAGEAL REFLUX DISEASE WITHOUT ESOPHAGITIS: ICD-10-CM

## 2024-09-11 DIAGNOSIS — Z98.890 OTHER SPECIFIED POSTPROCEDURAL STATES: Chronic | ICD-10-CM

## 2024-09-11 LAB
ALBUMIN SERPL ELPH-MCNC: 3.8 G/DL — SIGNIFICANT CHANGE UP (ref 3.3–5)
ALP SERPL-CCNC: 104 U/L — SIGNIFICANT CHANGE UP (ref 40–120)
ALT FLD-CCNC: 21 U/L — SIGNIFICANT CHANGE UP (ref 10–45)
ANION GAP SERPL CALC-SCNC: 15 MMOL/L — SIGNIFICANT CHANGE UP (ref 5–17)
ANISOCYTOSIS BLD QL: SLIGHT — SIGNIFICANT CHANGE UP
AST SERPL-CCNC: 22 U/L — SIGNIFICANT CHANGE UP (ref 10–40)
BASOPHILS # BLD AUTO: 0 K/UL — SIGNIFICANT CHANGE UP (ref 0–0.2)
BASOPHILS NFR BLD AUTO: 0 % — SIGNIFICANT CHANGE UP (ref 0–2)
BILIRUB SERPL-MCNC: 0.3 MG/DL — SIGNIFICANT CHANGE UP (ref 0.2–1.2)
BUN SERPL-MCNC: 38 MG/DL — HIGH (ref 7–23)
BURR CELLS BLD QL SMEAR: PRESENT — SIGNIFICANT CHANGE UP
CALCIUM SERPL-MCNC: 9.3 MG/DL — SIGNIFICANT CHANGE UP (ref 8.4–10.5)
CHLORIDE SERPL-SCNC: 109 MMOL/L — HIGH (ref 96–108)
CO2 SERPL-SCNC: 15 MMOL/L — LOW (ref 22–31)
CREAT SERPL-MCNC: 3.03 MG/DL — HIGH (ref 0.5–1.3)
EGFR: 15 ML/MIN/1.73M2 — LOW
ELLIPTOCYTES BLD QL SMEAR: SLIGHT — SIGNIFICANT CHANGE UP
EOSINOPHIL # BLD AUTO: 0.2 K/UL — SIGNIFICANT CHANGE UP (ref 0–0.5)
EOSINOPHIL NFR BLD AUTO: 0.9 % — SIGNIFICANT CHANGE UP (ref 0–6)
GAS PNL BLDV: SIGNIFICANT CHANGE UP
GLUCOSE SERPL-MCNC: 83 MG/DL — SIGNIFICANT CHANGE UP (ref 70–99)
HCT VFR BLD CALC: 32.9 % — LOW (ref 34.5–45)
HGB BLD-MCNC: 10.4 G/DL — LOW (ref 11.5–15.5)
LYMPHOCYTES # BLD AUTO: 1.18 K/UL — SIGNIFICANT CHANGE UP (ref 1–3.3)
LYMPHOCYTES # BLD AUTO: 5.3 % — LOW (ref 13–44)
MACROCYTES BLD QL: SLIGHT — SIGNIFICANT CHANGE UP
MANUAL SMEAR VERIFICATION: SIGNIFICANT CHANGE UP
MCHC RBC-ENTMCNC: 31.6 GM/DL — LOW (ref 32–36)
MCHC RBC-ENTMCNC: 32 PG — SIGNIFICANT CHANGE UP (ref 27–34)
MCV RBC AUTO: 101.2 FL — HIGH (ref 80–100)
MONOCYTES # BLD AUTO: 1.38 K/UL — HIGH (ref 0–0.9)
MONOCYTES NFR BLD AUTO: 6.2 % — SIGNIFICANT CHANGE UP (ref 2–14)
NEUTROPHILS # BLD AUTO: 19.46 K/UL — HIGH (ref 1.8–7.4)
NEUTROPHILS NFR BLD AUTO: 87.6 % — HIGH (ref 43–77)
OVALOCYTES BLD QL SMEAR: SLIGHT — SIGNIFICANT CHANGE UP
PLAT MORPH BLD: NORMAL — SIGNIFICANT CHANGE UP
PLATELET # BLD AUTO: 191 K/UL — SIGNIFICANT CHANGE UP (ref 150–400)
POIKILOCYTOSIS BLD QL AUTO: SLIGHT — SIGNIFICANT CHANGE UP
POTASSIUM SERPL-MCNC: 4.8 MMOL/L — SIGNIFICANT CHANGE UP (ref 3.5–5.3)
POTASSIUM SERPL-SCNC: 4.8 MMOL/L — SIGNIFICANT CHANGE UP (ref 3.5–5.3)
PROT SERPL-MCNC: 7.4 G/DL — SIGNIFICANT CHANGE UP (ref 6–8.3)
RBC # BLD: 3.25 M/UL — LOW (ref 3.8–5.2)
RBC # FLD: 14.5 % — SIGNIFICANT CHANGE UP (ref 10.3–14.5)
RBC BLD AUTO: ABNORMAL
SODIUM SERPL-SCNC: 139 MMOL/L — SIGNIFICANT CHANGE UP (ref 135–145)
WBC # BLD: 22.22 K/UL — HIGH (ref 3.8–10.5)
WBC # FLD AUTO: 22.22 K/UL — HIGH (ref 3.8–10.5)

## 2024-09-11 PROCEDURE — 31575 DIAGNOSTIC LARYNGOSCOPY: CPT

## 2024-09-11 PROCEDURE — 99285 EMERGENCY DEPT VISIT HI MDM: CPT | Mod: GC

## 2024-09-11 PROCEDURE — 70490 CT SOFT TISSUE NECK W/O DYE: CPT | Mod: 26,MC

## 2024-09-11 PROCEDURE — 99223 1ST HOSP IP/OBS HIGH 75: CPT

## 2024-09-11 PROCEDURE — 71045 X-RAY EXAM CHEST 1 VIEW: CPT | Mod: 26

## 2024-09-11 RX ORDER — VENLAFAXINE HCL 75 MG
375 TABLET ORAL DAILY
Refills: 0 | Status: DISCONTINUED | OUTPATIENT
Start: 2024-09-11 | End: 2024-09-13

## 2024-09-11 RX ORDER — 5-HYDROXYTRYPTOPHAN (5-HTP) 100 MG
3 TABLET,DISINTEGRATING ORAL AT BEDTIME
Refills: 0 | Status: DISCONTINUED | OUTPATIENT
Start: 2024-09-11 | End: 2024-09-24

## 2024-09-11 RX ORDER — SENNOSIDES 8.6 MG
2 TABLET ORAL AT BEDTIME
Refills: 0 | Status: DISCONTINUED | OUTPATIENT
Start: 2024-09-11 | End: 2024-09-24

## 2024-09-11 RX ORDER — SODIUM CHLORIDE 0.9 % (FLUSH) 0.9 %
1000 SYRINGE (ML) INJECTION ONCE
Refills: 0 | Status: COMPLETED | OUTPATIENT
Start: 2024-09-11 | End: 2024-09-11

## 2024-09-11 RX ORDER — ACETAMINOPHEN 325 MG
650 TABLET ORAL EVERY 6 HOURS
Refills: 0 | Status: DISCONTINUED | OUTPATIENT
Start: 2024-09-11 | End: 2024-09-24

## 2024-09-11 RX ORDER — ACETAMINOPHEN 325 MG
1000 TABLET ORAL ONCE
Refills: 0 | Status: COMPLETED | OUTPATIENT
Start: 2024-09-11 | End: 2024-09-11

## 2024-09-11 RX ORDER — PANTOPRAZOLE SODIUM 40 MG/1
40 TABLET, DELAYED RELEASE ORAL
Refills: 0 | Status: DISCONTINUED | OUTPATIENT
Start: 2024-09-11 | End: 2024-09-24

## 2024-09-11 RX ORDER — ASTEMIZOLE 10 MG
250 TABLET ORAL DAILY
Refills: 0 | Status: DISCONTINUED | OUTPATIENT
Start: 2024-09-11 | End: 2024-09-24

## 2024-09-11 RX ORDER — AMPICILLIN, SULBACTAM 250; 125 MG/ML; MG/ML
3 INJECTION, POWDER, FOR SOLUTION INTRAMUSCULAR; INTRAVENOUS ONCE
Refills: 0 | Status: COMPLETED | OUTPATIENT
Start: 2024-09-11 | End: 2024-09-11

## 2024-09-11 RX ORDER — ASTEMIZOLE 10 MG
500 TABLET ORAL AT BEDTIME
Refills: 0 | Status: DISCONTINUED | OUTPATIENT
Start: 2024-09-11 | End: 2024-09-24

## 2024-09-11 RX ORDER — AMPICILLIN, SULBACTAM 250; 125 MG/ML; MG/ML
3 INJECTION, POWDER, FOR SOLUTION INTRAMUSCULAR; INTRAVENOUS EVERY 12 HOURS
Refills: 0 | Status: DISCONTINUED | OUTPATIENT
Start: 2024-09-12 | End: 2024-09-18

## 2024-09-11 RX ADMIN — Medication 400 MILLIGRAM(S): at 19:44

## 2024-09-11 RX ADMIN — AMPICILLIN, SULBACTAM 200 GRAM(S): 250; 125 INJECTION, POWDER, FOR SOLUTION INTRAMUSCULAR; INTRAVENOUS at 18:38

## 2024-09-11 RX ADMIN — Medication 1000 MILLILITER(S): at 18:38

## 2024-09-11 RX ADMIN — Medication 1 MILLIGRAM(S): at 21:51

## 2024-09-11 RX ADMIN — Medication 500 MILLIGRAM(S): at 21:51

## 2024-09-11 RX ADMIN — Medication 101.6 MILLIGRAM(S): at 23:15

## 2024-09-11 RX ADMIN — Medication 1000 MILLIGRAM(S): at 20:55

## 2024-09-11 NOTE — H&P ADULT - NSHPSOCIALHISTORY_GEN_ALL_CORE
lives at [facility] assisted living in [place]   no smoking    no alcohol

## 2024-09-11 NOTE — H&P ADULT - PROBLEM/PLAN-7
Patient refused to run 3.75 hrs in dialysis. She completed 3.5 hrs with some hypotension and nausea. 1L removed. Patient sent back to the unit with no further complaints.    DISPLAY PLAN FREE TEXT

## 2024-09-11 NOTE — H&P ADULT - ASSESSMENT
84-year-old female with a history of anemia, hypothyroidism, depression, GERD, bipolar, CKD , presents for redness and swelling on her face for the past few days.

## 2024-09-11 NOTE — ED PROVIDER NOTE - CHIEF COMPLAINT
The patient is a 84y Female complaining of  The patient is a 84y Female complaining of right facial, neck and chest redness, swelling.

## 2024-09-11 NOTE — H&P ADULT - PROBLEM SELECTOR PLAN 1
no drainable collection on CT , s/p laryngoscope no airway involvement   - ENT consult appreciated, day team to follow up further recommendations   - c/w Unasyn ( renal dosed)   -dexamethasone 8mg Q8h   - if worsening , ID consult can be obtained by day team   - minced diet

## 2024-09-11 NOTE — CONSULT NOTE ADULT - SUBJECTIVE AND OBJECTIVE BOX
CC:    HPI:       PAST MEDICAL & SURGICAL HISTORY:  Depression      Hypothyroid      Kidney disease      Cataract      History of rectal surgery      History of tonsillectomy        Allergies    azithromycin (Rash)    Intolerances      MEDICATIONS  (STANDING):    MEDICATIONS  (PRN):      Social History: **??**    Family history: **??**    ROS:   ENT: all negative except as noted in HPI   CV: denies palpitations  Pulm: denies SOB, cough, hemoptysis  GI: denies change in appetite, indigestion, n/v  : denies pertinent urinary symptoms, urgency  Neuro: denies numbness/tingling, loss of sensation  Psych: denies anxiety  MS: denies muscle weakness, instability  Heme: denies easy bruising or bleeding  Endo: denies heat/cold intolerance, excessive sweating  Vascular: denies LE edema    Vital Signs Last 24 Hrs  T(C): 37.6 (11 Sep 2024 18:50), Max: 37.6 (11 Sep 2024 18:50)  T(F): 99.6 (11 Sep 2024 18:50), Max: 99.6 (11 Sep 2024 18:50)  HR: 85 (11 Sep 2024 18:50) (85 - 90)  BP: 132/64 (11 Sep 2024 18:50) (132/64 - 140/76)  BP(mean): --  RR: 20 (11 Sep 2024 18:50) (20 - 20)  SpO2: 95% (11 Sep 2024 18:50) (95% - 97%)    Parameters below as of 11 Sep 2024 18:50  Patient On (Oxygen Delivery Method): room air                              10.4   22.22 )-----------( 191      ( 11 Sep 2024 17:18 )             32.9    09-11    139  |  109<H>  |  38<H>  ----------------------------<  83  4.8   |  15<L>  |  3.03<H>    Ca    9.3      11 Sep 2024 17:18    TPro  7.4  /  Alb  3.8  /  TBili  0.3  /  DBili  x   /  AST  22  /  ALT  21  /  AlkPhos  104  09-11       PHYSICAL EXAM:  Gen: NAD  Skin: No rashes, bruises, or lesions  Head: Normocephalic, Atraumatic  Face: no edema, erythema, or fluctuance. Parotid glands soft without mass  Eyes: no scleral injection  Ears: Right: ear canal clear, TM intact without effusion or erythema. No evidence of any fluid drainage. No mastoid tenderness, erythema, or ear bulging            Left: ear canal clear, TM intact without effusion or erythema. No evidence of any fluid drainage. No mastoid tenderness, erythema, or ear bulging  Nose: Nares bilaterally patent, no discharge  Mouth: No Stridor / Drooling / Trismus.  Mucosa moist, tongue/uvula midline, oropharynx clear  Neck: Flat, supple, no lymphadenopathy, trachea midline, no masses  Lymphatic: No lymphadenopathy  Resp: breathing easily, no stridor  CV: no peripheral edema/cyanosis  GI: nondistended   Peripheral vascular: no JVD or edema  Neuro: facial nerve intact, no facial droop      Diagnostic Nasal Endoscopy: (Scope #2 used)    Fiberoptic Indirect laryngoscopy:  (Scope #2 used)    IMAGING/ADDITIONAL STUDIES:  CC: right facial swelling     HPI: 84 year old, A&Ox4 Female, with PMHx of HTN, CKD, hypothyroid, depression, and OA, presented to ED for 4 days of worsening right facial/neck swelling and pain. At ED, labs are significant for leucocytosis (WBC 22.22) and creatinine of 3.03. CT neck without contrast revealed right facial cellulitis/myositis, without gross evidence of a drainable fluid collection. ENT consulted for further evaluation. Per patient, on Saturday, she went for a dental consultation for cracked tooth. No procedure was done. Then pt started noticing right facial swelling on Saturday evening. Today, she presented to PCP, who later referred her to ENT, when swelling progressively worsens along with new onset of trismus in the past 4 days. outpatient ENT sent her to ED for CT scan and iv antibiotics. During encounter, pt is A&Ox4. Denies facial trauma or pain during chewing. No difficulty tolerating diet. no odynophagia or shortness of breath. Currently, pt denies F/C/N/V, recent URI, rhinorrhea,  dysphonia, changes in voice or inability to tolerate secretions.    PAST MEDICAL & SURGICAL HISTORY:  Depression  Hypothyroid  Kidney disease  Cataract  History of rectal surgery  History of tonsillectomy    Allergies  azithromycin (Rash)    MEDICATIONS  (STANDING):  MEDICATIONS  (PRN):    Social History: denies drinking or smoking   Family history: no pertinent family history     ROS:   ENT: all negative except as noted in HPI   CV: denies palpitations  Pulm: denies SOB, cough, hemoptysis  GI: endorses poor appetite due to facial pain.   : denies pertinent urinary symptoms, urgency  Neuro: denies numbness/tingling, loss of sensation  Psych: denies anxiety  MS: denies muscle weakness, instability  Heme: denies easy bruising or bleeding  Endo: denies heat/cold intolerance, excessive sweating  Vascular: denies LE edema    Vital Signs Last 24 Hrs  T(C): 37.6 (11 Sep 2024 18:50), Max: 37.6 (11 Sep 2024 18:50)  T(F): 99.6 (11 Sep 2024 18:50), Max: 99.6 (11 Sep 2024 18:50)  HR: 85 (11 Sep 2024 18:50) (85 - 90)  BP: 132/64 (11 Sep 2024 18:50) (132/64 - 140/76)  BP(mean): --  RR: 20 (11 Sep 2024 18:50) (20 - 20)  SpO2: 95% (11 Sep 2024 18:50) (95% - 97%)    Parameters below as of 11 Sep 2024 18:50  Patient On (Oxygen Delivery Method): room air             10.4   22.22 )-----------( 191      ( 11 Sep 2024 17:18 )             32.9    09-11    139  |  109<H>  |  38<H>  ----------------------------<  83  4.8   |  15<L>  |  3.03<H>    Ca    9.3      11 Sep 2024 17:18    TPro  7.4  /  Alb  3.8  /  TBili  0.3  /  DBili  x   /  AST  22  /  ALT  21  /  AlkPhos  104  09-11     PHYSICAL EXAM:  Gen: NAD  Skin: right facial and neck erythema   Head: Normocephalic, Atraumatic  Face: right submandibular erythema with swelling, warm to touch and TTP. swelling is firm without fluctuance. noted 2 black lesions (small wounds) around right submandibular region. additional erythema extends down to right platysma region and anterior neck. Warm to touch.   Eyes: no scleral injection  Nose: Nares bilaterally patent, no discharge  Mouth: dry oral mucosa. no pus expressed from Stensen's and Tacho's duct. no tenderness on gum or teeth on palpation. Mild trismus. No Stridor.   Neck: erythema on right platysma and anterior neck,  trachea midline, no masses  Lymphatic: No lymphadenopathy  Resp: breathing comfortably at room air, no stridor  CV: no peripheral edema/cyanosis  GI: nondistended   Peripheral vascular: no JVD or edema  Neuro: facial nerve intact, no facial droop    #PROCEDURE  Fiberoptic Flexible Laryngoscopy (Ambu scope used):  Reason for Laryngoscopy: neck swelling   Patient was unable to cooperate with mirror.    +pachyderma, c/w LPRD. Nasopharynx, oropharynx, and hypopharynx clear, no bleeding. Tongue base, posterior pharyngeal wall, vallecula, epiglottis, and subglottis appear normal. No erythema, edema, pooling of secretions, masses or lesions. Airway patent, no foreign body visualized. No glottic/supraglottic edema. True vocal cords, arytenoids, vestibular folds, ventricles, pyriform sinuses, and aryepiglottic folds appear normal bilaterally. Vocal cords mobile with good contact b/l.    # IMAGING/ADDITIONAL STUDIES:   ACC: [id number] EXAM: CT NECK SOFT TISSUE ORDERED BY: CYNTHIA WARD  PROCEDURE DATE: 09/11/2024  INTERPRETATION: EXAMINATION: CT NECK SOFT TISSUE    FINDINGS:    There is beam hardening artifact secondary to dental amalgam, but the examination is nonetheless diagnostic.    There is soft tissue swelling and stranding in the subcutaneous fat of the right submandibular space, consistent with a cellulitis. There is an associated myositis, with thickening of the perioral and platysma muscles on the right side. This has worsened compared to the prior CT, although that examination was not optimized to evaluate the soft tissues. There is no associated sialolithiasis. There are no periapical lucencies. There is no fluid in the sinus cavities. The mastoids are clear.    There is diffuse osteopenia. There is moderate right C4-5, left C5-6, and bilateral C6-7 osteophytic foraminal encroachment there is diffuse volume loss in the thyroid gland, with small calcified nodules, statistically likely to represent colloid cysts or adenomas. There are bilateral lens implants. There is volume loss and small vessel disease in the brain parenchyma.    Without the benefit of IV contrast, the examination appears otherwise grossly unremarkable, although a contrast study would be more sensitive.    The oral cavity, dentition, sublingual, submandibular, nasopharyngeal, pharyngeal, , and retropharyngeal spaces are otherwise negative. The parotids, submandibular, and thyroid glands are otherwise negative. The parapharyngeal fat, carotid spaces, posterior triangles, paraspinal musculature, epiglottis, and larynx appear otherwise unremarkable. Limited views of the brain parenchyma, skull base, skull base foramina, and visualized upper chest are otherwise negative. No perineural spread of disease. No drainable fluid collections or an abscess.    IMPRESSION:  1. Right facial cellulitis/myositis, without gross evidence of a drainable fluid collection.    Patient Name: JOANNA MALDONADO  MRN: [id number], Accession: [id number]  DOS: 09/11/24 06:33 PM    --- End of Report ---

## 2024-09-11 NOTE — ED ADULT NURSE NOTE - OBJECTIVE STATEMENT
83 y/o Female presents to ED from home with c/o facial swelling. PMH of CKD, hypothyroid, GERD. Daughter states pt was seen on Saturday at dentist for consultation. Dentist did not do any dental work on pt. Pt began to have swelling and redness sunday/monday. Pt saw PCP and ENT who recommended to come to ER. Pt endorsing pain to right facial and jaw area. Pt has been taking tylenol. Denies difficulty swallowing or breathing. Denies SOB, CP, fever, chills. Pt is speaking full sentences without difficulty. Airway patent with spontaneous unlabored breathing, skin is normal color for race. Safety maintained bed in lowest position and locked.

## 2024-09-11 NOTE — H&P ADULT - HISTORY OF PRESENT ILLNESS
Patient is an 84-year-old female with a history of anemia, hypothyroidism, depression, GERD, bipolar, CKD , presents for redness and swelling on her face for the past few days.   Patient reports  right-sided facial swelling and redness including her neck and on her lower jaw that started on 9/8/24, worsening over the past few days. she reports  a dental exam for cracked teeth on day prior but no work performed . she reports no fevers. She was evaluated by her PMD on day of admission and subsequently referred to ENT who referred her to the hospital. She reports   ED course : evaluated by ENT s/p laryngoscope , no airway involvement

## 2024-09-11 NOTE — H&P ADULT - NSHPPHYSICALEXAM_GEN_ALL_CORE
Vital Signs Last 24 Hrs  T(C): 37.6 (11 Sep 2024 18:50), Max: 37.6 (11 Sep 2024 18:50)  T(F): 99.6 (11 Sep 2024 18:50), Max: 99.6 (11 Sep 2024 18:50)  HR: 85 (11 Sep 2024 18:50) (85 - 90)  BP: 132/64 (11 Sep 2024 18:50) (132/64 - 140/76)  BP(mean): --  RR: 20 (11 Sep 2024 18:50) (20 - 20)  SpO2: 95% (11 Sep 2024 18:50) (95% - 97%)    Parameters below as of 11 Sep 2024 18:50  Patient On (Oxygen Delivery Method): room air Vital Signs Last 24 Hrs  T(C): 37.6 (11 Sep 2024 18:50), Max: 37.6 (11 Sep 2024 18:50)  T(F): 99.6 (11 Sep 2024 18:50), Max: 99.6 (11 Sep 2024 18:50)  HR: 85 (11 Sep 2024 18:50) (85 - 90)  BP: 132/64 (11 Sep 2024 18:50) (132/64 - 140/76)  BP(mean): --  RR: 20 (11 Sep 2024 18:50) (20 - 20)  SpO2: 95% (11 Sep 2024 18:50) (95% - 97%)    Parameters below as of 11 Sep 2024 18:50  Patient On (Oxygen Delivery Method): room air    GENERAL: No acute distress, well-developed  HEAD:  R facial sweling , R parotid/ jaw firm , edematous , no fluctuance ,  Atraumatic, Normocephalic  ENT: poor dentition EOMI, PERRLA, conjunctiva and sclera clear,  moist mucosa no pharyngeal erythema or exudates   NECK: +ttp on R , erythematous , supple , no JVD   CHEST/LUNG: Clear to auscultation bilaterally; No wheeze, equal breath sounds bilaterally   BACK: No spinal tenderness,  No CVA tenderness   HEART: Regular rate and rhythm; No murmurs, rubs, or gallops  ABDOMEN: Soft, Nontender, Nondistended; Bowel sounds present  EXTREMITIES:  No clubbing, cyanosis, or edema  MSK: No joint swelling or effusions, ROM intact   PSYCH: Normal behavior/affect  NEUROLOGY: AAOx3, non-focal, cranial nerves intact  SKIN: facial redness and swelling ,  Normal color, No rashes or lesions

## 2024-09-11 NOTE — CONSULT NOTE ADULT - PROBLEM SELECTOR RECOMMENDATION 2
- trial of PPI, or OTC acid reducer.   - avoid spicy/fatty/acidic foods (fried foods, tomato, citrus juices, chocolate, mints, coffee, tea, martin, alcohol)   - avoid eating or drinking 2-3 hours before bedtime and lying down   - avoid bending over after eating.   - Elevation of HOB or use an extra pillow for sleep  - outpatient follow up at [facility] ENT office, located at [address] [place] [address]. Please call [phone]) to make appointment.

## 2024-09-11 NOTE — CONSULT NOTE ADULT - PROBLEM SELECTOR RECOMMENDATION 9
- case discussed, imaging and scope reviewed with attending Dr Rodrigues  - admit to medicine  - c/w SUSANNE benavidesn for now  - rec ID consult for further eval and management  - encourage oral hydration (dehydrated oral mucosa)   - Please page or call [phone] if concerns or issues - case discussed, imaging and scope reviewed with attending Dr Rodrigues  - please admit to medicine  - rec IV decadron 8mg Q8H for 24 hours only.   - c/w IV unasyn for now  - rec ID consult for further eval and management  - encourage oral hydration (dehydrated oral mucosa)   - Please page or call [phone] if concerns or issues

## 2024-09-11 NOTE — ED PROVIDER NOTE - CLINICAL SUMMARY MEDICAL DECISION MAKING FREE TEXT BOX
Ankita DUQUE: Ankita DUQUE: Patient is an 84-year-old female with a history of anemia, hypothyroidism, depression, GERD, bipolar, CKD here for right-sided facial swelling and redness, right lower jaw swelling that started on Sunday night.  Patient went to the dentist on Saturday for evaluation for several cracked teeth.  Patient denies any overt fevers but thinks she may have had a fever now.  She denies any nausea or vomiting.  Today, patient went to her primary care physician and was sent to ENT.  She was seen by an [employer] RN who attempted a flexible scope which was unsuccessful secondary to patient's inability to tolerate it.  Patient denies any shortness of breath.  She does have pain with opening her mouth.  She reports significant pain in her right lower jaw.  She was sent to the emergency department for CT scan, IV antibiotics and admission for cellulitis.    On exam, patient has significant erythema, warmth and tenderness to the right side of her face, neck and upper chest.  She has significant induration and swelling underneath her right jaw.  She has trismus.  There is no stridor on exam.  Labs were ordered and CT Noncon was ordered from triage.  Patient has noted leukocytosis to 22.  Plan to add cultures, IV Unasyn, ENT evaluation.  Differential includes dental abscess, Gerson's angina,  Cellulitis.  Patient needs IV antibiotics and admission.

## 2024-09-11 NOTE — ED PROVIDER NOTE - PROGRESS NOTE DETAILS
ENT consulted. Received signout.  Patient evaluated by ENT, patent airway, CT scan shows right facial cellulitis/myositis.  Discussed with ENT.  Recommending IV antibiotics.  Does not require airway watch at this time.  Patient not on oxygen, speaking in full sentences, not short of breath.  To be admitted.  Roshan Casillas PGY-3

## 2024-09-11 NOTE — ED PROVIDER NOTE - OBJECTIVE STATEMENT
Anikta DUQUE: Patient is an 84-year-old female with a history of anemia, hypothyroidism, depression, GERD, bipolar, CKD here for right-sided facial swelling and redness, right lower jaw swelling that started on Sunday night.  Patient went to the dentist on Saturday for evaluation for several cracked teeth.  Patient denies any overt fevers but thinks she may have had a fever now.  She denies any nausea or vomiting.  Today, patient went to her primary care physician and was sent to ENT.  She was seen by an [employer] RN who attempted a flexible scope which was unsuccessful secondary to patient's inability to tolerate it.  Patient denies any shortness of breath.  She does have pain with opening her mouth.  She reports significant pain in her right lower jaw.  She was sent to the emergency department for CT scan, IV antibiotics and admission for cellulitis.

## 2024-09-11 NOTE — H&P ADULT - NSHPREVIEWOFSYSTEMS_GEN_ALL_CORE
CONSTITUTIONAL: +  weakness,  no fevers or chills  EYES/ENT: No visual changes;  No dysphagia  NECK:+ swelling and  pain  on R , no  stiffness  RESPIRATORY: No cough, wheezing, hemoptysis; No shortness of breath  CARDIOVASCULAR: No chest pain or palpitations; No lower extremity edema  EXTREMITIES: no le edema, cyanosis, clubbing  GASTROINTESTINAL: No abdominal or epigastric pain. No nausea, vomiting, or hematemesis; No diarrhea or constipation. No melena or hematochezia.  BACK: No back pain  GENITOURINARY: No dysuria, frequency or hematuria  NEUROLOGICAL: No numbness or weakness  MSK: no joint swelling or pain  SKIN: + redness and swelling on R face , No itching, burning, rashes, or lesions   PSYCH: no agitation  All other review of systems is negative unless indicated above.

## 2024-09-11 NOTE — H&P ADULT - NSHPLABSRESULTS_GEN_ALL_CORE
Labs personally reviewed:                          10.4   22.22 )-----------( 191      ( 11 Sep 2024 17:18 )             32.9     09-11    139  |  109<H>  |  38<H>  ----------------------------<  83  4.8   |  15<L>  |  3.03<H>    Ca    9.3      11 Sep 2024 17:18    TPro  7.4  /  Alb  3.8  /  TBili  0.3  /  DBili  x   /  AST  22  /  ALT  21  /  AlkPhos  104  09-11        LIVER FUNCTIONS - ( 11 Sep 2024 17:18 )  Alb: 3.8 g/dL / Pro: 7.4 g/dL / ALK PHOS: 104 U/L / ALT: 21 U/L / AST: 22 U/L / GGT: x             Urinalysis Basic - ( 11 Sep 2024 17:18 )    Color: x / Appearance: x / SG: x / pH: x  Gluc: 83 mg/dL / Ketone: x  / Bili: x / Urobili: x   Blood: x / Protein: x / Nitrite: x   Leuk Esterase: x / RBC: x / WBC x   Sq Epi: x / Non Sq Epi: x / Bacteria: x      CAPILLARY BLOOD GLUCOSE          Imaging:  CXR personally reviewed: no focal opacity  CT neck: Right facial cellulitis/myositis, without gross evidence of a   drainable fluid collection.      EKG

## 2024-09-11 NOTE — ED PROVIDER NOTE - RAPID ASSESSMENT
84-year-old female with past medical history of anemia, hypothyroidism, depression, GERD, bipolar, CKD presenting with right facial swelling.  Patient reports that she went to the dentist on Saturday for consultation for several cracked teeth.  The next day patient noted some mild pain and swelling to the right lower jaw.  Over the past 4 days patient states swelling has worsened.  Now with significant erythema overlying the right jaw and onto the right side of the neck and upper chest.  Patient followed up with her PCP and ENT today and was sent to the ER for CT scan and IV antibiotics.  Denies difficulty breathing/swallowing.    **Patient was rapidly assessed by me, Nicko Garcia PA-C. A limited history was obtained. The patient will be seen and further examined/worked up in the main ED and their care will be completed by the main ED team. Receiving team will follow up on labs, analgesia, any clinical imaging, and perform reassessment and disposition of the patient as clinically indicated. All decisions regarding the progression of care will be made at their discretion. 84-year-old female with past medical history of anemia, hypothyroidism, depression, GERD, bipolar, CKD presenting with right facial swelling.  Patient reports that she went to the dentist on Saturday for consultation for several cracked teeth.  The next day patient noted some mild pain and swelling to the right lower jaw.  Over the past 4 days patient states swelling has worsened.  Now with significant erythema overlying the right jaw and onto the right side of the neck and upper chest.  Patient followed up with her PCP and ENT today and was sent to the ER for CT scan and IV antibiotics.  Denies difficulty breathing/swallowing.    **Patient was rapidly assessed by me, Nicko Garcia PA-C. A limited history was obtained. The patient will be seen and further examined/worked up in the main ED and their care will be completed by the main ED team. Receiving team will follow up on labs, analgesia, any clinical imaging, and perform reassessment and disposition of the patient as clinically indicated. All decisions regarding the progression of care will be made at their discretion.    Attending MD Flores: This patient was seen and orders were placed by the PA as per our department's QPA model.  I was not consulted in regards to this patient although I was present and available in the Emergency Department to the PA.  Patient was to be sent to main ED for full medical evaluation and receiving team was to follow up on any labs, analgesia, clinical imaging ordered by the PA.  Any reassessment and disposition decisions were to be made by receiving team as clinically indicated, all decisions regarding the progression of care to be made at their discretion.  I did not perform a comprehensive history and physical on this patient.

## 2024-09-12 LAB
ALBUMIN SERPL ELPH-MCNC: 3.7 G/DL — SIGNIFICANT CHANGE UP (ref 3.3–5)
ALP SERPL-CCNC: 114 U/L — SIGNIFICANT CHANGE UP (ref 40–120)
ALT FLD-CCNC: 18 U/L — SIGNIFICANT CHANGE UP (ref 10–45)
ANION GAP SERPL CALC-SCNC: 13 MMOL/L — SIGNIFICANT CHANGE UP (ref 5–17)
AST SERPL-CCNC: 15 U/L — SIGNIFICANT CHANGE UP (ref 10–40)
BILIRUB SERPL-MCNC: 0.3 MG/DL — SIGNIFICANT CHANGE UP (ref 0.2–1.2)
BUN SERPL-MCNC: 37 MG/DL — HIGH (ref 7–23)
CALCIUM SERPL-MCNC: 9.2 MG/DL — SIGNIFICANT CHANGE UP (ref 8.4–10.5)
CHLORIDE SERPL-SCNC: 112 MMOL/L — HIGH (ref 96–108)
CO2 SERPL-SCNC: 14 MMOL/L — LOW (ref 22–31)
CREAT SERPL-MCNC: 2.97 MG/DL — HIGH (ref 0.5–1.3)
EGFR: 15 ML/MIN/1.73M2 — LOW
GLUCOSE SERPL-MCNC: 140 MG/DL — HIGH (ref 70–99)
HCT VFR BLD CALC: 32.9 % — LOW (ref 34.5–45)
HGB BLD-MCNC: 10.2 G/DL — LOW (ref 11.5–15.5)
MCHC RBC-ENTMCNC: 31 GM/DL — LOW (ref 32–36)
MCHC RBC-ENTMCNC: 31.3 PG — SIGNIFICANT CHANGE UP (ref 27–34)
MCV RBC AUTO: 100.9 FL — HIGH (ref 80–100)
NRBC # BLD: 0 /100 WBCS — SIGNIFICANT CHANGE UP (ref 0–0)
PLATELET # BLD AUTO: 193 K/UL — SIGNIFICANT CHANGE UP (ref 150–400)
POTASSIUM SERPL-MCNC: 4.6 MMOL/L — SIGNIFICANT CHANGE UP (ref 3.5–5.3)
POTASSIUM SERPL-SCNC: 4.6 MMOL/L — SIGNIFICANT CHANGE UP (ref 3.5–5.3)
PROT SERPL-MCNC: 7.1 G/DL — SIGNIFICANT CHANGE UP (ref 6–8.3)
RBC # BLD: 3.26 M/UL — LOW (ref 3.8–5.2)
RBC # FLD: 14.8 % — HIGH (ref 10.3–14.5)
SODIUM SERPL-SCNC: 139 MMOL/L — SIGNIFICANT CHANGE UP (ref 135–145)
WBC # BLD: 15.43 K/UL — HIGH (ref 3.8–10.5)
WBC # FLD AUTO: 15.43 K/UL — HIGH (ref 3.8–10.5)

## 2024-09-12 PROCEDURE — 99231 SBSQ HOSP IP/OBS SF/LOW 25: CPT | Mod: 25

## 2024-09-12 RX ORDER — INFLUENZA VIRUS VACCINE 15; 15; 15; 15 UG/.5ML; UG/.5ML; UG/.5ML; UG/.5ML
0.5 SUSPENSION INTRAMUSCULAR ONCE
Refills: 0 | Status: DISCONTINUED | OUTPATIENT
Start: 2024-09-12 | End: 2024-09-24

## 2024-09-12 RX ORDER — CHLORHEXIDINE GLUCONATE ORAL RINSE 1.2 MG/ML
1 SOLUTION DENTAL DAILY
Refills: 0 | Status: DISCONTINUED | OUTPATIENT
Start: 2024-09-12 | End: 2024-09-24

## 2024-09-12 RX ORDER — SODIUM BICARBONATE 650 MG
650 TABLET ORAL
Refills: 0 | Status: DISCONTINUED | OUTPATIENT
Start: 2024-09-12 | End: 2024-09-13

## 2024-09-12 RX ADMIN — Medication 650 MILLIGRAM(S): at 18:40

## 2024-09-12 RX ADMIN — Medication 101.6 MILLIGRAM(S): at 21:27

## 2024-09-12 RX ADMIN — Medication 150 MILLIGRAM(S): at 13:00

## 2024-09-12 RX ADMIN — AMPICILLIN, SULBACTAM 200 GRAM(S): 250; 125 INJECTION, POWDER, FOR SOLUTION INTRAMUSCULAR; INTRAVENOUS at 18:41

## 2024-09-12 RX ADMIN — Medication 1 MILLIGRAM(S): at 21:30

## 2024-09-12 RX ADMIN — Medication 500 MILLIGRAM(S): at 22:59

## 2024-09-12 RX ADMIN — Medication 88 MICROGRAM(S): at 05:10

## 2024-09-12 RX ADMIN — CHLORHEXIDINE GLUCONATE ORAL RINSE 1 APPLICATION(S): 1.2 SOLUTION DENTAL at 18:41

## 2024-09-12 RX ADMIN — Medication 5000 UNIT(S): at 18:40

## 2024-09-12 RX ADMIN — PANTOPRAZOLE SODIUM 40 MILLIGRAM(S): 40 TABLET, DELAYED RELEASE ORAL at 05:10

## 2024-09-12 RX ADMIN — Medication 101.6 MILLIGRAM(S): at 13:14

## 2024-09-12 RX ADMIN — Medication 5000 UNIT(S): at 05:10

## 2024-09-12 RX ADMIN — Medication 375 MILLIGRAM(S): at 13:13

## 2024-09-12 RX ADMIN — Medication 250 MILLIGRAM(S): at 13:00

## 2024-09-12 RX ADMIN — AMPICILLIN, SULBACTAM 200 GRAM(S): 250; 125 INJECTION, POWDER, FOR SOLUTION INTRAMUSCULAR; INTRAVENOUS at 05:33

## 2024-09-12 RX ADMIN — Medication 101.6 MILLIGRAM(S): at 05:29

## 2024-09-12 NOTE — PROGRESS NOTE ADULT - PROBLEM SELECTOR PLAN 1
- rec IV decadron 8mg Q8H for 24 hours only.   - c/w IV unasyn for now  - rec ID consult for further eval and management  - encourage oral hydration (dehydrated oral mucosa)   - Please page or call [phone] if concerns or issues.

## 2024-09-12 NOTE — PHYSICAL THERAPY INITIAL EVALUATION ADULT - IMPAIRED TRANSFERS: SIT/STAND, REHAB EVAL
On first attempt, pt with episode of knee buckling, required seated rest break on bed./impaired balance/decreased strength

## 2024-09-12 NOTE — PHYSICAL THERAPY INITIAL EVALUATION ADULT - ADDITIONAL COMMENTS
[facility], no stairs to negotiate. Previously independent with all functional mobility, owns RW/cane; has [employer] 4-6/day on Monday/Thursday to assist with light housework. Pt lives at [facility].  PTA pt reports being mostly wheelchair bound, however able to ambulate short distances around apartment using rolling walker.  Pt is mostly alone during day, has assist with meals.  Pt expresses desire to ambulate more, however has limited opportunity due to living at [facility].

## 2024-09-12 NOTE — PATIENT PROFILE ADULT - FALL HARM RISK - CONCLUSION
Pt was called. Informed that Dr. Ordaz sent in prescription. Pt asked about cost. Writer informed pt that we are not aware of cost until it is proceeded through insurance. Pt verbalized understanding and agreement with plan of care.    Fall with Harm Risk

## 2024-09-12 NOTE — PROGRESS NOTE ADULT - ASSESSMENT
Patient is a 84 year old female with PMHx of Anemia, Depression, CKD, GERD and Hypothyroidism. Presents to [facility] for worsening redness and swelling on her face for the past few days.    Plan:    # Facial cellulitis:   - + leukocytosis, fu BCx (in progress)  - No drainable collection on CT , s/p laryngoscope no airway involvement   - ENT consult appreciated -> day team to follow up further recommendations   - Abx mgmt per ID  - Dexamethasone 8MG Q8H    - Fu ID consult    # CKD:  - Bun/Cr 38/3.03  - Monitor I/O's  - Serial Cr  - Avoid nephrotoxins  - Renally dose medications  - Continue to monitor    # Depression:  - C/w home meds    # LPRD (laryngopharyngeal reflux disease):  - ENT recommendations appreciated   - PPI    # Hypothyroidism:  - C/w Synthroid    # DVT ppx:  - Heparin subq    [facility]  [phone]     Patient is a 84 year old female with PMHx of Anemia, Depression, CKD, GERD and Hypothyroidism. Presents to [facility] for worsening redness and swelling on her face for the past few days.    Plan:    # Facial cellulitis:   - + leukocytosis, fu BCx (in progress)  - No drainable collection on CT , s/p laryngoscope no airway involvement   - Abx mgmt per ID  - Dexamethasone 8MG Q8H for 24H only per ENT  - Encourage oral hydration   - Minced and moist diet  - ID/ENT following    # CKD:  - Bun/Cr 38/3.03  - Monitor I/O's  - Serial Cr  - Avoid nephrotoxins  - Renally dose medications  - Continue to monitor  - Renal consult pending (called)    # Depression:  - C/w home meds    # LPRD (laryngopharyngeal reflux disease):  - ENT recommendations appreciated   - PPI    # Hypothyroidism:  - C/w Synthroid    # DVT ppx:  - Heparin subq    [facility]  [phone]     Patient is a 84 year old female with PMHx of Anemia, Depression, CKD, GERD and Hypothyroidism. Presents to [facility] for worsening redness and swelling on her face for the past few days.    Plan:    # Facial cellulitis:   - + leukocytosis, fu BCx (in progress)  - No drainable collection on CT , s/p laryngoscope no airway involvement   - Abx mgmt per ID  - Dexamethasone 8MG Q8H for 24H only per ENT  - Encourage oral hydration   - Minced and moist diet  - Monitor temps/WBC  - ID/ENT following    # CKD:  - Bun/Cr 38/3.03  - Monitor I/O's  - Serial Cr  - Avoid nephrotoxins  - Renally dose medications  - Continue to monitor  - Renal consult pending (called)    # Depression:  - C/w home meds    # LPRD (laryngopharyngeal reflux disease):  - ENT recommendations appreciated   - PPI    # Hypothyroidism:  - C/w Synthroid    # DVT ppx:  - Heparin subq    [facility]

## 2024-09-12 NOTE — CONSULT NOTE ADULT - SUBJECTIVE AND OBJECTIVE BOX
[facility] DIVISION OF INFECTIOUS DISEASES  LOUANN Dubois S. Shah, Y. Patel, G. [facility]  [phone]  ([phone] - weekdays after 5pm and weekends)    JOANNA MALDONADO  84y, Female  [id number]    HPI:  Patient is an 84-year-old female with a history of anemia, hypothyroidism, depression, GERD, bipolar, CKD , presents for redness and swelling on her face for the past few days.   Patient reports  right-sided facial swelling and redness including her neck and on her lower jaw that started on 9/8/24, worsening over the past few days. she reports  a dental exam for cracked teeth on day prior but no work performed . she reports no fevers. She was evaluated by her PMD on day of admission and subsequently referred to ENT who referred her to the hospital. She reports   ED course : evaluated by ENT s/p laryngoscope , no airway involvement (11 Sep 2024 21:10)  ROS: 14 point review of systems completed, pertinent positives and negatives as per HPI.    Allergies: azithromycin (Rash)    PMH -- Depression  Hypothyroid  Kidney disease    PSH -- Cataract  History of rectal surgery  History of tonsillectomy    FH -- noncontributory   Social History -- denies tobacco, alcohol or illicit drug use    Physical Exam--  Vital Signs Last 24 Hrs  T(F): 99 (12 Sep 2024 04:37), Max: 99.6 (11 Sep 2024 18:50)  HR: 84 (12 Sep 2024 04:37) (84 - 94)  BP: 117/75 (12 Sep 2024 04:37) (113/71 - 140/76)  RR: 18 (12 Sep 2024 04:37) (18 - 20)  SpO2: 94% (12 Sep 2024 04:37) (93% - 97%)  General: no acute distress  HEENT: NC/AT, EOMI, anicteric  Lungs: clear to auscultation bilaterally  Heart: S1, S2 present, normal rate/rhythm  Abdomen: Soft. ND. NT. BS present.   Neuro: AAOx3, no obvious focal deficits   Extremities: No cyanosis. No edema.   Skin: Warm. Dry.    Lines: PIV     Laboratory & Imaging Data--  CBC:                       10.4   22.22 )-----------( 191      ( 11 Sep 2024 17:18 )             32.9     WBC Count: 22.22 K/uL (09-11-24 @ 17:18)    CMP: 09-11    139  |  109<H>  |  38<H>  ----------------------------<  83  4.8   |  15<L>  |  3.03<H>    Ca    9.3      11 Sep 2024 17:18    TPro  7.4  /  Alb  3.8  /  TBili  0.3  /  DBili  x   /  AST  22  /  ALT  21  /  AlkPhos  104  09-11    LIVER FUNCTIONS - ( 11 Sep 2024 17:18 )  Alb: 3.8 g/dL / Pro: 7.4 g/dL / ALK PHOS: 104 U/L / ALT: 21 U/L / AST: 22 U/L / GGT: x           Microbiology: reviewed  Radiology--reviewed    Active Medications--  acetaminophen     Tablet .. 650 milliGRAM(s) Oral every 6 hours PRN  ampicillin/sulbactam  IVPB 3 Gram(s) IV Intermittent every 12 hours  buPROPion XL (24-Hour) . 150 milliGRAM(s) Oral daily  dexAMETHasone  IVPB 8 milliGRAM(s) IV Intermittent every 8 hours  divalproex  milliGRAM(s) Oral daily  divalproex  milliGRAM(s) Oral at bedtime  heparin   Injectable 5000 Unit(s) SubCutaneous every 12 hours  influenza  Vaccine (HIGH DOSE) 0.5 milliLiter(s) IntraMuscular once  levothyroxine 88 MICROGram(s) Oral daily  LORazepam     Tablet 1 milliGRAM(s) Oral at bedtime  melatonin 3 milliGRAM(s) Oral at bedtime PRN  pantoprazole    Tablet 40 milliGRAM(s) Oral before breakfast  polyethylene glycol 3350 17 Gram(s) Oral daily PRN  senna 2 Tablet(s) Oral at bedtime PRN  venlafaxine XR. 375 milliGRAM(s) Oral daily    Current Antimicrobials:   ampicillin/sulbactam  IVPB 3 Gram(s) IV Intermittent every 12 hours    Prior/Completed Antimicrobials:  ampicillin/sulbactam  IVPB    Immunologic:   influenza  Vaccine (HIGH DOSE) 0.5 milliLiter(s) IntraMuscular once [facility]  LOUANN Dubois S. Shah, Y. Patel, G. [facility]  [phone]  ([phone] - weekdays after 5pm and weekends)    JOANNA MALDONADO  84y, Female  [id number]    HPI:  Patient is an 84-year-old female with a history of anemia, hypothyroidism, depression, GERD, bipolar, CKD who presents for redness and swelling on her face for the past few days. Patient reports right-sided facial swelling and redness including her neck and on her lower jaw that started on 9/8/24, worsening over the past few days. She reports  a dental exam for cracked teeth on day prior but no work performed. States someone told her she had 2 small pimple like lesion on her face, currently scabbed over. She denies any drainage from lesions. She reports no fevers. She was evaluated by her PMD on day of admission and subsequently referred to ENT who referred her to the hospital. In the ER, patient was evaluated by ENT s/p laryngoscope , no airway involvement. Patient this morning states she feels better, less pain.   ROS: 14 point review of systems completed, pertinent positives and negatives as per HPI.    Allergies: azithromycin (Rash)    PMH -- Depression  Hypothyroid  Kidney disease    PSH -- Cataract  History of rectal surgery  History of tonsillectomy    FH -- noncontributory   Social History -- denies tobacco, alcohol or illicit drug use    Physical Exam--  Vital Signs Last 24 Hrs  T(F): 99 (12 Sep 2024 04:37), Max: 99.6 (11 Sep 2024 18:50)  HR: 84 (12 Sep 2024 04:37) (84 - 94)  BP: 117/75 (12 Sep 2024 04:37) (113/71 - 140/76)  RR: 18 (12 Sep 2024 04:37) (18 - 20)  SpO2: 94% (12 Sep 2024 04:37) (93% - 97%)  General: no acute distress  HEENT: R facial swelling and erythema  Lungs: clear to auscultation bilaterally  Heart: S1, S2 present, normal rate/rhythm  Abdomen: Soft. ND. NT. BS present.   Neuro: AAOx3, no obvious focal deficits   Extremities: No cyanosis. No edema.   Skin: Warm. Dry.  R lower face erythema, induration, warmth, TTP  Lines: PIV     Laboratory & Imaging Data--  CBC:                       10.4   22.22 )-----------( 191      ( 11 Sep 2024 17:18 )             32.9     WBC Count: 22.22 K/uL (09-11-24 @ 17:18)    CMP: 09-11    139  |  109<H>  |  38<H>  ----------------------------<  83  4.8   |  15<L>  |  3.03<H>    Ca    9.3      11 Sep 2024 17:18    TPro  7.4  /  Alb  3.8  /  TBili  0.3  /  DBili  x   /  AST  22  /  ALT  21  /  AlkPhos  104  09-11    LIVER FUNCTIONS - ( 11 Sep 2024 17:18 )  Alb: 3.8 g/dL / Pro: 7.4 g/dL / ALK PHOS: 104 U/L / ALT: 21 U/L / AST: 22 U/L / GGT: x           Microbiology: reviewed  Radiology--reviewed  < from: Xray Chest 1 View- PORTABLE-Urgent (Xray Chest 1 View- PORTABLE-Urgent .) (09.11.24 @ 19:02) >  IMPRESSION:    Clear lungs.    < end of copied text >  < from: CT Neck Soft Tissue No Cont (09.11.24 @ 18:33) >  IMPRESSION:    1.  Right facial cellulitis/myositis, without gross evidence of a   drainable fluid collection.    < end of copied text >    Active Medications--  acetaminophen     Tablet .. 650 milliGRAM(s) Oral every 6 hours PRN  ampicillin/sulbactam  IVPB 3 Gram(s) IV Intermittent every 12 hours  buPROPion XL (24-Hour) . 150 milliGRAM(s) Oral daily  dexAMETHasone  IVPB 8 milliGRAM(s) IV Intermittent every 8 hours  divalproex  milliGRAM(s) Oral daily  divalproex  milliGRAM(s) Oral at bedtime  heparin   Injectable 5000 Unit(s) SubCutaneous every 12 hours  influenza  Vaccine (HIGH DOSE) 0.5 milliLiter(s) IntraMuscular once  levothyroxine 88 MICROGram(s) Oral daily  LORazepam     Tablet 1 milliGRAM(s) Oral at bedtime  melatonin 3 milliGRAM(s) Oral at bedtime PRN  pantoprazole    Tablet 40 milliGRAM(s) Oral before breakfast  polyethylene glycol 3350 17 Gram(s) Oral daily PRN  senna 2 Tablet(s) Oral at bedtime PRN  venlafaxine XR. 375 milliGRAM(s) Oral daily    Current Antimicrobials:   ampicillin/sulbactam  IVPB 3 Gram(s) IV Intermittent every 12 hours    Prior/Completed Antimicrobials:  ampicillin/sulbactam  IVPB    Immunologic:   influenza  Vaccine (HIGH DOSE) 0.5 milliLiter(s) IntraMuscular once

## 2024-09-12 NOTE — PHYSICAL THERAPY INITIAL EVALUATION ADULT - GAIT DISTANCE, PT EVAL
Pt ambulated 4 lateral steps to the left, seated rest break and additional 4 steps to the right.  Pt reports feeling fatigued after ambulation.

## 2024-09-12 NOTE — CONSULT NOTE ADULT - SUBJECTIVE AND OBJECTIVE BOX
CARDIOLOGY CONSULT - Dr. Ackerman         HPI:  Patient is an 84-year-old female with a history of anemia, hypothyroidism, depression, GERD, bipolar, CKD , presents for redness and swelling on her face for the past few days.   Patient reports  right-sided facial swelling and redness including her neck and on her lower jaw that started on 9/8/24, worsening over the past few days. she reports  a dental exam for cracked teeth on day prior but no work performed . she reports no fevers. She was evaluated by her PMD on day of admission and subsequently referred to ENT who referred her to the hospital. She reports   ED course : evaluated by ENT s/p laryngoscope , no airway involvement   (11 Sep 2024 21:10)      PAST MEDICAL & SURGICAL HISTORY:  Depression      Hypothyroid      Kidney disease      Cataract      History of rectal surgery      History of tonsillectomy              PREVIOUS DIAGNOSTIC TESTING:    [ ] Echocardiogram:    [ ]  Catheterization:  [ ] Stress Test:  	    MEDICATIONS:  Home Medications:  acetaminophen 325 mg oral tablet: 2 tab(s) orally every 6 hours as needed for  mild pain (11 Sep 2024 21:40)  buPROPion 150 mg/24 hours (XL) oral tablet, extended release: 1 tab(s) orally once a day (11 Sep 2024 21:40)  divalproex sodium 250 mg oral tablet, extended release: 1 tab(s) orally once a day (11 Sep 2024 21:40)  divalproex sodium 500 mg oral tablet, extended release: 1 tab(s) orally once a day (at bedtime) (11 Sep 2024 21:38)  lactulose 10 g/15 mL oral syrup: 30 milliliter(s) orally every 8 hours as needed for  constipation (11 Sep 2024 21:40)  levothyroxine 88 mcg (0.088 mg) oral tablet: 1 tab(s) orally once a day (11 Sep 2024 21:39)  LORazepam 1 mg oral tablet: 1 tab(s) orally once a day (at bedtime) (11 Sep 2024 21:40)  Melatonin 3 mg oral tablet: 1 tab(s) orally once a day (at bedtime) (11 Sep 2024 21:40)  polyethylene glycol 3350 oral powder for reconstitution: 17 gram(s) orally once a day (11 Sep 2024 21:40)  senna leaf extract oral tablet: 2 tab(s) orally once a day (at bedtime) (11 Sep 2024 21:40)  venlafaxine 75 mg oral capsule, extended release: 375 milligram(s) orally once a day (11 Sep 2024 21:39)      MEDICATIONS  (STANDING):  ampicillin/sulbactam  IVPB 3 Gram(s) IV Intermittent every 12 hours  buPROPion XL (24-Hour) . 150 milliGRAM(s) Oral daily  dexAMETHasone  IVPB 8 milliGRAM(s) IV Intermittent every 8 hours  divalproex  milliGRAM(s) Oral daily  divalproex  milliGRAM(s) Oral at bedtime  heparin   Injectable 5000 Unit(s) SubCutaneous every 12 hours  influenza  Vaccine (HIGH DOSE) 0.5 milliLiter(s) IntraMuscular once  levothyroxine 88 MICROGram(s) Oral daily  LORazepam     Tablet 1 milliGRAM(s) Oral at bedtime  pantoprazole    Tablet 40 milliGRAM(s) Oral before breakfast  venlafaxine XR. 375 milliGRAM(s) Oral daily      FAMILY HISTORY:      SOCIAL HISTORY:    [ ] Non-smoker  [X] Smoker- former  [ ] Alcohol    Allergies    azithromycin (Rash)    Intolerances    	    REVIEW OF SYSTEMS:  CONSTITUTIONAL: No fever, weight loss, or fatigue  EYES: No eye pain, visual disturbances, or discharge  ENMT:  No difficulty hearing, tinnitus, vertigo; No sinus or throat pain  NECK: No pain or stiffness  RESPIRATORY: No cough, wheezing, chills or hemoptysis; No Shortness of Breath  CARDIOVASCULAR: No chest pain, palpitations, passing out, dizziness, or leg swelling  GASTROINTESTINAL: No abdominal or epigastric pain. No nausea, vomiting, or hematemesis; No diarrhea or constipation. No melena or hematochezia.  GENITOURINARY: No dysuria, frequency, hematuria, or incontinence  NEUROLOGICAL: No headaches, memory loss, loss of strength, numbness, or tremors  SKIN: No itching, burning, rashes, or lesions   	    [ ] All others negative	  [ ] Unable to obtain    PHYSICAL EXAM:  T(C): 37.2 (09-12-24 @ 04:37), Max: 37.6 (09-11-24 @ 18:50)  HR: 84 (09-12-24 @ 04:37) (84 - 94)  BP: 117/75 (09-12-24 @ 04:37) (113/71 - 140/76)  RR: 18 (09-12-24 @ 04:37) (18 - 20)  SpO2: 94% (09-12-24 @ 04:37) (93% - 97%)  Wt(kg): --  I&O's Summary    11 Sep 2024 07:01  -  12 Sep 2024 07:00  --------------------------------------------------------  IN: 0 mL / OUT: 1100 mL / NET: -1100 mL        Appearance: Normal	  Psychiatry: A & O x 3, Mood & affect appropriate  HEENT:   Normal oral mucosa, PERRL, EOMI	  Lymphatic: No lymphadenopathy  Cardiovascular: Normal S1 S2,RRR, No JVD, No murmurs  Respiratory: Lungs clear to auscultation	  Gastrointestinal:  Soft, Non-tender, + BS	  Skin: No rashes, No ecchymoses, No cyanosis	  Neurologic: Non-focal  Extremities: Normal range of motion, No clubbing, cyanosis or edema  Vascular: Peripheral pulses palpable 2+ bilaterally    TELEMETRY: 	    ECG:  	  RADIOLOGY:  OTHER: 	  	  LABS:	 	    CARDIAC MARKERS:                                  10.2   15.43 )-----------( 193      ( 12 Sep 2024 07:34 )             32.9     09-12    139  |  112<H>  |  37<H>  ----------------------------<  140<H>  4.6   |  14<L>  |  2.97<H>    Ca    9.2      12 Sep 2024 07:37    TPro  7.1  /  Alb  3.7  /  TBili  0.3  /  DBili  x   /  AST  15  /  ALT  18  /  AlkPhos  114  09-12      proBNP:   Lipid Profile:   HgA1c:   TSH:        CARDIOLOGY CONSULT - Dr. Ackerman         HPI:  Patient is an 84-year-old female with a history of anemia, hypothyroidism, depression, GERD, bipolar, CKD , presents for redness and swelling on her face for the past few days.   Patient reports  right-sided facial swelling and redness including her neck and on her lower jaw that started on 9/8/24, worsening over the past few days. she reports  a dental exam for cracked teeth one day prior but no work performed . she reports no fevers. She was evaluated by her PMD on day of admission and subsequently referred to ENT who referred her to the hospital. She reports   ED course : evaluated by ENT s/p laryngoscope , no airway involvement   (11 Sep 2024 21:10)      PAST MEDICAL & SURGICAL HISTORY:  Depression      Hypothyroid      Kidney disease      Cataract      History of rectal surgery      History of tonsillectomy              PREVIOUS DIAGNOSTIC TESTING:    [X] Echocardiogram:  Outpatient ECHO 5/14/24  Findings  Left Ventricle LV chamber is mildly dilated. LV wall thickness is normal. LV systolic function is moderately  reduced. There is moderate global hypokinesis. The estimated LV ejection fraction is 40 %  (abnormal). Grade I diastolic dysfunction. Strain imaging is abnormal. The average global  longitudinal peak systolic strain is -8.2 %.  Right Ventricle There is normal right ventricular size, wall dimension, and systolic function.  Left Atrium LA chamber size is normal.  Right Atrium RA chamber size is normal. The inferior vena cava dimension is normal.  Aortic Valve There is a trileaflet aortic valve. There is normal aortic valve structure and function. There is aortic  valve sclerosis with reduced leaflet excursion. There is mild to moderate aortic regurgitation. There  is moderate aortic stenosis.  Mitral Valve The mitral valve is structurally normal. Mitral valve leaflets appear normal with normal excursion.  There is mild calcification of mitral valve posterior leaflet. There is a dense posterior mitral annular  calcification. There is mild mitral regurgitation. There is mild mitral stenosis.  Tricuspid Valve Tricuspid valve is structurally normal. There is trace tricuspid regurgitation. The estimated PA  systolic pressure was not measured in the absence of an adequate TR jet.  Pulmonary Valve The pulmonic valve structurally is normal. There is minimal pulmonic regurgitation.  Pericardium The pericardium is normal. There is no pericardial effusion present. No pleural effusion.  Aorta The size of the visualized portion of aortic root is within normal limits.  Impressions  • LV systolic function is moderately reduced.  • There is moderate global hypokinesis.  • The estimated LV ejection fraction is 40 % (abnormal).  • Grade I diastolic dysfunction.  • There is normal right ventricular size, wall dimension, and systolic function.  • LA chamber size is normal.  • There is mild to moderate aortic regurgitation.  • There is moderate aortic stenosis.  • There is mild mitral regurgitation.  • There is mild mitral stenosis.  [ ]  Catheterization:  [ ] Stress Test:  	    MEDICATIONS:  Home Medications:  acetaminophen 325 mg oral tablet: 2 tab(s) orally every 6 hours as needed for  mild pain (11 Sep 2024 21:40)  buPROPion 150 mg/24 hours (XL) oral tablet, extended release: 1 tab(s) orally once a day (11 Sep 2024 21:40)  divalproex sodium 250 mg oral tablet, extended release: 1 tab(s) orally once a day (11 Sep 2024 21:40)  divalproex sodium 500 mg oral tablet, extended release: 1 tab(s) orally once a day (at bedtime) (11 Sep 2024 21:38)  lactulose 10 g/15 mL oral syrup: 30 milliliter(s) orally every 8 hours as needed for  constipation (11 Sep 2024 21:40)  levothyroxine 88 mcg (0.088 mg) oral tablet: 1 tab(s) orally once a day (11 Sep 2024 21:39)  LORazepam 1 mg oral tablet: 1 tab(s) orally once a day (at bedtime) (11 Sep 2024 21:40)  Melatonin 3 mg oral tablet: 1 tab(s) orally once a day (at bedtime) (11 Sep 2024 21:40)  polyethylene glycol 3350 oral powder for reconstitution: 17 gram(s) orally once a day (11 Sep 2024 21:40)  senna leaf extract oral tablet: 2 tab(s) orally once a day (at bedtime) (11 Sep 2024 21:40)  venlafaxine 75 mg oral capsule, extended release: 375 milligram(s) orally once a day (11 Sep 2024 21:39)      MEDICATIONS  (STANDING):  ampicillin/sulbactam  IVPB 3 Gram(s) IV Intermittent every 12 hours  buPROPion XL (24-Hour) . 150 milliGRAM(s) Oral daily  dexAMETHasone  IVPB 8 milliGRAM(s) IV Intermittent every 8 hours  divalproex  milliGRAM(s) Oral daily  divalproex  milliGRAM(s) Oral at bedtime  heparin   Injectable 5000 Unit(s) SubCutaneous every 12 hours  influenza  Vaccine (HIGH DOSE) 0.5 milliLiter(s) IntraMuscular once  levothyroxine 88 MICROGram(s) Oral daily  LORazepam     Tablet 1 milliGRAM(s) Oral at bedtime  pantoprazole    Tablet 40 milliGRAM(s) Oral before breakfast  venlafaxine XR. 375 milliGRAM(s) Oral daily      FAMILY HISTORY:      SOCIAL HISTORY:    [ ] Non-smoker  [X] Smoker- former  [ ] Alcohol    Allergies    azithromycin (Rash)    Intolerances    	    REVIEW OF SYSTEMS:  CONSTITUTIONAL: No fever, weight loss, or fatigue  EYES: No eye pain, visual disturbances, or discharge  ENMT:  No difficulty hearing, tinnitus, vertigo; No sinus or throat pain  NECK: No pain or stiffness  RESPIRATORY: No cough, wheezing, chills or hemoptysis; No Shortness of Breath  CARDIOVASCULAR: No chest pain, palpitations, passing out, dizziness, or leg swelling  GASTROINTESTINAL: No abdominal or epigastric pain. No nausea, vomiting, or hematemesis; No diarrhea or constipation. No melena or hematochezia.  GENITOURINARY: No dysuria, frequency, hematuria, or incontinence  NEUROLOGICAL: No headaches, memory loss, loss of strength, numbness, or tremors  SKIN: No itching, burning, rashes, or lesions; + redness and swelling on right face  	    [X] All others negative	  [ ] Unable to obtain    PHYSICAL EXAM:  T(C): 37.2 (09-12-24 @ 04:37), Max: 37.6 (09-11-24 @ 18:50)  HR: 84 (09-12-24 @ 04:37) (84 - 94)  BP: 117/75 (09-12-24 @ 04:37) (113/71 - 140/76)  RR: 18 (09-12-24 @ 04:37) (18 - 20)  SpO2: 94% (09-12-24 @ 04:37) (93% - 97%)  Wt(kg): --  I&O's Summary    11 Sep 2024 07:01  -  12 Sep 2024 07:00  --------------------------------------------------------  IN: 0 mL / OUT: 1100 mL / NET: -1100 mL        Appearance: Normal	  Psychiatry: A & O x 3, Mood & affect appropriate  HEENT:   Normal oral mucosa, PERRL, EOMI; + right neck redness and swelling  Lymphatic: No lymphadenopathy  Cardiovascular: Normal S1 S2,RRR, No JVD, +murmur  Respiratory: Lungs clear to auscultation b/l  Gastrointestinal:  Soft, Non-tender, + BS	  Skin: No rashes, No ecchymoses, No cyanosis; + right face and neck redness and swelling  Neurologic: Non-focal  Extremities: Normal range of motion, No clubbing, cyanosis or edema  Vascular: Peripheral pulses palpable 2+ bilaterally    TELEMETRY: 	  not on tele  ECG:  	  RADIOLOGY:  < from: Xray Chest 1 View- PORTABLE-Urgent (Xray Chest 1 View- PORTABLE-Urgent .) (09.11.24 @ 19:02) >  IMPRESSION:    Clear lungs.    < end of copied text >  < from: CT Neck Soft Tissue No Cont (09.11.24 @ 18:33) >  IMPRESSION:    1.  Right facial cellulitis/myositis, without gross evidence of a   drainable fluid collection.        < end of copied text >    OTHER: 	  	  LABS:	 	    CARDIAC MARKERS:                                  10.2   15.43 )-----------( 193      ( 12 Sep 2024 07:34 )             32.9     09-12    139  |  112<H>  |  37<H>  ----------------------------<  140<H>  4.6   |  14<L>  |  2.97<H>    Ca    9.2      12 Sep 2024 07:37    TPro  7.1  /  Alb  3.7  /  TBili  0.3  /  DBili  x   /  AST  15  /  ALT  18  /  AlkPhos  114  09-12      proBNP:   Lipid Profile:   HgA1c:   TSH:        CARDIOLOGY CONSULT - Dr. Ackerman         HPI:  Patient is an 84-year-old female with a history of anemia, hypothyroidism, depression, GERD, bipolar, CKD , presents for redness and swelling on her face for the past few days.   Patient reports  right-sided facial swelling and redness including her neck and on her lower jaw that started on 9/8/24, worsening over the past few days. she reports  a dental exam for cracked teeth one day prior but no work performed . she reports no fevers. She was evaluated by her PMD on day of admission and subsequently referred to ENT who referred her to the hospital. She denies CP or SOB.   ED course : evaluated by ENT s/p laryngoscope , no airway involvement   (11 Sep 2024 21:10)      PAST MEDICAL & SURGICAL HISTORY:  Depression      Hypothyroid      Kidney disease      Cataract      History of rectal surgery      History of tonsillectomy              PREVIOUS DIAGNOSTIC TESTING:    [X] Echocardiogram:  Outpatient ECHO 5/14/24  Findings  Left Ventricle LV chamber is mildly dilated. LV wall thickness is normal. LV systolic function is moderately  reduced. There is moderate global hypokinesis. The estimated LV ejection fraction is 40 %  (abnormal). Grade I diastolic dysfunction. Strain imaging is abnormal. The average global  longitudinal peak systolic strain is -8.2 %.  Right Ventricle There is normal right ventricular size, wall dimension, and systolic function.  Left Atrium LA chamber size is normal.  Right Atrium RA chamber size is normal. The inferior vena cava dimension is normal.  Aortic Valve There is a trileaflet aortic valve. There is normal aortic valve structure and function. There is aortic  valve sclerosis with reduced leaflet excursion. There is mild to moderate aortic regurgitation. There  is moderate aortic stenosis.  Mitral Valve The mitral valve is structurally normal. Mitral valve leaflets appear normal with normal excursion.  There is mild calcification of mitral valve posterior leaflet. There is a dense posterior mitral annular  calcification. There is mild mitral regurgitation. There is mild mitral stenosis.  Tricuspid Valve Tricuspid valve is structurally normal. There is trace tricuspid regurgitation. The estimated PA  systolic pressure was not measured in the absence of an adequate TR jet.  Pulmonary Valve The pulmonic valve structurally is normal. There is minimal pulmonic regurgitation.  Pericardium The pericardium is normal. There is no pericardial effusion present. No pleural effusion.  Aorta The size of the visualized portion of aortic root is within normal limits.  Impressions  • LV systolic function is moderately reduced.  • There is moderate global hypokinesis.  • The estimated LV ejection fraction is 40 % (abnormal).  • Grade I diastolic dysfunction.  • There is normal right ventricular size, wall dimension, and systolic function.  • LA chamber size is normal.  • There is mild to moderate aortic regurgitation.  • There is moderate aortic stenosis.  • There is mild mitral regurgitation.  • There is mild mitral stenosis.  [ ]  Catheterization:  [ ] Stress Test:  	    MEDICATIONS:  Home Medications:  acetaminophen 325 mg oral tablet: 2 tab(s) orally every 6 hours as needed for  mild pain (11 Sep 2024 21:40)  buPROPion 150 mg/24 hours (XL) oral tablet, extended release: 1 tab(s) orally once a day (11 Sep 2024 21:40)  divalproex sodium 250 mg oral tablet, extended release: 1 tab(s) orally once a day (11 Sep 2024 21:40)  divalproex sodium 500 mg oral tablet, extended release: 1 tab(s) orally once a day (at bedtime) (11 Sep 2024 21:38)  lactulose 10 g/15 mL oral syrup: 30 milliliter(s) orally every 8 hours as needed for  constipation (11 Sep 2024 21:40)  levothyroxine 88 mcg (0.088 mg) oral tablet: 1 tab(s) orally once a day (11 Sep 2024 21:39)  LORazepam 1 mg oral tablet: 1 tab(s) orally once a day (at bedtime) (11 Sep 2024 21:40)  Melatonin 3 mg oral tablet: 1 tab(s) orally once a day (at bedtime) (11 Sep 2024 21:40)  polyethylene glycol 3350 oral powder for reconstitution: 17 gram(s) orally once a day (11 Sep 2024 21:40)  senna leaf extract oral tablet: 2 tab(s) orally once a day (at bedtime) (11 Sep 2024 21:40)  venlafaxine 75 mg oral capsule, extended release: 375 milligram(s) orally once a day (11 Sep 2024 21:39)      MEDICATIONS  (STANDING):  ampicillin/sulbactam  IVPB 3 Gram(s) IV Intermittent every 12 hours  buPROPion XL (24-Hour) . 150 milliGRAM(s) Oral daily  dexAMETHasone  IVPB 8 milliGRAM(s) IV Intermittent every 8 hours  divalproex  milliGRAM(s) Oral daily  divalproex  milliGRAM(s) Oral at bedtime  heparin   Injectable 5000 Unit(s) SubCutaneous every 12 hours  influenza  Vaccine (HIGH DOSE) 0.5 milliLiter(s) IntraMuscular once  levothyroxine 88 MICROGram(s) Oral daily  LORazepam     Tablet 1 milliGRAM(s) Oral at bedtime  pantoprazole    Tablet 40 milliGRAM(s) Oral before breakfast  venlafaxine XR. 375 milliGRAM(s) Oral daily      FAMILY HISTORY:      SOCIAL HISTORY:    [ ] Non-smoker  [X] Smoker- former  [ ] Alcohol    Allergies    azithromycin (Rash)    Intolerances    	    REVIEW OF SYSTEMS:  CONSTITUTIONAL: No fever, weight loss, or fatigue  EYES: No eye pain, visual disturbances, or discharge  ENMT:  No difficulty hearing, tinnitus, vertigo; No sinus or throat pain  NECK: No pain or stiffness  RESPIRATORY: No cough, wheezing, chills or hemoptysis; No Shortness of Breath  CARDIOVASCULAR: No chest pain, palpitations, passing out, dizziness, or leg swelling  GASTROINTESTINAL: No abdominal or epigastric pain. No nausea, vomiting, or hematemesis; No diarrhea or constipation. No melena or hematochezia.  GENITOURINARY: No dysuria, frequency, hematuria, or incontinence  NEUROLOGICAL: No headaches, memory loss, loss of strength, numbness, or tremors  SKIN: No itching, burning, rashes, or lesions; + redness and swelling on right face  	    [X] All others negative	  [ ] Unable to obtain    PHYSICAL EXAM:  T(C): 37.2 (09-12-24 @ 04:37), Max: 37.6 (09-11-24 @ 18:50)  HR: 84 (09-12-24 @ 04:37) (84 - 94)  BP: 117/75 (09-12-24 @ 04:37) (113/71 - 140/76)  RR: 18 (09-12-24 @ 04:37) (18 - 20)  SpO2: 94% (09-12-24 @ 04:37) (93% - 97%)  Wt(kg): --  I&O's Summary    11 Sep 2024 07:01  -  12 Sep 2024 07:00  --------------------------------------------------------  IN: 0 mL / OUT: 1100 mL / NET: -1100 mL        Appearance: Normal	  Psychiatry: A & O x 3, Mood & affect appropriate  HEENT:   Normal oral mucosa, PERRL, EOMI; + right neck redness and swelling  Lymphatic: No lymphadenopathy  Cardiovascular: Normal S1 S2,RRR, No JVD, +murmur  Respiratory: Lungs clear to auscultation b/l  Gastrointestinal:  Soft, Non-tender, + BS	  Skin: No rashes, No ecchymoses, No cyanosis; + right face and neck redness and swelling  Neurologic: Non-focal  Extremities: Normal range of motion, No clubbing, cyanosis or edema  Vascular: Peripheral pulses palpable 2+ bilaterally    TELEMETRY: 	  not on tele  ECG:  	not in chart  RADIOLOGY:  < from: Xray Chest 1 View- PORTABLE-Urgent (Xray Chest 1 View- PORTABLE-Urgent .) (09.11.24 @ 19:02) >  IMPRESSION:    Clear lungs.    < end of copied text >  < from: CT Neck Soft Tissue No Cont (09.11.24 @ 18:33) >  IMPRESSION:    1.  Right facial cellulitis/myositis, without gross evidence of a   drainable fluid collection.        < end of copied text >    OTHER: 	  	  LABS:	 	    CARDIAC MARKERS:                                  10.2   15.43 )-----------( 193      ( 12 Sep 2024 07:34 )             32.9     09-12    139  |  112<H>  |  37<H>  ----------------------------<  140<H>  4.6   |  14<L>  |  2.97<H>    Ca    9.2      12 Sep 2024 07:37    TPro  7.1  /  Alb  3.7  /  TBili  0.3  /  DBili  x   /  AST  15  /  ALT  18  /  AlkPhos  114  09-12      proBNP:   Lipid Profile:   HgA1c:   TSH:

## 2024-09-12 NOTE — PHYSICAL THERAPY INITIAL EVALUATION ADULT - NSPTDISCHREC_GEN_A_CORE
If pt d/c home, pt would require Home PT, assist with ALL mobility, 24/7 supervision & DME: rolling walker, 3:1 commode, transport wheelchair/[facility]-acute Rehab

## 2024-09-12 NOTE — CONSULT NOTE ADULT - TIME BILLING
agree with above  Patient is an 84-year-old female with a history of anemia, hypothyroidism, depression, GERD, bipolar, CKD, presents for redness and swelling on her face for the past few days.     #Right facial swelling and redness, Leukocytosis   -CT Neck 9/11/24 shows Right facial cellulitis/myositis, without gross evidence of a drainable fluid collection.  -CXR 9/11/24 shows Clear lungs  -trend WBC  -ENT following  -abx per med    #CKD  -Creat stable; Trend  -nephrology f/u    #Aortic Stenosis  -moderate AS on outpt TTE 5/24  -cont to monitor clinically with yearly TTE    #Cardiomyopathy, HFrEF  -outpatient TTE 5/24 shows LV systolic function is moderately reduced, moderate global hypokinesis; EF 40%; mild to moderate aortic regurgitation; moderate aortic stenosis  -no evidence of volume overload/HF  -no diuretic need  -per outpt visit, patient and son do not want to proceed with ischemic eval and do not want to start lv dysfxn medications as she remains asymptomatic    dvt ppx

## 2024-09-12 NOTE — PROGRESS NOTE ADULT - ASSESSMENT
84 year old, A&Ox4 Female, with PMHx of HTN, CKD, hypothyroid, depression, and OA, presented to ED for 4 days of worsening right facial/neck swelling and pain. At ED, labs are significant for leucocytosis (WBC 22.22) and creatinine of 3.03. CT neck without contrast revealed right facial cellulitis/myositis, without gross evidence of a drainable fluid collection. ENT consulted for further evaluation. Per patient, on Saturday, she went for a dental consultation for cracked tooth. No procedure was done. Then pt started noticing right facial swelling on Saturday evening. Pt presented to PCP, who later referred her to ENT, when swelling progressively worsened along with new onset of trismus in the past 4 days. outpatient ENT sent her to ED for CT scan and iv antibiotics. Neck CT revealed R. facial cellulitis and myositis, no collection. Exam: R. submandibular swelling, induration, and erythema decreased slightly. Pt reports improvement in her symptoms. Pt with decreased pain,. Tolerating a regular diet. Afebrile. WBC 15.3

## 2024-09-12 NOTE — PROGRESS NOTE ADULT - SUBJECTIVE AND OBJECTIVE BOX
SUBJECTIVE / OVERNIGHT EVENTS:          --------------------------------------------------------------------------------------------  LABS:                        10.4   22.22 )-----------( 191      ( 11 Sep 2024 17:18 )             32.9     09-11    139  |  109<H>  |  38<H>  ----------------------------<  83  4.8   |  15<L>  |  3.03<H>    Ca    9.3      11 Sep 2024 17:18    TPro  7.4  /  Alb  3.8  /  TBili  0.3  /  DBili  x   /  AST  22  /  ALT  21  /  AlkPhos  104  09-11      CAPILLARY BLOOD GLUCOSE            Urinalysis Basic - ( 11 Sep 2024 17:18 )    Color: x / Appearance: x / SG: x / pH: x  Gluc: 83 mg/dL / Ketone: x  / Bili: x / Urobili: x   Blood: x / Protein: x / Nitrite: x   Leuk Esterase: x / RBC: x / WBC x   Sq Epi: x / Non Sq Epi: x / Bacteria: x        RADIOLOGY & ADDITIONAL TESTS:    Imaging Personally Reviewed:  [x] YES  [ ] NO    Consultant(s) Notes Reviewed:  [x] YES  [ ] NO    MEDICATIONS  (STANDING):  ampicillin/sulbactam  IVPB 3 Gram(s) IV Intermittent every 12 hours  buPROPion XL (24-Hour) . 150 milliGRAM(s) Oral daily  dexAMETHasone  IVPB 8 milliGRAM(s) IV Intermittent every 8 hours  divalproex  milliGRAM(s) Oral daily  divalproex  milliGRAM(s) Oral at bedtime  heparin   Injectable 5000 Unit(s) SubCutaneous every 12 hours  influenza  Vaccine (HIGH DOSE) 0.5 milliLiter(s) IntraMuscular once  levothyroxine 88 MICROGram(s) Oral daily  LORazepam     Tablet 1 milliGRAM(s) Oral at bedtime  pantoprazole    Tablet 40 milliGRAM(s) Oral before breakfast  venlafaxine XR. 375 milliGRAM(s) Oral daily    MEDICATIONS  (PRN):  acetaminophen     Tablet .. 650 milliGRAM(s) Oral every 6 hours PRN Temp greater or equal to 38C (100.4F), Mild Pain (1 - 3)  melatonin 3 milliGRAM(s) Oral at bedtime PRN Insomnia  polyethylene glycol 3350 17 Gram(s) Oral daily PRN Constipation  senna 2 Tablet(s) Oral at bedtime PRN Constipation      Care Discussed with Consultants/Other Providers [x] YES  [ ] NO    Vital Signs Last 24 Hrs  T(C): 37.2 (12 Sep 2024 04:37), Max: 37.6 (11 Sep 2024 18:50)  T(F): 99 (12 Sep 2024 04:37), Max: 99.6 (11 Sep 2024 18:50)  HR: 84 (12 Sep 2024 04:37) (84 - 94)  BP: 117/75 (12 Sep 2024 04:37) (113/71 - 140/76)  BP(mean): 76 (11 Sep 2024 22:07) (76 - 76)  RR: 18 (12 Sep 2024 04:37) (18 - 20)  SpO2: 94% (12 Sep 2024 04:37) (93% - 97%)    Parameters below as of 12 Sep 2024 04:37  Patient On (Oxygen Delivery Method): room air      I&O's Summary    11 Sep 2024 07:01  -  12 Sep 2024 07:00  --------------------------------------------------------  IN: 0 mL / OUT: 1100 mL / NET: -1100 mL           SUBJECTIVE / OVERNIGHT EVENTS:          --------------------------------------------------------------------------------------------  LABS:                        10.4   22.22 )-----------( 191      ( 11 Sep 2024 17:18 )             32.9     09-11    139  |  109<H>  |  38<H>  ----------------------------<  83  4.8   |  15<L>  |  3.03<H>    Ca    9.3      11 Sep 2024 17:18    TPro  7.4  /  Alb  3.8  /  TBili  0.3  /  DBili  x   /  AST  22  /  ALT  21  /  AlkPhos  104  09-11      CAPILLARY BLOOD GLUCOSE            Urinalysis Basic - ( 11 Sep 2024 17:18 )    Color: x / Appearance: x / SG: x / pH: x  Gluc: 83 mg/dL / Ketone: x  / Bili: x / Urobili: x   Blood: x / Protein: x / Nitrite: x   Leuk Esterase: x / RBC: x / WBC x   Sq Epi: x / Non Sq Epi: x / Bacteria: x        RADIOLOGY & ADDITIONAL TESTS:    Imaging Personally Reviewed:  [x] YES  [ ] NO    Consultant(s) Notes Reviewed:  [x] YES  [ ] NO    MEDICATIONS  (STANDING):  ampicillin/sulbactam  IVPB 3 Gram(s) IV Intermittent every 12 hours  buPROPion XL (24-Hour) . 150 milliGRAM(s) Oral daily  dexAMETHasone  IVPB 8 milliGRAM(s) IV Intermittent every 8 hours  divalproex  milliGRAM(s) Oral daily  divalproex  milliGRAM(s) Oral at bedtime  heparin   Injectable 5000 Unit(s) SubCutaneous every 12 hours  influenza  Vaccine (HIGH DOSE) 0.5 milliLiter(s) IntraMuscular once  levothyroxine 88 MICROGram(s) Oral daily  LORazepam     Tablet 1 milliGRAM(s) Oral at bedtime  pantoprazole    Tablet 40 milliGRAM(s) Oral before breakfast  venlafaxine XR. 375 milliGRAM(s) Oral daily    MEDICATIONS  (PRN):  acetaminophen     Tablet .. 650 milliGRAM(s) Oral every 6 hours PRN Temp greater or equal to 38C (100.4F), Mild Pain (1 - 3)  melatonin 3 milliGRAM(s) Oral at bedtime PRN Insomnia  polyethylene glycol 3350 17 Gram(s) Oral daily PRN Constipation  senna 2 Tablet(s) Oral at bedtime PRN Constipation      Care Discussed with Consultants/Other Providers [x] YES  [ ] NO    Vital Signs Last 24 Hrs  T(C): 37.2 (12 Sep 2024 04:37), Max: 37.6 (11 Sep 2024 18:50)  T(F): 99 (12 Sep 2024 04:37), Max: 99.6 (11 Sep 2024 18:50)  HR: 84 (12 Sep 2024 04:37) (84 - 94)  BP: 117/75 (12 Sep 2024 04:37) (113/71 - 140/76)  BP(mean): 76 (11 Sep 2024 22:07) (76 - 76)  RR: 18 (12 Sep 2024 04:37) (18 - 20)  SpO2: 94% (12 Sep 2024 04:37) (93% - 97%)    Parameters below as of 12 Sep 2024 04:37  Patient On (Oxygen Delivery Method): room air      I&O's Summary    11 Sep 2024 07:01  -  12 Sep 2024 07:00  --------------------------------------------------------  IN: 0 mL / OUT: 1100 mL / NET: -1100 mL    PHYSICAL EXAM:  GENERAL: NAD, well-developed, comfortable  HEENT: right sided facial swelling and neck erythema; TTP  CHEST/LUNG: Clear to auscultation bilaterally; No wheeze  HEART: Regular rate and rhythm; No murmurs, rubs, or gallops  ABDOMEN: Soft, Nontender, Nondistended; Bowel sounds present  NEURO: AAOx3, no focal weakness, 5/5 b/l extremity strength, b/l knee no arthritis, no effusion   EXTREMITIES:  2+ Peripheral Pulses, No clubbing, cyanosis, or edema  SKIN: No rashes or lesions         SUBJECTIVE / OVERNIGHT EVENTS:    patient seen and examined  resting comfortably in bed  no events overnight    --------------------------------------------------------------------------------------------  LABS:                        10.4   22.22 )-----------( 191      ( 11 Sep 2024 17:18 )             32.9     09-11    139  |  109<H>  |  38<H>  ----------------------------<  83  4.8   |  15<L>  |  3.03<H>    Ca    9.3      11 Sep 2024 17:18    TPro  7.4  /  Alb  3.8  /  TBili  0.3  /  DBili  x   /  AST  22  /  ALT  21  /  AlkPhos  104  09-11      CAPILLARY BLOOD GLUCOSE            Urinalysis Basic - ( 11 Sep 2024 17:18 )    Color: x / Appearance: x / SG: x / pH: x  Gluc: 83 mg/dL / Ketone: x  / Bili: x / Urobili: x   Blood: x / Protein: x / Nitrite: x   Leuk Esterase: x / RBC: x / WBC x   Sq Epi: x / Non Sq Epi: x / Bacteria: x        RADIOLOGY & ADDITIONAL TESTS:    Imaging Personally Reviewed:  [x] YES  [ ] NO    Consultant(s) Notes Reviewed:  [x] YES  [ ] NO    MEDICATIONS  (STANDING):  ampicillin/sulbactam  IVPB 3 Gram(s) IV Intermittent every 12 hours  buPROPion XL (24-Hour) . 150 milliGRAM(s) Oral daily  dexAMETHasone  IVPB 8 milliGRAM(s) IV Intermittent every 8 hours  divalproex  milliGRAM(s) Oral daily  divalproex  milliGRAM(s) Oral at bedtime  heparin   Injectable 5000 Unit(s) SubCutaneous every 12 hours  influenza  Vaccine (HIGH DOSE) 0.5 milliLiter(s) IntraMuscular once  levothyroxine 88 MICROGram(s) Oral daily  LORazepam     Tablet 1 milliGRAM(s) Oral at bedtime  pantoprazole    Tablet 40 milliGRAM(s) Oral before breakfast  venlafaxine XR. 375 milliGRAM(s) Oral daily    MEDICATIONS  (PRN):  acetaminophen     Tablet .. 650 milliGRAM(s) Oral every 6 hours PRN Temp greater or equal to 38C (100.4F), Mild Pain (1 - 3)  melatonin 3 milliGRAM(s) Oral at bedtime PRN Insomnia  polyethylene glycol 3350 17 Gram(s) Oral daily PRN Constipation  senna 2 Tablet(s) Oral at bedtime PRN Constipation      Care Discussed with Consultants/Other Providers [x] YES  [ ] NO    Vital Signs Last 24 Hrs  T(C): 37.2 (12 Sep 2024 04:37), Max: 37.6 (11 Sep 2024 18:50)  T(F): 99 (12 Sep 2024 04:37), Max: 99.6 (11 Sep 2024 18:50)  HR: 84 (12 Sep 2024 04:37) (84 - 94)  BP: 117/75 (12 Sep 2024 04:37) (113/71 - 140/76)  BP(mean): 76 (11 Sep 2024 22:07) (76 - 76)  RR: 18 (12 Sep 2024 04:37) (18 - 20)  SpO2: 94% (12 Sep 2024 04:37) (93% - 97%)    Parameters below as of 12 Sep 2024 04:37  Patient On (Oxygen Delivery Method): room air      I&O's Summary    11 Sep 2024 07:01  -  12 Sep 2024 07:00  --------------------------------------------------------  IN: 0 mL / OUT: 1100 mL / NET: -1100 mL    PHYSICAL EXAM:  GENERAL: NAD, well-developed, comfortable  HEENT: right sided facial swelling and neck erythema; TTP  CHEST/LUNG: Clear to auscultation bilaterally; No wheeze  HEART: Regular rate and rhythm; No murmurs, rubs, or gallops  ABDOMEN: Soft, Nontender, Nondistended; Bowel sounds present  NEURO: AAOx3, no focal weakness, 5/5 b/l extremity strength, b/l knee no arthritis, no effusion   EXTREMITIES:  2+ Peripheral Pulses, No clubbing, cyanosis, or edema  SKIN: No rashes or lesions

## 2024-09-12 NOTE — PHYSICAL THERAPY INITIAL EVALUATION ADULT - GENERAL OBSERVATIONS, REHAB EVAL
Pt rec'd semisupine in bed, +primafit, +IVL, +bed alarm, in NAD.  Pt cleared for PT session by JAKE Cano.

## 2024-09-12 NOTE — CONSULT NOTE ADULT - SUBJECTIVE AND OBJECTIVE BOX
HPI: Ms. 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 She is well-known to me from the outpatient setting. She presented last night to the [facility] ER with progressively worsening right-sided facial swelling and redness for 3-4 days. s/p dental evaluation Saturday 9/7 for a cracked tooth. She was noted in the ER to have a WBC of 22.22; CT head/neck without contrast revealed a right facial cellulitis/myositis. ENT was called to evaluate; laryngoscopy was negative for airway involvement. She was placed on IV Unasyn and IV Decadron. She was noted to have azotemia with a creatinine of 3.03mg/dL; in light of this, a renal consultation was requested.    Ms. Yeung and her son Nadeem are well-known to me from the outpatient setting. I last saw her at the office on 6/4/24; I did not adjust her plan of care at that time.      PAST MEDICAL & SURGICAL HISTORY:  Dementia  Bipolar disorder  GERD  Hypothyroidism  CKD4-5  Cataracts  Rectal surgery  Tonsillectomy    Allergies  azithromycin (Rash)    SOCIAL HISTORY:  Denies ETOh,Smoking,     FAMILY HISTORY:  No CKD    REVIEW OF SYSTEMS:  CONSTITUTIONAL: No weakness, fevers or chills  EYES/ENT: R facial swelling/redness  NECK: No pain or stiffness  RESPIRATORY: No cough, wheezing, hemoptysis; No shortness of breath  CARDIOVASCULAR: No chest pain or palpitations  GASTROINTESTINAL: No abdominal or epigastric pain. No nausea, vomiting, or hematemesis; No diarrhea or constipation. No melena or hematochezia.  GENITOURINARY: No dysuria, frequency or hematuria  NEUROLOGICAL: No numbness or weakness  SKIN: (+)R facial redness  All other review of systems is negative unless indicated above.    VITAL:  T(C): , Max: 37.6 (09-11-24 @ 18:50)  T(F): , Max: 99.6 (09-11-24 @ 18:50)  HR: 83 (09-12-24 @ 13:00)  BP: 116/71 (09-12-24 @ 13:00)  BP(mean): 76 (09-11-24 @ 22:07)  RR: 18 (09-12-24 @ 12:53)  SpO2: 96% (09-12-24 @ 13:00)    PHYSICAL EXAM:  Constitutional: NAD, Alert  HEENT: NCAT, MMM  Neck: Supple, No JVD  Respiratory: CTA-b/l  Cardiovascular: RRR s1s2, no m/r/g  Gastrointestinal: BS+, soft, NT/ND  Extremities: No peripheral edema b/l  Neurological: no focal deficits; strength grossly intact  Back: no CVAT b/l  Skin: No rashes, no nevi    LABS:                        10.2   15.43 )-----------( 193      ( 12 Sep 2024 07:34 )             32.9     Na(139)/K(4.6)/Cl(112)/HCO3(14)/BUN(37)/Cr(2.97)Glu(140)/Ca(9.2)/Mg(--)/PO4(--)    09-12 @ 07:37  Na(139)/K(4.8)/Cl(109)/HCO3(15)/BUN(38)/Cr(3.03)Glu(83)/Ca(9.3)/Mg(--)/PO4(--)    09-11 @ 17:18    (1/31/24) - BUN 37, Cr 3.2, K 4.5, HCO3 18, Ca 9.2, M 2.7, P 4.2, Alb 4.1, TSat 31, Ferr 289, Hb 10.8  (9/27/23) - BUN 38, Cr 3.1, K 5.6, HCO3 20, Ca 9.2, M 2.7, P 4.5, U 5.2, A1c 4.8, Hb 11.9  (2/15/23) - BUN 29, Cr 2.9, K 4.8, HCO3 20, Ca 9.4, M 2.5, P 3.9, U 5.3, Hb 11.1  (11/15/22) - BUN 37, Cr 2.9, K 5.2, HCO3 24, Alb 3.9, Ca 9.3, Hb 12.3, A1c 5.4  (8/3/22) - BUN 39, Cr 2.67, K 4.2, HCO3 22, Ca 8.8  (7/29/22) - BUN 54, Cr 3.29, K 4.0, HCO3 18, Hb 11.7, UA - trace prot/1wbc/0rbc; Up/c 0.3mg/g      IMAGING:  < from: Xray Chest 1 View- PORTABLE-Urgent (Xray Chest 1 View- PORTABLE-Urgent .) (09.11.24 @ 19:02) >  Clear lungs.    < from: CT Neck Soft Tissue No Cont (09.11.24 @ 18:33) >  1.  Right facial cellulitis/myositis, without gross evidence of a   drainable fluid collection.    (6/7/23) - TTE - mod-severe LV systolic dysfunction, mild diastolic dysfunction, mild MR.     (8/1/22) - Renal US - atrophic R kidney (6.8cm); L kidney not visualized        ASSESSMENT:  (1)Renal - nonproteinuric CKD4 with atrophic R kidney. Grossly stable numbers; GFR ~15-17ml/min  (2)Metabolic acidosis - renally mediated  (3)ID - R facial celluitis - on IV Unasyn    RECOMMEND:  (1)No IVF/No diuretics  (2)Add NaHCO3 650mg po bid  (3)Abx for GFR 15-20ml/min - present dosing is acceptable  (4)BMP daily    Thank you for involving [facility] in this patient's care.    With warm regards,    Ab Tripathi MD   [facility]  Office: [phone]  Cell: [phone]               HPI: Ms. 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 She is well-known to me from the outpatient setting. She presented last night to the [facility] ER with progressively worsening right-sided facial swelling and redness for 3-4 days. s/p dental evaluation Saturday 9/7 for a cracked tooth. She was noted in the ER to have a WBC of 22.22; CT head/neck without contrast revealed a right facial cellulitis/myositis. ENT was called to evaluate; laryngoscopy was negative for airway involvement. She was placed on IV Unasyn and IV Decadron. She was noted to have azotemia with a creatinine of 3.03mg/dL; in light of this, a renal consultation was requested.    Ms. Yeung and her son Nadeem are well-known to me from the outpatient setting. I last saw her at the office on 6/4/24; I did not adjust her plan of care at that time.    History provided by patient and daughter at bedside. They share that her right facial redness/erythema has been improving since admission/since initiation of antibiotics. The patient shares that over the past few months she has also been suffering from an "aura" after which she either falls or feels like she is about to fall. Asks what can be done to prevent further falls in the future.    PAST MEDICAL & SURGICAL HISTORY:  Dementia  Bipolar disorder  GERD  Hypothyroidism  CKD4-5  Cataracts  Rectal surgery  Tonsillectomy    Allergies  azithromycin (Rash)    SOCIAL HISTORY:  Denies ETOh,Smoking,     FAMILY HISTORY:  No CKD    REVIEW OF SYSTEMS:  CONSTITUTIONAL: No weakness, fevers or chills  EYES/ENT: R facial swelling/redness  NECK: No pain or stiffness  RESPIRATORY: No cough, wheezing, hemoptysis; No shortness of breath  CARDIOVASCULAR: No chest pain or palpitations  GASTROINTESTINAL: No abdominal or epigastric pain. No nausea, vomiting, or hematemesis; No diarrhea or constipation. No melena or hematochezia.  GENITOURINARY: No dysuria, frequency or hematuria  NEUROLOGICAL: No numbness or weakness  SKIN: (+)R facial redness  All other review of systems is negative unless indicated above.    VITAL:  T(C): , Max: 37.6 (09-11-24 @ 18:50)  T(F): , Max: 99.6 (09-11-24 @ 18:50)  HR: 83 (09-12-24 @ 13:00)  BP: 116/71 (09-12-24 @ 13:00)  BP(mean): 76 (09-11-24 @ 22:07)  RR: 18 (09-12-24 @ 12:53)  SpO2: 96% (09-12-24 @ 13:00)    PHYSICAL EXAM:  Constitutional: NAD, Alert  HEENT: (+)R perimandibular erythema/edema  Neck: Supple, No JVD  Respiratory: CTA-b/l  Cardiovascular: RRR s1s2, no m/r/g  Gastrointestinal: BS+, soft, NT/ND  Extremities: No peripheral edema b/l  Neurological: no focal deficits; strength grossly intact  Back: no CVAT b/l  Skin: (+)R perimandibular erythema    LABS:                        10.2   15.43 )-----------( 193      ( 12 Sep 2024 07:34 )             32.9     Na(139)/K(4.6)/Cl(112)/HCO3(14)/BUN(37)/Cr(2.97)Glu(140)/Ca(9.2)/Mg(--)/PO4(--)    09-12 @ 07:37  Na(139)/K(4.8)/Cl(109)/HCO3(15)/BUN(38)/Cr(3.03)Glu(83)/Ca(9.3)/Mg(--)/PO4(--)    09-11 @ 17:18    (1/31/24) - BUN 37, Cr 3.2, K 4.5, HCO3 18, Ca 9.2, M 2.7, P 4.2, Alb 4.1, TSat 31, Ferr 289, Hb 10.8  (9/27/23) - BUN 38, Cr 3.1, K 5.6, HCO3 20, Ca 9.2, M 2.7, P 4.5, U 5.2, A1c 4.8, Hb 11.9  (2/15/23) - BUN 29, Cr 2.9, K 4.8, HCO3 20, Ca 9.4, M 2.5, P 3.9, U 5.3, Hb 11.1  (11/15/22) - BUN 37, Cr 2.9, K 5.2, HCO3 24, Alb 3.9, Ca 9.3, Hb 12.3, A1c 5.4  (8/3/22) - BUN 39, Cr 2.67, K 4.2, HCO3 22, Ca 8.8  (7/29/22) - BUN 54, Cr 3.29, K 4.0, HCO3 18, Hb 11.7, UA - trace prot/1wbc/0rbc; Up/c 0.3mg/g      IMAGING:  < from: Xray Chest 1 View- PORTABLE-Urgent (Xray Chest 1 View- PORTABLE-Urgent .) (09.11.24 @ 19:02) >  Clear lungs.    < from: CT Neck Soft Tissue No Cont (09.11.24 @ 18:33) >  1.  Right facial cellulitis/myositis, without gross evidence of a   drainable fluid collection.    (6/7/23) - TTE - mod-severe LV systolic dysfunction, mild diastolic dysfunction, mild MR.     (8/1/22) - Renal US - atrophic R kidney (6.8cm); L kidney not visualized        ASSESSMENT:  (1)Renal - nonproteinuric CKD4 with atrophic R kidney. Grossly stable numbers; GFR ~15-17ml/min  (2)Metabolic acidosis - renally mediated  (3)ID - R facial celluitis - on IV Unasyn  (4)Neuro - "aura"    RECOMMEND:  (1)No IVF/No diuretics  (2)Add NaHCO3 650mg po bid  (3)Trend orthostatics, given the "aura"  (4)Neuro eval?  (5)Abx for GFR 15-20ml/min - present dosing is acceptable  (6)BMP daily    Thank you for involving [facility] in this patient's care.    With warm regards,    Ab Tripathi MD   [facility]  Office: [phone]  Cell: [phone]

## 2024-09-12 NOTE — PHYSICAL THERAPY INITIAL EVALUATION ADULT - PERTINENT HX OF CURRENT PROBLEM, REHAB EVAL
Pt is an 84 year old female with PMH significant for anemia, hypothyroidism, depression, GERD, bipolar, CKD. Pt presents for redness and swelling on her face for the past few days.  Pt was evaluated by her PMD on day of admission and subsequently referred to ENT who referred her to the hospital. ED course: evaluated by ENT s/p laryngoscope, no airway involvement. Neck CT revealed Right facial cellulitis and myositis.   CT Neck Soft Tissue(9/11): Right facial cellulitis/myositis, without gross evidence of a drainable fluid collection.  CXR(9/11): Clear lungs

## 2024-09-12 NOTE — PATIENT PROFILE ADULT - PUBLIC BENEFITS
Mirvaso Pregnancy And Lactation Text: This medication has not been assigned a Pregnancy Risk Category. It is unknown if the medication is excreted in breast milk. Qbrexza Counseling:  I discussed with the patient the risks of [place] including but not limited to headache, mydriasis, blurred vision, dry eyes, nasal dryness, dry mouth, dry throat, dry skin, urinary hesitation, and constipation. Local skin reactions including erythema, burning, stinging, and itching can also occur. Metronidazole Pregnancy And Lactation Text: This medication is Pregnancy Category B and considered safe during pregnancy. It is also excreted in breast milk. Itraconazole Pregnancy And Lactation Text: This medication is Pregnancy Category C and it isn't know if it is safe during pregnancy. It is also excreted in breast milk. Colchicine Pregnancy And Lactation Text: This medication is Pregnancy Category C and isn't considered safe during pregnancy. It is excreted in breast milk. no Mirvaso Counseling: Payal Anibal is a topical medication which can decrease superficial blood flow where applied. Side effects are uncommon and include stinging, redness and allergic reactions. Humira Counseling:  I discussed with the patient the risks of adalimumab including but not limited to myelosuppression, immunosuppression, autoimmune hepatitis, demyelinating diseases, lymphoma, and serious infections. The patient understands that monitoring is required including a PPD at baseline and must alert us or the primary physician if symptoms of infection or other concerning signs are noted. Niacinamide Counseling: I recommended taking niacin or niacinamide, also know as vitamin B3, twice daily. Recent evidence suggests that taking vitamin B3 (500 mg twice daily) can reduce the risk of actinic keratoses and non-melanoma skin cancers. Side effects of vitamin B3 include flushing and headache. Cyclophosphamide Pregnancy And Lactation Text: This medication is Pregnancy Category D and it isn't considered safe during pregnancy. This medication is excreted in breast milk. Rhofade Counseling: Rhofade is a topical medication which can decrease superficial blood flow where applied. Side effects are uncommon and include stinging, redness and allergic reactions. Drysol Pregnancy And Lactation Text: This medication is considered safe during pregnancy and breast feeding. Solaraze Counseling:  I discussed with the patient the risks of Solaraze including but not limited to erythema, scaling, itching, weeping, crusting, and pain. Cibinqo Pregnancy And Lactation Text: It is unknown if this medication will adversely affect pregnancy or breast feeding. You should not take this medication if you are currently pregnant or planning a pregnancy or while breastfeeding. Hydroquinone Counseling:  Patient advised that medication may result in skin irritation, lightening (hypopigmentation), dryness, and burning. In the event of skin irritation, the patient was advised to reduce the amount of the drug applied or use it less frequently. Rarely, spots that are treated with hydroquinone can become darker (pseudoochronosis). Should this occur, patient instructed to stop medication and call the office. The patient verbalized understanding of the proper use and possible adverse effects of hydroquinone. All of the patient's questions and concerns were addressed. Sski Pregnancy And Lactation Text: This medication is Pregnancy Category D and isn't considered safe during pregnancy. It is excreted in breast milk. Use Enhanced Medication Counseling?: No Griseofulvin Pregnancy And Lactation Text: This medication is Pregnancy Category X and is known to cause serious birth defects. It is unknown if this medication is excreted in breast milk but breast feeding should be avoided. Erythromycin Pregnancy And Lactation Text: This medication is Pregnancy Category B and is considered safe during pregnancy. It is also excreted in breast milk. Dapsone Counseling: I discussed with the patient the risks of dapsone including but not limited to hemolytic anemia, agranulocytosis, rashes, methemoglobinemia, kidney failure, peripheral neuropathy, headaches, GI upset, and liver toxicity. Patients who start dapsone require monitoring including baseline LFTs and weekly CBCs for the first month, then every month thereafter. The patient verbalized understanding of the proper use and possible adverse effects of dapsone. All of the patient's questions and concerns were addressed. Topical Retinoid Pregnancy And Lactation Text: This medication is Pregnancy Category C. It is unknown if this medication is excreted in breast milk. Winlevi Pregnancy And Lactation Text: This medication is considered safe during pregnancy and breastfeeding. Azithromycin Pregnancy And Lactation Text: This medication is considered safe during pregnancy and is also secreted in breast milk. 5-Fu Pregnancy And Lactation Text: This medication is Pregnancy Category X and contraindicated in pregnancy and in women who may become pregnant. It is unknown if this medication is excreted in breast milk. Prednisone Counseling:  I discussed with the patient the risks of prolonged use of prednisone including but not limited to weight gain, insomnia, osteoporosis, mood changes, diabetes, susceptibility to infection, glaucoma and high blood pressure. In cases where prednisone use is prolonged, patients should be monitored with blood pressure checks, serum glucose levels and an eye exam.  Additionally, the patient may need to be placed on GI prophylaxis, PCP prophylaxis, and calcium and vitamin D supplementation and/or a bisphosphonate. The patient verbalized understanding of the proper use and the possible adverse effects of prednisone. All of the patient's questions and concerns were addressed. Adbry Pregnancy And Lactation Text: It is unknown if this medication will adversely affect pregnancy or breast feeding. SSKI Counseling:  I discussed with the patient the risks of SSKI including but not limited to thyroid abnormalities, metallic taste, GI upset, fever, headache, acne, arthralgias, paraesthesias, lymphadenopathy, easy bleeding, arrhythmias, and allergic reaction. Opioid Counseling: I discussed with the patient the potential side effects of opioids including but not limited to addiction, altered mental status, and depression. I stressed avoiding alcohol, benzodiazepines, muscle relaxants and sleep aids unless specifically okayed by a physician. The patient verbalized understanding of the proper use and possible adverse effects of opioids. All of the patient's questions and concerns were addressed. They were instructed to flush the remaining pills down the toilet if they did not need them for pain. 5-Fu Counseling: 5-Fluorouracil Counseling:  I discussed with the patient the risks of 5-fluorouracil including but not limited to erythema, scaling, itching, weeping, crusting, and pain. Valtrex Counseling: I discussed with the patient the risks of valacyclovir including but not limited to kidney damage, nausea, vomiting and severe allergy. The patient understands that if the infection seems to be worsening or is not improving, they are to call. Minocycline Counseling: Patient advised regarding possible photosensitivity and discoloration of the teeth, skin, lips, tongue and gums. Patient instructed to avoid sunlight, if possible. When exposed to sunlight, patients should wear protective clothing, sunglasses, and sunscreen. The patient was instructed to call the office immediately if the following severe adverse effects occur:  hearing changes, easy bruising/bleeding, severe headache, or vision changes. The patient verbalized understanding of the proper use and possible adverse effects of minocycline. All of the patient's questions and concerns were addressed. Topical Clindamycin Pregnancy And Lactation Text: This medication is Pregnancy Category B and is considered safe during pregnancy. It is unknown if it is excreted in breast milk. Xelkemarz Pregnancy And Lactation Text: This medication is Pregnancy Category D and is not considered safe during pregnancy. The risk during breast feeding is also uncertain. High Dose Vitamin A Counseling: Side effects reviewed, pt to contact office should one occur. Clindamycin Pregnancy And Lactation Text: This medication can be used in pregnancy if certain situations. Clindamycin is also present in breast milk. Rituxan Pregnancy And Lactation Text: This medication is Pregnancy Category C and it isn't know if it is safe during pregnancy. It is unknown if this medication is excreted in breast milk but similar antibodies are known to be excreted. Bexarotene Counseling:  I discussed with the patient the risks of bexarotene including but not limited to hair loss, dry lips/skin/eyes, liver abnormalities, hyperlipidemia, pancreatitis, depression/suicidal ideation, photosensitivity, drug rash/allergic reactions, hypothyroidism, anemia, leukopenia, infection, cataracts, and teratogenicity. Patient understands that they will need regular blood tests to check lipid profile, liver function tests, white blood cell count, thyroid function tests and pregnancy test if applicable. Ivermectin Pregnancy And Lactation Text: This medication is Pregnancy Category C and it isn't known if it is safe during pregnancy. It is also excreted in breast milk. Cellcept Pregnancy And Lactation Text: This medication is Pregnancy Category D and isn't considered safe during pregnancy. It is unknown if this medication is excreted in breast milk. Olumiant Counseling: I discussed with the patient the risks of Olumiant therapy including but not limited to upper respiratory tract infections, shingles, cold sores, and nausea. Live vaccines should be avoided. This medication has been linked to serious infections; higher rate of mortality; malignancy and lymphoproliferative disorders; major adverse cardiovascular events; thrombosis; gastrointestinal perforations; neutropenia; lymphopenia; anemia; liver enzyme elevations; and lipid elevations. Hydroxyzine Pregnancy And Lactation Text: This medication is not safe during pregnancy and should not be taken. It is also excreted in breast milk and breast feeding isn't recommended. Winlevi Counseling:  I discussed with the patient the risks of topical clascoterone including but not limited to erythema, scaling, itching, and stinging. Patient voiced their understanding. Clindamycin Counseling: I counseled the patient regarding use of clindamycin as an antibiotic for prophylactic and/or therapeutic purposes. Clindamycin is active against numerous classes of bacteria, including skin bacteria. Side effects may include nausea, diarrhea, gastrointestinal upset, rash, hives, yeast infections, and in rare cases, colitis. Zyclara Counseling:  I discussed with the patient the risks of imiquimod including but not limited to erythema, scaling, itching, weeping, crusting, and pain. Patient understands that the inflammatory response to imiquimod is variable from person to person and was educated regarded proper titration schedule. If flu-like symptoms develop, patient knows to discontinue the medication and contact us. Siliq Counseling:  I discussed with the patient the risks of Siliq including but not limited to new or worsening depression, suicidal thoughts and behavior, immunosuppression, malignancy, posterior leukoencephalopathy syndrome, and serious infections. The patient understands that monitoring is required including a PPD at baseline and must alert us or the primary physician if symptoms of infection or other concerning signs are noted. There is also a special program designed to monitor depression which is required with Siliq. Humira Pregnancy And Lactation Text: This medication is Pregnancy Category B and is considered safe during pregnancy. It is unknown if this medication is excreted in breast milk. Cimetidine Pregnancy And Lactation Text: This medication is Pregnancy Category B and is considered safe during pregnancy. It is also excreted in breast milk and breast feeding isn't recommended. Niacinamide Pregnancy And Lactation Text: These medications are considered safe during pregnancy. Detail Level: Detailed Libtayo Pregnancy And Lactation Text: This medication is contraindicated in pregnancy and when breast feeding. Tremfya Pregnancy And Lactation Text: The risk during pregnancy and breastfeeding is uncertain with this medication. Thalidomide Pregnancy And Lactation Text: This medication is Pregnancy Category X and is absolutely contraindicated during pregnancy. It is unknown if it is excreted in breast milk. Protopic Counseling: Patient may experience a mild burning sensation during topical application. Protopic is not approved in children less than 3years of age. There have been case reports of hematologic and skin malignancies in patients using topical calcineurin inhibitors although causality is questionable. Azithromycin Counseling:  I discussed with the patient the risks of azithromycin including but not limited to GI upset, allergic reaction, drug rash, diarrhea, and yeast infections. Isotretinoin Counseling: Patient should get monthly blood tests, not donate blood, not drive at night if vision affected, not share medication, and not undergo elective surgery for 6 months after tx completed. Side effects reviewed, pt to contact office should one occur. Cephalexin Pregnancy And Lactation Text: This medication is Pregnancy Category B and considered safe during pregnancy. It is also excreted in breast milk but can be used safely for shorter doses. Carac Counseling:  I discussed with the patient the risks of Carac including but not limited to erythema, scaling, itching, weeping, crusting, and pain. Topical Ketoconazole Counseling: Patient counseled that this medication may cause skin irritation or allergic reactions. In the event of skin irritation, the patient was advised to reduce the amount of the drug applied or use it less frequently. The patient verbalized understanding of the proper use and possible adverse effects of ketoconazole. All of the patient's questions and concerns were addressed. Cephalexin Counseling: I counseled the patient regarding use of cephalexin as an antibiotic for prophylactic and/or therapeutic purposes. Cephalexin (commonly prescribed under brand name Keflex) is a cephalosporin antibiotic which is active against numerous classes of bacteria, including most skin bacteria. Side effects may include nausea, diarrhea, gastrointestinal upset, rash, hives, yeast infections, and in rare cases, hepatitis, kidney disease, seizures, fever, confusion, neurologic symptoms, and others. Patients with severe allergies to penicillin medications are cautioned that there is about a 10% incidence of cross-reactivity with cephalosporins. When possible, patients with penicillin allergies should use alternatives to cephalosporins for antibiotic therapy. Doxepin Counseling:  Patient advised that the medication is sedating and not to drive a car after taking this medication. Patient informed of potential adverse effects including but not limited to dry mouth, urinary retention, and blurry vision. The patient verbalized understanding of the proper use and possible adverse effects of doxepin. All of the patient's questions and concerns were addressed. Bactrim Counseling:  I discussed with the patient the risks of sulfa antibiotics including but not limited to GI upset, allergic reaction, drug rash, diarrhea, dizziness, photosensitivity, and yeast infections. Rarely, more serious reactions can occur including but not limited to aplastic anemia, agranulocytosis, methemoglobinemia, blood dyscrasias, liver or kidney failure, lung infiltrates or desquamative/blistering drug rashes. Ketoconazole Pregnancy And Lactation Text: This medication is Pregnancy Category C and it isn't know if it is safe during pregnancy. It is also excreted in breast milk and breast feeding isn't recommended. Acitretin Pregnancy And Lactation Text: This medication is Pregnancy Category X and should not be given to women who are pregnant or may become pregnant in the future. This medication is excreted in breast milk. Azelaic Acid Counseling: Patient counseled that medicine may cause skin irritation and to avoid applying near the eyes. In the event of skin irritation, the patient was advised to reduce the amount of the drug applied or use it less frequently. The patient verbalized understanding of the proper use and possible adverse effects of azelaic acid. All of the patient's questions and concerns were addressed. Benzoyl Peroxide Counseling: Patient counseled that medicine may cause skin irritation and bleach clothing. In the event of skin irritation, the patient was advised to reduce the amount of the drug applied or use it less frequently. The patient verbalized understanding of the proper use and possible adverse effects of benzoyl peroxide. All of the patient's questions and concerns were addressed. Thalidomide Counseling: I discussed with the patient the risks of thalidomide including but not limited to birth defects, anxiety, weakness, chest pain, dizziness, cough and severe allergy. Arava Counseling:  Patient counseled regarding adverse effects of Arava including but not limited to nausea, vomiting, abnormalities in liver function tests. Patients may develop mouth sores, rash, diarrhea, and abnormalities in blood counts. The patient understands that monitoring is required including LFTs and blood counts. There is a rare possibility of scarring of the liver and lung problems that can occur when taking methotrexate. Persistent nausea, loss of appetite, pale stools, dark urine, cough, and shortness of breath should be reported immediately. Patient advised to discontinue Arava treatment and consult with a physician prior to attempting conception. The patient will have to undergo a treatment to eliminate Arava from the body prior to conception. Rifampin Counseling: I discussed with the patient the risks of rifampin including but not limited to liver damage, kidney damage, red-orange body fluids, nausea/vomiting and severe allergy. Oral Minoxidil Pregnancy And Lactation Text: This medication should only be used when clearly needed if you are pregnant, attempting to become pregnant or breast feeding. Gabapentin Counseling: I discussed with the patient the risks of gabapentin including but not limited to dizziness, somnolence, fatigue and ataxia. Tazorac Counseling:  Patient advised that medication is irritating and drying. Patient may need to apply sparingly and wash off after an hour before eventually leaving it on overnight. The patient verbalized understanding of the proper use and possible adverse effects of tazorac. All of the patient's questions and concerns were addressed. Minocycline Pregnancy And Lactation Text: This medication is Pregnancy Category D and not consider safe during pregnancy. It is also excreted in breast milk. Qbrexza Pregnancy And Lactation Text: There is no available data on Qbrexza use in pregnant women. There is no available data on Qbrexza use in lactation. Infliximab Counseling:  I discussed with the patient the risks of infliximab including but not limited to myelosuppression, immunosuppression, autoimmune hepatitis, demyelinating diseases, lymphoma, and serious infections. The patient understands that monitoring is required including a PPD at baseline and must alert us or the primary physician if symptoms of infection or other concerning signs are noted. Cimzia Counseling:  I discussed with the patient the risks of Cimzia including but not limited to immunosuppression, allergic reactions and infections. The patient understands that monitoring is required including a PPD at baseline and must alert us or the primary physician if symptoms of infection or other concerning signs are noted. Doxycycline Pregnancy And Lactation Text: This medication is Pregnancy Category D and not consider safe during pregnancy. It is also excreted in breast milk but is considered safe for shorter treatment courses. Colchicine Counseling:  Patient counseled regarding adverse effects including but not limited to stomach upset (nausea, vomiting, stomach pain, or diarrhea). Patient instructed to limit alcohol consumption while taking this medication. Colchicine may reduce blood counts especially with prolonged use. The patient understands that monitoring of kidney function and blood counts may be required, especially at baseline. The patient verbalized understanding of the proper use and possible adverse effects of colchicine. All of the patient's questions and concerns were addressed. Drysol Counseling:  I discussed with the patient the risks of drysol/aluminum chloride including but not limited to skin rash, itching, irritation, burning. Oral Minoxidil Counseling- I discussed with the patient the risks of oral minoxidil including but not limited to shortness of breath, swelling of the feet or ankles, dizziness, lightheadedness, unwanted hair growth and allergic reaction. The patient verbalized understanding of the proper use and possible adverse effects of oral minoxidil. All of the patient's questions and concerns were addressed. Opzelura Counseling:  I discussed with the patient the risks of [place]son [place] including but not limited to nasopharngitis, bronchitis, ear infection, eosinophila, hives, diarrhea, folliculitis, tonsillitis, and rhinorrhea. Taken orally, this medication has been linked to serious infections; higher rate of mortality; malignancy and lymphoproliferative disorders; major adverse cardiovascular events; thrombosis; thrombocytopenia, anemia, and neutropenia; and lipid elevations. Doxycycline Counseling:  Patient counseled regarding possible photosensitivity and increased risk for sunburn. Patient instructed to avoid sunlight, if possible. When exposed to sunlight, patients should wear protective clothing, sunglasses, and sunscreen. The patient was instructed to call the office immediately if the following severe adverse effects occur:  hearing changes, easy bruising/bleeding, severe headache, or vision changes. The patient verbalized understanding of the proper use and possible adverse effects of doxycycline. All of the patient's questions and concerns were addressed. Griseofulvin Counseling:  I discussed with the patient the risks of griseofulvin including but not limited to photosensitivity, cytopenia, liver damage, nausea/vomiting and severe allergy. The patient understands that this medication is best absorbed when taken with a fatty meal (e.g., ice cream or french fries). Topical Retinoid counseling:  Patient advised to apply a pea-sized amount only at bedtime and wait 30 minutes after washing their face before applying. If too drying, patient may add a non-comedogenic moisturizer. The patient verbalized understanding of the proper use and possible adverse effects of retinoids. All of the patient's questions and concerns were addressed. Hydroquinone Pregnancy And Lactation Text: This medication has not been assigned a Pregnancy Risk Category but animal studies failed to show danger with the topical medication. It is unknown if the medication is excreted in breast milk. Albendazole Counseling:  I discussed with the patient the risks of albendazole including but not limited to cytopenia, kidney damage, nausea/vomiting and severe allergy. The patient understands that this medication is being used in an off-label manner. Valtrex Pregnancy And Lactation Text: this medication is Pregnancy Category B and is considered safe during pregnancy. This medication is not directly found in breast milk but it's metabolite acyclovir is present. Solaraze Pregnancy And Lactation Text: This medication is Pregnancy Category B and is considered safe. There is some data to suggest avoiding during the third trimester. It is unknown if this medication is excreted in breast milk. Nsaids Pregnancy And Lactation Text: These medications are considered safe up to 30 weeks gestation. It is excreted in breast milk. Tetracycline Counseling: Patient counseled regarding possible photosensitivity and increased risk for sunburn. Patient instructed to avoid sunlight, if possible. When exposed to sunlight, patients should wear protective clothing, sunglasses, and sunscreen. The patient was instructed to call the office immediately if the following severe adverse effects occur:  hearing changes, easy bruising/bleeding, severe headache, or vision changes. The patient verbalized understanding of the proper use and possible adverse effects of tetracycline. All of the patient's questions and concerns were addressed. Patient understands to avoid pregnancy while on therapy due to potential birth defects. Spironolactone Pregnancy And Lactation Text: This medication can cause feminization of the male fetus and should be avoided during pregnancy. The active metabolite is also found in breast milk. Azathioprine Counseling:  I discussed with the patient the risks of azathioprine including but not limited to myelosuppression, immunosuppression, hepatotoxicity, lymphoma, and infections. The patient understands that monitoring is required including baseline LFTs, Creatinine, possible TPMP genotyping and weekly CBCs for the first month and then every 2 weeks thereafter. The patient verbalized understanding of the proper use and possible adverse effects of azathioprine. All of the patient's questions and concerns were addressed. Rinvoq Pregnancy And Lactation Text: Based on animal studies, Rinvoq may cause embryo-fetal harm when administered to pregnant women. The medication should not be used in pregnancy. Breastfeeding is not recommended during treatment and for 6 days after the last dose. Finasteride Counseling:  I discussed with the patient the risks of use of finasteride including but not limited to decreased libido, decreased ejaculate volume, gynecomastia, and depression. Women should not handle medication. All of the patient's questions and concerns were addressed. Ilumya Counseling: I discussed with the patient the risks of tildrakizumab including but not limited to immunosuppression, malignancy, posterior leukoencephalopathy syndrome, and serious infections. The patient understands that monitoring is required including a PPD at baseline and must alert us or the primary physician if symptoms of infection or other concerning signs are noted. Itraconazole Counseling:  I discussed with the patient the risks of itraconazole including but not limited to liver damage, nausea/vomiting, neuropathy, and severe allergy. The patient understands that this medication is best absorbed when taken with acidic beverages such as non-diet cola or ginger ale. The patient understands that monitoring is required including baseline LFTs and repeat LFTs at intervals. The patient understands that they are to contact us or the primary physician if concerning signs are noted. Quinolones Counseling:  I discussed with the patient the risks of fluoroquinolones including but not limited to GI upset, allergic reaction, drug rash, diarrhea, dizziness, photosensitivity, yeast infections, liver function test abnormalities, tendonitis/tendon rupture. Bexarotene Pregnancy And Lactation Text: This medication is Pregnancy Category X and should not be given to women who are pregnant or may become pregnant. This medication should not be used if you are breast feeding. Dutasteride Counseling: Dustasteride Counseling:  I discussed with the patient the risks of use of dutasteride including but not limited to decreased libido, decreased ejaculate volume, and gynecomastia. Women who can become pregnant should not handle medication. All of the patient's questions and concerns were addressed. Olumiant Pregnancy And Lactation Text: Based on animal studies, Sara Anne-Marie may cause embryo-fetal harm when administered to pregnant women. The medication should not be used in pregnancy. Breastfeeding is not recommended during treatment. Hydroxychloroquine Counseling:  I discussed with the patient that a baseline ophthalmologic exam is needed at the start of therapy and every year thereafter while on therapy. A CBC may also be warranted for monitoring. The side effects of this medication were discussed with the patient, including but not limited to agranulocytosis, aplastic anemia, seizures, rashes, retinopathy, and liver toxicity. Patient instructed to call the office should any adverse effect occur. The patient verbalized understanding of the proper use and possible adverse effects of Plaquenil. All the patient's questions and concerns were addressed. Isotretinoin Pregnancy And Lactation Text: This medication is Pregnancy Category X and is considered extremely dangerous during pregnancy. It is unknown if it is excreted in breast milk. Opzelura Pregnancy And Lactation Text: There is insufficient data to evaluate drug-associated risk for major birth defects, miscarriage, or other adverse maternal or fetal outcomes. There is a pregnancy registry that monitors pregnancy outcomes in pregnant persons exposed to the medication during pregnancy. It is unknown if this medication is excreted in breast milk. Do not breastfeed during treatment and for about 4 weeks after the last dose. Skyrizi Counseling: I discussed with the patient the risks of risankizumab-rzaa including but not limited to immunosuppression, and serious infections. The patient understands that monitoring is required including a PPD at baseline and must alert us or the primary physician if symptoms of infection or other concerning signs are noted. Aklief Pregnancy And Lactation Text: It is unknown if this medication is safe to use during pregnancy. It is unknown if this medication is excreted in breast milk. Breastfeeding women should use the topical cream on the smallest area of the skin for the shortest time needed while breastfeeding. Do not apply to nipple and areola. Bactrim Pregnancy And Lactation Text: This medication is Pregnancy Category D and is known to cause fetal risk. It is also excreted in breast milk. Benzoyl Peroxide Pregnancy And Lactation Text: This medication is Pregnancy Category C. It is unknown if benzoyl peroxide is excreted in breast milk. Nsaids Counseling: NSAID Counseling: I discussed with the patient that NSAIDs should be taken with food. Prolonged use of NSAIDs can result in the development of stomach ulcers. Patient advised to stop taking NSAIDs if abdominal pain occurs. The patient verbalized understanding of the proper use and possible adverse effects of NSAIDs. All of the patient's questions and concerns were addressed. Birth Control Pills Counseling: Birth Control Pill Counseling: I discussed with the patient the potential side effects of OCPs including but not limited to increased risk of stroke, heart attack, thrombophlebitis, deep venous thrombosis, hepatic adenomas, breast changes, GI upset, headaches, and depression. The patient verbalized understanding of the proper use and possible adverse effects of OCPs. All of the patient's questions and concerns were addressed. Oxybutynin Counseling:  I discussed with the patient the risks of oxybutynin including but not limited to skin rash, drowsiness, dry mouth, difficulty urinating, and blurred vision. Hydroxychloroquine Pregnancy And Lactation Text: This medication has been shown to cause fetal harm but it isn't assigned a Pregnancy Risk Category. There are small amounts excreted in breast milk. Glycopyrrolate Counseling:  I discussed with the patient the risks of glycopyrrolate including but not limited to skin rash, drowsiness, dry mouth, difficulty urinating, and blurred vision. Taltz Counseling: I discussed with the patient the risks of ixekizumab including but not limited to immunosuppression, serious infections, worsening of inflammatory bowel disease and drug reactions. The patient understands that monitoring is required including a PPD at baseline and must alert us or the primary physician if symptoms of infection or other concerning signs are noted. Cellcept Counseling:  I discussed with the patient the risks of mycophenolate mofetil including but not limited to infection/immunosuppression, GI upset, hypokalemia, hypercholesterolemia, bone marrow suppression, lymphoproliferative disorders, malignancy, GI ulceration/bleed/perforation, colitis, interstitial lung disease, kidney failure, progressive multifocal leukoencephalopathy, and birth defects. The patient understands that monitoring is required including a baseline creatinine and regular CBC testing. In addition, patient must alert us immediately if symptoms of infection or other concerning signs are noted. Acitretin Counseling:  I discussed with the patient the risks of acitretin including but not limited to hair loss, dry lips/skin/eyes, liver damage, hyperlipidemia, depression/suicidal ideation, photosensitivity. Serious rare side effects can include but are not limited to pancreatitis, pseudotumor cerebri, bony changes, clot formation/stroke/heart attack. Patient understands that alcohol is contraindicated since it can result in liver toxicity and significantly prolong the elimination of the drug by many years. Tazorac Pregnancy And Lactation Text: This medication is not safe during pregnancy. It is unknown if this medication is excreted in breast milk. Fluconazole Counseling:  Patient counseled regarding adverse effects of fluconazole including but not limited to headache, diarrhea, nausea, upset stomach, liver function test abnormalities, taste disturbance, and stomach pain. There is a rare possibility of liver failure that can occur when taking fluconazole. The patient understands that monitoring of LFTs and kidney function test may be required, especially at baseline. The patient verbalized understanding of the proper use and possible adverse effects of fluconazole. All of the patient's questions and concerns were addressed. Clofazimine Counseling:  I discussed with the patient the risks of clofazimine including but not limited to skin and eye pigmentation, liver damage, nausea/vomiting, gastrointestinal bleeding and allergy. Methotrexate Counseling:  Patient counseled regarding adverse effects of methotrexate including but not limited to nausea, vomiting, abnormalities in liver function tests. Patients may develop mouth sores, rash, diarrhea, and abnormalities in blood counts. The patient understands that monitoring is required including LFT's and blood counts. There is a rare possibility of scarring of the liver and lung problems that can occur when taking methotrexate. Persistent nausea, loss of appetite, pale stools, dark urine, cough, and shortness of breath should be reported immediately. Patient advised to discontinue methotrexate treatment at least three months before attempting to become pregnant. I discussed the need for folate supplements while taking methotrexate. These supplements can decrease side effects during methotrexate treatment. The patient verbalized understanding of the proper use and possible adverse effects of methotrexate. All of the patient's questions and concerns were addressed. Eucrisa Counseling: Patient may experience a mild burning sensation during topical application. Ewa Castorena is not approved in children less than 3years of age. Enbrel Counseling:  I discussed with the patient the risks of etanercept including but not limited to myelosuppression, immunosuppression, autoimmune hepatitis, demyelinating diseases, lymphoma, and infections. The patient understands that monitoring is required including a PPD at baseline and must alert us or the primary physician if symptoms of infection or other concerning signs are noted. Prednisone Pregnancy And Lactation Text: This medication is Pregnancy Category C and it isn't know if it is safe during pregnancy. This medication is excreted in breast milk. Erythromycin Counseling:  I discussed with the patient the risks of erythromycin including but not limited to GI upset, allergic reaction, drug rash, diarrhea, increase in liver enzymes, and yeast infections. Propranolol Pregnancy And Lactation Text: This medication is Pregnancy Category C and it isn't known if it is safe during pregnancy. It is excreted in breast milk. Protopic Pregnancy And Lactation Text: This medication is Pregnancy Category C. It is unknown if this medication is excreted in breast milk when applied topically. Terbinafine Counseling: Patient counseling regarding adverse effects of terbinafine including but not limited to headache, diarrhea, rash, upset stomach, liver function test abnormalities, itching, taste/smell disturbance, nausea, abdominal pain, and flatulence. There is a rare possibility of liver failure that can occur when taking terbinafine. The patient understands that a baseline LFT and kidney function test may be required. The patient verbalized understanding of the proper use and possible adverse effects of terbinafine. All of the patient's questions and concerns were addressed. Dapsone Pregnancy And Lactation Text: This medication is Pregnancy Category C and is not considered safe during pregnancy or breast feeding. Finasteride Pregnancy And Lactation Text: This medication is absolutely contraindicated during pregnancy. It is unknown if it is excreted in breast milk. High Dose Vitamin A Pregnancy And Lactation Text: High dose vitamin A therapy is contraindicated during pregnancy and breast feeding. Tremfya Counseling: I discussed with the patient the risks of guselkumab including but not limited to immunosuppression, serious infections, worsening of inflammatory bowel disease and drug reactions. The patient understands that monitoring is required including a PPD at baseline and must alert us or the primary physician if symptoms of infection or other concerning signs are noted. Imiquimod Counseling:  I discussed with the patient the risks of imiquimod including but not limited to erythema, scaling, itching, weeping, crusting, and pain. Patient understands that the inflammatory response to imiquimod is variable from person to person and was educated regarded proper titration schedule. If flu-like symptoms develop, patient knows to discontinue the medication and contact us. Rituxan Counseling:  I discussed with the patient the risks of Rituxan infusions. Side effects can include infusion reactions, severe drug rashes including mucocutaneous reactions, reactivation of latent hepatitis and other infections and rarely progressive multifocal leukoencephalopathy. All of the patient's questions and concerns were addressed. Aklief counseling:  Patient advised to apply a pea-sized amount only at bedtime and wait 30 minutes after washing their face before applying. If too drying, patient may add a non-comedogenic moisturizer. The most commonly reported side effects including irritation, redness, scaling, dryness, stinging, burning, itching, and increased risk of sunburn. The patient verbalized understanding of the proper use and possible adverse effects of retinoids. All of the patient's questions and concerns were addressed. Simponi Counseling:  I discussed with the patient the risks of golimumab including but not limited to myelosuppression, immunosuppression, autoimmune hepatitis, demyelinating diseases, lymphoma, and serious infections. The patient understands that monitoring is required including a PPD at baseline and must alert us or the primary physician if symptoms of infection or other concerning signs are noted. Xolair Pregnancy And Lactation Text: This medication is Pregnancy Category B and is considered safe during pregnancy. This medication is excreted in breast milk. Dutasteride Pregnancy And Lactation Text: This medication is absolutely contraindicated in women, especially during pregnancy and breast feeding. Feminization of male fetuses is possible if taking while pregnant. Ketoconazole Counseling:   Patient counseled regarding improving absorption with orange juice. Adverse effects include but are not limited to breast enlargement, headache, diarrhea, nausea, upset stomach, liver function test abnormalities, taste disturbance, and stomach pain. There is a rare possibility of liver failure that can occur when taking ketoconazole. The patient understands that monitoring of LFTs may be required, especially at baseline. The patient verbalized understanding of the proper use and possible adverse effects of ketoconazole. All of the patient's questions and concerns were addressed. Cibinqo Counseling: I discussed with the patient the risks of Cibinqo therapy including but not limited to common cold, nausea, headache, cold sores, increased blood CPK levels, dizziness, UTIs, fatigue, acne, and vomitting. Live vaccines should be avoided. This medication has been linked to serious infections; higher rate of mortality; malignancy and lymphoproliferative disorders; major adverse cardiovascular events; thrombosis; thrombocytopenia and lymphopenia; lipid elevations; and retinal detachment. Otezla Counseling: Courtney Chinyere Counseling: The side effects of Courtney [place]ret[place] were discussed with the patient, including but not limited to worsening or new depression, weight loss, diarrhea, nausea, upper respiratory tract infection, and headache. Patient instructed to call the office should any adverse effect occur. The patient verbalized understanding of the proper use and possible adverse effects of Otezla. All the patient's questions and concerns were addressed. Doxepin Pregnancy And Lactation Text: This medication is Pregnancy Category C and it isn't known if it is safe during pregnancy. It is also excreted in breast milk and breast feeding isn't recommended. Birth Control Pills Pregnancy And Lactation Text: This medication should be avoided if pregnant and for the first 30 days post-partum. Glycopyrrolate Pregnancy And Lactation Text: This medication is Pregnancy Category B and is considered safe during pregnancy. It is unknown if it is excreted breast milk. Cimetidine Counseling:  I discussed with the patient the risks of Cimetidine including but not limited to gynecomastia, headache, diarrhea, nausea, drowsiness, arrhythmias, pancreatitis, skin rashes, psychosis, bone marrow suppression and kidney toxicity. Hydroxyzine Counseling: Patient advised that the medication is sedating and not to drive a car after taking this medication. Patient informed of potential adverse effects including but not limited to dry mouth, urinary retention, and blurry vision. The patient verbalized understanding of the proper use and possible adverse effects of hydroxyzine. All of the patient's questions and concerns were addressed. Odomzo Counseling- I discussed with the patient the risks of Odomzo including but not limited to nausea, vomiting, diarrhea, constipation, weight loss, changes in the sense of taste, decreased appetite, muscle spasms, and hair loss. The patient verbalized understanding of the proper use and possible adverse effects of Odomzo. All of the patient's questions and concerns were addressed. Topical Sulfur Applications Counseling: Topical Sulfur Counseling: Patient counseled that this medication may cause skin irritation or allergic reactions. In the event of skin irritation, the patient was advised to reduce the amount of the drug applied or use it less frequently. The patient verbalized understanding of the proper use and possible adverse effects of topical sulfur application. All of the patient's questions and concerns were addressed. Sarecycline Counseling: Patient advised regarding possible photosensitivity and discoloration of the teeth, skin, lips, tongue and gums. Patient instructed to avoid sunlight, if possible. When exposed to sunlight, patients should wear protective clothing, sunglasses, and sunscreen. The patient was instructed to call the office immediately if the following severe adverse effects occur:  hearing changes, easy bruising/bleeding, severe headache, or vision changes. The patient verbalized understanding of the proper use and possible adverse effects of sarecycline. All of the patient's questions and concerns were addressed. Libtayo Counseling- I discussed with the patient the risks of Libtayo including but not limited to nausea, vomiting, diarrhea, and bone or muscle pain. The patient verbalized understanding of the proper use and possible adverse effects of Libtayo. All of the patient's questions and concerns were addressed. Wartpeel Counseling:  I discussed with the patient the risks of Wartpeel including but not limited to erythema, scaling, itching, weeping, crusting, and pain. Dupixent Counseling: I discussed with the patient the risks of dupilumab including but not limited to eye infection and irritation, cold sores, injection site reactions, worsening of asthma, allergic reactions and increased risk of parasitic infection. Live vaccines should be avoided while taking dupilumab. Dupilumab will also interact with certain medications such as warfarin and cyclosporine. The patient understands that monitoring is required and they must alert us or the primary physician if symptoms of infection or other concerning signs are noted. Tranexamic Acid Pregnancy And Lactation Text: It is unknown if this medication is safe during pregnancy or breast feeding. Minoxidil Counseling: Minoxidil is a topical medication which can increase blood flow where it is applied. It is uncertain how this medication increases hair growth. Side effects are uncommon and include stinging and allergic reactions. Ivermectin Counseling:  Patient instructed to take medication on an empty stomach with a full glass of water. Patient informed of potential adverse effects including but not limited to nausea, diarrhea, dizziness, itching, and swelling of the extremities or lymph nodes. The patient verbalized understanding of the proper use and possible adverse effects of ivermectin. All of the patient's questions and concerns were addressed. Elidel Counseling: Patient may experience a mild burning sensation during topical application. Elidel is not approved in children less than 3years of age. There have been case reports of hematologic and skin malignancies in patients using topical calcineurin inhibitors although causality is questionable. Cosentyx Counseling:  I discussed with the patient the risks of Cosentyx including but not limited to worsening of Crohn's disease, immunosuppression, allergic reactions and infections. The patient understands that monitoring is required including a PPD at baseline and must alert us or the primary physician if symptoms of infection or other concerning signs are noted. Cyclophosphamide Counseling:  I discussed with the patient the risks of cyclophosphamide including but not limited to hair loss, hormonal abnormalities, decreased fertility, abdominal pain, diarrhea, nausea and vomiting, bone marrow suppression and infection. The patient understands that monitoring is required while taking this medication. Cyclosporine Counseling:  I discussed with the patient the risks of cyclosporine including but not limited to hypertension, gingival hyperplasia,myelosuppression, immunosuppression, liver damage, kidney damage, neurotoxicity, lymphoma, and serious infections. The patient understands that monitoring is required including baseline blood pressure, CBC, CMP, lipid panel and uric acid, and then 1-2 times monthly CMP and blood pressure. Klisyri Pregnancy And Lactation Text: It is unknown if this medication can harm a developing fetus or if it is excreted in breast milk. Metronidazole Counseling:  I discussed with the patient the risks of metronidazole including but not limited to seizures, nausea/vomiting, a metallic taste in the mouth, nausea/vomiting and severe allergy. Rinvoq Counseling: I discussed with the patient the risks of Rinvoq therapy including but not limited to upper respiratory tract infections, shingles, cold sores, bronchitis, nausea, cough, fever, acne, and headache. Live vaccines should be avoided. This medication has been linked to serious infections; higher rate of mortality; malignancy and lymphoproliferative disorders; major adverse cardiovascular events; thrombosis; thrombocytopenia, anemia, and neutropenia; lipid elevations; liver enzyme elevations; and gastrointestinal perforations. Dupixent Pregnancy And Lactation Text: This medication likely crosses the placenta but the risk for the fetus is uncertain. This medication is excreted in breast milk. Topical Sulfur Applications Pregnancy And Lactation Text: This medication is Pregnancy Category C and has an unknown safety profile during pregnancy. It is unknown if this topical medication is excreted in breast milk. Erivedge Counseling- I discussed with the patient the risks of Erivedge including but not limited to nausea, vomiting, diarrhea, constipation, weight loss, changes in the sense of taste, decreased appetite, muscle spasms, and hair loss. The patient verbalized understanding of the proper use and possible adverse effects of Erivedge. All of the patient's questions and concerns were addressed. Rifampin Pregnancy And Lactation Text: This medication is Pregnancy Category C and it isn't know if it is safe during pregnancy. It is also excreted in breast milk and should not be used if you are breast feeding. Adbry Counseling: I discussed with the patient the risks of tralokinumab including but not limited to eye infection and irritation, cold sores, injection site reactions, worsening of asthma, allergic reactions and increased risk of parasitic infection. Live vaccines should be avoided while taking tralokinumab. The patient understands that monitoring is required and they must alert us or the primary physician if symptoms of infection or other concerning signs are noted. Otezla Pregnancy And Lactation Text: This medication is Pregnancy Category C and it isn't known if it is safe during pregnancy. It is unknown if it is excreted in breast milk. Xeljanz Counseling: Joe Talley Counseling: I discussed with the patient the risks of Joe Talley therapy including increased risk of infection, liver issues, headache, diarrhea, or cold symptoms. Live vaccines should be avoided. They were instructed to call if they have any problems. Topical Clindamycin Counseling: Patient counseled that this medication may cause skin irritation or allergic reactions. In the event of skin irritation, the patient was advised to reduce the amount of the drug applied or use it less frequently. The patient verbalized understanding of the proper use and possible adverse effects of clindamycin. All of the patient's questions and concerns were addressed. Tranexamic Acid Counseling:  Patient advised of the small risk of bleeding problems with tranexamic acid. They were also instructed to call if they developed any nausea, vomiting or diarrhea. All of the patient's questions and concerns were addressed. Methotrexate Pregnancy And Lactation Text: This medication is Pregnancy Category X and is known to cause fetal harm. This medication is excreted in breast milk. Opioid Pregnancy And Lactation Text: These medications can lead to premature delivery and should be avoided during pregnancy. These medications are also present in breast milk in small amounts. Picato Counseling:  I discussed with the patient the risks of Picato including but not limited to erythema, scaling, itching, weeping, crusting, and pain. Klisyri Counseling:  I discussed with the patient the risks of Italia Pry including but not limited to erythema, scaling, itching, weeping, crusting, and pain. Xolair Counseling:  Patient informed of potential adverse effects including but not limited to fever, muscle aches, rash and allergic reactions. The patient verbalized understanding of the proper use and possible adverse effects of Xolair. All of the patient's questions and concerns were addressed. Stelara Counseling:  I discussed with the patient the risks of ustekinumab including but not limited to immunosuppression, malignancy, posterior leukoencephalopathy syndrome, and serious infections. The patient understands that monitoring is required including a PPD at baseline and must alert us or the primary physician if symptoms of infection or other concerning signs are noted. Propranolol Counseling:  I discussed with the patient the risks of propranolol including but not limited to low heart rate, low blood pressure, low blood sugar, restlessness and increased cold sensitivity. They should call the office if they experience any of these side effects. Spironolactone Counseling: Patient advised regarding risks of diarrhea, abdominal pain, hyperkalemia, birth defects (for female patients), liver toxicity and renal toxicity. The patient may need blood work to monitor liver and kidney function and potassium levels while on therapy. The patient verbalized understanding of the proper use and possible adverse effects of spironolactone. All of the patient's questions and concerns were addressed. Cimzia Pregnancy And Lactation Text: This medication crosses the placenta but can be considered safe in certain situations. Cimzia may be excreted in breast milk. Zoryve Counseling:  I discussed with the patient that Malon Rakers is not for use in the eyes, mouth or vagina. The most commonly reported side effects include diarrhea, headache, insomnia, application site pain, upper respiratory tract infections, and urinary tract infections. All of the patient's questions and concerns were addressed. Topical Metronidazole Pregnancy And Lactation Text: This medication is Pregnancy Category B and considered safe during pregnancy. It is also considered safe to use while breastfeeding. Olanzapine Pregnancy And Lactation Text: This medication is pregnancy category C. There are no adequate and well controlled trials with olanzapine in pregnant females. Olanzapine should be used during pregnancy only if the potential benefit justifies the potential risk to the fetus. In a study in lactating healthy women, olanzapine was excreted in breast milk. It is recommended that women taking olanzapine should not breast feed. Zoryve Pregnancy And Lactation Text: It is unknown if this medication can cause problems during pregnancy and breastfeeding. Sotyktu Counseling:  I discussed the most common side effects of Sotyktu including: common cold, sore throat, sinus infections, cold sores, canker sores, folliculitis, and acne. ? I also discussed more serious side effects of Sotyktu including but not limited to: serious allergic reactions; increased risk for infections such as TB; cancers such as lymphomas; rhabdomyolysis and elevated CPK; and elevated triglycerides and liver enzymes. ? Topical Steroids Counseling: I discussed with the patient that prolonged use of topical steroids can result in the increased appearance of superficial blood vessels (telangiectasias), lightening (hypopigmentation) and thinning of the skin (atrophy). Patient understands to avoid using high potency steroids in skin folds, the groin or the face. The patient verbalized understanding of the proper use and possible adverse effects of topical steroids. All of the patient's questions and concerns were addressed. Soolantra Counseling: I discussed with the patients the risks of topial Soolantra. This is a medicine which decreases the number of mites and inflammation in the skin. You experience burning, stinging, eye irritation or allergic reactions. Please call our office if you develop any problems from using this medication. Sotyktu Pregnancy And Lactation Text: There is insufficient data to evaluate whether or not Sotyktu is safe to use during pregnancy. ? ? It is not known if Sotyktu passes into breast milk and whether or not it is safe to use when breastfeeding. ?? Topical Steroids Applications Pregnancy And Lactation Text: Most topical steroids are considered safe to use during pregnancy and lactation. Any topical steroid applied to the breast or nipple should be washed off before breastfeeding. Soolantra Pregnancy And Lactation Text: This medication is Pregnancy Category C. This medication is considered safe during breast feeding. Low Dose Naltrexone Counseling- I discussed with the patient the potential risks and side effects of low dose naltrexone including but not limited to: more vivid dreams, headaches, nausea, vomiting, abdominal pain, fatigue, dizziness, and anxiety. Low Dose Naltrexone Pregnancy And Lactation Text: Naltrexone is pregnancy category C. There have been no adequate and well-controlled studies in pregnant women. It should be used in pregnancy only if the potential benefit justifies the potential risk to the fetus. Limited data indicates that naltrexone is minimally excreted into breastmilk. Calcipotriene Counseling:  I discussed with the patient the risks of calcipotriene including but not limited to erythema, scaling, itching, and irritation. Calcipotriene Pregnancy And Lactation Text: The use of this medication during pregnancy or lactation is not recommended as there is insufficient data. Cantharidin Counseling:  I discussed with the patient the risks of Cantharidin including but not limited to pain, redness, burning, itching, and blistering. VTAMA Counseling: I discussed with the patient that Dala Ron is not for use in the eyes, mouth or mouth. They should call the office if they develop any signs of allergic reactions to Dala Ron. The patient verbalized understanding of the proper use and possible adverse effects of VTAMA. All of the patient's questions and concerns were addressed. Cantharidin Pregnancy And Lactation Text: This medication has not been proven safe during pregnancy. It is unknown if this medication is excreted in breast milk. Topical Metronidazole Counseling: Metronidazole is a topical antibiotic medication. You may experience burning, stinging, redness, or allergic reactions. Please call our office if you develop any problems from using this medication. Olanzapine Counseling- I discussed with the patient the common side effects of olanzapine including but are not limited to: lack of energy, dry mouth, increased appetite, sleepiness, tremor, constipation, dizziness, changes in behavior, or restlessness. Explained that teenagers are more likely to experience headaches, abdominal pain, pain in the arms or legs, tiredness, and sleepiness. Serious side effects include but are not limited: increased risk of death in elderly patients who are confused, have memory loss, or dementia-related psychosis; hyperglycemia; increased cholesterol and triglycerides; and weight gain.

## 2024-09-12 NOTE — PROGRESS NOTE ADULT - SUBJECTIVE AND OBJECTIVE BOX
ENT ISSUE/POD: right facial swelling    HPI: 84 year old, A&Ox4 Female, with PMHx of HTN, CKD, hypothyroid, depression, and OA, presented to ED for 4 days of worsening right facial/neck swelling and pain. At ED, labs are significant for leucocytosis (WBC 22.22) and creatinine of 3.03. CT neck without contrast revealed right facial cellulitis/myositis, without gross evidence of a drainable fluid collection. ENT consulted for further evaluation. Per patient, on Saturday, she went for a dental consultation for cracked tooth. No procedure was done. Then pt started noticing right facial swelling on Saturday evening. Pt presented to PCP, who later referred her to ENT, when swelling progressively worsened along with new onset of trismus in the past 4 days. outpatient ENT sent her to ED for CT scan and iv antibiotics. Neck CT revealed R. facial cellulitis and myositis. Pt reports improvement in her symptoms. Pt denies SOB, fevers, cough, dysphagia.        PAST MEDICAL & SURGICAL HISTORY:  Depression      Hypothyroid      Kidney disease      Cataract      History of rectal surgery      History of tonsillectomy        Allergies    azithromycin (Rash)    Intolerances      MEDICATIONS  (STANDING):  ampicillin/sulbactam  IVPB 3 Gram(s) IV Intermittent every 12 hours  buPROPion XL (24-Hour) . 150 milliGRAM(s) Oral daily  dexAMETHasone  IVPB 8 milliGRAM(s) IV Intermittent every 8 hours  divalproex  milliGRAM(s) Oral daily  divalproex  milliGRAM(s) Oral at bedtime  heparin   Injectable 5000 Unit(s) SubCutaneous every 12 hours  influenza  Vaccine (HIGH DOSE) 0.5 milliLiter(s) IntraMuscular once  levothyroxine 88 MICROGram(s) Oral daily  LORazepam     Tablet 1 milliGRAM(s) Oral at bedtime  pantoprazole    Tablet 40 milliGRAM(s) Oral before breakfast  venlafaxine XR. 375 milliGRAM(s) Oral daily    MEDICATIONS  (PRN):  acetaminophen     Tablet .. 650 milliGRAM(s) Oral every 6 hours PRN Temp greater or equal to 38C (100.4F), Mild Pain (1 - 3)  melatonin 3 milliGRAM(s) Oral at bedtime PRN Insomnia  polyethylene glycol 3350 17 Gram(s) Oral daily PRN Constipation  senna 2 Tablet(s) Oral at bedtime PRN Constipation      Social History: see consult    Family history: see consult    ENRIQUETA:   ENT: all negative except as noted in HPI   Pulm: denies SOB, cough, hemoptysis  Neuro: denies numbness/tingling, loss of sensation  Endo: denies heat/cold intolerance, excessive sweating      Vital Signs Last 24 Hrs  T(C): 37.2 (12 Sep 2024 04:37), Max: 37.6 (11 Sep 2024 18:50)  T(F): 99 (12 Sep 2024 04:37), Max: 99.6 (11 Sep 2024 18:50)  HR: 84 (12 Sep 2024 04:37) (84 - 94)  BP: 117/75 (12 Sep 2024 04:37) (113/71 - 140/76)  BP(mean): 76 (11 Sep 2024 22:07) (76 - 76)  RR: 18 (12 Sep 2024 04:37) (18 - 20)  SpO2: 94% (12 Sep 2024 04:37) (93% - 97%)    Parameters below as of 12 Sep 2024 04:37  Patient On (Oxygen Delivery Method): room air                              10.2   15.43 )-----------( 193      ( 12 Sep 2024 07:34 )             32.9    09-12    139  |  112<H>  |  37<H>  ----------------------------<  140<H>  4.6   |  14<L>  |  2.97<H>    Ca    9.2      12 Sep 2024 07:37    TPro  7.1  /  Alb  3.7  /  TBili  0.3  /  DBili  x   /  AST  15  /  ALT  18  /  AlkPhos  114  09-12       PHYSICAL EXAM:  Gen: NAD  Skin: right facial and neck erythema   Head: Normocephalic, Atraumatic  Face: right submandibular erythema with swelling/induration, warm to touch and TTP. No fluctuance. noted 2 black lesions (small wounds) around right submandibular region. additional erythema extends down to right platysma region and anterior neck. Warm to touch.   Eyes: no scleral injection  Nose: Nares bilaterally patent, no discharge  Mouth: dry oral mucosa. no pus expressed from Stensen's and Tacho's duct. no tenderness on gum or teeth on palpation. Mild trismus. No Stridor.   Neck: erythema on right platysma and anterior neck,  trachea midline, no masses  Lymphatic: No lymphadenopathy  Resp: breathing comfortably at room air, no stridor  CV: no peripheral edema/cyanosis  GI: nondistended   Peripheral vascular: no JVD or edema  Neuro: facial nerve intact, no facial droop

## 2024-09-13 LAB
ALBUMIN SERPL ELPH-MCNC: 3.5 G/DL — SIGNIFICANT CHANGE UP (ref 3.3–5)
ALP SERPL-CCNC: 92 U/L — SIGNIFICANT CHANGE UP (ref 40–120)
ALT FLD-CCNC: 14 U/L — SIGNIFICANT CHANGE UP (ref 10–45)
ANION GAP SERPL CALC-SCNC: 16 MMOL/L — SIGNIFICANT CHANGE UP (ref 5–17)
AST SERPL-CCNC: 10 U/L — SIGNIFICANT CHANGE UP (ref 10–40)
BILIRUB SERPL-MCNC: 0.2 MG/DL — SIGNIFICANT CHANGE UP (ref 0.2–1.2)
BUN SERPL-MCNC: 46 MG/DL — HIGH (ref 7–23)
CALCIUM SERPL-MCNC: 9.2 MG/DL — SIGNIFICANT CHANGE UP (ref 8.4–10.5)
CHLORIDE SERPL-SCNC: 111 MMOL/L — HIGH (ref 96–108)
CO2 SERPL-SCNC: 13 MMOL/L — LOW (ref 22–31)
CREAT SERPL-MCNC: 3.13 MG/DL — HIGH (ref 0.5–1.3)
EGFR: 14 ML/MIN/1.73M2 — LOW
GLUCOSE SERPL-MCNC: 135 MG/DL — HIGH (ref 70–99)
HCT VFR BLD CALC: 30.3 % — LOW (ref 34.5–45)
HGB BLD-MCNC: 9.8 G/DL — LOW (ref 11.5–15.5)
MCHC RBC-ENTMCNC: 31.7 PG — SIGNIFICANT CHANGE UP (ref 27–34)
MCHC RBC-ENTMCNC: 32.3 GM/DL — SIGNIFICANT CHANGE UP (ref 32–36)
MCV RBC AUTO: 98.1 FL — SIGNIFICANT CHANGE UP (ref 80–100)
NRBC # BLD: 0 /100 WBCS — SIGNIFICANT CHANGE UP (ref 0–0)
PLATELET # BLD AUTO: 218 K/UL — SIGNIFICANT CHANGE UP (ref 150–400)
POTASSIUM SERPL-MCNC: 4.4 MMOL/L — SIGNIFICANT CHANGE UP (ref 3.5–5.3)
POTASSIUM SERPL-SCNC: 4.4 MMOL/L — SIGNIFICANT CHANGE UP (ref 3.5–5.3)
PROT SERPL-MCNC: 6.8 G/DL — SIGNIFICANT CHANGE UP (ref 6–8.3)
RBC # BLD: 3.09 M/UL — LOW (ref 3.8–5.2)
RBC # FLD: 14.5 % — SIGNIFICANT CHANGE UP (ref 10.3–14.5)
SODIUM SERPL-SCNC: 140 MMOL/L — SIGNIFICANT CHANGE UP (ref 135–145)
WBC # BLD: 10.94 K/UL — HIGH (ref 3.8–10.5)
WBC # FLD AUTO: 10.94 K/UL — HIGH (ref 3.8–10.5)

## 2024-09-13 PROCEDURE — 99231 SBSQ HOSP IP/OBS SF/LOW 25: CPT

## 2024-09-13 RX ORDER — VENLAFAXINE HCL 75 MG
300 TABLET ORAL DAILY
Refills: 0 | Status: DISCONTINUED | OUTPATIENT
Start: 2024-09-13 | End: 2024-09-24

## 2024-09-13 RX ORDER — VENLAFAXINE HCL 75 MG
75 TABLET ORAL DAILY
Refills: 0 | Status: DISCONTINUED | OUTPATIENT
Start: 2024-09-13 | End: 2024-09-24

## 2024-09-13 RX ORDER — DIPHENHYDRAMINE HCL 12.5MG/5ML
25 LIQUID (ML) ORAL ONCE
Refills: 0 | Status: DISCONTINUED | OUTPATIENT
Start: 2024-09-13 | End: 2024-09-24

## 2024-09-13 RX ORDER — VENLAFAXINE HCL 75 MG
75 TABLET ORAL DAILY
Refills: 0 | Status: DISCONTINUED | OUTPATIENT
Start: 2024-09-13 | End: 2024-09-13

## 2024-09-13 RX ORDER — SODIUM BICARBONATE 650 MG
1300 TABLET ORAL
Refills: 0 | Status: DISCONTINUED | OUTPATIENT
Start: 2024-09-13 | End: 2024-09-14

## 2024-09-13 RX ADMIN — Medication 5000 UNIT(S): at 17:39

## 2024-09-13 RX ADMIN — Medication 650 MILLIGRAM(S): at 12:36

## 2024-09-13 RX ADMIN — AMPICILLIN, SULBACTAM 200 GRAM(S): 250; 125 INJECTION, POWDER, FOR SOLUTION INTRAMUSCULAR; INTRAVENOUS at 05:26

## 2024-09-13 RX ADMIN — Medication 650 MILLIGRAM(S): at 18:46

## 2024-09-13 RX ADMIN — Medication 250 MILLIGRAM(S): at 12:50

## 2024-09-13 RX ADMIN — PANTOPRAZOLE SODIUM 40 MILLIGRAM(S): 40 TABLET, DELAYED RELEASE ORAL at 06:49

## 2024-09-13 RX ADMIN — AMPICILLIN, SULBACTAM 200 GRAM(S): 250; 125 INJECTION, POWDER, FOR SOLUTION INTRAMUSCULAR; INTRAVENOUS at 17:50

## 2024-09-13 RX ADMIN — Medication 300 MILLIGRAM(S): at 18:43

## 2024-09-13 RX ADMIN — CHLORHEXIDINE GLUCONATE ORAL RINSE 1 APPLICATION(S): 1.2 SOLUTION DENTAL at 12:32

## 2024-09-13 RX ADMIN — Medication 650 MILLIGRAM(S): at 13:20

## 2024-09-13 RX ADMIN — Medication 650 MILLIGRAM(S): at 06:49

## 2024-09-13 RX ADMIN — Medication 500 MILLIGRAM(S): at 22:08

## 2024-09-13 RX ADMIN — Medication 1300 MILLIGRAM(S): at 17:39

## 2024-09-13 RX ADMIN — Medication 5000 UNIT(S): at 05:26

## 2024-09-13 RX ADMIN — Medication 88 MICROGRAM(S): at 05:26

## 2024-09-13 RX ADMIN — Medication 1 MILLIGRAM(S): at 22:08

## 2024-09-13 RX ADMIN — Medication 75 MILLIGRAM(S): at 19:27

## 2024-09-13 RX ADMIN — Medication 150 MILLIGRAM(S): at 12:27

## 2024-09-13 RX ADMIN — Medication 650 MILLIGRAM(S): at 19:20

## 2024-09-13 NOTE — PROGRESS NOTE ADULT - SUBJECTIVE AND OBJECTIVE BOX
ENT ISSUE/POD: right facial swelling    HPI: 84 year old, A&Ox4 Female, with PMHx of HTN, CKD, hypothyroid, depression, and OA, presented to ED for 4 days of worsening right facial/neck swelling and pain. At ED, labs are significant for leucocytosis (WBC 22.22) and creatinine of 3.03. CT neck without contrast revealed right facial cellulitis/myositis, without gross evidence of a drainable fluid collection. ENT consulted for further evaluation. Per patient, on Saturday, she went for a dental consultation for cracked tooth. No procedure was done. Then pt started noticing right facial swelling on Saturday evening. Pt presented to PCP, who later referred her to ENT, when swelling progressively worsened along with new onset of trismus in the past 4 days. outpatient ENT sent her to ED for CT scan and iv antibiotics. Neck CT revealed R. facial cellulitis and myositis. Pt reports improvement in her symptoms. Pt denies SOB, fevers, cough, dysphagia.        PAST MEDICAL & SURGICAL HISTORY:  Depression      Hypothyroid      Kidney disease      Cataract      History of rectal surgery      History of tonsillectomy        Allergies    azithromycin (Rash)    Intolerances      MEDICATIONS  (STANDING):  ampicillin/sulbactam  IVPB 3 Gram(s) IV Intermittent every 12 hours  buPROPion XL (24-Hour) . 150 milliGRAM(s) Oral daily  chlorhexidine 2% Cloths 1 Application(s) Topical daily  diphenhydrAMINE 25 milliGRAM(s) Oral once  divalproex  milliGRAM(s) Oral daily  divalproex  milliGRAM(s) Oral at bedtime  heparin   Injectable 5000 Unit(s) SubCutaneous every 12 hours  influenza  Vaccine (HIGH DOSE) 0.5 milliLiter(s) IntraMuscular once  levothyroxine 88 MICROGram(s) Oral daily  LORazepam     Tablet 1 milliGRAM(s) Oral at bedtime  pantoprazole    Tablet 40 milliGRAM(s) Oral before breakfast  sodium bicarbonate 650 milliGRAM(s) Oral two times a day  venlafaxine XR. 375 milliGRAM(s) Oral daily    MEDICATIONS  (PRN):  acetaminophen     Tablet .. 650 milliGRAM(s) Oral every 6 hours PRN Temp greater or equal to 38C (100.4F), Mild Pain (1 - 3)  melatonin 3 milliGRAM(s) Oral at bedtime PRN Insomnia  polyethylene glycol 3350 17 Gram(s) Oral daily PRN Constipation  senna 2 Tablet(s) Oral at bedtime PRN Constipation      Social History: see consult    Family history: see consult    ROS:   ENT: all negative except as noted in HPI   Pulm: denies SOB, cough, hemoptysis  Neuro: denies numbness/tingling, loss of sensation  Endo: denies heat/cold intolerance, excessive sweating      Vital Signs Last 24 Hrs  T(C): 36.5 (13 Sep 2024 05:16), Max: 36.8 (12 Sep 2024 21:11)  T(F): 97.7 (13 Sep 2024 05:16), Max: 98.2 (12 Sep 2024 21:11)  HR: 91 (13 Sep 2024 05:16) (83 - 91)  BP: 120/71 (13 Sep 2024 05:16) (116/71 - 125/72)  BP(mean): --  RR: 18 (13 Sep 2024 05:16) (18 - 18)  SpO2: 95% (13 Sep 2024 05:16) (95% - 99%)    Parameters below as of 13 Sep 2024 05:16  Patient On (Oxygen Delivery Method): room air                              10.2   15.43 )-----------( 193      ( 12 Sep 2024 07:34 )             32.9    09-12    139  |  112<H>  |  37<H>  ----------------------------<  140<H>  4.6   |  14<L>  |  2.97<H>    Ca    9.2      12 Sep 2024 07:37    TPro  7.1  /  Alb  3.7  /  TBili  0.3  /  DBili  x   /  AST  15  /  ALT  18  /  AlkPhos  114  09-12       PHYSICAL EXAM:  Gen: NAD  Skin: right facial and neck erythema   Head: Normocephalic, Atraumatic  Face: right submandibular erythema with swelling/induration, warm to touch and TTP. No fluctuance. noted 2 black crusts around right submandibular region. additional erythema extends down to right platysma region and anterior neck. Warm to touch.   Eyes: no scleral injection  Nose: Nares bilaterally patent, no discharge  Mouth: dry oral mucosa. no pus expressed from Stensen's and Tacho's duct. no tenderness on gum or teeth on palpation. Mild trismus. No Stridor.   Neck: erythema on right platysma and anterior neck,  trachea midline, no masses  Lymphatic: No lymphadenopathy  Resp: breathing comfortably at room air, no stridor  CV: no peripheral edema/cyanosis  GI: nondistended   Peripheral vascular: no JVD or edema  Neuro: facial nerve intact, no facial droop

## 2024-09-13 NOTE — PROGRESS NOTE ADULT - ASSESSMENT
Patient is a 84 year old female with PMHx of Anemia, Depression, CKD, GERD and Hypothyroidism. Presents to [facility] for worsening redness and swelling on her face for the past few days.    Plan:    # Facial cellulitis:   - + leukocytosis, BCx w/ NGTD  - No drainable collection on CT , s/p laryngoscope no airway involvement   - Abx mgmt per ID  - S/p Dexamethasone 8MG Q8H for 24H only per ENT  - Encourage oral hydration   - Minced and moist diet  - Monitor temps/WBC  - ID/ENT following    # CKD:  - Bun/Cr 38/3.03  - Monitor I/O's  - Serial Cr  - Avoid nephrotoxins  - Renally dose medications  - C/w NaHCO3  - Renal following    # Depression:  - C/w current meds    # LPRD (laryngopharyngeal reflux disease):  - ENT recommendations appreciated   - PPI    # Hypothyroidism:  - C/w Synthroid    # DVT ppx:  - Heparin subq    [facility]       5-Fu Pregnancy And Lactation Text: This medication is Pregnancy Category X and contraindicated in pregnancy and in women who may become pregnant. It is unknown if this medication is excreted in breast milk.

## 2024-09-13 NOTE — PROGRESS NOTE ADULT - SUBJECTIVE AND OBJECTIVE BOX
[facility]  LOUANN Dubois Y. Patel, S. Shah, G. David  [phone]  ([phone] - weekdays after 5pm and weekends)    Name: JOANNA MALDONADO  Age/Gender: 84y Female  MRN: [id number]    Interval History:  Patient seen and examined this morning.   Feels better, no new complaints.   Notes reviewed  No concerning overnight events  Afebrile   Allergies: azithromycin (Rash)      Objective:  Vitals:   T(F): 97.7 (09-13-24 @ 05:16), Max: 98.2 (09-12-24 @ 21:11)  HR: 91 (09-13-24 @ 05:16) (83 - 91)  BP: 120/71 (09-13-24 @ 05:16) (116/71 - 125/72)  RR: 18 (09-13-24 @ 05:16) (18 - 18)  SpO2: 95% (09-13-24 @ 05:16) (95% - 99%)  Physical Examination:  General: no acute distress  HEENT: NC/AT, anicteric, EOMI  Respiratory: no acc muscle use, breathing comfortably  Cardiovascular: S1 and S2 present  Gastrointestinal: normal appearing, nondistended  Extremities: no edema, no cyanosis  Skin: dec R facial/submandibular erythema, swelling and warmth    Laboratory Studies:  CBC:                       9.8    10.94 )-----------( 218      ( 13 Sep 2024 07:15 )             30.3     WBC Trend:  10.94 09-13-24 @ 07:15  15.43 09-12-24 @ 07:34  22.22 09-11-24 @ 17:18    CMP: 09-13    140  |  111<H>  |  46<H>  ----------------------------<  135<H>  4.4   |  13<L>  |  3.13<H>    Ca    9.2      13 Sep 2024 07:08    TPro  6.8  /  Alb  3.5  /  TBili  0.2  /  DBili  x   /  AST  10  /  ALT  14  /  AlkPhos  92  09-13    Creatinine: 3.13 mg/dL (09-13-24 @ 07:08)  Creatinine: 2.97 mg/dL (09-12-24 @ 07:37)  Creatinine: 3.03 mg/dL (09-11-24 @ 17:18)    LIVER FUNCTIONS - ( 13 Sep 2024 07:08 )  Alb: 3.5 g/dL / Pro: 6.8 g/dL / ALK PHOS: 92 U/L / ALT: 14 U/L / AST: 10 U/L / GGT: x           Microbiology: reviewed   Culture - Blood (collected 09-11-24 @ 18:40)  Source: .Blood Blood-Peripheral  Preliminary Report (09-13-24 @ 02:02):    No growth at 24 hours    Culture - Blood (collected 09-11-24 @ 18:30)  Source: .Blood Blood-Peripheral  Preliminary Report (09-13-24 @ 02:02):    No growth at 24 hours    Radiology: reviewed     Medications:  acetaminophen     Tablet .. 650 milliGRAM(s) Oral every 6 hours PRN  ampicillin/sulbactam  IVPB 3 Gram(s) IV Intermittent every 12 hours  buPROPion XL (24-Hour) . 150 milliGRAM(s) Oral daily  chlorhexidine 2% Cloths 1 Application(s) Topical daily  diphenhydrAMINE 25 milliGRAM(s) Oral once  divalproex  milliGRAM(s) Oral daily  divalproex  milliGRAM(s) Oral at bedtime  heparin   Injectable 5000 Unit(s) SubCutaneous every 12 hours  influenza  Vaccine (HIGH DOSE) 0.5 milliLiter(s) IntraMuscular once  levothyroxine 88 MICROGram(s) Oral daily  LORazepam     Tablet 1 milliGRAM(s) Oral at bedtime  melatonin 3 milliGRAM(s) Oral at bedtime PRN  pantoprazole    Tablet 40 milliGRAM(s) Oral before breakfast  polyethylene glycol 3350 17 Gram(s) Oral daily PRN  senna 2 Tablet(s) Oral at bedtime PRN  sodium bicarbonate 1300 milliGRAM(s) Oral two times a day  venlafaxine XR. 375 milliGRAM(s) Oral daily    Current Antimicrobials:  ampicillin/sulbactam  IVPB 3 Gram(s) IV Intermittent every 12 hours    Prior/Completed Antimicrobials:  ampicillin/sulbactam  IVPB

## 2024-09-13 NOTE — PROGRESS NOTE ADULT - ASSESSMENT
A/P    Patient is an 84-year-old female with a history of anemia, hypothyroidism, depression, GERD, bipolar, CKD, presents for redness and swelling on her face for the past few days.     #Right facial swelling and redness, Leukocytosis   -improving  -CT Neck 9/11/24 shows Right facial cellulitis/myositis, without gross evidence of a drainable fluid collection.  -CXR 9/11/24 shows Clear lungs  -trend WBC; improving  -ENT and ID following  -abx per ID    #CKD  -Trend Creat   -nephrology f/u    #Hx of frequent falls  -pt describes random "zoning out/staring episodes" and then falling (has been occurring for about 2.5 years per pt)  -pt denies LOC during episodes; denies CP or SOB  -orthostatics negative  -check TTE  -check tele  -neuro f/u    #Aortic Stenosis  -moderate AS on outpt TTE 5/24  -cont to monitor clinically with yearly TTE    #Cardiomyopathy, HFrEF  -outpatient TTE 5/24 shows LV systolic function is moderately reduced, moderate global hypokinesis; EF 40%; mild to moderate aortic regurgitation; moderate aortic stenosis  -no evidence of volume overload/HF  -no diuretic need  -per outpt visit, patient and son do not want to proceed with ischemic eval and do not want to start lv dysfxn medications as she remains asymptomatic    dvt ppx

## 2024-09-13 NOTE — PROGRESS NOTE ADULT - ASSESSMENT
Patient is an 84-year-old female with a history of anemia, hypothyroidism, depression, GERD, bipolar, CKD who presents for redness and swelling on her face for the past few days. Patient reports right-sided facial swelling and redness including her neck and on her lower jaw that started on 9/8/24, worsening over the past few days. She reports  a dental exam for cracked teeth on day prior but no work performed. States someone told her she had 2 small pimple like lesion on her face, currently scabbed over.    R facial cellulitis  Leukocytosis due to above   - R lower face/neck erythema with induration, warmth and TTP with 2 small scabs noted--likely portal of entry  - CT neck soft tissue with R facial cellulitis/myositis, no e/o drainable fluid collection  - s/p dexamethasone x24h, remains on unasyn with continued improvement  - WBC improved, remains afebrile   - Bcx NGTD   - ENT following    Recommendations:  Continue on unasyn 3g IV Q12h (renally dosed)  If continues to improve over next 24h - can d/c on augmentin 500mg PO BID until 9/20  -patient will follow up with us as outpatient next week after discharge   Continue rest of care per primary team     Over the weekend Dr. Marybeth Barnett will be covering for our group. If you have any questions, concerns or new micro data, please reach out to them at [phone]     Ash Graham M.D.  [facility], Division of Infectious Diseases  [phone]  After 5pm on weekdays and all day on weekends - please call [phone]  Available on [employer] TEAMS

## 2024-09-13 NOTE — PROGRESS NOTE ADULT - ASSESSMENT
84 year old, A&Ox4 Female, with PMHx of HTN, CKD, hypothyroid, depression, and OA, presented to ED for 4 days of worsening right facial/neck swelling and pain. At ED, labs are significant for leucocytosis (WBC 22.22) and creatinine of 3.03. CT neck without contrast revealed right facial cellulitis/myositis, without gross evidence of a drainable fluid collection. ENT consulted for further evaluation. Per patient, on Saturday, she went for a dental consultation for cracked tooth. No procedure was done. Then pt started noticing right facial swelling on Saturday evening. Pt presented to PCP, who later referred her to ENT, when swelling progressively worsened along with new onset of trismus in the past 4 days. outpatient ENT sent her to ED for CT scan and iv antibiotics. Neck CT revealed R. facial cellulitis and myositis, no collection. Exam: R. submandibular swelling, induration, and erythema decreased slightly. Pt reports improvement in her symptoms. Pt with decreased pain. Tolerating a regular diet. Afebrile. WBC 15.3

## 2024-09-13 NOTE — PROGRESS NOTE ADULT - SUBJECTIVE AND OBJECTIVE BOX
Overnight events noted      VITAL:  T(C): , Max: 36.8 (09-12-24 @ 21:11)  T(F): , Max: 98.2 (09-12-24 @ 21:11)  HR: 91 (09-13-24 @ 05:16)  BP: 120/71 (09-13-24 @ 05:16)  RR: 18 (09-13-24 @ 05:16)  SpO2: 95% (09-13-24 @ 05:16)    121/75, 85 lying==>126/73, 101 standing      PHYSICAL EXAM:  Constitutional: NAD, Alert  HEENT: (+)R perimandibular erythema/edema  Neck: Supple, No JVD  Respiratory: CTA-b/l  Cardiovascular: RRR s1s2, no m/r/g  Gastrointestinal: BS+, soft, NT/ND  Extremities: No peripheral edema b/l  Neurological: no focal deficits; strength grossly intact  Back: no CVAT b/l  Skin: (+)R perimandibular erythema    LABS:                        9.8    10.94 )-----------( 218      ( 13 Sep 2024 07:15 )             30.3     Na(140)/K(4.4)/Cl(111)/HCO3(13)/BUN(46)/Cr(3.13)Glu(135)/Ca(9.2)/Mg(--)/PO4(--)    09-13 @ 07:08  Na(139)/K(4.6)/Cl(112)/HCO3(14)/BUN(37)/Cr(2.97)Glu(140)/Ca(9.2)/Mg(--)/PO4(--)    09-12 @ 07:37  Na(139)/K(4.8)/Cl(109)/HCO3(15)/BUN(38)/Cr(3.03)Glu(83)/Ca(9.3)/Mg(--)/PO4(--)    09-11 @ 17:18      IMPRESSION: 84F w/ dementia, bipolar disorder, and CKD4-5, 9/11/24 p/w R facial cellulitis    (1)Renal - nonproteinuric CKD4 with atrophic R kidney. Grossly stable numbers; GFR ~15-17ml/min  (2)Metabolic acidosis - renally mediated - worsening   (3)ID - R facial celluitis - on IV Unasyn  (4)Neuro - "aura" - not orthostatic - etiology?      RECOMMEND:  (1)Increase NaHCO3 from 650bid to 1300bid  (2)F/U Neuro input  (3)Abx for GFR 15-20ml/min - present dosing is acceptable  (4)BMP daily while admitted  (5)If for discharge, she can f/u at my office as previously scheduled on Wed 10/23/24 at 1040a    Ab Tripathi MD  [facility]  Office/on call physician: [phone]  Cell (7a-7p): [phone]       Son Nadeem at bedside  no complaints      VITAL:  T(C): , Max: 36.8 (09-12-24 @ 21:11)  T(F): , Max: 98.2 (09-12-24 @ 21:11)  HR: 91 (09-13-24 @ 05:16)  BP: 120/71 (09-13-24 @ 05:16)  RR: 18 (09-13-24 @ 05:16)  SpO2: 95% (09-13-24 @ 05:16)    121/75, 85 lying==>126/73, 101 standing      PHYSICAL EXAM:  Constitutional: NAD, Alert  HEENT: (+)R perimandibular erythema/edema  Neck: Supple, No JVD  Respiratory: CTA-b/l  Cardiovascular: RRR s1s2, no m/r/g  Gastrointestinal: BS+, soft, NT/ND  Extremities: No peripheral edema b/l  Neurological: no focal deficits; strength grossly intact  Back: no CVAT b/l  Skin: (+)R perimandibular erythema    LABS:                        9.8    10.94 )-----------( 218      ( 13 Sep 2024 07:15 )             30.3     Na(140)/K(4.4)/Cl(111)/HCO3(13)/BUN(46)/Cr(3.13)Glu(135)/Ca(9.2)/Mg(--)/PO4(--)    09-13 @ 07:08  Na(139)/K(4.6)/Cl(112)/HCO3(14)/BUN(37)/Cr(2.97)Glu(140)/Ca(9.2)/Mg(--)/PO4(--)    09-12 @ 07:37  Na(139)/K(4.8)/Cl(109)/HCO3(15)/BUN(38)/Cr(3.03)Glu(83)/Ca(9.3)/Mg(--)/PO4(--)    09-11 @ 17:18      IMPRESSION: 84F w/ dementia, bipolar disorder, and CKD4-5, 9/11/24 p/w R facial cellulitis    (1)Renal - nonproteinuric CKD4 with atrophic R kidney. Grossly stable numbers; GFR ~15-17ml/min  (2)Metabolic acidosis - renally mediated - worsening   (3)ID - R facial celluitis - on IV Unasyn  (4)Neuro - "aura" - not orthostatic - etiology?      RECOMMEND:  (1)Increase NaHCO3 from 650bid to 1300bid  (2)F/U Neuro input  (3)Abx for GFR 15-20ml/min - present dosing is acceptable  (4)BMP daily while admitted  (5)If for discharge, she can f/u at my office as previously scheduled on Wed 10/23/24 at 1040a    Ab Tripathi MD  [facility]  Office/on call physician: [phone]  Cell (7a-7p): [phone]

## 2024-09-13 NOTE — CONSULT NOTE ADULT - SUBJECTIVE AND OBJECTIVE BOX
Neurology Consult    Reason for Consult: Patient is a 84y old  Female who presents with a chief complaint of R face swelling and redness (13 Sep 2024 10:20)      HPI:  Patient is an 84-year-old female with a history of anemia, hypothyroidism, depression, GERD, bipolar, CKD , presents for redness and swelling on her face for the past few days.   Patient reports  right-sided facial swelling and redness including her neck and on her lower jaw that started on 9/8/24, worsening over the past few days. she reports  a dental exam for cracked teeth on day prior but no work performed . she reports no fevers. She was evaluated by her PMD on day of admission and subsequently referred to ENT who referred her to the hospital. She reports   ED course : evaluated by ENT s/p laryngoscope , no airway involvement   (11 Sep 2024 21:10)       PAST MEDICAL & SURGICAL HISTORY:  Depression      Hypothyroid      Kidney disease      Cataract      History of rectal surgery      History of tonsillectomy          Allergies: Allergies    azithromycin (Rash)    Intolerances        Social History: Denies toxic habits including tobacco, ETOH or illicit drugs.    Family History: FAMILY HISTORY:  . No family history of strokes    Medications: MEDICATIONS  (STANDING):  ampicillin/sulbactam  IVPB 3 Gram(s) IV Intermittent every 12 hours  buPROPion XL (24-Hour) . 150 milliGRAM(s) Oral daily  chlorhexidine 2% Cloths 1 Application(s) Topical daily  diphenhydrAMINE 25 milliGRAM(s) Oral once  divalproex  milliGRAM(s) Oral daily  divalproex  milliGRAM(s) Oral at bedtime  heparin   Injectable 5000 Unit(s) SubCutaneous every 12 hours  influenza  Vaccine (HIGH DOSE) 0.5 milliLiter(s) IntraMuscular once  levothyroxine 88 MICROGram(s) Oral daily  LORazepam     Tablet 1 milliGRAM(s) Oral at bedtime  pantoprazole    Tablet 40 milliGRAM(s) Oral before breakfast  sodium bicarbonate 1300 milliGRAM(s) Oral two times a day  venlafaxine XR. 375 milliGRAM(s) Oral daily    MEDICATIONS  (PRN):  acetaminophen     Tablet .. 650 milliGRAM(s) Oral every 6 hours PRN Temp greater or equal to 38C (100.4F), Mild Pain (1 - 3)  melatonin 3 milliGRAM(s) Oral at bedtime PRN Insomnia  polyethylene glycol 3350 17 Gram(s) Oral daily PRN Constipation  senna 2 Tablet(s) Oral at bedtime PRN Constipation      Review of Systems:  CONSTITUTIONAL:  No weight loss, fever, chills, weakness or fatigue.  HEENT:  Eyes:  No visual loss, blurred vision, double vision or yellow sclera. Ears, Nose, Throat:  No hearing loss, sneezing, congestion, runny nose or sore throat.  SKIN:  No rash or itching.  CARDIOVASCULAR:  No chest pain, chest pressure or chest discomfort. No palpitations or edema.  RESPIRATORY:  No shortness of breath, cough or sputum.  GASTROINTESTINAL:  No anorexia, nausea, vomiting or diarrhea. No abdominal pain or blood.  GENITOURINARY:  No burning on urination or incontinence   NEUROLOGICAL:  No headache, dizziness, syncope, paralysis, ataxia, numbness or tingling in the extremities. No change in bowel or bladder control. no limb weakness. no vision changes.   MUSCULOSKELETAL:  No muscle, back pain, joint pain or stiffness.  HEMATOLOGIC:  No anemia, bleeding or bruising.  LYMPHATICS:  No enlarged nodes. No history of splenectomy.  PSYCHIATRIC:  No history of depression or anxiety.  ENDOCRINOLOGIC:  No reports of sweating, cold or heat intolerance. No polyuria or polydipsia.      Vitals:  Vital Signs Last 24 Hrs  T(C): 36.5 (13 Sep 2024 05:16), Max: 36.8 (12 Sep 2024 21:11)  T(F): 97.7 (13 Sep 2024 05:16), Max: 98.2 (12 Sep 2024 21:11)  HR: 91 (13 Sep 2024 05:16) (83 - 91)  BP: 120/71 (13 Sep 2024 05:16) (116/71 - 125/72)  BP(mean): --  RR: 18 (13 Sep 2024 05:16) (18 - 18)  SpO2: 95% (13 Sep 2024 05:16) (95% - 99%)    Parameters below as of 13 Sep 2024 05:16  Patient On (Oxygen Delivery Method): room air        General Exam:   General Appearance: Appropriately dressed and in no acute distress       Head: Normocephalic, atraumatic and no dysmorphic features  Ear, Nose, and Throat: Moist mucous membranes  CVS: S1S2+  Resp: No SOB, no wheeze or rhonchi  GI: soft NT/ND  Extremities: No edema or cyanosis  Skin:  redness on face      Neurological Exam:  Mental Status: Awake, alert and oriented x 3.  Able to follow simple and complex verbal commands. Able to name and repeat. fluent speech. No obvious aphasia or dysarthria noted.   Cranial Nerves: PERRL, EOMI, VFFC, sensation V1-V3 intact,  no obvious facial asymmetry, equal elevation of palate, scm/trap 5/5, tongue is midline on protrusion. no obvious papilledema on fundoscopic exam. hearing is grossly intact.   Motor: Normal bulk, tone and strength throughout uppers ; lowers 3-4/5   Sensation: Intact to light touch and pinprick throughout. no right/left confusion. no extinction to tactile on DSS.   Coordination: No dysmetria on FNF or HKS  Gait: walker /wheelchair     Data/Labs/Imaging which I personally reviewed.     Labs:     CBC Full  -  ( 13 Sep 2024 07:15 )  WBC Count : 10.94 K/uL  RBC Count : 3.09 M/uL  Hemoglobin : 9.8 g/dL  Hematocrit : 30.3 %  Platelet Count - Automated : 218 K/uL  Mean Cell Volume : 98.1 fl  Mean Cell Hemoglobin : 31.7 pg  Mean Cell Hemoglobin Concentration : 32.3 gm/dL  Auto Neutrophil # : x  Auto Lymphocyte # : x  Auto Monocyte # : x  Auto Eosinophil # : x  Auto Basophil # : x  Auto Neutrophil % : x  Auto Lymphocyte % : x  Auto Monocyte % : x  Auto Eosinophil % : x  Auto Basophil % : x    09-13    140  |  111<H>  |  46<H>  ----------------------------<  135<H>  4.4   |  13<L>  |  3.13<H>    Ca    9.2      13 Sep 2024 07:08    TPro  6.8  /  Alb  3.5  /  TBili  0.2  /  DBili  x   /  AST  10  /  ALT  14  /  AlkPhos  92  09-13    LIVER FUNCTIONS - ( 13 Sep 2024 07:08 )  Alb: 3.5 g/dL / Pro: 6.8 g/dL / ALK PHOS: 92 U/L / ALT: 14 U/L / AST: 10 U/L / GGT: x             Urinalysis Basic - ( 13 Sep 2024 07:08 )    Color: x / Appearance: x / SG: x / pH: x  Gluc: 135 mg/dL / Ketone: x  / Bili: x / Urobili: x   Blood: x / Protein: x / Nitrite: x   Leuk Esterase: x / RBC: x / WBC x   Sq Epi: x / Non Sq Epi: x / Bacteria: x    < from: CT Head No Cont (07.31.24 @ 15:06) >    ACC: [id number] EXAM:  CT CERVICAL SPINE   ORDERED BY: CHANTEL GERBER     ACC: [id number] EXAM:  CT BRAIN   ORDERED BY: RAHEEL LARSON     PROCEDURE DATE:  07/31/2024          INTERPRETATION:  CLINICAL INFORMATION: fall, slipped out of wheelchair at   midnight/possibly fell asleep while watching TV, R arm/L hand skin tears   - dressed by dtr, denies neck/body pain, no AC use    COMPARISON: Head CT 6/22/2024    CONTRAST:  IV Contrast: NONE  Complications: None reported at time of study completion    TECHNIQUE:    CT BRAIN: Serial axial images were obtained from the skull base to the   vertex using multi-slice helical technique. Sagittal and coronal   reformats were obtained.    CT CERVICAL SPINE: Axial images were obtained of the cervical spineusing   multislice helical technique. Reformatted coronal and sagittal images   were obtained.    FINDINGS:    CT BRAIN:    VENTRICLES AND SULCI: Age appropriate involutional changes.  INTRA-AXIAL: No mass effect, acute hemorrhage, or midline shift.There   are periventricular and subcortical white matter hypodensities,   consistent with microvascular type changes.  EXTRA-AXIAL: No mass or fluid collection. Basal cisterns are normal in   appearance.    VISUALIZED SINUSES:  Clear.  TYMPANOMASTOIDCAVITIES:  Clear.  VISUALIZED ORBITS: Bilateral lens replacement.  CALVARIUM: Intact.    MISCELLANEOUS: None.      CT CERVICAL SPINE:    VERTEBRAE:  Normal in height. No acute fracture. Multilevel degenerative   changes including marginal osteophytes.  ALIGNMENT: No subluxation or scoliosis.  INTERVERTEBRAL DISC SPACES: Multilevel degenerative disc osteophyte   complexes. Multilevel bilateral moderate to severe neural foraminal   stenoses.    VISUALIZED LUNGS: Bilateral chronic appearing interstitial prominence.    MISCELLANEOUS:  None.      IMPRESSION:    CT HEAD:  No evidence of acute intracranial pathology.    CT CERVICAL SPINE:  No acute fracture or traumatic subluxation.    Multi-level degenerative changes.        --- End of Report ---            STEVE ALLEN MD; Attending Radiologist  This document has been electronically signed. Jul 31 2024  3:18PM    < end of copied text >

## 2024-09-13 NOTE — PROGRESS NOTE ADULT - SUBJECTIVE AND OBJECTIVE BOX
SUBJECTIVE / OVERNIGHT EVENTS:      Patient seen and examined at bedside. Resting comfortably in bed      --------------------------------------------------------------------------------------------  LABS:                        9.8    10.94 )-----------( 218      ( 13 Sep 2024 07:15 )             30.3     09-13    140  |  111<H>  |  46<H>  ----------------------------<  135<H>  4.4   |  13<L>  |  3.13<H>    Ca    9.2      13 Sep 2024 07:08    TPro  6.8  /  Alb  3.5  /  TBili  0.2  /  DBili  x   /  AST  10  /  ALT  14  /  AlkPhos  92  09-13      CAPILLARY BLOOD GLUCOSE            Urinalysis Basic - ( 13 Sep 2024 07:08 )    Color: x / Appearance: x / SG: x / pH: x  Gluc: 135 mg/dL / Ketone: x  / Bili: x / Urobili: x   Blood: x / Protein: x / Nitrite: x   Leuk Esterase: x / RBC: x / WBC x   Sq Epi: x / Non Sq Epi: x / Bacteria: x        RADIOLOGY & ADDITIONAL TESTS: < from: Xray Chest 1 View- PORTABLE-Urgent (Xray Chest 1 View- PORTABLE-Urgent .) (09.11.24 @ 19:02) >  IMPRESSION:    Clear lungs.    < end of copied text >      Imaging Personally Reviewed:  [x] YES  [ ] NO    Consultant(s) Notes Reviewed:  [x] YES  [ ] NO    MEDICATIONS  (STANDING):  ampicillin/sulbactam  IVPB 3 Gram(s) IV Intermittent every 12 hours  buPROPion XL (24-Hour) . 150 milliGRAM(s) Oral daily  chlorhexidine 2% Cloths 1 Application(s) Topical daily  diphenhydrAMINE 25 milliGRAM(s) Oral once  divalproex  milliGRAM(s) Oral daily  divalproex  milliGRAM(s) Oral at bedtime  heparin   Injectable 5000 Unit(s) SubCutaneous every 12 hours  influenza  Vaccine (HIGH DOSE) 0.5 milliLiter(s) IntraMuscular once  levothyroxine 88 MICROGram(s) Oral daily  LORazepam     Tablet 1 milliGRAM(s) Oral at bedtime  pantoprazole    Tablet 40 milliGRAM(s) Oral before breakfast  sodium bicarbonate 1300 milliGRAM(s) Oral two times a day  venlafaxine XR. 375 milliGRAM(s) Oral daily    MEDICATIONS  (PRN):  acetaminophen     Tablet .. 650 milliGRAM(s) Oral every 6 hours PRN Temp greater or equal to 38C (100.4F), Mild Pain (1 - 3)  melatonin 3 milliGRAM(s) Oral at bedtime PRN Insomnia  polyethylene glycol 3350 17 Gram(s) Oral daily PRN Constipation  senna 2 Tablet(s) Oral at bedtime PRN Constipation      Care Discussed with Consultants/Other Providers [x] YES  [ ] NO    Vital Signs Last 24 Hrs  T(C): 36.5 (13 Sep 2024 05:16), Max: 36.8 (12 Sep 2024 21:11)  T(F): 97.7 (13 Sep 2024 05:16), Max: 98.2 (12 Sep 2024 21:11)  HR: 91 (13 Sep 2024 05:16) (83 - 91)  BP: 120/71 (13 Sep 2024 05:16) (116/71 - 125/72)  BP(mean): --  RR: 18 (13 Sep 2024 05:16) (18 - 18)  SpO2: 95% (13 Sep 2024 05:16) (95% - 99%)    Parameters below as of 13 Sep 2024 05:16  Patient On (Oxygen Delivery Method): room air      I&O's Summary    12 Sep 2024 07:01  -  13 Sep 2024 07:00  --------------------------------------------------------  IN: 150 mL / OUT: 1200 mL / NET: -1050 mL      PHYSICAL EXAM:  GENERAL: NAD, well-developed, comfortable  HEENT: right sided facial swelling and neck erythema; TTP  CHEST/LUNG: Clear to auscultation bilaterally; No wheeze  HEART: Regular rate and rhythm; No murmurs, rubs, or gallops  ABDOMEN: Soft, Nontender, Nondistended; Bowel sounds present  NEURO: AAOx3, no focal weakness, 5/5 b/l extremity strength, b/l knee no arthritis, no effusion   EXTREMITIES:  2+ Peripheral Pulses, No clubbing, cyanosis, or edema  SKIN: No rashes or lesions

## 2024-09-13 NOTE — PROGRESS NOTE ADULT - PROBLEM SELECTOR PLAN 1
- d/c decadron   - IV ABX per ID   - encourage oral hydration (dehydrated oral mucosa)   - Please page or call [phone] if concerns or issues. Statement Selected

## 2024-09-14 LAB
ANION GAP SERPL CALC-SCNC: 15 MMOL/L — SIGNIFICANT CHANGE UP (ref 5–17)
BUN SERPL-MCNC: 53 MG/DL — HIGH (ref 7–23)
CALCIUM SERPL-MCNC: 8.7 MG/DL — SIGNIFICANT CHANGE UP (ref 8.4–10.5)
CHLORIDE SERPL-SCNC: 109 MMOL/L — HIGH (ref 96–108)
CO2 SERPL-SCNC: 14 MMOL/L — LOW (ref 22–31)
CREAT SERPL-MCNC: 3.15 MG/DL — HIGH (ref 0.5–1.3)
EGFR: 14 ML/MIN/1.73M2 — LOW
GLUCOSE SERPL-MCNC: 65 MG/DL — LOW (ref 70–99)
HCT VFR BLD CALC: 29.3 % — LOW (ref 34.5–45)
HGB BLD-MCNC: 9.4 G/DL — LOW (ref 11.5–15.5)
MCHC RBC-ENTMCNC: 31.3 PG — SIGNIFICANT CHANGE UP (ref 27–34)
MCHC RBC-ENTMCNC: 32.1 GM/DL — SIGNIFICANT CHANGE UP (ref 32–36)
MCV RBC AUTO: 97.7 FL — SIGNIFICANT CHANGE UP (ref 80–100)
NRBC # BLD: 0 /100 WBCS — SIGNIFICANT CHANGE UP (ref 0–0)
PLATELET # BLD AUTO: 244 K/UL — SIGNIFICANT CHANGE UP (ref 150–400)
POTASSIUM SERPL-MCNC: 3.9 MMOL/L — SIGNIFICANT CHANGE UP (ref 3.5–5.3)
POTASSIUM SERPL-SCNC: 3.9 MMOL/L — SIGNIFICANT CHANGE UP (ref 3.5–5.3)
RBC # BLD: 3 M/UL — LOW (ref 3.8–5.2)
RBC # FLD: 14.5 % — SIGNIFICANT CHANGE UP (ref 10.3–14.5)
SODIUM SERPL-SCNC: 138 MMOL/L — SIGNIFICANT CHANGE UP (ref 135–145)
WBC # BLD: 12.58 K/UL — HIGH (ref 3.8–10.5)
WBC # FLD AUTO: 12.58 K/UL — HIGH (ref 3.8–10.5)

## 2024-09-14 RX ORDER — SODIUM BICARBONATE 650 MG
1300 TABLET ORAL THREE TIMES A DAY
Refills: 0 | Status: DISCONTINUED | OUTPATIENT
Start: 2024-09-14 | End: 2024-09-24

## 2024-09-14 RX ADMIN — CHLORHEXIDINE GLUCONATE ORAL RINSE 1 APPLICATION(S): 1.2 SOLUTION DENTAL at 13:04

## 2024-09-14 RX ADMIN — Medication 650 MILLIGRAM(S): at 06:15

## 2024-09-14 RX ADMIN — AMPICILLIN, SULBACTAM 200 GRAM(S): 250; 125 INJECTION, POWDER, FOR SOLUTION INTRAMUSCULAR; INTRAVENOUS at 05:42

## 2024-09-14 RX ADMIN — PANTOPRAZOLE SODIUM 40 MILLIGRAM(S): 40 TABLET, DELAYED RELEASE ORAL at 07:12

## 2024-09-14 RX ADMIN — Medication 1300 MILLIGRAM(S): at 21:33

## 2024-09-14 RX ADMIN — Medication 5000 UNIT(S): at 17:27

## 2024-09-14 RX ADMIN — Medication 5000 UNIT(S): at 05:42

## 2024-09-14 RX ADMIN — Medication 500 MILLIGRAM(S): at 21:33

## 2024-09-14 RX ADMIN — Medication 250 MILLIGRAM(S): at 13:04

## 2024-09-14 RX ADMIN — Medication 650 MILLIGRAM(S): at 05:45

## 2024-09-14 RX ADMIN — Medication 88 MICROGRAM(S): at 05:42

## 2024-09-14 RX ADMIN — AMPICILLIN, SULBACTAM 200 GRAM(S): 250; 125 INJECTION, POWDER, FOR SOLUTION INTRAMUSCULAR; INTRAVENOUS at 17:28

## 2024-09-14 RX ADMIN — Medication 650 MILLIGRAM(S): at 13:35

## 2024-09-14 RX ADMIN — Medication 1 MILLIGRAM(S): at 21:33

## 2024-09-14 RX ADMIN — Medication 1300 MILLIGRAM(S): at 07:12

## 2024-09-14 RX ADMIN — Medication 650 MILLIGRAM(S): at 13:05

## 2024-09-14 RX ADMIN — Medication 150 MILLIGRAM(S): at 13:03

## 2024-09-14 RX ADMIN — Medication 300 MILLIGRAM(S): at 13:03

## 2024-09-14 RX ADMIN — Medication 75 MILLIGRAM(S): at 13:04

## 2024-09-14 NOTE — PROGRESS NOTE ADULT - SUBJECTIVE AND OBJECTIVE BOX
NEPHROLOGY[facility] [phone]        Patient seen and examined, she states she has pain on her right mandible        MEDICATIONS  (STANDING):  ampicillin/sulbactam  IVPB 3 Gram(s) IV Intermittent every 12 hours  buPROPion XL (24-Hour) . 150 milliGRAM(s) Oral daily  chlorhexidine 2% Cloths 1 Application(s) Topical daily  diphenhydrAMINE 25 milliGRAM(s) Oral once  divalproex  milliGRAM(s) Oral daily  divalproex  milliGRAM(s) Oral at bedtime  heparin   Injectable 5000 Unit(s) SubCutaneous every 12 hours  influenza  Vaccine (HIGH DOSE) 0.5 milliLiter(s) IntraMuscular once  levothyroxine 88 MICROGram(s) Oral daily  LORazepam     Tablet 1 milliGRAM(s) Oral at bedtime  pantoprazole    Tablet 40 milliGRAM(s) Oral before breakfast  sodium bicarbonate 1300 milliGRAM(s) Oral three times a day  venlafaxine 75 milliGRAM(s) Oral daily  venlafaxine 300 milliGRAM(s) Oral daily      VITAL:  T(C): , Max: 36.6 (09-14-24 @ 09:32)  T(F): , Max: 97.8 (09-14-24 @ 09:32)  HR: 91 (09-14-24 @ 04:16)  BP: 116/71 (09-14-24 @ 04:16)  BP(mean): --  RR: 18 (09-14-24 @ 09:32)  SpO2: 99% (09-14-24 @ 09:32)  Wt(kg): --    I and O's:    09-13 @ 07:01  -  09-14 @ 07:00  --------------------------------------------------------  IN: 100 mL / OUT: 2100 mL / NET: -2000 mL    09-14 @ 07:01  -  09-14 @ 17:24  --------------------------------------------------------  IN: 240 mL / OUT: 600 mL / NET: -360 mL          PHYSICAL EXAM:    Constitutional: NAD  Neck:  No JVD  Respiratory: CTAB/L  Cardiovascular: S1 and S2  Gastrointestinal: BS+, soft, NT/ND  Extremities: No peripheral edema  Neurological: A/O x 3, no focal deficits  Psychiatric: Normal mood, normal affect  : No Arnold  Skin: +erythema on right mandible  Access: Not applicable    LABS:                        9.4    12.58 )-----------( 244      ( 14 Sep 2024 07:16 )             29.3     09-14    138  |  109<H>  |  53<H>  ----------------------------<  65<L>  3.9   |  14<L>  |  3.15<H>    Ca    8.7      14 Sep 2024 07:04    TPro  6.8  /  Alb  3.5  /  TBili  0.2  /  DBili  x   /  AST  10  /  ALT  14  /  AlkPhos  92  09-13          Urine Studies:  Urinalysis Basic - ( 14 Sep 2024 07:04 )    Color: x / Appearance: x / SG: x / pH: x  Gluc: 65 mg/dL / Ketone: x  / Bili: x / Urobili: x   Blood: x / Protein: x / Nitrite: x   Leuk Esterase: x / RBC: x / WBC x   Sq Epi: x / Non Sq Epi: x / Bacteria: x            RADIOLOGY & ADDITIONAL STUDIES:

## 2024-09-14 NOTE — PROGRESS NOTE ADULT - ASSESSMENT
84F w/ dementia, bipolar disorder, and CKD4-5, 9/11/24 p/w R facial cellulitis    (1)Renal - nonproteinuric CKD4 with atrophic R kidney. Grossly stable numbers; GFR ~15-17ml/min  (2)Metabolic acidosis - renally mediated - worsening   (3)ID - R facial celluitis - on IV Unasyn  (4)Neuro - "aura" - not orthostatic - etiology?      RECOMMEND:  (1)Increase NaHCO3 to 1300 tid  (2)F/U Neuro input  (3)Abx for GFR 15-20ml/min - present dosing is acceptable  (4)BMP daily while admitted  (5)If for discharge, she can f/u at my office as previously scheduled on Wed 10/23/24 at 1040a

## 2024-09-14 NOTE — PROGRESS NOTE ADULT - ASSESSMENT
A/P    Patient is an 84-year-old female with a history of anemia, hypothyroidism, depression, GERD, bipolar, CKD, presents for redness and swelling on her face for the past few days.     #Right facial swelling and redness, Leukocytosis   -improving  -CT Neck 9/11/24 shows Right facial cellulitis/myositis, without gross evidence of a drainable fluid collection.  -CXR 9/11/24 shows Clear lungs  -trend WBC; improving  -ENT and ID following  -abx per ID    #CKD  -Trend Creat   -nephrology f/u    #Hx of frequent falls  -pt describes random "zoning out/staring episodes" and then falling (has been occurring for about 2.5 years per pt)  -pt denies LOC during episodes; denies CP or SOB  -orthostatics negative  -check TTE  -check tele  -neuro f/u    #Aortic Stenosis  -moderate AS on outpt TTE 5/24  -cont to monitor clinically with yearly TTE    #Cardiomyopathy, HFrEF  -outpatient TTE 5/24 shows LV systolic function is moderately reduced, moderate global hypokinesis; EF 40%; mild to moderate aortic regurgitation; moderate aortic stenosis  -no evidence of volume overload/HF  -no diuretic need  -per outpt visit, patient and son do not want to proceed with ischemic eval and do not want to start lv dysfxn medications as she remains asymptomatic    dvt ppx      35 minutes spent on total encounter; more than 50% of the visit was spent counseling and/or coordinating care by the attending physician.

## 2024-09-14 NOTE — PROGRESS NOTE ADULT - SUBJECTIVE AND OBJECTIVE BOX
CARDIOLOGY FOLLOW UP - Dr. Ackerman  Date of Service: 09-14-24 @ 08:14    CC: no events    Review of Systems:  Constitutional: No fever, weight loss, or fatigue  Respiratory: No cough, wheezing, or hemoptysis, no shortness of breath  Cardiovascular: No chest pain, palpitations, passing out, dizziness, or leg swelling  Gastrointestinal: No abd or epigastric pain. No nausea, vomiting, or hematemesis; no diarrhea or consiptaiton, no melena or hematochezia  Vascular: No edema     TELEMETRY:    PHYSICAL EXAM:  T(C): 36.3 (09-14-24 @ 04:16), Max: 36.9 (09-13-24 @ 11:50)  HR: 91 (09-14-24 @ 04:16) (83 - 91)  BP: 116/71 (09-14-24 @ 04:16) (116/71 - 160/61)  RR: 18 (09-14-24 @ 04:16) (18 - 18)  SpO2: 96% (09-14-24 @ 04:16) (96% - 100%)  Wt(kg): --  I&O's Summary    13 Sep 2024 07:01  -  14 Sep 2024 07:00  --------------------------------------------------------  IN: 100 mL / OUT: 2100 mL / NET: -2000 mL        Appearance: Normal	  Cardiovascular: Normal S1 S2,RRR, No JVD, No murmurs  Respiratory: Lungs clear to auscultation	  Gastrointestinal:  Soft, Non-tender, + BS	  Extremities: Normal range of motion, No clubbing, cyanosis or edema  Vascular: Peripheral pulses palpable 2+ bilaterally       Home Medications:  acetaminophen 325 mg oral tablet: 2 tab(s) orally every 6 hours as needed for  mild pain (11 Sep 2024 21:40)  buPROPion 150 mg/24 hours (XL) oral tablet, extended release: 1 tab(s) orally once a day (11 Sep 2024 21:40)  divalproex sodium 250 mg oral tablet, extended release: 1 tab(s) orally once a day (11 Sep 2024 21:40)  divalproex sodium 500 mg oral tablet, extended release: 1 tab(s) orally once a day (at bedtime) (11 Sep 2024 21:38)  lactulose 10 g/15 mL oral syrup: 30 milliliter(s) orally every 8 hours as needed for  constipation (11 Sep 2024 21:40)  levothyroxine 88 mcg (0.088 mg) oral tablet: 1 tab(s) orally once a day (11 Sep 2024 21:39)  LORazepam 1 mg oral tablet: 1 tab(s) orally once a day (at bedtime) (11 Sep 2024 21:40)  Melatonin 3 mg oral tablet: 1 tab(s) orally once a day (at bedtime) (11 Sep 2024 21:40)  polyethylene glycol 3350 oral powder for reconstitution: 17 gram(s) orally once a day (11 Sep 2024 21:40)  senna leaf extract oral tablet: 2 tab(s) orally once a day (at bedtime) (11 Sep 2024 21:40)  venlafaxine 75 mg oral capsule, extended release: 375 milligram(s) orally once a day (11 Sep 2024 21:39)        MEDICATIONS  (STANDING):  ampicillin/sulbactam  IVPB 3 Gram(s) IV Intermittent every 12 hours  buPROPion XL (24-Hour) . 150 milliGRAM(s) Oral daily  chlorhexidine 2% Cloths 1 Application(s) Topical daily  diphenhydrAMINE 25 milliGRAM(s) Oral once  divalproex  milliGRAM(s) Oral daily  divalproex  milliGRAM(s) Oral at bedtime  heparin   Injectable 5000 Unit(s) SubCutaneous every 12 hours  influenza  Vaccine (HIGH DOSE) 0.5 milliLiter(s) IntraMuscular once  levothyroxine 88 MICROGram(s) Oral daily  LORazepam     Tablet 1 milliGRAM(s) Oral at bedtime  pantoprazole    Tablet 40 milliGRAM(s) Oral before breakfast  sodium bicarbonate 1300 milliGRAM(s) Oral two times a day  venlafaxine 75 milliGRAM(s) Oral daily  venlafaxine 300 milliGRAM(s) Oral daily        EKG:  RADIOLOGY:  DIAGNOSTIC TESTING:  [ ] Echocardiogram:  [ ] Catherterization:  [ ] Stress Test:  OTHER:     LABS:	 	                          9.4    12.58 )-----------( 244      ( 14 Sep 2024 07:16 )             29.3     09-14    138  |  109<H>  |  53<H>  ----------------------------<  65<L>  3.9   |  14<L>  |  3.15<H>    Ca    8.7      14 Sep 2024 07:04    TPro  6.8  /  Alb  3.5  /  TBili  0.2  /  DBili  x   /  AST  10  /  ALT  14  /  AlkPhos  92  09-13          CARDIAC MARKERS:

## 2024-09-14 NOTE — PROGRESS NOTE ADULT - ASSESSMENT
Patient is a 84 year old female with PMHx of Anemia, Depression, CKD, GERD and Hypothyroidism. Presents to [facility] for worsening redness and swelling on her face for the past few days.    Plan:    # Facial cellulitis:   - + leukocytosis, BCx w/ NGTD  - No drainable collection on CT , s/p laryngoscope no airway involvement   - Abx mgmt per ID-> Continue on unasyn 3g IV Q12h (renally dosed)  - S/p Dexamethasone 8MG Q8H for 24H only per ENT  - Encourage oral hydration   - Minced and moist diet  - Monitor temps/WBC  - Per ID-> If continues to improve over next 24h - can d/c on augmentin 500mg PO BID until 9/20  - ID/ENT following    # CKD:  - Bun/Cr 38/3.03  - Monitor I/O's  - Serial Cr  - Avoid nephrotoxins  - Renally dose medications  - C/w NaHCO3  - Renal following    # Depression:  - C/w current meds    # LPRD (laryngopharyngeal reflux disease):  - ENT recommendations appreciated   - PPI    # Hypothyroidism:  - C/w Synthroid    # DVT ppx:  - Heparin subq    [facility]  [phone]

## 2024-09-14 NOTE — PROGRESS NOTE ADULT - SUBJECTIVE AND OBJECTIVE BOX
SUBJECTIVE / OVERNIGHT EVENTS:      No events noted overnight. Resting comfortably in bed. Aide at bedside      --------------------------------------------------------------------------------------------  LABS:                        9.8    10.94 )-----------( 218      ( 13 Sep 2024 07:15 )             30.3     09-13    140  |  111<H>  |  46<H>  ----------------------------<  135<H>  4.4   |  13<L>  |  3.13<H>    Ca    9.2      13 Sep 2024 07:08    TPro  6.8  /  Alb  3.5  /  TBili  0.2  /  DBili  x   /  AST  10  /  ALT  14  /  AlkPhos  92  09-13      CAPILLARY BLOOD GLUCOSE            Urinalysis Basic - ( 13 Sep 2024 07:08 )    Color: x / Appearance: x / SG: x / pH: x  Gluc: 135 mg/dL / Ketone: x  / Bili: x / Urobili: x   Blood: x / Protein: x / Nitrite: x   Leuk Esterase: x / RBC: x / WBC x   Sq Epi: x / Non Sq Epi: x / Bacteria: x        RADIOLOGY & ADDITIONAL TESTS:    Imaging Personally Reviewed:  [x] YES  [ ] NO    Consultant(s) Notes Reviewed:  [x] YES  [ ] NO    MEDICATIONS  (STANDING):  ampicillin/sulbactam  IVPB 3 Gram(s) IV Intermittent every 12 hours  buPROPion XL (24-Hour) . 150 milliGRAM(s) Oral daily  chlorhexidine 2% Cloths 1 Application(s) Topical daily  diphenhydrAMINE 25 milliGRAM(s) Oral once  divalproex  milliGRAM(s) Oral daily  divalproex  milliGRAM(s) Oral at bedtime  heparin   Injectable 5000 Unit(s) SubCutaneous every 12 hours  influenza  Vaccine (HIGH DOSE) 0.5 milliLiter(s) IntraMuscular once  levothyroxine 88 MICROGram(s) Oral daily  LORazepam     Tablet 1 milliGRAM(s) Oral at bedtime  pantoprazole    Tablet 40 milliGRAM(s) Oral before breakfast  sodium bicarbonate 1300 milliGRAM(s) Oral two times a day  venlafaxine 300 milliGRAM(s) Oral daily  venlafaxine 75 milliGRAM(s) Oral daily    MEDICATIONS  (PRN):  acetaminophen     Tablet .. 650 milliGRAM(s) Oral every 6 hours PRN Temp greater or equal to 38C (100.4F), Mild Pain (1 - 3)  melatonin 3 milliGRAM(s) Oral at bedtime PRN Insomnia  polyethylene glycol 3350 17 Gram(s) Oral daily PRN Constipation  senna 2 Tablet(s) Oral at bedtime PRN Constipation      Care Discussed with Consultants/Other Providers [x] YES  [ ] NO    Vital Signs Last 24 Hrs  T(C): 36.3 (14 Sep 2024 04:16), Max: 36.9 (13 Sep 2024 11:50)  T(F): 97.3 (14 Sep 2024 04:16), Max: 98.5 (13 Sep 2024 11:50)  HR: 91 (14 Sep 2024 04:16) (83 - 91)  BP: 116/71 (14 Sep 2024 04:16) (116/71 - 160/61)  BP(mean): --  RR: 18 (14 Sep 2024 04:16) (18 - 18)  SpO2: 96% (14 Sep 2024 04:16) (96% - 100%)    Parameters below as of 14 Sep 2024 04:16  Patient On (Oxygen Delivery Method): room air      I&O's Summary    12 Sep 2024 07:01  -  13 Sep 2024 07:00  --------------------------------------------------------  IN: 150 mL / OUT: 1200 mL / NET: -1050 mL    13 Sep 2024 07:01  -  14 Sep 2024 05:41  --------------------------------------------------------  IN: 100 mL / OUT: 1300 mL / NET: -1200 mL          PHYSICAL EXAM:  GENERAL: NAD, well-developed, comfortable  HEENT: right sided facial swelling and neck erythema; TTP  CHEST/LUNG: Clear to auscultation bilaterally; No wheeze  HEART: Regular rate and rhythm; No murmurs, rubs, or gallops  ABDOMEN: Soft, Nontender, Nondistended; Bowel sounds present  NEURO: AAOx3, no focal weakness, 5/5 b/l extremity strength, b/l knee no arthritis, no effusion   EXTREMITIES:  2+ Peripheral Pulses, No clubbing, cyanosis, or edema  SKIN: No rashes or lesions

## 2024-09-15 LAB
ALBUMIN SERPL ELPH-MCNC: 3.3 G/DL — SIGNIFICANT CHANGE UP (ref 3.3–5)
ALP SERPL-CCNC: 87 U/L — SIGNIFICANT CHANGE UP (ref 40–120)
ALT FLD-CCNC: 14 U/L — SIGNIFICANT CHANGE UP (ref 10–45)
ANION GAP SERPL CALC-SCNC: 15 MMOL/L — SIGNIFICANT CHANGE UP (ref 5–17)
AST SERPL-CCNC: 11 U/L — SIGNIFICANT CHANGE UP (ref 10–40)
BILIRUB SERPL-MCNC: 0.2 MG/DL — SIGNIFICANT CHANGE UP (ref 0.2–1.2)
BUN SERPL-MCNC: 54 MG/DL — HIGH (ref 7–23)
CALCIUM SERPL-MCNC: 8.7 MG/DL — SIGNIFICANT CHANGE UP (ref 8.4–10.5)
CHLORIDE SERPL-SCNC: 108 MMOL/L — SIGNIFICANT CHANGE UP (ref 96–108)
CO2 SERPL-SCNC: 13 MMOL/L — LOW (ref 22–31)
CREAT SERPL-MCNC: 3.36 MG/DL — HIGH (ref 0.5–1.3)
EGFR: 13 ML/MIN/1.73M2 — LOW
GLUCOSE BLDC GLUCOMTR-MCNC: 58 MG/DL — LOW (ref 70–99)
GLUCOSE BLDC GLUCOMTR-MCNC: 62 MG/DL — LOW (ref 70–99)
GLUCOSE BLDC GLUCOMTR-MCNC: 82 MG/DL — SIGNIFICANT CHANGE UP (ref 70–99)
GLUCOSE BLDC GLUCOMTR-MCNC: 92 MG/DL — SIGNIFICANT CHANGE UP (ref 70–99)
GLUCOSE BLDC GLUCOMTR-MCNC: 96 MG/DL — SIGNIFICANT CHANGE UP (ref 70–99)
GLUCOSE SERPL-MCNC: 47 MG/DL — CRITICAL LOW (ref 70–99)
HCT VFR BLD CALC: 33.3 % — LOW (ref 34.5–45)
HGB BLD-MCNC: 10.1 G/DL — LOW (ref 11.5–15.5)
MCHC RBC-ENTMCNC: 30.3 GM/DL — LOW (ref 32–36)
MCHC RBC-ENTMCNC: 30.7 PG — SIGNIFICANT CHANGE UP (ref 27–34)
MCV RBC AUTO: 101.2 FL — HIGH (ref 80–100)
NRBC # BLD: 0 /100 WBCS — SIGNIFICANT CHANGE UP (ref 0–0)
PLATELET # BLD AUTO: 217 K/UL — SIGNIFICANT CHANGE UP (ref 150–400)
POTASSIUM SERPL-MCNC: 4.5 MMOL/L — SIGNIFICANT CHANGE UP (ref 3.5–5.3)
POTASSIUM SERPL-SCNC: 4.5 MMOL/L — SIGNIFICANT CHANGE UP (ref 3.5–5.3)
PROT SERPL-MCNC: 6.4 G/DL — SIGNIFICANT CHANGE UP (ref 6–8.3)
RBC # BLD: 3.29 M/UL — LOW (ref 3.8–5.2)
RBC # FLD: 14.5 % — SIGNIFICANT CHANGE UP (ref 10.3–14.5)
SODIUM SERPL-SCNC: 136 MMOL/L — SIGNIFICANT CHANGE UP (ref 135–145)
WBC # BLD: 11.9 K/UL — HIGH (ref 3.8–10.5)
WBC # FLD AUTO: 11.9 K/UL — HIGH (ref 3.8–10.5)

## 2024-09-15 RX ADMIN — Medication 1 MILLIGRAM(S): at 21:50

## 2024-09-15 RX ADMIN — Medication 88 MICROGRAM(S): at 05:13

## 2024-09-15 RX ADMIN — AMPICILLIN, SULBACTAM 200 GRAM(S): 250; 125 INJECTION, POWDER, FOR SOLUTION INTRAMUSCULAR; INTRAVENOUS at 05:27

## 2024-09-15 RX ADMIN — Medication 650 MILLIGRAM(S): at 02:30

## 2024-09-15 RX ADMIN — Medication 650 MILLIGRAM(S): at 01:55

## 2024-09-15 RX ADMIN — Medication 5000 UNIT(S): at 05:15

## 2024-09-15 RX ADMIN — Medication 150 MILLIGRAM(S): at 12:36

## 2024-09-15 RX ADMIN — Medication 5000 UNIT(S): at 17:39

## 2024-09-15 RX ADMIN — Medication 75 MILLIGRAM(S): at 12:58

## 2024-09-15 RX ADMIN — Medication 300 MILLIGRAM(S): at 12:36

## 2024-09-15 RX ADMIN — PANTOPRAZOLE SODIUM 40 MILLIGRAM(S): 40 TABLET, DELAYED RELEASE ORAL at 05:13

## 2024-09-15 RX ADMIN — AMPICILLIN, SULBACTAM 200 GRAM(S): 250; 125 INJECTION, POWDER, FOR SOLUTION INTRAMUSCULAR; INTRAVENOUS at 17:38

## 2024-09-15 RX ADMIN — Medication 1300 MILLIGRAM(S): at 05:13

## 2024-09-15 RX ADMIN — Medication 500 MILLIGRAM(S): at 21:56

## 2024-09-15 RX ADMIN — CHLORHEXIDINE GLUCONATE ORAL RINSE 1 APPLICATION(S): 1.2 SOLUTION DENTAL at 12:37

## 2024-09-15 RX ADMIN — Medication 1300 MILLIGRAM(S): at 12:38

## 2024-09-15 RX ADMIN — Medication 1300 MILLIGRAM(S): at 21:51

## 2024-09-15 RX ADMIN — Medication 250 MILLIGRAM(S): at 12:58

## 2024-09-15 NOTE — PROGRESS NOTE ADULT - SUBJECTIVE AND OBJECTIVE BOX
Neurology      S: patient seen. no significant neuro chagnes       Medications: MEDICATIONS  (STANDING):  ampicillin/sulbactam  IVPB 3 Gram(s) IV Intermittent every 12 hours  buPROPion XL (24-Hour) . 150 milliGRAM(s) Oral daily  chlorhexidine 2% Cloths 1 Application(s) Topical daily  diphenhydrAMINE 25 milliGRAM(s) Oral once  divalproex  milliGRAM(s) Oral daily  divalproex  milliGRAM(s) Oral at bedtime  heparin   Injectable 5000 Unit(s) SubCutaneous every 12 hours  influenza  Vaccine (HIGH DOSE) 0.5 milliLiter(s) IntraMuscular once  levothyroxine 88 MICROGram(s) Oral daily  LORazepam     Tablet 1 milliGRAM(s) Oral at bedtime  pantoprazole    Tablet 40 milliGRAM(s) Oral before breakfast  sodium bicarbonate 1300 milliGRAM(s) Oral three times a day  venlafaxine 75 milliGRAM(s) Oral daily  venlafaxine 300 milliGRAM(s) Oral daily    MEDICATIONS  (PRN):  acetaminophen     Tablet .. 650 milliGRAM(s) Oral every 6 hours PRN Temp greater or equal to 38C (100.4F), Mild Pain (1 - 3)  melatonin 3 milliGRAM(s) Oral at bedtime PRN Insomnia  polyethylene glycol 3350 17 Gram(s) Oral daily PRN Constipation  senna 2 Tablet(s) Oral at bedtime PRN Constipation       Vitals:  Vital Signs Last 24 Hrs  T(C): 37 (15 Sep 2024 04:24), Max: 37 (15 Sep 2024 04:24)  T(F): 98.6 (15 Sep 2024 04:24), Max: 98.6 (15 Sep 2024 04:24)  HR: 84 (15 Sep 2024 04:24) (84 - 85)  BP: 124/73 (15 Sep 2024 04:24) (105/66 - 124/73)  BP(mean): --  RR: 18 (15 Sep 2024 04:24) (18 - 18)  SpO2: 98% (15 Sep 2024 04:24) (98% - 98%)    Parameters below as of 15 Sep 2024 04:24  Patient On (Oxygen Delivery Method): room air                General Exam:   General Appearance: Appropriately dressed and in no acute distress       Head: Normocephalic, atraumatic and no dysmorphic features  Ear, Nose, and Throat: Moist mucous membranes  CVS: S1S2+  Resp: No SOB, no wheeze or rhonchi  GI: soft NT/ND  Extremities: No edema or cyanosis  Skin:  redness on face      Neurological Exam:  Mental Status: Awake, alert and oriented x 3.  Able to follow simple and complex verbal commands. Able to name and repeat. fluent speech. No obvious aphasia or dysarthria noted.   Cranial Nerves: PERRL, EOMI, VFFC, sensation V1-V3 intact,  no obvious facial asymmetry, equal elevation of palate, scm/trap 5/5, tongue is midline on protrusion. no obvious papilledema on fundoscopic exam. hearing is grossly intact.   Motor: Normal bulk, tone and strength throughout uppers ; lowers 3-4/5   Sensation: Intact to light touch and pinprick throughout. no right/left confusion. no extinction to tactile on DSS.   Coordination: No dysmetria on FNF or HKS  Gait: walker /wheelchair     Data/Labs/Imaging which I personally reviewed.     Labs:       LABS:                          10.1   11.90 )-----------( 217      ( 15 Sep 2024 07:46 )             33.3     09-15    136  |  108  |  54<H>  ----------------------------<  47<LL>  4.5   |  13<L>  |  3.36<H>    Ca    8.7      15 Sep 2024 07:36    TPro  6.4  /  Alb  3.3  /  TBili  0.2  /  DBili  x   /  AST  11  /  ALT  14  /  AlkPhos  87  09-15    LIVER FUNCTIONS - ( 15 Sep 2024 07:36 )  Alb: 3.3 g/dL / Pro: 6.4 g/dL / ALK PHOS: 87 U/L / ALT: 14 U/L / AST: 11 U/L / GGT: x             Urinalysis Basic - ( 15 Sep 2024 07:36 )    Color: x / Appearance: x / SG: x / pH: x  Gluc: 47 mg/dL / Ketone: x  / Bili: x / Urobili: x   Blood: x / Protein: x / Nitrite: x   Leuk Esterase: x / RBC: x / WBC x   Sq Epi: x / Non Sq Epi: x / Bacteria: x        < from: CT Head No Cont (07.31.24 @ 15:06) >    ACC: [id number] EXAM:  CT CERVICAL SPINE   ORDERED BY: CHANTEL GERBER     ACC: [id number] EXAM:  CT BRAIN   ORDERED BY: RAHEEL LARSON     PROCEDURE DATE:  07/31/2024          INTERPRETATION:  CLINICAL INFORMATION: fall, slipped out of wheelchair at   midnight/possibly fell asleep while watching TV, R arm/L hand skin tears   - dressed by dtr, denies neck/body pain, no AC use    COMPARISON: Head CT 6/22/2024    CONTRAST:  IV Contrast: NONE  Complications: None reported at time of study completion    TECHNIQUE:    CT BRAIN: Serial axial images were obtained from the skull base to the   vertex using multi-slice helical technique. Sagittal and coronal   reformats were obtained.    CT CERVICAL SPINE: Axial images were obtained of the cervical spineusing   multislice helical technique. Reformatted coronal and sagittal images   were obtained.    FINDINGS:    CT BRAIN:    VENTRICLES AND SULCI: Age appropriate involutional changes.  INTRA-AXIAL: No mass effect, acute hemorrhage, or midline shift.There   are periventricular and subcortical white matter hypodensities,   consistent with microvascular type changes.  EXTRA-AXIAL: No mass or fluid collection. Basal cisterns are normal in   appearance.    VISUALIZED SINUSES:  Clear.  TYMPANOMASTOIDCAVITIES:  Clear.  VISUALIZED ORBITS: Bilateral lens replacement.  CALVARIUM: Intact.    MISCELLANEOUS: None.      CT CERVICAL SPINE:    VERTEBRAE:  Normal in height. No acute fracture. Multilevel degenerative   changes including marginal osteophytes.  ALIGNMENT: No subluxation or scoliosis.  INTERVERTEBRAL DISC SPACES: Multilevel degenerative disc osteophyte   complexes. Multilevel bilateral moderate to severe neural foraminal   stenoses.    VISUALIZED LUNGS: Bilateral chronic appearing interstitial prominence.    MISCELLANEOUS:  None.      IMPRESSION:    CT HEAD:  No evidence of acute intracranial pathology.    CT CERVICAL SPINE:  No acute fracture or traumatic subluxation.    Multi-level degenerative changes.        --- End of Report ---            STEVE ALLEN MD; Attending Radiologist  This document has been electronically signed. Jul 31 2024  3:18PM    < end of copied text >

## 2024-09-15 NOTE — PROGRESS NOTE ADULT - SUBJECTIVE AND OBJECTIVE BOX
CARDIOLOGY FOLLOW UP - Dr. Ackerman  Date of Service: 09-15-24 @ 09:24    CC: no events    Review of Systems:  Constitutional: No fever, weight loss, or fatigue  Respiratory: No cough, wheezing, or hemoptysis, no shortness of breath  Cardiovascular: No chest pain, palpitations, passing out, dizziness, or leg swelling  Gastrointestinal: No abd or epigastric pain. No nausea, vomiting, or hematemesis; no diarrhea or consiptaiton, no melena or hematochezia  Vascular: No edema     TELEMETRY:    PHYSICAL EXAM:  T(C): 37 (09-15-24 @ 04:24), Max: 37 (09-15-24 @ 04:24)  HR: 84 (09-15-24 @ 04:24) (84 - 85)  BP: 124/73 (09-15-24 @ 04:24) (105/66 - 124/73)  RR: 18 (09-15-24 @ 04:24) (18 - 18)  SpO2: 98% (09-15-24 @ 04:24) (98% - 99%)  Wt(kg): --  I&O's Summary    14 Sep 2024 07:01  -  15 Sep 2024 07:00  --------------------------------------------------------  IN: 440 mL / OUT: 2200 mL / NET: -1760 mL        Appearance: Normal	  Cardiovascular: Normal S1 S2,RRR, No JVD, No murmurs  Respiratory: Lungs clear to auscultation	  Gastrointestinal:  Soft, Non-tender, + BS	  Extremities: Normal range of motion, No clubbing, cyanosis or edema  Vascular: Peripheral pulses palpable 2+ bilaterally       Home Medications:  acetaminophen 325 mg oral tablet: 2 tab(s) orally every 6 hours as needed for  mild pain (11 Sep 2024 21:40)  buPROPion 150 mg/24 hours (XL) oral tablet, extended release: 1 tab(s) orally once a day (11 Sep 2024 21:40)  divalproex sodium 250 mg oral tablet, extended release: 1 tab(s) orally once a day (11 Sep 2024 21:40)  divalproex sodium 500 mg oral tablet, extended release: 1 tab(s) orally once a day (at bedtime) (11 Sep 2024 21:38)  lactulose 10 g/15 mL oral syrup: 30 milliliter(s) orally every 8 hours as needed for  constipation (11 Sep 2024 21:40)  levothyroxine 88 mcg (0.088 mg) oral tablet: 1 tab(s) orally once a day (11 Sep 2024 21:39)  LORazepam 1 mg oral tablet: 1 tab(s) orally once a day (at bedtime) (11 Sep 2024 21:40)  Melatonin 3 mg oral tablet: 1 tab(s) orally once a day (at bedtime) (11 Sep 2024 21:40)  polyethylene glycol 3350 oral powder for reconstitution: 17 gram(s) orally once a day (11 Sep 2024 21:40)  senna leaf extract oral tablet: 2 tab(s) orally once a day (at bedtime) (11 Sep 2024 21:40)  venlafaxine 75 mg oral capsule, extended release: 375 milligram(s) orally once a day (11 Sep 2024 21:39)        MEDICATIONS  (STANDING):  ampicillin/sulbactam  IVPB 3 Gram(s) IV Intermittent every 12 hours  buPROPion XL (24-Hour) . 150 milliGRAM(s) Oral daily  chlorhexidine 2% Cloths 1 Application(s) Topical daily  diphenhydrAMINE 25 milliGRAM(s) Oral once  divalproex  milliGRAM(s) Oral daily  divalproex  milliGRAM(s) Oral at bedtime  heparin   Injectable 5000 Unit(s) SubCutaneous every 12 hours  influenza  Vaccine (HIGH DOSE) 0.5 milliLiter(s) IntraMuscular once  levothyroxine 88 MICROGram(s) Oral daily  LORazepam     Tablet 1 milliGRAM(s) Oral at bedtime  pantoprazole    Tablet 40 milliGRAM(s) Oral before breakfast  sodium bicarbonate 1300 milliGRAM(s) Oral three times a day  venlafaxine 75 milliGRAM(s) Oral daily  venlafaxine 300 milliGRAM(s) Oral daily        EKG:  RADIOLOGY:  DIAGNOSTIC TESTING:  [ ] Echocardiogram:  [ ] Catherterization:  [ ] Stress Test:  OTHER:     LABS:	 	                          10.1   11.90 )-----------( 217      ( 15 Sep 2024 07:46 )             33.3     09-15    136  |  108  |  54<H>  ----------------------------<  47<LL>  4.5   |  13<L>  |  3.36<H>    Ca    8.7      15 Sep 2024 07:36    TPro  6.4  /  Alb  3.3  /  TBili  0.2  /  DBili  x   /  AST  11  /  ALT  14  /  AlkPhos  87  09-15          CARDIAC MARKERS:

## 2024-09-15 NOTE — PROGRESS NOTE ADULT - SUBJECTIVE AND OBJECTIVE BOX
SUBJECTIVE / OVERNIGHT EVENTS:      No events noted overnight. Resting in bed      --------------------------------------------------------------------------------------------  LABS:                        9.4    12.58 )-----------( 244      ( 14 Sep 2024 07:16 )             29.3     09-14    138  |  109<H>  |  53<H>  ----------------------------<  65<L>  3.9   |  14<L>  |  3.15<H>    Ca    8.7      14 Sep 2024 07:04        CAPILLARY BLOOD GLUCOSE            Urinalysis Basic - ( 14 Sep 2024 07:04 )    Color: x / Appearance: x / SG: x / pH: x  Gluc: 65 mg/dL / Ketone: x  / Bili: x / Urobili: x   Blood: x / Protein: x / Nitrite: x   Leuk Esterase: x / RBC: x / WBC x   Sq Epi: x / Non Sq Epi: x / Bacteria: x        RADIOLOGY & ADDITIONAL TESTS:    Imaging Personally Reviewed:  [x] YES  [ ] NO    Consultant(s) Notes Reviewed:  [x] YES  [ ] NO    MEDICATIONS  (STANDING):  ampicillin/sulbactam  IVPB 3 Gram(s) IV Intermittent every 12 hours  buPROPion XL (24-Hour) . 150 milliGRAM(s) Oral daily  chlorhexidine 2% Cloths 1 Application(s) Topical daily  diphenhydrAMINE 25 milliGRAM(s) Oral once  divalproex  milliGRAM(s) Oral daily  divalproex  milliGRAM(s) Oral at bedtime  heparin   Injectable 5000 Unit(s) SubCutaneous every 12 hours  influenza  Vaccine (HIGH DOSE) 0.5 milliLiter(s) IntraMuscular once  levothyroxine 88 MICROGram(s) Oral daily  LORazepam     Tablet 1 milliGRAM(s) Oral at bedtime  pantoprazole    Tablet 40 milliGRAM(s) Oral before breakfast  sodium bicarbonate 1300 milliGRAM(s) Oral three times a day  venlafaxine 75 milliGRAM(s) Oral daily  venlafaxine 300 milliGRAM(s) Oral daily    MEDICATIONS  (PRN):  acetaminophen     Tablet .. 650 milliGRAM(s) Oral every 6 hours PRN Temp greater or equal to 38C (100.4F), Mild Pain (1 - 3)  melatonin 3 milliGRAM(s) Oral at bedtime PRN Insomnia  polyethylene glycol 3350 17 Gram(s) Oral daily PRN Constipation  senna 2 Tablet(s) Oral at bedtime PRN Constipation      Care Discussed with Consultants/Other Providers [x] YES  [ ] NO    Vital Signs Last 24 Hrs  T(C): 37 (15 Sep 2024 04:24), Max: 37 (15 Sep 2024 04:24)  T(F): 98.6 (15 Sep 2024 04:24), Max: 98.6 (15 Sep 2024 04:24)  HR: 84 (15 Sep 2024 04:24) (84 - 85)  BP: 124/73 (15 Sep 2024 04:24) (105/66 - 124/73)  BP(mean): --  RR: 18 (15 Sep 2024 04:24) (18 - 18)  SpO2: 98% (15 Sep 2024 04:24) (98% - 99%)    Parameters below as of 15 Sep 2024 04:24  Patient On (Oxygen Delivery Method): room air      I&O's Summary    14 Sep 2024 07:01  -  15 Sep 2024 07:00  --------------------------------------------------------  IN: 440 mL / OUT: 2200 mL / NET: -1760 mL      PHYSICAL EXAM:  GENERAL: NAD, well-developed, comfortable  HEENT: right sided facial swelling and neck erythema; TTP  CHEST/LUNG: Clear to auscultation bilaterally; No wheeze  HEART: Regular rate and rhythm; No murmurs, rubs, or gallops  ABDOMEN: Soft, Nontender, Nondistended; Bowel sounds present  NEURO: AAOx3, no focal weakness, 5/5 b/l extremity strength, b/l knee no arthritis, no effusion   EXTREMITIES:  2+ Peripheral Pulses, No clubbing, cyanosis, or edema  SKIN: No rashes or lesions

## 2024-09-15 NOTE — PROGRESS NOTE ADULT - ASSESSMENT
Patient is a 84 year old female with PMHx of Anemia, Depression, CKD, GERD and Hypothyroidism. Presents to [facility] for worsening redness and swelling on her face for the past few days.    Plan:    # Facial cellulitis:   - + leukocytosis, BCx w/ NGTD  - No drainable collection on CT , s/p laryngoscope no airway involvement   - Abx mgmt per ID-> Continue on unasyn 3g IV Q12h (renally dosed)  - S/p Dexamethasone 8MG Q8H for 24H only per ENT  - Encourage oral hydration   - Minced and moist diet  - Monitor temps/WBC  - Per ID-> If continues to improve over next 24h - can d/c on augmentin 500mg PO BID until 9/20  - ID/ENT following    # CKD:  - Bun/Cr 38/3.03  - Monitor I/O's  - Serial Cr  - Avoid nephrotoxins  - Renally dose medications  - C/w NaHCO3  - Renal following    # Depression:  - C/w current meds    # LPRD (laryngopharyngeal reflux disease):  - ENT recommendations appreciated   - PPI    # Hypothyroidism:  - C/w Synthroid    # DVT ppx:  - Heparin subq    [facility]  [phone]

## 2024-09-15 NOTE — PROGRESS NOTE ADULT - ASSESSMENT
84-year-old female with   anemia, hypothyroidism, depression, GERD, bipolar, CKD , presents for redness and swelling on her face for the past few days.  neuro called for episodes of AMS followed by falls and syncope.    CTH 7/31/24 neg   CT c spine neg   o/e AAOx3, IGLESIAS uppers> lowers. uses walker     Imprssion:   episodes of "spacing out" /AMS followed by occaional fall/syncope of unclear etiology.  no tongue bite, convulsions, incontienence or post ictal confusion lasts < 1 min ; low suspicion for seizure        - in or outpatient MRI brain and EEG   - check orthostatics   - limit sedating meds --> on significant psych meds, consider psych f/u.  venlafaxine , lorazapam 1mg QHS, VPA  AM 500mg QHS and wellbutrin 150mg daily (can lower seizure threshold)   - TTE at some point can also be outpatient and possibe event monitor   - non urgent vessel imaging, can do carotid duplex   - unasyn for cellulitis   - eventually EMILIO    - telemetry  - PT/OT/SS/SLP, OOBC  - check FS, glucose control <180  - GI/DVT ppx  - Thank you for allowing me to participate in the care of this patient. Call with questions.   Fabio Fox MD  Vascular Neurology  Office: [phone]

## 2024-09-16 LAB
ANION GAP SERPL CALC-SCNC: 16 MMOL/L — SIGNIFICANT CHANGE UP (ref 5–17)
BUN SERPL-MCNC: 45 MG/DL — HIGH (ref 7–23)
CALCIUM SERPL-MCNC: 9 MG/DL — SIGNIFICANT CHANGE UP (ref 8.4–10.5)
CHLORIDE SERPL-SCNC: 112 MMOL/L — HIGH (ref 96–108)
CO2 SERPL-SCNC: 16 MMOL/L — LOW (ref 22–31)
CREAT SERPL-MCNC: 3.1 MG/DL — HIGH (ref 0.5–1.3)
EGFR: 14 ML/MIN/1.73M2 — LOW
GLUCOSE SERPL-MCNC: 65 MG/DL — LOW (ref 70–99)
HCT VFR BLD CALC: 32.8 % — LOW (ref 34.5–45)
HGB BLD-MCNC: 10.2 G/DL — LOW (ref 11.5–15.5)
MCHC RBC-ENTMCNC: 31.1 GM/DL — LOW (ref 32–36)
MCHC RBC-ENTMCNC: 31.7 PG — SIGNIFICANT CHANGE UP (ref 27–34)
MCV RBC AUTO: 101.9 FL — HIGH (ref 80–100)
NRBC # BLD: 0 /100 WBCS — SIGNIFICANT CHANGE UP (ref 0–0)
PLATELET # BLD AUTO: 249 K/UL — SIGNIFICANT CHANGE UP (ref 150–400)
POTASSIUM SERPL-MCNC: 5 MMOL/L — SIGNIFICANT CHANGE UP (ref 3.5–5.3)
POTASSIUM SERPL-SCNC: 5 MMOL/L — SIGNIFICANT CHANGE UP (ref 3.5–5.3)
RBC # BLD: 3.22 M/UL — LOW (ref 3.8–5.2)
RBC # FLD: 14.5 % — SIGNIFICANT CHANGE UP (ref 10.3–14.5)
SODIUM SERPL-SCNC: 144 MMOL/L — SIGNIFICANT CHANGE UP (ref 135–145)
WBC # BLD: 11.49 K/UL — HIGH (ref 3.8–10.5)
WBC # FLD AUTO: 11.49 K/UL — HIGH (ref 3.8–10.5)

## 2024-09-16 PROCEDURE — 99232 SBSQ HOSP IP/OBS MODERATE 35: CPT | Mod: 25

## 2024-09-16 PROCEDURE — 93010 ELECTROCARDIOGRAM REPORT: CPT

## 2024-09-16 PROCEDURE — 10061 I&D ABSCESS COMP/MULTIPLE: CPT

## 2024-09-16 RX ORDER — DOXYCYCLINE HYCLATE 100 MG
100 CAPSULE ORAL EVERY 12 HOURS
Refills: 0 | Status: DISCONTINUED | OUTPATIENT
Start: 2024-09-16 | End: 2024-09-20

## 2024-09-16 RX ADMIN — Medication 5000 UNIT(S): at 17:11

## 2024-09-16 RX ADMIN — PANTOPRAZOLE SODIUM 40 MILLIGRAM(S): 40 TABLET, DELAYED RELEASE ORAL at 05:43

## 2024-09-16 RX ADMIN — CHLORHEXIDINE GLUCONATE ORAL RINSE 1 APPLICATION(S): 1.2 SOLUTION DENTAL at 11:25

## 2024-09-16 RX ADMIN — Medication 1300 MILLIGRAM(S): at 14:13

## 2024-09-16 RX ADMIN — Medication 1300 MILLIGRAM(S): at 05:43

## 2024-09-16 RX ADMIN — Medication 500 MILLIGRAM(S): at 21:51

## 2024-09-16 RX ADMIN — Medication 300 MILLIGRAM(S): at 11:24

## 2024-09-16 RX ADMIN — Medication 100 MILLIGRAM(S): at 23:26

## 2024-09-16 RX ADMIN — Medication 1 MILLIGRAM(S): at 21:51

## 2024-09-16 RX ADMIN — Medication 88 MICROGRAM(S): at 04:50

## 2024-09-16 RX ADMIN — Medication 5000 UNIT(S): at 05:43

## 2024-09-16 RX ADMIN — Medication 75 MILLIGRAM(S): at 11:25

## 2024-09-16 RX ADMIN — Medication 250 MILLIGRAM(S): at 11:24

## 2024-09-16 RX ADMIN — Medication 100 MILLIGRAM(S): at 12:51

## 2024-09-16 RX ADMIN — Medication 150 MILLIGRAM(S): at 11:24

## 2024-09-16 RX ADMIN — AMPICILLIN, SULBACTAM 200 GRAM(S): 250; 125 INJECTION, POWDER, FOR SOLUTION INTRAMUSCULAR; INTRAVENOUS at 05:43

## 2024-09-16 RX ADMIN — Medication 1300 MILLIGRAM(S): at 21:51

## 2024-09-16 RX ADMIN — AMPICILLIN, SULBACTAM 200 GRAM(S): 250; 125 INJECTION, POWDER, FOR SOLUTION INTRAMUSCULAR; INTRAVENOUS at 17:44

## 2024-09-16 RX ADMIN — Medication 650 MILLIGRAM(S): at 04:49

## 2024-09-16 NOTE — PROGRESS NOTE ADULT - SUBJECTIVE AND OBJECTIVE BOX
Overnight events noted      VITAL:  T(C): , Max: 36.9 (09-16-24 @ 05:17)  T(F): , Max: 98.5 (09-16-24 @ 05:17)  HR: 91 (09-16-24 @ 05:17)  BP: 134/77 (09-16-24 @ 05:17)  BP(mean): --  RR: 18 (09-16-24 @ 05:17)  SpO2: 98% (09-16-24 @ 05:17)  Wt(kg): --      PHYSICAL EXAM:  Constitutional: NAD  Neck:  No JVD  Respiratory: CTAB/L  Cardiovascular: S1 and S2  Gastrointestinal: BS+, soft, NT/ND  Extremities: No peripheral edema  Neurological: A/O x 3, no focal deficits  Psychiatric: Normal mood, normal affect  : No Arnold  Skin: +erythema on right mandible    LABS:                        10.2   11.49 )-----------( 249      ( 16 Sep 2024 06:48 )             32.8     Na(144)/K(5.0)/Cl(112)/HCO3(16)/BUN(45)/Cr(3.10)Glu(65)/Ca(9.0)/Mg(--)/PO4(--)    09-16 @ 06:48  Na(136)/K(4.5)/Cl(108)/HCO3(13)/BUN(54)/Cr(3.36)Glu(47)/Ca(8.7)/Mg(--)/PO4(--)    09-15 @ 07:36  Na(138)/K(3.9)/Cl(109)/HCO3(14)/BUN(53)/Cr(3.15)Glu(65)/Ca(8.7)/Mg(--)/PO4(--)    09-14 @ 07:04      IMPRESSION: 84F w/ dementia, bipolar disorder, and CKD4-5, 9/11/24 p/w R facial cellulitis    (1)Renal - nonproteinuric CKD4 with atrophic R kidney. Grossly stable numbers  (2)Metabolic acidosis - renally mediated -starting to improve, on standing PO NaHCO3    (3)ID - R facial celluitis - on IV Unasyn  (4)Neuro - "aura" - Neurology input appreciated      RECOMMEND:  (1)PO NaHCO3 as ordered  (2)Abx for GFR 15-20ml/min - present dosing is acceptable  (3)If for discharge, she can f/u at my office as previously scheduled on Wed 10/23/24 at 1040a            Ab Tripathi MD  [facility]  Office/on call physician: [phone]  Cell (7a-7p): [phone]       daughter at bedside - shares that patient is sleeping with eyes open      VITAL:  T(C): , Max: 36.9 (09-16-24 @ 05:17)  T(F): , Max: 98.5 (09-16-24 @ 05:17)  HR: 91 (09-16-24 @ 05:17)  BP: 134/77 (09-16-24 @ 05:17)  BP(mean): --  RR: 18 (09-16-24 @ 05:17)  SpO2: 98% (09-16-24 @ 05:17)  Wt(kg): --      PHYSICAL EXAM:  Constitutional: NAD  HEENT: (+)R facial erythema  Neck:  No JVD  Respiratory: CTAB/L  Cardiovascular: S1 and S2  Gastrointestinal: BS+, soft, NT/ND  Extremities: No peripheral edema  Neurological: A/O x 3, no focal deficits  Psychiatric: Normal mood, normal affect  : No Arnold  Skin: +erythema on right mandible    LABS:                        10.2   11.49 )-----------( 249      ( 16 Sep 2024 06:48 )             32.8     Na(144)/K(5.0)/Cl(112)/HCO3(16)/BUN(45)/Cr(3.10)Glu(65)/Ca(9.0)/Mg(--)/PO4(--)    09-16 @ 06:48  Na(136)/K(4.5)/Cl(108)/HCO3(13)/BUN(54)/Cr(3.36)Glu(47)/Ca(8.7)/Mg(--)/PO4(--)    09-15 @ 07:36  Na(138)/K(3.9)/Cl(109)/HCO3(14)/BUN(53)/Cr(3.15)Glu(65)/Ca(8.7)/Mg(--)/PO4(--)    09-14 @ 07:04      IMPRESSION: 84F w/ dementia, bipolar disorder, and CKD4-5, 9/11/24 p/w R facial cellulitis    (1)Renal - nonproteinuric CKD4 with atrophic R kidney. Grossly stable numbers  (2)Metabolic acidosis - renally mediated -starting to improve, on standing PO NaHCO3    (3)ID - R facial celluitis - on IV Unasyn  (4)Neuro - "aura" - Neurology input appreciated      RECOMMEND:  (1)PO NaHCO3 as ordered  (2)Abx for GFR 15-20ml/min - present dosing is acceptable  (3)If for discharge, she can f/u at my office as previously scheduled on Wed 10/23/24 at 1040a            Ab Tripathi MD  [facility]  Office/on call physician: [phone]  Cell (7a-7p): [phone]

## 2024-09-16 NOTE — PROGRESS NOTE ADULT - ASSESSMENT
84-year-old female with   anemia, hypothyroidism, depression, GERD, bipolar, CKD , presents for redness and swelling on her face for the past few days.  neuro called for episodes of AMS followed by falls and syncope.    CTH 7/31/24 neg   CT c spine neg   o/e AAOx3, IGLESIAS uppers> lowers. uses walker   orthostatics neg     Imprssion:   episodes of "spacing out" /AMS followed by occaional fall/syncope of unclear etiology.  no tongue bite, convulsions, incontienence or post ictal confusion lasts < 1 min ; low suspicion for seizure more likely from underlyikng psych condition     - in or outpatient MRI brain and EEG   - limit sedating meds --> on significant psych meds, consider psych f/u.  venlafaxine , lorazapam 1mg QHS, VPA  AM 500mg QHS and wellbutrin 150mg daily (can lower seizure threshold)   - TTE at some point can also be outpatient and possibe event monitor   - non urgent vessel imaging, can do carotid duplex   - unasyn for cellulitis   - eventually EMILIO    - telemetry  - PT/OT/SS/SLP, OOBC  - check FS, glucose control <180  - GI/DVT ppx  - Thank you for allowing me to participate in the care of this patient. Call with questions.   Fabio Fox MD  Vascular Neurology  Office: [phone]   84-year-old female with   anemia, hypothyroidism, depression, GERD, bipolar, CKD , presents for redness and swelling on her face for the past few days.  neuro called for episodes of AMS followed by falls and syncope.    CTH 7/31/24 neg   CT c spine neg   o/e AAOx3, IGLESIAS uppers> lowers. uses walker   orthostatics neg     Imprssion:   episodes of "spacing out" /AMS followed by occaional fall/syncope of unclear etiology.  no tongue bite, convulsions, incontienence or post ictal confusion lasts < 1 min ; low suspicion for seizure more likely from underlyikng psych condition     - in or outpatient MRI brain and EEG ; spoke with daughter 9/16 states psych meds have been for years r/o seizure   - limit sedating meds --> on significant psych meds, consider psych f/u.  venlafaxine , lorazapam 1mg QHS, VPA  AM 500mg QHS and wellbutrin 150mg daily (can lower seizure threshold)   - TTE at some point can also be outpatient and possibe event monitor   - non urgent vessel imaging, can do carotid duplex   - unasyn for cellulitis   - eventually EMILIO    - telemetry  - PT/OT/SS/SLP, OOBC  - check FS, glucose control <180  - GI/DVT ppx  - Thank you for allowing me to participate in the care of this patient. Call with questions.   spoke to daughter 9/16   Fabio Fox MD  Vascular Neurology  Office: [phone]

## 2024-09-16 NOTE — PROGRESS NOTE ADULT - SUBJECTIVE AND OBJECTIVE BOX
CARDIOLOGY FOLLOW UP - Dr. Ackerman  DATE OF SERVICE: 9/16/24    CC no CP or SOB  right facial redness and swelling with drainage      REVIEW OF SYSTEMS:  CONSTITUTIONAL: No fever, weight loss, or fatigue  RESPIRATORY: No cough, wheezing, chills or hemoptysis; No Shortness of Breath  CARDIOVASCULAR: No chest pain, palpitations, passing out, dizziness  GASTROINTESTINAL: No abdominal or epigastric pain. No nausea, vomiting, or hematemesis; No diarrhea or constipation. No melena or hematochezia.      PHYSICAL EXAM:  T(C): 36.9 (09-16-24 @ 05:17), Max: 36.9 (09-16-24 @ 05:17)  HR: 91 (09-16-24 @ 05:17) (81 - 91)  BP: 134/77 (09-16-24 @ 05:17) (113/70 - 134/77)  RR: 18 (09-16-24 @ 05:17) (18 - 18)  SpO2: 98% (09-16-24 @ 05:17) (96% - 100%)  Wt(kg): --  I&O's Summary    15 Sep 2024 07:01  -  16 Sep 2024 07:00  --------------------------------------------------------  IN: 720 mL / OUT: 3200 mL / NET: -2480 mL        Appearance: Normal	  Cardiovascular: Normal S1 S2,RRR, No JVD, +murmur  Respiratory: Lungs clear to auscultation b/l  Gastrointestinal:  Soft, Non-tender, + BS	  Extremities: Normal range of motion, No clubbing, cyanosis or edema      Home Medications:  acetaminophen 325 mg oral tablet: 2 tab(s) orally every 6 hours as needed for  mild pain (11 Sep 2024 21:40)  buPROPion 150 mg/24 hours (XL) oral tablet, extended release: 1 tab(s) orally once a day (11 Sep 2024 21:40)  divalproex sodium 250 mg oral tablet, extended release: 1 tab(s) orally once a day (11 Sep 2024 21:40)  divalproex sodium 500 mg oral tablet, extended release: 1 tab(s) orally once a day (at bedtime) (11 Sep 2024 21:38)  lactulose 10 g/15 mL oral syrup: 30 milliliter(s) orally every 8 hours as needed for  constipation (11 Sep 2024 21:40)  levothyroxine 88 mcg (0.088 mg) oral tablet: 1 tab(s) orally once a day (11 Sep 2024 21:39)  LORazepam 1 mg oral tablet: 1 tab(s) orally once a day (at bedtime) (11 Sep 2024 21:40)  Melatonin 3 mg oral tablet: 1 tab(s) orally once a day (at bedtime) (11 Sep 2024 21:40)  polyethylene glycol 3350 oral powder for reconstitution: 17 gram(s) orally once a day (11 Sep 2024 21:40)  senna leaf extract oral tablet: 2 tab(s) orally once a day (at bedtime) (11 Sep 2024 21:40)  venlafaxine 75 mg oral capsule, extended release: 375 milligram(s) orally once a day (11 Sep 2024 21:39)      MEDICATIONS  (STANDING):  ampicillin/sulbactam  IVPB 3 Gram(s) IV Intermittent every 12 hours  buPROPion XL (24-Hour) . 150 milliGRAM(s) Oral daily  chlorhexidine 2% Cloths 1 Application(s) Topical daily  diphenhydrAMINE 25 milliGRAM(s) Oral once  divalproex  milliGRAM(s) Oral daily  divalproex  milliGRAM(s) Oral at bedtime  heparin   Injectable 5000 Unit(s) SubCutaneous every 12 hours  influenza  Vaccine (HIGH DOSE) 0.5 milliLiter(s) IntraMuscular once  levothyroxine 88 MICROGram(s) Oral daily  LORazepam     Tablet 1 milliGRAM(s) Oral at bedtime  pantoprazole    Tablet 40 milliGRAM(s) Oral before breakfast  sodium bicarbonate 1300 milliGRAM(s) Oral three times a day  venlafaxine 75 milliGRAM(s) Oral daily  venlafaxine 300 milliGRAM(s) Oral daily      TELEMETRY: 	  not on tele  ECG:  	  RADIOLOGY:   DIAGNOSTIC TESTING:  [ ] Echocardiogram:  [ ]  Catheterization:  [ ] Stress Test:    OTHER: 	    LABS:	 	                            10.2   11.49 )-----------( 249      ( 16 Sep 2024 06:48 )             32.8     09-16    144  |  112<H>  |  45<H>  ----------------------------<  65<L>  5.0   |  16<L>  |  3.10<H>    Ca    9.0      16 Sep 2024 06:48    TPro  6.4  /  Alb  3.3  /  TBili  0.2  /  DBili  x   /  AST  11  /  ALT  14  /  AlkPhos  87  09-15

## 2024-09-16 NOTE — PROGRESS NOTE ADULT - NS ATTEND AMEND GEN_ALL_CORE FT
wound was already draining, manual expression have expressed majority of the purulence but the self drainage site was small only 5mm.  Incision and drainage was performed to widen drainage site and break up any loculations.  There was some remaining purulence (probably 2 cc remaining) but cavity was larger than that.  The cavity was thoroughly irrigated and packed.  Overall there is a very thick capsule around the purulence as well so the edema will take a while to resolve, other factors are already improving such as white count.  Culture was already taken today by primary team from the actively draining purulence, f/up results (although has been on abx so may not grow).  I would not change abx just because she seems to have developed purulence, may have had low level abscess formation from the beginning given how thick this capsule was, other factors such as pain, white count, overall edema have all improved.  As long as she continues to improve with packing changes and culture does not grow anything of concern would c/w abx per ID.    - ENT will c/w packing changes for now.  Will devance each day to encourage granulation. wound was already draining, manual expression had expressed majority of the purulence but the self drainage site was small only 5mm.  Incision and drainage was performed to widen drainage site and break up any loculations.  There was some remaining purulence (probably 2 cc remaining) but cavity was larger than that.  The cavity was thoroughly irrigated and packed.  Overall there is a very thick capsule around the purulence as well so the edema will take a while to resolve, other factors are already improving such as white count.  Culture was already taken today by primary team from the actively draining purulence, f/up results (although has been on abx so may not grow).  I would not change abx just because she seems to have developed purulence, may have had low level abscess formation not seen on original CT from the beginning given how thick this capsule was, other factors such as pain, white count, overall edema have all improved.  As long as she continues to improve with packing changes and culture does not grow anything of concern would c/w abx per ID.    - ENT will c/w packing changes for now.  Will devance each day to encourage granulation.

## 2024-09-16 NOTE — PROGRESS NOTE ADULT - SUBJECTIVE AND OBJECTIVE BOX
[facility] DIVISION OF INFECTIOUS DISEASES  LOUANN Dubois Y. Patel, S. Shah, G. David  [phone]  ([phone] - weekdays after 5pm and weekends)    Name: JOANNA MALDONADO  Age/Gender: 84y Female  MRN: [id number]    Interval History:  Patient seen and examined this morning.   Reports R lower face feels more swollen.  Denies fever or any other new complaints.   Notes reviewed  No concerning overnight events  Afebrile   Allergies: azithromycin (Rash)      Objective:  Vitals:   T(F): 98.5 (09-16-24 @ 05:17), Max: 98.5 (09-16-24 @ 05:17)  HR: 91 (09-16-24 @ 05:17) (81 - 91)  BP: 134/77 (09-16-24 @ 05:17) (113/70 - 134/77)  RR: 18 (09-16-24 @ 05:17) (18 - 18)  SpO2: 98% (09-16-24 @ 05:17) (96% - 100%)  Physical Examination:  General: no acute distress  HEENT: anicteric, EOMI  Respiratory: no acc muscle use, breathing comfortably  Cardiovascular: S1 and S2 present  Gastrointestinal: normal appearing, nondistended  Extremities: no edema, no cyanosis  Skin: R submandibular swelling with induration, small area with little expressible purulence, mild TTP, erythema overall less    Laboratory Studies:  CBC:                       10.2   11.49 )-----------( 249      ( 16 Sep 2024 06:48 )             32.8     WBC Trend:  11.49 09-16-24 @ 06:48  11.90 09-15-24 @ 07:46  12.58 09-14-24 @ 07:16  10.94 09-13-24 @ 07:15  15.43 09-12-24 @ 07:34  22.22 09-11-24 @ 17:18    CMP: 09-16    144  |  112<H>  |  45<H>  ----------------------------<  65<L>  5.0   |  16<L>  |  3.10<H>    Ca    9.0      16 Sep 2024 06:48    TPro  6.4  /  Alb  3.3  /  TBili  0.2  /  DBili  x   /  AST  11  /  ALT  14  /  AlkPhos  87  09-15    Creatinine: 3.10 mg/dL (09-16-24 @ 06:48)  Creatinine: 3.36 mg/dL (09-15-24 @ 07:36)  Creatinine: 3.15 mg/dL (09-14-24 @ 07:04)  Creatinine: 3.13 mg/dL (09-13-24 @ 07:08)  Creatinine: 2.97 mg/dL (09-12-24 @ 07:37)  Creatinine: 3.03 mg/dL (09-11-24 @ 17:18)    LIVER FUNCTIONS - ( 15 Sep 2024 07:36 )  Alb: 3.3 g/dL / Pro: 6.4 g/dL / ALK PHOS: 87 U/L / ALT: 14 U/L / AST: 11 U/L / GGT: x           Microbiology: reviewed   Culture - Blood (collected 09-11-24 @ 18:40)  Source: .Blood Blood-Peripheral  Preliminary Report (09-16-24 @ 02:01):    No growth at 4 days    Culture - Blood (collected 09-11-24 @ 18:30)  Source: .Blood Blood-Peripheral  Preliminary Report (09-16-24 @ 02:01):    No growth at 4 days    Radiology: reviewed     Medications:  acetaminophen     Tablet .. 650 milliGRAM(s) Oral every 6 hours PRN  ampicillin/sulbactam  IVPB 3 Gram(s) IV Intermittent every 12 hours  buPROPion XL (24-Hour) . 150 milliGRAM(s) Oral daily  chlorhexidine 2% Cloths 1 Application(s) Topical daily  diphenhydrAMINE 25 milliGRAM(s) Oral once  divalproex  milliGRAM(s) Oral daily  divalproex  milliGRAM(s) Oral at bedtime  heparin   Injectable 5000 Unit(s) SubCutaneous every 12 hours  influenza  Vaccine (HIGH DOSE) 0.5 milliLiter(s) IntraMuscular once  levothyroxine 88 MICROGram(s) Oral daily  LORazepam     Tablet 1 milliGRAM(s) Oral at bedtime  melatonin 3 milliGRAM(s) Oral at bedtime PRN  pantoprazole    Tablet 40 milliGRAM(s) Oral before breakfast  polyethylene glycol 3350 17 Gram(s) Oral daily PRN  senna 2 Tablet(s) Oral at bedtime PRN  sodium bicarbonate 1300 milliGRAM(s) Oral three times a day  venlafaxine 300 milliGRAM(s) Oral daily  venlafaxine 75 milliGRAM(s) Oral daily    Current Antimicrobials:  ampicillin/sulbactam  IVPB 3 Gram(s) IV Intermittent every 12 hours    Prior/Completed Antimicrobials:  ampicillin/sulbactam  IVPB

## 2024-09-16 NOTE — PROGRESS NOTE ADULT - ASSESSMENT
84 year old, A&Ox4 Female, with PMHx of HTN, CKD, hypothyroid, depression, and OA, presented to ED for 4 days of worsening right facial/neck swelling and pain. At ED, labs are significant for leucocytosis (WBC 22.22) and creatinine of 3.03. CT neck without contrast revealed right facial cellulitis/myositis, without gross evidence of a drainable fluid collection. ENT consulted for further evaluation. Per patient, on Saturday, she went for a dental consultation for cracked tooth. No procedure was done. Then pt started noticing right facial swelling on Saturday evening. Pt presented to PCP, who later referred her to ENT, when swelling progressively worsened along with new onset of trismus. outpatient ENT sent her to ED for CT scan and iv antibiotics. Neck CT revealed R. facial cellulitis and myositis, no collection. Pt symptoms initially improved however ENT called back because pus is now draining from the wound and pt states pain and swelling remains the same. Exam: R. submandibular swelling, induration, and erythema with small open wound actively draining pus. Afebrile. WBC 11.49  84 year old, A&Ox4 Female, with PMHx of HTN, CKD, hypothyroid, depression, and OA, presented to ED for 4 days of worsening right facial/neck swelling and pain. At ED, labs are significant for leucocytosis (WBC 22.22) and creatinine of 3.03. CT neck without contrast revealed right facial cellulitis/myositis, without gross evidence of a drainable fluid collection. ENT consulted for further evaluation. Per patient, on Saturday, she went for a dental consultation for cracked tooth. No procedure was done. Then pt started noticing right facial swelling on Saturday evening. Pt presented to PCP, who later referred her to ENT, when swelling progressively worsened along with new onset of trismus. outpatient ENT sent her to ED for CT scan and iv antibiotics. Neck CT revealed R. facial cellulitis and myositis, no collection. Pt symptoms initially improved however ENT called back because pus is now draining from the wound and pt states pain and swelling remains the same. Exam: R. submandibular swelling, induration, and erythema with small open wound actively draining pus. I&D of abscess performed, wound packed with iodoform, pt tolerated procedure well. Afebrile. WBC 11.49

## 2024-09-16 NOTE — PROGRESS NOTE ADULT - ASSESSMENT
Patient is an 84-year-old female with a history of anemia, hypothyroidism, depression, GERD, bipolar, CKD who presents for redness and swelling on her face for the past few days. Patient reports right-sided facial swelling and redness including her neck and on her lower jaw that started on 9/8/24, worsening over the past few days. She reports  a dental exam for cracked teeth on day prior but no work performed. States someone told her she had 2 small pimple like lesion on her face, currently scabbed over.    R facial cellulitis  Leukocytosis due to above   - R lower face/neck erythema with induration, warmth and TTP with 2 small scabs noted--likely portal of entry  - CT neck soft tissue with R facial cellulitis/myositis, no e/o drainable fluid collection  - s/p dexamethasone x24h, remains on unasyn with continued improvement  - Bcx NGTD   - WBC stable, remains afebrile, swelling/induration appears inc and small amt expressible pus     Recommendations:  ENT follow up re:drainage   Send culture of drainage, ordered   Continue on unasyn 3g IV Q12h (renally dosed)  Will add doxycycline 100mg Q12h for now   Continue rest of care per primary team       D/w NP Ping Graham M.D.  [facility], Division of Infectious Diseases  [phone]  After 5pm on weekdays and all day on weekends - please call [phone]  Available on [employer] TEAMS    Patient is an 84-year-old female with a history of anemia, hypothyroidism, depression, GERD, bipolar, CKD who presents for redness and swelling on her face for the past few days. Patient reports right-sided facial swelling and redness including her neck and on her lower jaw that started on 9/8/24, worsening over the past few days. She reports  a dental exam for cracked teeth on day prior but no work performed. States someone told her she had 2 small pimple like lesion on her face, currently scabbed over.    R facial cellulitis  Leukocytosis due to above   - R lower face/neck erythema with induration, warmth and TTP with 2 small scabs noted--likely portal of entry  - CT neck soft tissue with R facial cellulitis/myositis, no e/o drainable fluid collection  - s/p dexamethasone x24h, remains on unasyn with continued improvement  - Bcx NGTD   - WBC stable, remains afebrile, swelling/induration appears inc and small amt expressible pus     Recommendations:  ENT follow up re:drainage   Send culture of drainage, ordered   Continue on unasyn 3g IV Q12h (renally dosed)  Will add doxycycline 100mg Q12h for now   Continue rest of care per primary team       D/w NP Ping; daughter Efren over the phone  Ash Graham M.D.  [facility], Division of Infectious Diseases  [phone]  After 5pm on weekdays and all day on weekends - please call [phone]  Available on [employer] TEAMS

## 2024-09-16 NOTE — PROGRESS NOTE ADULT - SUBJECTIVE AND OBJECTIVE BOX
ENT ISSUE/POD: right facial swelling    HPI: 84 year old, A&Ox4 Female, with PMHx of HTN, CKD, hypothyroid, depression, and OA, presented to ED for 4 days of worsening right facial/neck swelling and pain. At ED, labs are significant for leucocytosis (WBC 22.22) and creatinine of 3.03. CT neck without contrast revealed right facial cellulitis/myositis, without gross evidence of a drainable fluid collection. ENT consulted for further evaluation. Per patient, on Saturday, she went for a dental consultation for cracked tooth. No procedure was done. Then pt started noticing right facial swelling on Saturday evening. Pt presented to PCP, who later referred her to ENT, when swelling progressively worsened along with new onset of trismus in the past 4 days. outpatient ENT sent her to ED for CT scan and iv antibiotics. Neck CT revealed R. facial cellulitis and myositis. Pt symptoms initially improved however ENT called back because pus is now draining from the wound and pt states pain and swelling remains the same. Pt denies SOB, fevers, cough, dysphagia.        PAST MEDICAL & SURGICAL HISTORY:  Depression      Hypothyroid      Kidney disease      Cataract      History of rectal surgery      History of tonsillectomy        Allergies    azithromycin (Rash)    Intolerances      MEDICATIONS  (STANDING):  ampicillin/sulbactam  IVPB 3 Gram(s) IV Intermittent every 12 hours  buPROPion XL (24-Hour) . 150 milliGRAM(s) Oral daily  chlorhexidine 2% Cloths 1 Application(s) Topical daily  diphenhydrAMINE 25 milliGRAM(s) Oral once  divalproex  milliGRAM(s) Oral daily  divalproex  milliGRAM(s) Oral at bedtime  doxycycline IVPB 100 milliGRAM(s) IV Intermittent every 12 hours  heparin   Injectable 5000 Unit(s) SubCutaneous every 12 hours  influenza  Vaccine (HIGH DOSE) 0.5 milliLiter(s) IntraMuscular once  levothyroxine 88 MICROGram(s) Oral daily  LORazepam     Tablet 1 milliGRAM(s) Oral at bedtime  pantoprazole    Tablet 40 milliGRAM(s) Oral before breakfast  sodium bicarbonate 1300 milliGRAM(s) Oral three times a day  venlafaxine 300 milliGRAM(s) Oral daily  venlafaxine 75 milliGRAM(s) Oral daily    MEDICATIONS  (PRN):  acetaminophen     Tablet .. 650 milliGRAM(s) Oral every 6 hours PRN Temp greater or equal to 38C (100.4F), Mild Pain (1 - 3)  melatonin 3 milliGRAM(s) Oral at bedtime PRN Insomnia  polyethylene glycol 3350 17 Gram(s) Oral daily PRN Constipation  senna 2 Tablet(s) Oral at bedtime PRN Constipation      Social History: see consult    Family history: see consult    ROS:   ENT: all negative except as noted in HPI   Pulm: denies SOB, cough, hemoptysis  Neuro: denies numbness/tingling, loss of sensation  Endo: denies heat/cold intolerance, excessive sweating      Vital Signs Last 24 Hrs  T(C): 36.7 (16 Sep 2024 11:38), Max: 36.9 (16 Sep 2024 05:17)  T(F): 98.1 (16 Sep 2024 11:38), Max: 98.5 (16 Sep 2024 05:17)  HR: 87 (16 Sep 2024 11:38) (81 - 91)  BP: 136/74 (16 Sep 2024 11:38) (113/70 - 136/74)  BP(mean): --  RR: 18 (16 Sep 2024 11:38) (18 - 18)  SpO2: 96% (16 Sep 2024 11:38) (96% - 100%)    Parameters below as of 16 Sep 2024 11:38  Patient On (Oxygen Delivery Method): room air                              10.2   11.49 )-----------( 249      ( 16 Sep 2024 06:48 )             32.8    09-16    144  |  112<H>  |  45<H>  ----------------------------<  65<L>  5.0   |  16<L>  |  3.10<H>    Ca    9.0      16 Sep 2024 06:48    TPro  6.4  /  Alb  3.3  /  TBili  0.2  /  DBili  x   /  AST  11  /  ALT  14  /  AlkPhos  87  09-15       PHYSICAL EXAM:  Gen: NAD  Skin: right facial and neck erythema   Head: Normocephalic, Atraumatic  Face: right submandibular erythema with swelling/induration, warm to touch and TTP. Small open wound with pus actively draining.   Eyes: no scleral injection  Nose: Nares bilaterally patent, no discharge  Mouth: dry oral mucosa. no pus expressed from Stensen's and Garden's duct. no tenderness on gum or teeth on palpation. Mild trismus. No Stridor.   Neck: erythema on right platysma and anterior neck,  trachea midline, no masses  Lymphatic: No lymphadenopathy  Resp: breathing comfortably at room air, no stridor  CV: no peripheral edema/cyanosis  GI: nondistended   Peripheral vascular: no JVD or edema  Neuro: facial nerve intact, no facial droop         ENT ISSUE/POD: right facial swelling    HPI: 84 year old, A&Ox4 Female, with PMHx of HTN, CKD, hypothyroid, depression, and OA, presented to ED for 4 days of worsening right facial/neck swelling and pain. At ED, labs are significant for leucocytosis (WBC 22.22) and creatinine of 3.03. CT neck without contrast revealed right facial cellulitis/myositis, without gross evidence of a drainable fluid collection. ENT consulted for further evaluation. Per patient, on Saturday, she went for a dental consultation for cracked tooth. No procedure was done. Then pt started noticing right facial swelling on Saturday evening. Pt presented to PCP, who later referred her to ENT, when swelling progressively worsened along with new onset of trismus in the past 4 days. outpatient ENT sent her to ED for CT scan and iv antibiotics. Neck CT revealed R. facial cellulitis and myositis. Pt symptoms initially improved however ENT called back because pus is now draining from the wound and pt states pain and swelling remains the same. Pt denies SOB, fevers, cough, dysphagia.        PAST MEDICAL & SURGICAL HISTORY:  Depression      Hypothyroid      Kidney disease      Cataract      History of rectal surgery      History of tonsillectomy        Allergies    azithromycin (Rash)    Intolerances      MEDICATIONS  (STANDING):  ampicillin/sulbactam  IVPB 3 Gram(s) IV Intermittent every 12 hours  buPROPion XL (24-Hour) . 150 milliGRAM(s) Oral daily  chlorhexidine 2% Cloths 1 Application(s) Topical daily  diphenhydrAMINE 25 milliGRAM(s) Oral once  divalproex  milliGRAM(s) Oral daily  divalproex  milliGRAM(s) Oral at bedtime  doxycycline IVPB 100 milliGRAM(s) IV Intermittent every 12 hours  heparin   Injectable 5000 Unit(s) SubCutaneous every 12 hours  influenza  Vaccine (HIGH DOSE) 0.5 milliLiter(s) IntraMuscular once  levothyroxine 88 MICROGram(s) Oral daily  LORazepam     Tablet 1 milliGRAM(s) Oral at bedtime  pantoprazole    Tablet 40 milliGRAM(s) Oral before breakfast  sodium bicarbonate 1300 milliGRAM(s) Oral three times a day  venlafaxine 300 milliGRAM(s) Oral daily  venlafaxine 75 milliGRAM(s) Oral daily    MEDICATIONS  (PRN):  acetaminophen     Tablet .. 650 milliGRAM(s) Oral every 6 hours PRN Temp greater or equal to 38C (100.4F), Mild Pain (1 - 3)  melatonin 3 milliGRAM(s) Oral at bedtime PRN Insomnia  polyethylene glycol 3350 17 Gram(s) Oral daily PRN Constipation  senna 2 Tablet(s) Oral at bedtime PRN Constipation      Social History: see consult    Family history: see consult    ROS:   ENT: all negative except as noted in HPI   Pulm: denies SOB, cough, hemoptysis  Neuro: denies numbness/tingling, loss of sensation  Endo: denies heat/cold intolerance, excessive sweating      Vital Signs Last 24 Hrs  T(C): 36.7 (16 Sep 2024 11:38), Max: 36.9 (16 Sep 2024 05:17)  T(F): 98.1 (16 Sep 2024 11:38), Max: 98.5 (16 Sep 2024 05:17)  HR: 87 (16 Sep 2024 11:38) (81 - 91)  BP: 136/74 (16 Sep 2024 11:38) (113/70 - 136/74)  BP(mean): --  RR: 18 (16 Sep 2024 11:38) (18 - 18)  SpO2: 96% (16 Sep 2024 11:38) (96% - 100%)    Parameters below as of 16 Sep 2024 11:38  Patient On (Oxygen Delivery Method): room air                              10.2   11.49 )-----------( 249      ( 16 Sep 2024 06:48 )             32.8    09-16    144  |  112<H>  |  45<H>  ----------------------------<  65<L>  5.0   |  16<L>  |  3.10<H>    Ca    9.0      16 Sep 2024 06:48    TPro  6.4  /  Alb  3.3  /  TBili  0.2  /  DBili  x   /  AST  11  /  ALT  14  /  AlkPhos  87  09-15       PHYSICAL EXAM:  Gen: NAD  Skin: right facial and neck erythema   Head: Normocephalic, Atraumatic  Face: right submandibular erythema with swelling/induration, warm to touch and TTP. Small open wound with pus actively draining.   Eyes: no scleral injection  Nose: Nares bilaterally patent, no discharge  Mouth: dry oral mucosa. no pus expressed from Stensen's and Schoolcraft's duct. no tenderness on gum or teeth on palpation. Mild trismus. No Stridor.   Neck: erythema on right platysma and anterior neck,  trachea midline, no masses  Lymphatic: No lymphadenopathy  Resp: breathing comfortably at room air, no stridor  CV: no peripheral edema/cyanosis  GI: nondistended   Peripheral vascular: no JVD or edema  Neuro: facial nerve intact, no facial droop      PROCEDURE NOTE - Incision and Drainage of Right Submandibular Abscess    Indication: Right Submandibular Abscess  	  Consent obtained, correct patient, procedure, site, positioning, and presence of correct supplies.    Procedure: Patient was positioned, prepped and draped in usual sterile fashion. Approximately 5cc's of 1% lidocaine w/ Epi was used to anesthetize the Right Submandibular region #15 blade scalpel was used to make a 1.5 cm incision across the body of the abscess. Clamp inserted and maneuvered to break up any loculations. Manual pressure was used to remove the contents of the abscess cavity. Following evacuation of the abscess cavity, it was rinsed thoroughly with 30cc of sterile normal saline and betadine. The abscess cavity was packed with 1 inch iodoform pressure dressing as applied to ensure hemostasis.  Blood loss: Minimal  Complications: None     ENT ISSUE/POD: right facial swelling    HPI: 84 year old, A&Ox4 Female, with PMHx of HTN, CKD, hypothyroid, depression, and OA, presented to ED for 4 days of worsening right facial/neck swelling and pain. At ED, labs are significant for leucocytosis (WBC 22.22) and creatinine of 3.03. CT neck without contrast revealed right facial cellulitis/myositis, without gross evidence of a drainable fluid collection. ENT consulted for further evaluation. Per patient, on Saturday, she went for a dental consultation for cracked tooth. No procedure was done. Then pt started noticing right facial swelling on Saturday evening. Pt presented to PCP, who later referred her to ENT, when swelling progressively worsened along with new onset of trismus in the past 4 days. outpatient ENT sent her to ED for CT scan and iv antibiotics. Neck CT revealed R. facial cellulitis and myositis. Pt symptoms initially improved however ENT called back because pus is now draining from the wound and pt states pain and swelling remains the same. Pt denies SOB, fevers, cough, dysphagia.        PAST MEDICAL & SURGICAL HISTORY:  Depression      Hypothyroid      Kidney disease      Cataract      History of rectal surgery      History of tonsillectomy        Allergies    azithromycin (Rash)    Intolerances      MEDICATIONS  (STANDING):  ampicillin/sulbactam  IVPB 3 Gram(s) IV Intermittent every 12 hours  buPROPion XL (24-Hour) . 150 milliGRAM(s) Oral daily  chlorhexidine 2% Cloths 1 Application(s) Topical daily  diphenhydrAMINE 25 milliGRAM(s) Oral once  divalproex  milliGRAM(s) Oral daily  divalproex  milliGRAM(s) Oral at bedtime  doxycycline IVPB 100 milliGRAM(s) IV Intermittent every 12 hours  heparin   Injectable 5000 Unit(s) SubCutaneous every 12 hours  influenza  Vaccine (HIGH DOSE) 0.5 milliLiter(s) IntraMuscular once  levothyroxine 88 MICROGram(s) Oral daily  LORazepam     Tablet 1 milliGRAM(s) Oral at bedtime  pantoprazole    Tablet 40 milliGRAM(s) Oral before breakfast  sodium bicarbonate 1300 milliGRAM(s) Oral three times a day  venlafaxine 300 milliGRAM(s) Oral daily  venlafaxine 75 milliGRAM(s) Oral daily    MEDICATIONS  (PRN):  acetaminophen     Tablet .. 650 milliGRAM(s) Oral every 6 hours PRN Temp greater or equal to 38C (100.4F), Mild Pain (1 - 3)  melatonin 3 milliGRAM(s) Oral at bedtime PRN Insomnia  polyethylene glycol 3350 17 Gram(s) Oral daily PRN Constipation  senna 2 Tablet(s) Oral at bedtime PRN Constipation      Social History: see consult    Family history: see consult    ROS:   ENT: all negative except as noted in HPI   Pulm: denies SOB, cough, hemoptysis  Neuro: denies numbness/tingling, loss of sensation  Endo: denies heat/cold intolerance, excessive sweating      Vital Signs Last 24 Hrs  T(C): 36.7 (16 Sep 2024 11:38), Max: 36.9 (16 Sep 2024 05:17)  T(F): 98.1 (16 Sep 2024 11:38), Max: 98.5 (16 Sep 2024 05:17)  HR: 87 (16 Sep 2024 11:38) (81 - 91)  BP: 136/74 (16 Sep 2024 11:38) (113/70 - 136/74)  BP(mean): --  RR: 18 (16 Sep 2024 11:38) (18 - 18)  SpO2: 96% (16 Sep 2024 11:38) (96% - 100%)    Parameters below as of 16 Sep 2024 11:38  Patient On (Oxygen Delivery Method): room air                              10.2   11.49 )-----------( 249      ( 16 Sep 2024 06:48 )             32.8    09-16    144  |  112<H>  |  45<H>  ----------------------------<  65<L>  5.0   |  16<L>  |  3.10<H>    Ca    9.0      16 Sep 2024 06:48    TPro  6.4  /  Alb  3.3  /  TBili  0.2  /  DBili  x   /  AST  11  /  ALT  14  /  AlkPhos  87  09-15       PHYSICAL EXAM:  Gen: NAD  Skin: right facial and neck erythema   Head: Normocephalic, Atraumatic  Face: right submandibular erythema with swelling/induration, warm to touch and TTP. Small open wound with pus actively draining.   Eyes: no scleral injection  Nose: Nares bilaterally patent, no discharge  Mouth: dry oral mucosa. no pus expressed from Stensen's and Blair's duct. no tenderness on gum or teeth on palpation. Mild trismus. No Stridor.   Neck: erythema on right platysma and anterior neck,  trachea midline, no masses  Lymphatic: No lymphadenopathy  Resp: breathing comfortably at room air, no stridor  CV: no peripheral edema/cyanosis  GI: nondistended   Peripheral vascular: no JVD or edema  Neuro: facial nerve intact, no facial droop      PROCEDURE NOTE - Incision and Drainage of Right facial Abscess    Indication: Right facial Abscess  	  Consent obtained, correct patient, procedure, site, positioning, and presence of correct supplies.    Procedure: Patient was positioned, prepped and draped in usual sterile fashion. Approximately 5cc's of 1% lidocaine w/ Epi was used to anesthetize the Right Submandibular region #15 blade scalpel was used to make a 1.5 cm incision across the body of the abscess. Clamp inserted and maneuvered to break up any loculations. Manual pressure was used to remove the contents of the abscess cavity. Following evacuation of the abscess cavity, it was rinsed thoroughly with 30cc of sterile normal saline and betadine. The abscess cavity was packed with 1 inch iodoform pressure dressing as applied to ensure hemostasis.  Blood loss: Minimal  Complications: None     ENT ISSUE/POD: right facial swelling    HPI: 84 year old, A&Ox4 Female, with PMHx of HTN, CKD, hypothyroid, depression, and OA, presented to ED for 4 days of worsening right facial/neck swelling and pain. At ED, labs are significant for leucocytosis (WBC 22.22) and creatinine of 3.03. CT neck without contrast revealed right facial cellulitis/myositis, without gross evidence of a drainable fluid collection. ENT consulted for further evaluation. Per patient, on Saturday, she went for a dental consultation for cracked tooth. No procedure was done. Then pt started noticing right facial swelling on Saturday evening. Pt presented to PCP, who later referred her to ENT, when swelling progressively worsened along with new onset of trismus in the past 4 days. outpatient ENT sent her to ED for CT scan and iv antibiotics. Neck CT revealed R. facial cellulitis and myositis. Pt symptoms initially improved however ENT called back because pus is now draining from the wound and pt states pain and swelling remains the same. Pt denies SOB, fevers, cough, dysphagia.        PAST MEDICAL & SURGICAL HISTORY:  Depression      Hypothyroid      Kidney disease      Cataract      History of rectal surgery      History of tonsillectomy        Allergies    azithromycin (Rash)    Intolerances      MEDICATIONS  (STANDING):  ampicillin/sulbactam  IVPB 3 Gram(s) IV Intermittent every 12 hours  buPROPion XL (24-Hour) . 150 milliGRAM(s) Oral daily  chlorhexidine 2% Cloths 1 Application(s) Topical daily  diphenhydrAMINE 25 milliGRAM(s) Oral once  divalproex  milliGRAM(s) Oral daily  divalproex  milliGRAM(s) Oral at bedtime  doxycycline IVPB 100 milliGRAM(s) IV Intermittent every 12 hours  heparin   Injectable 5000 Unit(s) SubCutaneous every 12 hours  influenza  Vaccine (HIGH DOSE) 0.5 milliLiter(s) IntraMuscular once  levothyroxine 88 MICROGram(s) Oral daily  LORazepam     Tablet 1 milliGRAM(s) Oral at bedtime  pantoprazole    Tablet 40 milliGRAM(s) Oral before breakfast  sodium bicarbonate 1300 milliGRAM(s) Oral three times a day  venlafaxine 300 milliGRAM(s) Oral daily  venlafaxine 75 milliGRAM(s) Oral daily    MEDICATIONS  (PRN):  acetaminophen     Tablet .. 650 milliGRAM(s) Oral every 6 hours PRN Temp greater or equal to 38C (100.4F), Mild Pain (1 - 3)  melatonin 3 milliGRAM(s) Oral at bedtime PRN Insomnia  polyethylene glycol 3350 17 Gram(s) Oral daily PRN Constipation  senna 2 Tablet(s) Oral at bedtime PRN Constipation      Social History: see consult    Family history: see consult    ROS:   ENT: all negative except as noted in HPI   Pulm: denies SOB, cough, hemoptysis  Neuro: denies numbness/tingling, loss of sensation  Endo: denies heat/cold intolerance, excessive sweating      Vital Signs Last 24 Hrs  T(C): 36.7 (16 Sep 2024 11:38), Max: 36.9 (16 Sep 2024 05:17)  T(F): 98.1 (16 Sep 2024 11:38), Max: 98.5 (16 Sep 2024 05:17)  HR: 87 (16 Sep 2024 11:38) (81 - 91)  BP: 136/74 (16 Sep 2024 11:38) (113/70 - 136/74)  BP(mean): --  RR: 18 (16 Sep 2024 11:38) (18 - 18)  SpO2: 96% (16 Sep 2024 11:38) (96% - 100%)    Parameters below as of 16 Sep 2024 11:38  Patient On (Oxygen Delivery Method): room air                              10.2   11.49 )-----------( 249      ( 16 Sep 2024 06:48 )             32.8    09-16    144  |  112<H>  |  45<H>  ----------------------------<  65<L>  5.0   |  16<L>  |  3.10<H>    Ca    9.0      16 Sep 2024 06:48    TPro  6.4  /  Alb  3.3  /  TBili  0.2  /  DBili  x   /  AST  11  /  ALT  14  /  AlkPhos  87  09-15       PHYSICAL EXAM:  Gen: NAD  Skin: right facial and neck erythema   Head: Normocephalic, Atraumatic  Face: right lower facial erythema with swelling/induration, warm to touch and TTP. Small open wound with pus actively draining.   Eyes: no scleral injection  Nose: Nares bilaterally patent, no discharge  Mouth: dry oral mucosa. no pus expressed from Stensen's and Appling's duct. no tenderness on gum or teeth on palpation. Mild trismus. No Stridor.   Neck: erythema on right platysma and anterior neck,  trachea midline, no masses  Lymphatic: No lymphadenopathy  Resp: breathing comfortably at room air, no stridor  CV: no peripheral edema/cyanosis  GI: nondistended   Peripheral vascular: no JVD or edema  Neuro: facial nerve intact, no facial droop      PROCEDURE NOTE - Incision and Drainage of Right facial Abscess    Indication: Right facial Abscess  	  Consent obtained, correct patient, procedure, site, positioning, and presence of correct supplies.    Procedure: Patient was positioned, prepped and draped in usual sterile fashion. Approximately 5cc's of 1% lidocaine w/ Epi was used to anesthetize the Right Submandibular region #15 blade scalpel was used to widen the actively draining site to a 1.5 cm incision across the body of the abscess. Clamp inserted and maneuvered to break up any loculations, large cavity was noted. Manual pressure was used to remove the contents of the abscess cavity. Following evacuation of the abscess cavity, it was rinsed thoroughly with 30cc of sterile normal saline and betadine. The abscess cavity was packed with 1 inch iodoform pressure dressing as applied to ensure hemostasis.  Blood loss: Minimal  Complications: None

## 2024-09-16 NOTE — CONSULT NOTE ADULT - SUBJECTIVE AND OBJECTIVE BOX
PEDIATRIC GENERAL SURGERY CONSULT NOTE    JOANNA MALDONADO  |  [id number]   |   [id number]   |   [id number]      Patient is a 84y old  Female who presents with a chief complaint of R face swelling and redness.      HPI:  Patient is an 84-year-old female with a history of anemia, hypothyroidism, depression, GERD, bipolar, CKD , presents for redness and swelling on her face for the past few days.   Patient reports  right-sided facial swelling and redness including her neck and on her lower jaw that started on 9/8/24, worsening over the past few days. she reports  a dental exam for cracked teeth on day prior but no work performed . she reports no fevers. She was evaluated by her PMD on day of admission and subsequently referred to ENT who referred her to the hospital. She reports   ED course : evaluated by ENT s/p laryngoscope , no airway involvement. Patient was admitted by medicine to manage facial swelling, on 9/16 it was reported that patients abscess expressed pus, ent performed an I&D and packed the wound with iodoform tape.    PAST MEDICAL & SURGICAL HISTORY:  Depression      Hypothyroid      Kidney disease      Cataract      History of rectal surgery      History of tonsillectomy      FAMILY HISTORY:      SOCIAL HISTORY:  Vaccination Status:     MEDICATIONS  (STANDING):  ampicillin/sulbactam  IVPB 3 Gram(s) IV Intermittent every 12 hours  buPROPion XL (24-Hour) . 150 milliGRAM(s) Oral daily  chlorhexidine 2% Cloths 1 Application(s) Topical daily  diphenhydrAMINE 25 milliGRAM(s) Oral once  divalproex  milliGRAM(s) Oral at bedtime  divalproex  milliGRAM(s) Oral daily  doxycycline IVPB 100 milliGRAM(s) IV Intermittent every 12 hours  heparin   Injectable 5000 Unit(s) SubCutaneous every 12 hours  influenza  Vaccine (HIGH DOSE) 0.5 milliLiter(s) IntraMuscular once  levothyroxine 88 MICROGram(s) Oral daily  LORazepam     Tablet 1 milliGRAM(s) Oral at bedtime  pantoprazole    Tablet 40 milliGRAM(s) Oral before breakfast  sodium bicarbonate 1300 milliGRAM(s) Oral three times a day  venlafaxine 75 milliGRAM(s) Oral daily  venlafaxine 300 milliGRAM(s) Oral daily    MEDICATIONS  (PRN):  acetaminophen     Tablet .. 650 milliGRAM(s) Oral every 6 hours PRN Temp greater or equal to 38C (100.4F), Mild Pain (1 - 3)  melatonin 3 milliGRAM(s) Oral at bedtime PRN Insomnia  polyethylene glycol 3350 17 Gram(s) Oral daily PRN Constipation  senna 2 Tablet(s) Oral at bedtime PRN Constipation    Allergies    azithromycin (Rash)    Intolerances        Vital Signs Last 24 Hrs  T(C): 36.4 (16 Sep 2024 20:28), Max: 36.9 (16 Sep 2024 05:17)  T(F): 97.6 (16 Sep 2024 20:28), Max: 98.5 (16 Sep 2024 05:17)  HR: 90 (16 Sep 2024 20:28) (87 - 91)  BP: 124/69 (16 Sep 2024 20:28) (124/69 - 136/74)  BP(mean): --  RR: 18 (16 Sep 2024 20:28) (18 - 18)  SpO2: 99% (16 Sep 2024 20:28) (96% - 99%)    Parameters below as of 16 Sep 2024 20:28  Patient On (Oxygen Delivery Method): room air        PHYSICAL EXAM:  GENERAL: NAD, well-groomed, well-developed  HEENT - NC/AT, pupils equal and reactive to light,  ; Moist mucous membranes, noted generalized periodontitis. Abscess felt on the buccal aspect of the R mandible. Unable to appreciate odontogenic etiology for the abscess. Patient FELISHA is <30mm due to pain.   NECK: Supple, No JVD  CHEST/LUNG: Clear to auscultation bilaterally; No rales, rhonchi, wheezing  HEART: Regular rate and rhythm; No murmurs, rubs, or gallops  EXTREMITIES:  2+ Peripheral Pulses, No clubbing, cyanosis, or edema  NEURO:  No Focal deficits, sensory and motor intact  SKIN: Cellulitis/abscess on the R mandible now packed with iodoform tape. Warm to touch and +++TTP.                           10.2   11.49 )-----------( 249      ( 16 Sep 2024 06:48 )             32.8     09-16    144  |  112<H>  |  45<H>  ----------------------------<  65<L>  5.0   |  16<L>  |  3.10<H>    Ca    9.0      16 Sep 2024 06:48    TPro  6.4  /  Alb  3.3  /  TBili  0.2  /  DBili  x   /  AST  11  /  ALT  14  /  AlkPhos  87  09-15      Urinalysis Basic - ( 16 Sep 2024 06:48 )    Color: x / Appearance: x / SG: x / pH: x  Gluc: 65 mg/dL / Ketone: x  / Bili: x / Urobili: x   Blood: x / Protein: x / Nitrite: x   Leuk Esterase: x / RBC: x / WBC x   Sq Epi: x / Non Sq Epi: x / Bacteria: x        IMAGING STUDIES:

## 2024-09-16 NOTE — PROGRESS NOTE ADULT - PROBLEM SELECTOR PLAN 1
- F/u culture  - ENT will continue to follow  - Call with questions or concerns - F/u culture (obtained by primary team)   - Daily packing changes and irrigation  - ENT will continue to follow  - Call with questions or concerns

## 2024-09-16 NOTE — PROGRESS NOTE ADULT - SUBJECTIVE AND OBJECTIVE BOX
SUBJECTIVE / OVERNIGHT EVENTS:      Patient seen and examined at bedside. Daughter at bedside. Right facial redness seemed to increase this am     --------------------------------------------------------------------------------------------  LABS:                        10.2   11.49 )-----------( 249      ( 16 Sep 2024 06:48 )             32.8     09-16    144  |  112<H>  |  45<H>  ----------------------------<  65<L>  5.0   |  16<L>  |  3.10<H>    Ca    9.0      16 Sep 2024 06:48    TPro  6.4  /  Alb  3.3  /  TBili  0.2  /  DBili  x   /  AST  11  /  ALT  14  /  AlkPhos  87  09-15      CAPILLARY BLOOD GLUCOSE      POCT Blood Glucose.: 82 mg/dL (15 Sep 2024 12:30)  POCT Blood Glucose.: 96 mg/dL (15 Sep 2024 10:51)  POCT Blood Glucose.: 92 mg/dL (15 Sep 2024 09:37)        Urinalysis Basic - ( 16 Sep 2024 06:48 )    Color: x / Appearance: x / SG: x / pH: x  Gluc: 65 mg/dL / Ketone: x  / Bili: x / Urobili: x   Blood: x / Protein: x / Nitrite: x   Leuk Esterase: x / RBC: x / WBC x   Sq Epi: x / Non Sq Epi: x / Bacteria: x        RADIOLOGY & ADDITIONAL TESTS:    Imaging Personally Reviewed:  [x] YES  [ ] NO    Consultant(s) Notes Reviewed:  [x] YES  [ ] NO    MEDICATIONS  (STANDING):  ampicillin/sulbactam  IVPB 3 Gram(s) IV Intermittent every 12 hours  buPROPion XL (24-Hour) . 150 milliGRAM(s) Oral daily  chlorhexidine 2% Cloths 1 Application(s) Topical daily  diphenhydrAMINE 25 milliGRAM(s) Oral once  divalproex  milliGRAM(s) Oral daily  divalproex  milliGRAM(s) Oral at bedtime  heparin   Injectable 5000 Unit(s) SubCutaneous every 12 hours  influenza  Vaccine (HIGH DOSE) 0.5 milliLiter(s) IntraMuscular once  levothyroxine 88 MICROGram(s) Oral daily  LORazepam     Tablet 1 milliGRAM(s) Oral at bedtime  pantoprazole    Tablet 40 milliGRAM(s) Oral before breakfast  sodium bicarbonate 1300 milliGRAM(s) Oral three times a day  venlafaxine 300 milliGRAM(s) Oral daily  venlafaxine 75 milliGRAM(s) Oral daily    MEDICATIONS  (PRN):  acetaminophen     Tablet .. 650 milliGRAM(s) Oral every 6 hours PRN Temp greater or equal to 38C (100.4F), Mild Pain (1 - 3)  melatonin 3 milliGRAM(s) Oral at bedtime PRN Insomnia  polyethylene glycol 3350 17 Gram(s) Oral daily PRN Constipation  senna 2 Tablet(s) Oral at bedtime PRN Constipation      Care Discussed with Consultants/Other Providers [x] YES  [ ] NO    Vital Signs Last 24 Hrs  T(C): 36.9 (16 Sep 2024 05:17), Max: 36.9 (16 Sep 2024 05:17)  T(F): 98.5 (16 Sep 2024 05:17), Max: 98.5 (16 Sep 2024 05:17)  HR: 91 (16 Sep 2024 05:17) (81 - 91)  BP: 134/77 (16 Sep 2024 05:17) (113/70 - 134/77)  BP(mean): --  RR: 18 (16 Sep 2024 05:17) (18 - 18)  SpO2: 98% (16 Sep 2024 05:17) (96% - 100%)    Parameters below as of 16 Sep 2024 05:17  Patient On (Oxygen Delivery Method): room air      I&O's Summary    15 Sep 2024 07:01  -  16 Sep 2024 07:00  --------------------------------------------------------  IN: 720 mL / OUT: 3200 mL / NET: -2480 mL        PHYSICAL EXAM:  GENERAL: NAD, well-developed, comfortable  HEENT: right sided facial swelling and neck erythema; TTP  CHEST/LUNG: Clear to auscultation bilaterally; No wheeze  HEART: Regular rate and rhythm; No murmurs, rubs, or gallops  ABDOMEN: Soft, Nontender, Nondistended; Bowel sounds present  NEURO: AAOx3, no focal weakness, 5/5 b/l extremity strength, b/l knee no arthritis, no effusion   EXTREMITIES:  2+ Peripheral Pulses, No clubbing, cyanosis, or edema  SKIN: No rashes or lesions

## 2024-09-16 NOTE — PROGRESS NOTE ADULT - ASSESSMENT
Patient is a 84 year old female with PMHx of Anemia, Depression, CKD, GERD and Hypothyroidism. Presents to [facility] for worsening redness and swelling on her face for the past few days.    Plan:    # Facial cellulitis:   - + leukocytosis, BCx w/ NGTD  - No drainable collection on CT , s/p laryngoscope no airway involvement   - Abx mgmt per ID-> Continue on unasyn 3g IV Q12h (renally dosed)  - S/p Dexamethasone 8MG Q8H for 24H only per ENT  - Encourage oral hydration   - Minced and moist diet  - Monitor temps/WBC  - ENT re-eval called to access the site, as it appear to be more indurated today   - ID/ENT following    # CKD:  - Bun/Cr 38/3.03  - Monitor I/O's  - Serial Cr  - Avoid nephrotoxins  - Renally dose medications  - C/w NaHCO3  - Renal following    # Depression:  - C/w current meds    # LPRD (laryngopharyngeal reflux disease):  - ENT recommendations appreciated   - PPI    # Hypothyroidism:  - C/w Synthroid    # DVT ppx:  - Heparin subq    [facility]

## 2024-09-16 NOTE — PROGRESS NOTE ADULT - SUBJECTIVE AND OBJECTIVE BOX
Neurology      S: patient seen. no significant neuro chagnes       MEDICATIONS  (STANDING):  ampicillin/sulbactam  IVPB 3 Gram(s) IV Intermittent every 12 hours  buPROPion XL (24-Hour) . 150 milliGRAM(s) Oral daily  chlorhexidine 2% Cloths 1 Application(s) Topical daily  diphenhydrAMINE 25 milliGRAM(s) Oral once  divalproex  milliGRAM(s) Oral daily  divalproex  milliGRAM(s) Oral at bedtime  doxycycline IVPB 100 milliGRAM(s) IV Intermittent every 12 hours  heparin   Injectable 5000 Unit(s) SubCutaneous every 12 hours  influenza  Vaccine (HIGH DOSE) 0.5 milliLiter(s) IntraMuscular once  levothyroxine 88 MICROGram(s) Oral daily  LORazepam     Tablet 1 milliGRAM(s) Oral at bedtime  pantoprazole    Tablet 40 milliGRAM(s) Oral before breakfast  sodium bicarbonate 1300 milliGRAM(s) Oral three times a day  venlafaxine 300 milliGRAM(s) Oral daily  venlafaxine 75 milliGRAM(s) Oral daily    MEDICATIONS  (PRN):  acetaminophen     Tablet .. 650 milliGRAM(s) Oral every 6 hours PRN Temp greater or equal to 38C (100.4F), Mild Pain (1 - 3)  melatonin 3 milliGRAM(s) Oral at bedtime PRN Insomnia  polyethylene glycol 3350 17 Gram(s) Oral daily PRN Constipation  senna 2 Tablet(s) Oral at bedtime PRN Constipation    Vital Signs Last 24 Hrs  T(C): 36.9 (09-16-24 @ 05:17), Max: 36.9 (09-16-24 @ 05:17)  T(F): 98.5 (09-16-24 @ 05:17), Max: 98.5 (09-16-24 @ 05:17)  HR: 91 (09-16-24 @ 05:17) (81 - 91)  BP: 134/77 (09-16-24 @ 05:17) (113/70 - 134/77)  BP(mean): --  RR: 18 (09-16-24 @ 05:17) (18 - 18)  SpO2: 98% (09-16-24 @ 05:17) (96% - 100%)    Orthostatic VS  09-15-24 @ 04:24  Lying BP: 115/63 HR: 84  Sitting BP: 124/73 HR: 84  Standing BP: 130/75 HR: 89  Site: upper left arm  Mode: --  Orthostatic VS  09-14-24 @ 21:14  Lying BP: 109/69 HR: 79  Sitting BP: 105/66 HR: 85  Standing BP: 139/63 HR: 88  Site: upper right arm  Mode: electronic        General Exam:   General Appearance: Appropriately dressed and in no acute distress       Head: Normocephalic, atraumatic and no dysmorphic features  Ear, Nose, and Throat: Moist mucous membranes  CVS: S1S2+  Resp: No SOB, no wheeze or rhonchi  GI: soft NT/ND  Extremities: No edema or cyanosis  Skin:  redness on face      Neurological Exam:  Mental Status: Awake, alert and oriented x 3.  Able to follow simple and complex verbal commands. Able to name and repeat. fluent speech. No obvious aphasia or dysarthria noted.   Cranial Nerves: PERRL, EOMI, VFFC, sensation V1-V3 intact,  no obvious facial asymmetry, equal elevation of palate, scm/trap 5/5, tongue is midline on protrusion. no obvious papilledema on fundoscopic exam. hearing is grossly intact.   Motor: Normal bulk, tone and strength throughout uppers ; lowers 3-4/5   Sensation: Intact to light touch and pinprick throughout. no right/left confusion. no extinction to tactile on DSS.   Coordination: No dysmetria on FNF or HKS  Gait: walker /wheelchair     Data/Labs/Imaging which I personally reviewed.     Labs:       LABS:                          10.2   11.49 )-----------( 249      ( 16 Sep 2024 06:48 )             32.8     09-16    144  |  112<H>  |  45<H>  ----------------------------<  65<L>  5.0   |  16<L>  |  3.10<H>    Ca    9.0      16 Sep 2024 06:48    TPro  6.4  /  Alb  3.3  /  TBili  0.2  /  DBili  x   /  AST  11  /  ALT  14  /  AlkPhos  87  09-15    LIVER FUNCTIONS - ( 15 Sep 2024 07:36 )  Alb: 3.3 g/dL / Pro: 6.4 g/dL / ALK PHOS: 87 U/L / ALT: 14 U/L / AST: 11 U/L / GGT: x             Urinalysis Basic - ( 16 Sep 2024 06:48 )    Color: x / Appearance: x / SG: x / pH: x  Gluc: 65 mg/dL / Ketone: x  / Bili: x / Urobili: x   Blood: x / Protein: x / Nitrite: x   Leuk Esterase: x / RBC: x / WBC x   Sq Epi: x / Non Sq Epi: x / Bacteria: x          < from: CT Head No Cont (07.31.24 @ 15:06) >    ACC: [id number] EXAM:  CT CERVICAL SPINE   ORDERED BY: CHANTEL GERBER     ACC: [id number] EXAM:  CT BRAIN   ORDERED BY: RAHEEL LARSON     PROCEDURE DATE:  07/31/2024          INTERPRETATION:  CLINICAL INFORMATION: fall, slipped out of wheelchair at   midnight/possibly fell asleep while watching TV, R arm/L hand skin tears   - dressed by dtr, denies neck/body pain, no AC use    COMPARISON: Head CT 6/22/2024    CONTRAST:  IV Contrast: NONE  Complications: None reported at time of study completion    TECHNIQUE:    CT BRAIN: Serial axial images were obtained from the skull base to the   vertex using multi-slice helical technique. Sagittal and coronal   reformats were obtained.    CT CERVICAL SPINE: Axial images were obtained of the cervical spineusing   multislice helical technique. Reformatted coronal and sagittal images   were obtained.    FINDINGS:    CT BRAIN:    VENTRICLES AND SULCI: Age appropriate involutional changes.  INTRA-AXIAL: No mass effect, acute hemorrhage, or midline shift.There   are periventricular and subcortical white matter hypodensities,   consistent with microvascular type changes.  EXTRA-AXIAL: No mass or fluid collection. Basal cisterns are normal in   appearance.    VISUALIZED SINUSES:  Clear.  TYMPANOMASTOIDCAVITIES:  Clear.  VISUALIZED ORBITS: Bilateral lens replacement.  CALVARIUM: Intact.    MISCELLANEOUS: None.      CT CERVICAL SPINE:    VERTEBRAE:  Normal in height. No acute fracture. Multilevel degenerative   changes including marginal osteophytes.  ALIGNMENT: No subluxation or scoliosis.  INTERVERTEBRAL DISC SPACES: Multilevel degenerative disc osteophyte   complexes. Multilevel bilateral moderate to severe neural foraminal   stenoses.    VISUALIZED LUNGS: Bilateral chronic appearing interstitial prominence.    MISCELLANEOUS:  None.      IMPRESSION:    CT HEAD:  No evidence of acute intracranial pathology.    CT CERVICAL SPINE:  No acute fracture or traumatic subluxation.    Multi-level degenerative changes.        --- End of Report ---            STEVE ALLEN MD; Attending Radiologist  This document has been electronically signed. Jul 31 2024  3:18PM    < end of copied text >

## 2024-09-17 ENCOUNTER — APPOINTMENT (OUTPATIENT)
Dept: PLASTIC SURGERY | Facility: CLINIC | Age: 84
End: 2024-09-17

## 2024-09-17 LAB
ANION GAP SERPL CALC-SCNC: 14 MMOL/L — SIGNIFICANT CHANGE UP (ref 5–17)
BASOPHILS # BLD AUTO: 0.1 K/UL — SIGNIFICANT CHANGE UP (ref 0–0.2)
BASOPHILS NFR BLD AUTO: 1 % — SIGNIFICANT CHANGE UP (ref 0–2)
BUN SERPL-MCNC: 35 MG/DL — HIGH (ref 7–23)
CALCIUM SERPL-MCNC: 8.8 MG/DL — SIGNIFICANT CHANGE UP (ref 8.4–10.5)
CHLORIDE SERPL-SCNC: 111 MMOL/L — HIGH (ref 96–108)
CO2 SERPL-SCNC: 19 MMOL/L — LOW (ref 22–31)
CREAT SERPL-MCNC: 2.82 MG/DL — HIGH (ref 0.5–1.3)
CULTURE RESULTS: SIGNIFICANT CHANGE UP
CULTURE RESULTS: SIGNIFICANT CHANGE UP
EGFR: 16 ML/MIN/1.73M2 — LOW
EOSINOPHIL # BLD AUTO: 0.94 K/UL — HIGH (ref 0–0.5)
EOSINOPHIL NFR BLD AUTO: 9 % — HIGH (ref 0–6)
FLUAV AG NPH QL: SIGNIFICANT CHANGE UP
FLUBV AG NPH QL: SIGNIFICANT CHANGE UP
GLUCOSE SERPL-MCNC: 144 MG/DL — HIGH (ref 70–99)
HCT VFR BLD CALC: 33.2 % — LOW (ref 34.5–45)
HGB BLD-MCNC: 10.3 G/DL — LOW (ref 11.5–15.5)
LYMPHOCYTES # BLD AUTO: 1.15 K/UL — SIGNIFICANT CHANGE UP (ref 1–3.3)
LYMPHOCYTES # BLD AUTO: 11 % — LOW (ref 13–44)
MAGNESIUM SERPL-MCNC: 2.4 MG/DL — SIGNIFICANT CHANGE UP (ref 1.6–2.6)
MCHC RBC-ENTMCNC: 31 GM/DL — LOW (ref 32–36)
MCHC RBC-ENTMCNC: 31.3 PG — SIGNIFICANT CHANGE UP (ref 27–34)
MCV RBC AUTO: 100.9 FL — HIGH (ref 80–100)
MONOCYTES # BLD AUTO: 1.35 K/UL — HIGH (ref 0–0.9)
MONOCYTES NFR BLD AUTO: 13 % — SIGNIFICANT CHANGE UP (ref 2–14)
NEUTROPHILS # BLD AUTO: 5.73 K/UL — SIGNIFICANT CHANGE UP (ref 1.8–7.4)
NEUTROPHILS NFR BLD AUTO: 54 % — SIGNIFICANT CHANGE UP (ref 43–77)
PLATELET # BLD AUTO: 259 K/UL — SIGNIFICANT CHANGE UP (ref 150–400)
POTASSIUM SERPL-MCNC: 4.8 MMOL/L — SIGNIFICANT CHANGE UP (ref 3.5–5.3)
POTASSIUM SERPL-SCNC: 4.8 MMOL/L — SIGNIFICANT CHANGE UP (ref 3.5–5.3)
RBC # BLD: 3.29 M/UL — LOW (ref 3.8–5.2)
RBC # FLD: 14.6 % — HIGH (ref 10.3–14.5)
RSV RNA NPH QL NAA+NON-PROBE: SIGNIFICANT CHANGE UP
SARS-COV-2 RNA SPEC QL NAA+PROBE: SIGNIFICANT CHANGE UP
SODIUM SERPL-SCNC: 144 MMOL/L — SIGNIFICANT CHANGE UP (ref 135–145)
SPECIMEN SOURCE: SIGNIFICANT CHANGE UP
SPECIMEN SOURCE: SIGNIFICANT CHANGE UP
WBC # BLD: 10.41 K/UL — SIGNIFICANT CHANGE UP (ref 3.8–10.5)
WBC # FLD AUTO: 10.41 K/UL — SIGNIFICANT CHANGE UP (ref 3.8–10.5)

## 2024-09-17 PROCEDURE — 70551 MRI BRAIN STEM W/O DYE: CPT | Mod: 26

## 2024-09-17 PROCEDURE — 95816 EEG AWAKE AND DROWSY: CPT | Mod: 26

## 2024-09-17 PROCEDURE — 99233 SBSQ HOSP IP/OBS HIGH 50: CPT | Mod: FS,24

## 2024-09-17 RX ORDER — MORPHINE SULFATE 30 MG/1
2 TABLET, FILM COATED, EXTENDED RELEASE ORAL ONCE
Refills: 0 | Status: DISCONTINUED | OUTPATIENT
Start: 2024-09-17 | End: 2024-09-17

## 2024-09-17 RX ORDER — BENZOCAINE AND LEVOMENTHOL 200; 5 MG/G; MG/G
1 SPRAY TOPICAL ONCE
Refills: 0 | Status: COMPLETED | OUTPATIENT
Start: 2024-09-17 | End: 2024-09-17

## 2024-09-17 RX ADMIN — AMPICILLIN, SULBACTAM 200 GRAM(S): 250; 125 INJECTION, POWDER, FOR SOLUTION INTRAMUSCULAR; INTRAVENOUS at 18:04

## 2024-09-17 RX ADMIN — PANTOPRAZOLE SODIUM 40 MILLIGRAM(S): 40 TABLET, DELAYED RELEASE ORAL at 05:11

## 2024-09-17 RX ADMIN — Medication 1300 MILLIGRAM(S): at 21:31

## 2024-09-17 RX ADMIN — Medication 1300 MILLIGRAM(S): at 14:09

## 2024-09-17 RX ADMIN — MORPHINE SULFATE 2 MILLIGRAM(S): 30 TABLET, FILM COATED, EXTENDED RELEASE ORAL at 09:00

## 2024-09-17 RX ADMIN — Medication 88 MICROGRAM(S): at 05:12

## 2024-09-17 RX ADMIN — MORPHINE SULFATE 2 MILLIGRAM(S): 30 TABLET, FILM COATED, EXTENDED RELEASE ORAL at 08:25

## 2024-09-17 RX ADMIN — Medication 100 MILLIGRAM(S): at 12:10

## 2024-09-17 RX ADMIN — Medication 100 MILLIGRAM(S): at 23:34

## 2024-09-17 RX ADMIN — AMPICILLIN, SULBACTAM 200 GRAM(S): 250; 125 INJECTION, POWDER, FOR SOLUTION INTRAMUSCULAR; INTRAVENOUS at 05:11

## 2024-09-17 RX ADMIN — CHLORHEXIDINE GLUCONATE ORAL RINSE 1 APPLICATION(S): 1.2 SOLUTION DENTAL at 12:10

## 2024-09-17 RX ADMIN — BENZOCAINE AND LEVOMENTHOL 1 LOZENGE: 200; 5 SPRAY TOPICAL at 18:24

## 2024-09-17 RX ADMIN — Medication 5000 UNIT(S): at 05:12

## 2024-09-17 RX ADMIN — Medication 300 MILLIGRAM(S): at 12:10

## 2024-09-17 RX ADMIN — Medication 500 MILLIGRAM(S): at 21:31

## 2024-09-17 RX ADMIN — Medication 1300 MILLIGRAM(S): at 05:12

## 2024-09-17 RX ADMIN — Medication 150 MILLIGRAM(S): at 12:09

## 2024-09-17 RX ADMIN — Medication 5000 UNIT(S): at 18:04

## 2024-09-17 RX ADMIN — Medication 1 MILLIGRAM(S): at 21:31

## 2024-09-17 RX ADMIN — Medication 75 MILLIGRAM(S): at 12:10

## 2024-09-17 RX ADMIN — Medication 250 MILLIGRAM(S): at 12:09

## 2024-09-17 NOTE — PROGRESS NOTE ADULT - ASSESSMENT
Patient is an 84-year-old female with a history of anemia, hypothyroidism, depression, GERD, bipolar, CKD who presents for redness and swelling on her face for the past few days. Patient reports right-sided facial swelling and redness including her neck and on her lower jaw that started on 9/8/24, worsening over the past few days. She reports  a dental exam for cracked teeth on day prior but no work performed. States someone told her she had 2 small pimple like lesion on her face, currently scabbed over.    R facial cellulitis with abscess   Leukocytosis due to above   - initial CT neck soft tissue with R facial cellulitis/myositis, no e/o drainable fluid collection  - s/p dexamethasone x24h  - 9/16 noted with increased duration/swelling and expressible pus -- now s/p I&D by ENT  - MRI head with no acute findings   - WBC normalized, remains afebrile but now reports chills, cough, sore throat since yest    Recommendations:  Follow drainage culture - in process   ENT and OMFS following, recs appreciated   Continue on unasyn 3g IV Q12h (renally dosed)  Continue doxycycline 100mg Q12h for now   Will tailor abx pending culture and clinical improvement   Check for COVID/flu/RSV -- ordered   Monitor temps/WBC-if spikes fever- send Bcx x2, obtain CXR  Continue rest of care per primary team       Ash Graham M.D.  [facility], Division of Infectious Diseases  [phone]  After 5pm on weekdays and all day on weekends - please call [phone]  Available on [employer] TEAMS

## 2024-09-17 NOTE — PROGRESS NOTE ADULT - ASSESSMENT
Patient is a 84 year old female with PMHx of Anemia, Depression, CKD, GERD and Hypothyroidism. Presents to [facility] for worsening redness and swelling on her face for the past few days.    Plan:    # Facial cellulitis:   - + leukocytosis, BCx w/ NGTD  - No drainable collection on CT , s/p laryngoscope no airway involvement   - Abx mgmt per ID-> Continue on unasyn 3g IV Q12h (renally dosed), Continue doxycycline 100mg Q12h for now   - S/p Dexamethasone 8MG Q8H for 24H only per ENT  - Encourage oral hydration   - Minced and moist diet  - Monitor temps/WBC  - ENT re-eval called 9/16-> Follow drainage culture - in process   - ID/ENT following    # Hx of frequent falls:  - Fall precautions  - Ortho negative  - MR Head 9/17/24 shows no intracranial mass, acute territorial infarct, acute intracranial hemorrhage, or midline shift.  - F/u TTE  - Rec EP eval for hx of multiple falls vs syncope with LBBB, CMP, and first degree AV block   - EEG pending  - Cards following    # CKD:  - Bun/Cr 38/3.03  - Monitor I/O's  - Serial Cr  - Avoid nephrotoxins  - Renally dose medications  - C/w NaHCO3  - Renal following    # Depression:  - C/w current meds    # LPRD (laryngopharyngeal reflux disease):  - ENT recommendations appreciated   - PPI    # Hypothyroidism:  - C/w Synthroid    # DVT ppx:  - Heparin subq    [facility]  [phone]

## 2024-09-17 NOTE — PROGRESS NOTE ADULT - ASSESSMENT
84-year-old female with   anemia, hypothyroidism, depression, GERD, bipolar, CKD , presents for redness and swelling on her face for the past few days.  neuro called for episodes of AMS followed by falls and syncope.    CTH 7/31/24 neg   CT c spine neg   o/e AAOx3, IGLESIAS uppers> lowers. uses walker   orthostatics neg   MRI brain neg for acute findings.  chronic R cerebellar strtoke     Imprssion:   1) episodes of "spacing out" /AMS followed by occaional fall/syncope of unclear etiology.  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  venlafaxine , lorazapam 1mg QHS, VPA  AM 500mg QHS and wellbutrin 150mg daily (can lower seizure threshold)   - TTE at some point can also be outpatient and possibe event monitor   - non urgent vessel imaging, can do carotid duplex   - unasyn for cellulitis   - eventually EMILIO    - telemetry  - PT/OT/SS/SLP, OOBC  - check FS, glucose control <180  - GI/DVT ppx  - Thank you for allowing me to participate in the care of this patient. Call with questions.   spoke to daughter 9/16   Fabio Fox MD  Vascular Neurology  Office: [phone]

## 2024-09-17 NOTE — PROGRESS NOTE ADULT - SUBJECTIVE AND OBJECTIVE BOX
[facility] DIVISION OF INFECTIOUS DISEASES  LOUANN Dubois Y. Patel, S. Shah, G. David  [phone]  ([phone] - weekdays after 5pm and weekends)    Name: JOANNA MALDONADO  Age/Gender: 84y Female  MRN: [id number]    Interval History:  Patient seen and examined this morning.   Feels swelling and pain are less today.   Complains of chills, sore throat and cough since yest.   Denies fever, chest pain, dyspnea, n/v/d.   Notes reviewed. Afebrile   Allergies: azithromycin (Rash)      Objective:  Vitals:   T(F): 98.4 (09-17-24 @ 05:10), Max: 98.4 (09-17-24 @ 05:10)  HR: 91 (09-17-24 @ 05:29) (87 - 109)  BP: 146/81 (09-17-24 @ 05:10) (124/69 - 146/81)  RR: 18 (09-17-24 @ 05:10) (18 - 18)  SpO2: 98% (09-17-24 @ 05:10) (96% - 99%)  Physical Examination:  General: no acute distress  HEENT: anicteric, EOMI  Respiratory: no acc muscle use, breathing comfortably  Cardiovascular: S1 and S2 present  Gastrointestinal: normal appearing, nondistended  Extremities: no edema, no cyanosis  Skin: R submandibular induration with dressing with some purulence    Laboratory Studies:  CBC:                       10.3   10.41 )-----------( 259      ( 17 Sep 2024 08:31 )             33.2     WBC Trend:  10.41 09-17-24 @ 08:31  11.49 09-16-24 @ 06:48  11.90 09-15-24 @ 07:46  12.58 09-14-24 @ 07:16  10.94 09-13-24 @ 07:15  15.43 09-12-24 @ 07:34  22.22 09-11-24 @ 17:18    CMP: 09-17    144  |  111[H]  |  35[H]  ----------------------------<  144[H]  4.8   |  19[L]  |  2.82[H]    Ca    8.8      17 Sep 2024 08:32  Mg     2.4     09-17      Creatinine: 2.82 mg/dL (09-17-24 @ 08:32)  Creatinine: 3.10 mg/dL (09-16-24 @ 06:48)  Creatinine: 3.36 mg/dL (09-15-24 @ 07:36)  Creatinine: 3.15 mg/dL (09-14-24 @ 07:04)  Creatinine: 3.13 mg/dL (09-13-24 @ 07:08)  Creatinine: 2.97 mg/dL (09-12-24 @ 07:37)  Creatinine: 3.03 mg/dL (09-11-24 @ 17:18)    Microbiology: reviewed   Culture - Blood (collected 09-11-24 @ 18:40)  Source: .Blood Blood-Peripheral  Final Report (09-17-24 @ 02:00):    No growth at 5 days    Culture - Blood (collected 09-11-24 @ 18:30)  Source: .Blood Blood-Peripheral  Final Report (09-17-24 @ 02:00):    No growth at 5 days    Radiology: reviewed   < from: MR Head No Cont (09.17.24 @ 07:15) >  IMPRESSION:    No evidence for intracranial mass, acute territorial infarct, acute   intracranial hemorrhage, or midline shift.    < end of copied text >    Medications:  acetaminophen     Tablet .. 650 milliGRAM(s) Oral every 6 hours PRN  ampicillin/sulbactam  IVPB 3 Gram(s) IV Intermittent every 12 hours  buPROPion XL (24-Hour) . 150 milliGRAM(s) Oral daily  chlorhexidine 2% Cloths 1 Application(s) Topical daily  diphenhydrAMINE 25 milliGRAM(s) Oral once  divalproex  milliGRAM(s) Oral daily  divalproex  milliGRAM(s) Oral at bedtime  doxycycline IVPB 100 milliGRAM(s) IV Intermittent every 12 hours  heparin   Injectable 5000 Unit(s) SubCutaneous every 12 hours  influenza  Vaccine (HIGH DOSE) 0.5 milliLiter(s) IntraMuscular once  levothyroxine 88 MICROGram(s) Oral daily  LORazepam     Tablet 1 milliGRAM(s) Oral at bedtime  melatonin 3 milliGRAM(s) Oral at bedtime PRN  pantoprazole    Tablet 40 milliGRAM(s) Oral before breakfast  polyethylene glycol 3350 17 Gram(s) Oral daily PRN  senna 2 Tablet(s) Oral at bedtime PRN  sodium bicarbonate 1300 milliGRAM(s) Oral three times a day  venlafaxine 75 milliGRAM(s) Oral daily  venlafaxine 300 milliGRAM(s) Oral daily    Current Antimicrobials:  ampicillin/sulbactam  IVPB 3 Gram(s) IV Intermittent every 12 hours  doxycycline IVPB 100 milliGRAM(s) IV Intermittent every 12 hours    Prior/Completed Antimicrobials:  ampicillin/sulbactam  IVPB

## 2024-09-17 NOTE — PROGRESS NOTE ADULT - SUBJECTIVE AND OBJECTIVE BOX
ENT ISSUE/POD: right facial swelling    HPI: 84 year old, A&Ox4 Female, with PMHx of HTN, CKD, hypothyroid, depression, and OA, presented to ED for 4 days of worsening right facial/neck swelling and pain. At ED, labs are significant for leucocytosis (WBC 22.22) and creatinine of 3.03. CT neck without contrast revealed right facial cellulitis/myositis, without gross evidence of a drainable fluid collection. ENT consulted for further evaluation. Per patient, on Saturday, she went for a dental consultation for cracked tooth. No procedure was done. Then pt started noticing right facial swelling on Saturday evening. Pt presented to PCP, who later referred her to ENT, when swelling progressively worsened along with new onset of trismus in the past 4 days. outpatient ENT sent her to ED for CT scan and iv antibiotics. Neck CT revealed R. facial cellulitis and myositis. Pt symptoms initially improved however ENT called back because pus was draining from the wound with no improvement of pain and swelling as per pt. Pt underwent bedside I&D with iodoform packing. Pt denies SOB, fevers, cough, dysphagia.              PAST MEDICAL & SURGICAL HISTORY:  Depression      Hypothyroid      Kidney disease      Cataract      History of rectal surgery      History of tonsillectomy        Allergies    azithromycin (Rash)    Intolerances      MEDICATIONS  (STANDING):  ampicillin/sulbactam  IVPB 3 Gram(s) IV Intermittent every 12 hours  buPROPion XL (24-Hour) . 150 milliGRAM(s) Oral daily  chlorhexidine 2% Cloths 1 Application(s) Topical daily  diphenhydrAMINE 25 milliGRAM(s) Oral once  divalproex  milliGRAM(s) Oral daily  divalproex  milliGRAM(s) Oral at bedtime  doxycycline IVPB 100 milliGRAM(s) IV Intermittent every 12 hours  heparin   Injectable 5000 Unit(s) SubCutaneous every 12 hours  influenza  Vaccine (HIGH DOSE) 0.5 milliLiter(s) IntraMuscular once  levothyroxine 88 MICROGram(s) Oral daily  LORazepam     Tablet 1 milliGRAM(s) Oral at bedtime  pantoprazole    Tablet 40 milliGRAM(s) Oral before breakfast  sodium bicarbonate 1300 milliGRAM(s) Oral three times a day  venlafaxine 75 milliGRAM(s) Oral daily  venlafaxine 300 milliGRAM(s) Oral daily    MEDICATIONS  (PRN):  acetaminophen     Tablet .. 650 milliGRAM(s) Oral every 6 hours PRN Temp greater or equal to 38C (100.4F), Mild Pain (1 - 3)  melatonin 3 milliGRAM(s) Oral at bedtime PRN Insomnia  polyethylene glycol 3350 17 Gram(s) Oral daily PRN Constipation  senna 2 Tablet(s) Oral at bedtime PRN Constipation      Social History: see consult    Family history: see consult    ROS:   ENT: all negative except as noted in HPI   Pulm: denies SOB, cough, hemoptysis  Neuro: denies numbness/tingling, loss of sensation  Endo: denies heat/cold intolerance, excessive sweating      Vital Signs Last 24 Hrs  T(C): 36.9 (17 Sep 2024 05:10), Max: 36.9 (17 Sep 2024 05:10)  T(F): 98.4 (17 Sep 2024 05:10), Max: 98.4 (17 Sep 2024 05:10)  HR: 91 (17 Sep 2024 05:29) (87 - 109)  BP: 146/81 (17 Sep 2024 05:10) (124/69 - 146/81)  BP(mean): --  RR: 18 (17 Sep 2024 05:10) (18 - 18)  SpO2: 98% (17 Sep 2024 05:10) (96% - 99%)    Parameters below as of 17 Sep 2024 05:10  Patient On (Oxygen Delivery Method): room air                              10.2   11.49 )-----------( 249      ( 16 Sep 2024 06:48 )             32.8    09-16    144  |  112<H>  |  45<H>  ----------------------------<  65<L>  5.0   |  16<L>  |  3.10<H>    Ca    9.0      16 Sep 2024 06:48    TPro  6.4  /  Alb  3.3  /  TBili  0.2  /  DBili  x   /  AST  11  /  ALT  14  /  AlkPhos  87  09-15       PHYSICAL EXAM:  Gen: NAD  Skin: right facial and neck erythema   Head: Normocephalic, Atraumatic  Face: .................................right submandibular erythema with swelling/induration, warm to touch and TTP. Small open wound with pus actively draining.   Eyes: no scleral injection  Nose: Nares bilaterally patent, no discharge  Mouth: dry oral mucosa. no pus expressed from Stensen's and Franklin's duct. no tenderness on gum or teeth on palpation. Mild trismus. No Stridor.   Neck: erythema on right platysma and anterior neck,  trachea midline, no masses  Lymphatic: No lymphadenopathy  Resp: breathing comfortably at room air, no stridor  CV: no peripheral edema/cyanosis  GI: nondistended   Peripheral vascular: no JVD or edema  Neuro: facial nerve intact, no facial droop         ENT ISSUE/POD: right facial swelling    HPI: 84 year old, A&Ox4 Female, with PMHx of HTN, CKD, hypothyroid, depression, and OA, presented to ED for 4 days of worsening right facial/neck swelling and pain. At ED, labs are significant for leucocytosis (WBC 22.22) and creatinine of 3.03. CT neck without contrast revealed right facial cellulitis/myositis, without gross evidence of a drainable fluid collection. ENT consulted for further evaluation. Per patient, on Saturday, she went for a dental consultation for cracked tooth. No procedure was done. Then pt started noticing right facial swelling on Saturday evening. Pt presented to PCP, who later referred her to ENT, when swelling progressively worsened along with new onset of trismus in the past 4 days. outpatient ENT sent her to ED for CT scan and iv antibiotics. Neck CT revealed R. facial cellulitis and myositis. Pt symptoms initially improved however ENT called back because pus was draining from the wound with no improvement of pain and swelling as per pt. Pt underwent bedside I&D with iodoform packing. Pt denies SOB, fevers, cough, dysphagia.              PAST MEDICAL & SURGICAL HISTORY:  Depression      Hypothyroid      Kidney disease      Cataract      History of rectal surgery      History of tonsillectomy        Allergies    azithromycin (Rash)    Intolerances      MEDICATIONS  (STANDING):  ampicillin/sulbactam  IVPB 3 Gram(s) IV Intermittent every 12 hours  buPROPion XL (24-Hour) . 150 milliGRAM(s) Oral daily  chlorhexidine 2% Cloths 1 Application(s) Topical daily  diphenhydrAMINE 25 milliGRAM(s) Oral once  divalproex  milliGRAM(s) Oral daily  divalproex  milliGRAM(s) Oral at bedtime  doxycycline IVPB 100 milliGRAM(s) IV Intermittent every 12 hours  heparin   Injectable 5000 Unit(s) SubCutaneous every 12 hours  influenza  Vaccine (HIGH DOSE) 0.5 milliLiter(s) IntraMuscular once  levothyroxine 88 MICROGram(s) Oral daily  LORazepam     Tablet 1 milliGRAM(s) Oral at bedtime  pantoprazole    Tablet 40 milliGRAM(s) Oral before breakfast  sodium bicarbonate 1300 milliGRAM(s) Oral three times a day  venlafaxine 75 milliGRAM(s) Oral daily  venlafaxine 300 milliGRAM(s) Oral daily    MEDICATIONS  (PRN):  acetaminophen     Tablet .. 650 milliGRAM(s) Oral every 6 hours PRN Temp greater or equal to 38C (100.4F), Mild Pain (1 - 3)  melatonin 3 milliGRAM(s) Oral at bedtime PRN Insomnia  polyethylene glycol 3350 17 Gram(s) Oral daily PRN Constipation  senna 2 Tablet(s) Oral at bedtime PRN Constipation      Social History: see consult    Family history: see consult    ROS:   ENT: all negative except as noted in HPI   Pulm: denies SOB, cough, hemoptysis  Neuro: denies numbness/tingling, loss of sensation  Endo: denies heat/cold intolerance, excessive sweating      Vital Signs Last 24 Hrs  T(C): 36.9 (17 Sep 2024 05:10), Max: 36.9 (17 Sep 2024 05:10)  T(F): 98.4 (17 Sep 2024 05:10), Max: 98.4 (17 Sep 2024 05:10)  HR: 91 (17 Sep 2024 05:29) (87 - 109)  BP: 146/81 (17 Sep 2024 05:10) (124/69 - 146/81)  BP(mean): --  RR: 18 (17 Sep 2024 05:10) (18 - 18)  SpO2: 98% (17 Sep 2024 05:10) (96% - 99%)    Parameters below as of 17 Sep 2024 05:10  Patient On (Oxygen Delivery Method): room air                              10.2   11.49 )-----------( 249      ( 16 Sep 2024 06:48 )             32.8    09-16    144  |  112<H>  |  45<H>  ----------------------------<  65<L>  5.0   |  16<L>  |  3.10<H>    Ca    9.0      16 Sep 2024 06:48    TPro  6.4  /  Alb  3.3  /  TBili  0.2  /  DBili  x   /  AST  11  /  ALT  14  /  AlkPhos  87  09-15       PHYSICAL EXAM:  Gen: NAD  Skin: right facial and neck erythema   Head: Normocephalic, Atraumatic  Face: s/p I&D of facial abscess right submandibular area, blood and pus expressed, wound irrigated and iodoform packing replaced. Surrounding area w/ +ttp, erythema with swelling/induration, warm to touch.   Eyes: no scleral injection  Nose: Nares bilaterally patent, no discharge  Mouth: dry oral mucosa. no pus expressed from Stensen's and Cascade's duct. no tenderness on gum or teeth on palpation. Mild trismus. No Stridor.   Neck: improving erythema on right platysma and anterior neck,  trachea midline, no masses  Lymphatic: No lymphadenopathy  Resp: breathing comfortably at room air, no stridor  CV: no peripheral edema/cyanosis  GI: nondistended   Peripheral vascular: no JVD or edema  Neuro: facial nerve intact, no facial droop

## 2024-09-17 NOTE — PROGRESS NOTE ADULT - SUBJECTIVE AND OBJECTIVE BOX
ORAL & MAXILLOFACIAL SURGERY PROGRESS NOTE    Cellulitis        JOANNA MALDONADO  |  [id number]      S: CHAPINCITO overnight. Pt seen and evaluated bedside this AM. Pt resting comfortably on exam. Tolerating Diet. Ambulating and voiding without issue. Pain well controlled. Patient reported large amount of discharge from the neck.     MEDICATIONS  (STANDING):  ampicillin/sulbactam  IVPB 3 Gram(s) IV Intermittent every 12 hours  buPROPion XL (24-Hour) . 150 milliGRAM(s) Oral daily  chlorhexidine 2% Cloths 1 Application(s) Topical daily  diphenhydrAMINE 25 milliGRAM(s) Oral once  divalproex  milliGRAM(s) Oral daily  divalproex  milliGRAM(s) Oral at bedtime  doxycycline IVPB 100 milliGRAM(s) IV Intermittent every 12 hours  heparin   Injectable 5000 Unit(s) SubCutaneous every 12 hours  influenza  Vaccine (HIGH DOSE) 0.5 milliLiter(s) IntraMuscular once  levothyroxine 88 MICROGram(s) Oral daily  LORazepam     Tablet 1 milliGRAM(s) Oral at bedtime  pantoprazole    Tablet 40 milliGRAM(s) Oral before breakfast  sodium bicarbonate 1300 milliGRAM(s) Oral three times a day  venlafaxine 300 milliGRAM(s) Oral daily  venlafaxine 75 milliGRAM(s) Oral daily    MEDICATIONS  (PRN):  acetaminophen     Tablet .. 650 milliGRAM(s) Oral every 6 hours PRN Temp greater or equal to 38C (100.4F), Mild Pain (1 - 3)  melatonin 3 milliGRAM(s) Oral at bedtime PRN Insomnia  polyethylene glycol 3350 17 Gram(s) Oral daily PRN Constipation  senna 2 Tablet(s) Oral at bedtime PRN Constipation      O:   Vital Signs Last 24 Hrs  T(C): 36.3 (17 Sep 2024 11:37), Max: 36.9 (17 Sep 2024 05:10)  T(F): 97.4 (17 Sep 2024 11:37), Max: 98.4 (17 Sep 2024 05:10)  HR: 89 (17 Sep 2024 11:37) (89 - 109)  BP: 116/69 (17 Sep 2024 11:37) (116/69 - 146/81)  BP(mean): --  RR: 16 (17 Sep 2024 11:37) (16 - 18)  SpO2: 97% (17 Sep 2024 11:37) (97% - 99%)    Parameters below as of 17 Sep 2024 11:37  Patient On (Oxygen Delivery Method): room air        PHYSICAL EXAM:  GENERAL: NAD, well-developed  HEENT: NC, right sided mandibular facial swelling w/ neck erythema; +TTP. Unable to appreciate inferior mandibular border. Iodoform tape visualize with pus appreciated. IOE: Dry mucous membrane appreciated. Firm sensation to the right buccal aspect of the mouth. Severe periodontitis noted around her existing teeth. Root tip seen on the upper left and bottom left of the maxilla and mandible respectfully.   CHEST/LUNG: Breathing even, unlabored  HEART: Regular rate and rhythm  ABDOMEN: Soft, nondistended. Incision C/D/I  EXTREMITIES: 2+ peripheral pulses, no clubbing, cyanosis, or edema.   NEURO:  AAOx3, no focal weakness, 5/5 b/l extremity strength, b/l knee no arthritis, no effusion.                           10.3   10.41 )-----------( 259      ( 17 Sep 2024 08:31 )             33.2     09-17    144  |  111[H]  |  35[H]  ----------------------------<  144[H]  4.8   |  19[L]  |  2.82[H]    Ca    8.8      17 Sep 2024 08:32  Mg     2.4     09-17 09-16-24 @ 07:01 - 09-17-24 @ 07:00  --------------------------------------------------------  IN: 600 mL / OUT: 1800 mL / NET: -1200 mL    09-17-24 @ 07:01  -  09-17-24 @ 12:15  --------------------------------------------------------  IN: 240 mL / OUT: 0 mL / NET: 240 mL

## 2024-09-17 NOTE — PROGRESS NOTE ADULT - ASSESSMENT
84 year old, A&Ox4 Female, with PMHx of HTN, CKD, hypothyroid, depression, and OA, presented to ED for 4 days of worsening right facial/neck swelling and pain. At ED, labs are significant for leucocytosis (WBC 22.22) and creatinine of 3.03. CT neck without contrast revealed right facial cellulitis/myositis, without gross evidence of a drainable fluid collection. ENT consulted for further evaluation. Per patient, on Saturday, she went for a dental consultation for cracked tooth. No procedure was done. Then pt started noticing right facial swelling on Saturday evening. Pt presented to PCP, who later referred her to ENT, when swelling progressively worsened along with new onset of trismus. outpatient ENT sent her to ED for CT scan and iv antibiotics. Neck CT revealed R. facial cellulitis and myositis, no collection. Pt symptoms initially improved however ENT called back because pus is now draining from the wound and pt states pain and swelling remains the same. Exam: R. submandibular swelling, induration, and erythema with small open wound actively draining pus. I&D of abscess performed, wound packed with iodoform, pt tolerated procedure well. Afebrile. WBC 11.49    84 year old, A&Ox4 Female, with PMHx of HTN, CKD, hypothyroid, depression, and OA, presented to ED for 4 days of worsening right facial/neck swelling and pain. At ED, labs are significant for leucocytosis (WBC 22.22) and creatinine of 3.03. CT neck without contrast revealed right facial cellulitis/myositis, without gross evidence of a drainable fluid collection. ENT consulted for further evaluation. Per patient, on Saturday, she went for a dental consultation for cracked tooth. No procedure was done. Then pt started noticing right facial swelling on Saturday evening. Pt presented to PCP, who later referred her to ENT, when swelling progressively worsened along with new onset of trismus. outpatient ENT sent her to ED for CT scan and iv antibiotics. Neck CT revealed R. facial cellulitis and myositis, no collection. Pt symptoms initially improved however ENT called back because pus is now draining from the wound and pt states pain and swelling remains the same. Now s/p I&D of facial abscess right submandibular area. This morning blood and pus expressed, wound irrigated and iodoform packing replaced. Surrounding area w/ +ttp, erythema with swelling/induration, warm to touch. Afebrile. WBC 10.41

## 2024-09-17 NOTE — EEG REPORT - NS EEG TEXT BOX
REPORT OF ROUTINE EEG WITH VIDEO    [facility]: [address], [address] [place] [address], Phone: [phone]  [facility]: [address] [place], [place] [address], [place] [address], Phone: [phone]  Office: [address] [place] [address], [place] [address], [place] [address], Phone: [phone]    Patient Name: Miroslava Yeung    Age: 84 year  : 1940    EEG #: [id number]  Study Date: 2024   Start Time: 10:31:01 AM     Study Duration: 20.9 minutes  		    Technical Information:					  On Instrument: [id number]  Placement and Labeling of Electrodes:  The EEG was performed utilizing 20 channels referential EEG connections (coronal over temporal over parasagittal montage) using all standard 10-20 electrode placements with EKG.  Recording was at a sampling rate of 256 samples per second per channel.  Time synchronized digital video recording was done simultaneously with EEG recording.  A low light infrared camera was used for low light recording.  Shady and seizure detection algorithms were utilized.  CSA Technical Component:  Quantitative EEG analysis using a separate Compressed Spectral Array (CSA) software package was conducted in real-time and run at bedside after set up by the technician, digitally displaying the power of electrographic frequencies included in the 1-30Hz band using a graded color map.  This data was reviewed and interpreted independently, and is reported in a separate section below.    History:  - 84 year old Female is here to rule out seizures      Medication  Depakote (Valproic acid/Divalproex)    Tylenol      Study Interpretation:    FINDINGS:  The background was continuous, spontaneously variable and reactive.  During wakefulness, the posteriorly dominant rhythm consisted of symmetric, poorly modulated 7-8 Hz activity, with an amplitude to 30 uV, that attenuated to eye opening.  Low amplitude central beta was noted in wakefulness.    Background Slowing:  Generalized slowing: present. Slow PDR  Focal slowing: none was present.    Sleep Background:  -Drowsiness was characterized by fragmentation, attenuation, and slowing of the background activity.    -N2 sleep transients with symmetric spindles and K-complexes.      Non-epileptiform activity:  None.    Epileptiform Activity:   No epileptiform discharges were present.    Events:  No clinical events were recorded.  No seizures were recorded.    Activation Procedures:   -Hyperventilation was not performed.    -Photic stimulation was not performed.      Artifacts:  Intermittent myogenic and movement artifacts were noted.    ECG:  No significant abnormality      EEG Classification / Summary:      Abnormal EEG in the awake, drowsy and asleep states.  Mild diffuse background slowing    Clinical Impression:    Mild nonspecific diffuse or multifocal cerebral dysfunction.     No epileptiform pattern or seizure recorded.    ________________________________________    VANNA Davis  Attending Physician, [facility]    ------------------------------------  EEG Reading Room: [phone]  On Call Service After Hours: [phone]

## 2024-09-17 NOTE — PROGRESS NOTE ADULT - SUBJECTIVE AND OBJECTIVE BOX
SUBJECTIVE / OVERNIGHT EVENTS:    Patient seen and examined at bedside. With right facial pain this am s/p drainage         --------------------------------------------------------------------------------------------  LABS:                        10.3   10.41 )-----------( 259      ( 17 Sep 2024 08:31 )             33.2     09-17    144  |  111[H]  |  35[H]  ----------------------------<  144[H]  4.8   |  19[L]  |  2.82[H]    Ca    8.8      17 Sep 2024 08:32  Mg     2.4     09-17        CAPILLARY BLOOD GLUCOSE            Urinalysis Basic - ( 17 Sep 2024 08:32 )    Color: x / Appearance: x / SG: x / pH: x  Gluc: 144 mg/dL / Ketone: x  / Bili: x / Urobili: x   Blood: x / Protein: x / Nitrite: x   Leuk Esterase: x / RBC: x / WBC x   Sq Epi: x / Non Sq Epi: x / Bacteria: x        RADIOLOGY & ADDITIONAL TESTS:    Imaging Personally Reviewed:  [x] YES  [ ] NO    Consultant(s) Notes Reviewed:  [x] YES  [ ] NO    MEDICATIONS  (STANDING):  ampicillin/sulbactam  IVPB 3 Gram(s) IV Intermittent every 12 hours  buPROPion XL (24-Hour) . 150 milliGRAM(s) Oral daily  chlorhexidine 2% Cloths 1 Application(s) Topical daily  diphenhydrAMINE 25 milliGRAM(s) Oral once  divalproex  milliGRAM(s) Oral daily  divalproex  milliGRAM(s) Oral at bedtime  doxycycline IVPB 100 milliGRAM(s) IV Intermittent every 12 hours  heparin   Injectable 5000 Unit(s) SubCutaneous every 12 hours  influenza  Vaccine (HIGH DOSE) 0.5 milliLiter(s) IntraMuscular once  levothyroxine 88 MICROGram(s) Oral daily  LORazepam     Tablet 1 milliGRAM(s) Oral at bedtime  pantoprazole    Tablet 40 milliGRAM(s) Oral before breakfast  sodium bicarbonate 1300 milliGRAM(s) Oral three times a day  venlafaxine 75 milliGRAM(s) Oral daily  venlafaxine 300 milliGRAM(s) Oral daily    MEDICATIONS  (PRN):  acetaminophen     Tablet .. 650 milliGRAM(s) Oral every 6 hours PRN Temp greater or equal to 38C (100.4F), Mild Pain (1 - 3)  melatonin 3 milliGRAM(s) Oral at bedtime PRN Insomnia  polyethylene glycol 3350 17 Gram(s) Oral daily PRN Constipation  senna 2 Tablet(s) Oral at bedtime PRN Constipation      Care Discussed with Consultants/Other Providers [x] YES  [ ] NO    Vital Signs Last 24 Hrs  T(C): 36.9 (17 Sep 2024 05:10), Max: 36.9 (17 Sep 2024 05:10)  T(F): 98.4 (17 Sep 2024 05:10), Max: 98.4 (17 Sep 2024 05:10)  HR: 91 (17 Sep 2024 05:29) (87 - 109)  BP: 146/81 (17 Sep 2024 05:10) (124/69 - 146/81)  BP(mean): --  RR: 18 (17 Sep 2024 05:10) (18 - 18)  SpO2: 98% (17 Sep 2024 05:10) (96% - 99%)    Parameters below as of 17 Sep 2024 05:10  Patient On (Oxygen Delivery Method): room air      I&O's Summary    16 Sep 2024 07:01  -  17 Sep 2024 07:00  --------------------------------------------------------  IN: 600 mL / OUT: 1800 mL / NET: -1200 mL        PHYSICAL EXAM:  GENERAL: NAD, well-developed, comfortable  HEENT: right sided facial swelling and neck erythema; TTP  CHEST/LUNG: Clear to auscultation bilaterally; No wheeze  HEART: Regular rate and rhythm; No murmurs, rubs, or gallops  ABDOMEN: Soft, Nontender, Nondistended; Bowel sounds present  NEURO: AAOx3, no focal weakness, 5/5 b/l extremity strength, b/l knee no arthritis, no effusion   EXTREMITIES:  2+ Peripheral Pulses, No clubbing, cyanosis, or edema  SKIN: No rashes or lesions

## 2024-09-17 NOTE — PROGRESS NOTE ADULT - ASSESSMENT
A/P    Patient is an 84-year-old female with a history of anemia, hypothyroidism, depression, GERD, bipolar, CKD, presents for redness and swelling on her face for the past few days.     #Right facial swelling and redness, Leukocytosis   -improving  -CT Neck 9/11/24 shows Right facial cellulitis/myositis, without gross evidence of a drainable fluid collection.  -CXR 9/11/24 shows Clear lungs  -trend WBC; improving  -ENT and ID following  -sp I&D of abscess 9/16  -abx per ID    #CKD  -Trend Creat  -nephrology f/u    #Hx of frequent falls  -pt describes random "zoning out/staring episodes" and then falling (has been occurring for about 2.5 years per pt)  -pt denies LOC during episodes; denies CP or SOB  -orthostatics negative  -MR Head 9/17/24 shows no intracranial mass, acute territorial infarct, acute intracranial hemorrhage, or midline shift.  -F/u TTE  -check tele  -neuro f/u    #Aortic Stenosis  -moderate AS on outpt TTE 5/24  -cont to monitor clinically with yearly TTE    #Cardiomyopathy, HFrEF  -outpatient TTE 5/24 shows LV systolic function is moderately reduced, moderate global hypokinesis; EF 40%; mild to moderate aortic regurgitation; moderate aortic stenosis  -no evidence of volume overload/HF  -no diuretic need  -per outpt visit, patient and son do not want to proceed with ischemic eval and do not want to start lv dysfxn medications as she remains asymptomatic    dvt ppx           A/P    Patient is an 84-year-old female with a history of anemia, hypothyroidism, depression, GERD, bipolar, CKD, presents for redness and swelling on her face for the past few days.     #Right facial swelling and redness, Leukocytosis   -improving  -CT Neck 9/11/24 shows Right facial cellulitis/myositis, without gross evidence of a drainable fluid collection.  -CXR 9/11/24 shows Clear lungs  -trend WBC; improving  -ENT and ID following  -sp I&D of abscess 9/16  -abx per ID    #CKD  -Trend Creat  -nephrology f/u    #Hx of frequent falls  -pt describes random "zoning out/staring episodes" and then falling (has been occurring for about 2.5 years per pt)  -pt denies LOC during episodes; denies CP or SOB  -orthostatics negative  -MR Head 9/17/24 shows no intracranial mass, acute territorial infarct, acute intracranial hemorrhage, or midline shift.  -F/u TTE  -check tele  -neuro f/u  -EKG shows NSR with first degree AV block and LBBB, no acute ischemic changes  -consider ILR given multiple falls, known cmp  -Rec EP eval for hx of multiple falls vs syncope with LBBB, CMP, and first degree AV block     #Aortic Stenosis  -moderate AS on outpt TTE 5/24  -cont to monitor clinically with yearly TTE    #Cardiomyopathy, HFrEF  -outpatient TTE 5/24 shows LV systolic function is moderately reduced, moderate global hypokinesis; EF 40%; mild to moderate aortic regurgitation; moderate aortic stenosis  -no evidence of volume overload/HF  -no diuretic need  -per outpt visit, patient and son do not want to proceed with ischemic eval and do not want to start lv dysfxn medications as she remains asymptomatic    dvt ppx

## 2024-09-17 NOTE — PROGRESS NOTE ADULT - NS ATTEND AMEND GEN_ALL_CORE FT
ENT following for facial abscess     84 year old, A&Ox4 Female, with PMHx of HTN, CKD, hypothyroid, depression, and OA, presented to ED for 4 days of worsening right facial/neck swelling and pain. At ED, labs are significant for leucocytosis (WBC 22.22) and creatinine of 3.03.     9/11/24 As per radiology CT neck without contrast shows Right facial cellulitis/myositis, without gross evidence of a drainable fluid collection.    ENT consulted for further evaluation. Per patient, on Saturday, she went for a dental consultation for cracked tooth. No procedure was done. Then pt started noticing right facial swelling on Saturday evening. Pt presented to PCP, who later referred her to ENT, when swelling progressively worsened along with new onset of trismus. outpatient ENT sent her to ED for CT scan and iv antibiotics. Pt symptoms initially improved however ENT called back because pus is now draining from the wound and pt states pain and swelling remains the same.     9/16/24 Now s/p I&D of facial abscess right submandibular area. This morning blood and pus expressed, wound irrigated and iodoform packing replaced. Surrounding area w/ +ttp, erythema with swelling/induration, warm to touch. Afebrile. WBC 10.41    Physical exam shows granulation in right submandibular area, blood and pus expressed, wound irrigated and iodoform packing replaced. Surrounding area w/ +ttp, erythema with swelling/induration, warm to touch.     Recommend:  Facial Abscess / Cellulitis   - F/u culture (obtained by primary team)   - Antibiotics as per ID  - Daily packing changes and irrigation  - ENT will continue to follow  - Call with questions or concerns.

## 2024-09-17 NOTE — PROGRESS NOTE ADULT - PROBLEM SELECTOR PLAN 1
- F/u culture (obtained by primary team)   - Daily packing changes and irrigation  - ENT will continue to follow  - Call with questions or concerns. Facial Abscess / Cellulitis   - F/u culture (obtained by primary team)   - Antibiotics as per ID  - Daily packing changes and irrigation  - ENT will continue to follow  - Call with questions or concerns.

## 2024-09-17 NOTE — PROGRESS NOTE ADULT - SUBJECTIVE AND OBJECTIVE BOX
Overnight events noted      VITAL:  T(C): , Max: 36.9 (09-17-24 @ 05:10)  T(F): , Max: 98.4 (09-17-24 @ 05:10)  HR: 91 (09-17-24 @ 05:29)  BP: 146/81 (09-17-24 @ 05:10)  BP(mean): --  RR: 18 (09-17-24 @ 05:10)  SpO2: 98% (09-17-24 @ 05:10)  Wt(kg): --      PHYSICAL EXAM:  Constitutional: NAD  HEENT: (+)R facial erythema  Neck:  No JVD  Respiratory: CTAB/L  Cardiovascular: S1 and S2  Gastrointestinal: BS+, soft, NT/ND  Extremities: No peripheral edema  Neurological: A/O x 3, no focal deficits  : No Arnold  Skin: +erythema on right mandible      LABS:                        10.3   10.41 )-----------( 259      ( 17 Sep 2024 08:31 )             33.2     Na(144)/K(4.8)/Cl(111)/HCO3(19)/BUN(35)/Cr(2.82)Glu(144)/Ca(8.8)/Mg(2.4)/PO4(--)    09-17 @ 08:32  Na(144)/K(5.0)/Cl(112)/HCO3(16)/BUN(45)/Cr(3.10)Glu(65)/Ca(9.0)/Mg(--)/PO4(--)    09-16 @ 06:48  Na(136)/K(4.5)/Cl(108)/HCO3(13)/BUN(54)/Cr(3.36)Glu(47)/Ca(8.7)/Mg(--)/PO4(--)    09-15 @ 07:36      IMPRESSION: 84F w/ dementia, bipolar disorder, and CKD4-5, 9/11/24 p/w R facial cellulitis    (1)Renal - nonproteinuric CKD4 with atrophic R kidney. Grossly stable numbers  (2)Metabolic acidosis - renally mediated - improving, on standing PO NaHCO3    (3)ID - R facial celluitis - on IV Unasyn  (4)Neuro - "aura" - Neuro on board - awaiting EEG       RECOMMEND:  (1)PO NaHCO3 as ordered  (2)Abx for GFR 15-20ml/min - present dosing is acceptable  (3)If for discharge, she can f/u at my office as previously scheduled on Wed 10/23/24 at 1040a                  Ab Tripathi MD  [facility]  Office/on call physician: [phone]  Cell (7a-7p): [phone]       no pain, no sob      VITAL:  T(C): , Max: 36.9 (09-17-24 @ 05:10)  T(F): , Max: 98.4 (09-17-24 @ 05:10)  HR: 91 (09-17-24 @ 05:29)  BP: 146/81 (09-17-24 @ 05:10)  BP(mean): --  RR: 18 (09-17-24 @ 05:10)  SpO2: 98% (09-17-24 @ 05:10)  Wt(kg): --      PHYSICAL EXAM:  Constitutional: NAD  HEENT: (+)R facial erythema  Neck:  No JVD  Respiratory: CTAB/L  Cardiovascular: S1 and S2  Gastrointestinal: BS+, soft, NT/ND  Extremities: No peripheral edema  Neurological: A/O x 3, no focal deficits  : No Arnold  Skin: +erythema on right mandible      LABS:                        10.3   10.41 )-----------( 259      ( 17 Sep 2024 08:31 )             33.2     Na(144)/K(4.8)/Cl(111)/HCO3(19)/BUN(35)/Cr(2.82)Glu(144)/Ca(8.8)/Mg(2.4)/PO4(--)    09-17 @ 08:32  Na(144)/K(5.0)/Cl(112)/HCO3(16)/BUN(45)/Cr(3.10)Glu(65)/Ca(9.0)/Mg(--)/PO4(--)    09-16 @ 06:48  Na(136)/K(4.5)/Cl(108)/HCO3(13)/BUN(54)/Cr(3.36)Glu(47)/Ca(8.7)/Mg(--)/PO4(--)    09-15 @ 07:36      IMPRESSION: 84F w/ dementia, bipolar disorder, and CKD4-5, 9/11/24 p/w R facial cellulitis    (1)Renal - nonproteinuric CKD4 with atrophic R kidney. Grossly stable numbers  (2)Metabolic acidosis - renally mediated - improving, on standing PO NaHCO3    (3)ID - R facial celluitis - on IV Unasyn  (4)Neuro - "aura" - Neuro on board - awaiting EEG       RECOMMEND:  (1)PO NaHCO3 as ordered  (2)Abx for GFR 15-20ml/min - present dosing is acceptable  (3)If for discharge, she can f/u at my office as previously scheduled on Wed 10/23/24 at 1040a                  Ab Tripathi MD  [facility]  Office/on call physician: [phone]  Cell (7a-7p): [phone]

## 2024-09-17 NOTE — PROGRESS NOTE ADULT - ASSESSMENT
84F w/ pmhx of anemia, depression, ckd, GERD and hypothyroidism presented to [facility] for worsening swelling of her face for the past few days.     Patient is POD1 I&D and placement of iodoform packing.     Plan:   - No OMFS intervention noted at this time, unable to appreciate any acute infection.   - Appreciate care per primary teen and reccs from ID and ENT.   - Encourage hydration.  - Please page OMFS when concerning oral abscess appears.     Gatito Au DDS   PGY-1 Oral and Maxillofacial Surgery   [facility] Pager: [phone]  Available on teams 84F w/ pmhx of anemia, depression, ckd, GERD and hypothyroidism presented to [facility] for worsening swelling of her face for the past few days.     Patient is POD1 I&D and placement of iodoform packing.     Plan:   - No OMFS intervention noted at this time, unable to appreciate any acute odontogenic infection.   - Appreciate care per primary teen and reccs from ID and ENT.   - Encourage hydration.  - Please page OMFS when concerning oral abscess appears.     Gatito Au DDS   PGY-1 Oral and Maxillofacial Surgery   [facility] Pager: [phone]  Available on teams 84F w/ pmhx of anemia, depression, ckd, GERD and hypothyroidism presented to [facility] for worsening swelling of her face for the past few days.     Patient is POD1 I&D and placement of iodoform packing.     Plan:   - No OMFS intervention noted at this time, unable to appreciate any acute odontogenic infection.   - ENT following, appreciate recs.   - ID following.   - No acute OMFS intervention at this time.   - No obvious dental etiology.   - Reconsult OMFS PRN.     Gatito Au DDS   PGY-1 Oral and Maxillofacial Surgery   [facility] Pager: [phone]  Available on teams

## 2024-09-17 NOTE — PROGRESS NOTE ADULT - SUBJECTIVE AND OBJECTIVE BOX
Neurology      S: patient seen. no significant neuro chagnes       Medications: MEDICATIONS  (STANDING):  ampicillin/sulbactam  IVPB 3 Gram(s) IV Intermittent every 12 hours  buPROPion XL (24-Hour) . 150 milliGRAM(s) Oral daily  chlorhexidine 2% Cloths 1 Application(s) Topical daily  diphenhydrAMINE 25 milliGRAM(s) Oral once  divalproex  milliGRAM(s) Oral daily  divalproex  milliGRAM(s) Oral at bedtime  doxycycline IVPB 100 milliGRAM(s) IV Intermittent every 12 hours  heparin   Injectable 5000 Unit(s) SubCutaneous every 12 hours  influenza  Vaccine (HIGH DOSE) 0.5 milliLiter(s) IntraMuscular once  levothyroxine 88 MICROGram(s) Oral daily  LORazepam     Tablet 1 milliGRAM(s) Oral at bedtime  pantoprazole    Tablet 40 milliGRAM(s) Oral before breakfast  sodium bicarbonate 1300 milliGRAM(s) Oral three times a day  venlafaxine 75 milliGRAM(s) Oral daily  venlafaxine 300 milliGRAM(s) Oral daily    MEDICATIONS  (PRN):  acetaminophen     Tablet .. 650 milliGRAM(s) Oral every 6 hours PRN Temp greater or equal to 38C (100.4F), Mild Pain (1 - 3)  melatonin 3 milliGRAM(s) Oral at bedtime PRN Insomnia  polyethylene glycol 3350 17 Gram(s) Oral daily PRN Constipation  senna 2 Tablet(s) Oral at bedtime PRN Constipation       Vitals:  Vital Signs Last 24 Hrs  T(C): 36.9 (17 Sep 2024 05:10), Max: 36.9 (17 Sep 2024 05:10)  T(F): 98.4 (17 Sep 2024 05:10), Max: 98.4 (17 Sep 2024 05:10)  HR: 91 (17 Sep 2024 05:29) (87 - 109)  BP: 146/81 (17 Sep 2024 05:10) (124/69 - 146/81)  BP(mean): --  RR: 18 (17 Sep 2024 05:10) (18 - 18)  SpO2: 98% (17 Sep 2024 05:10) (96% - 99%)    Parameters below as of 17 Sep 2024 05:10  Patient On (Oxygen Delivery Method): room air           Orthostatic VS  09-15-24 @ 04:24  Lying BP: 115/63 HR: 84  Sitting BP: 124/73 HR: 84  Standing BP: 130/75 HR: 89  Site: upper left arm  Mode: --  Orthostatic VS  09-14-24 @ 21:14  Lying BP: 109/69 HR: 79  Sitting BP: 105/66 HR: 85  Standing BP: 139/63 HR: 88  Site: upper right arm  Mode: electronic        General Exam:   General Appearance: Appropriately dressed and in no acute distress       Head: Normocephalic, atraumatic and no dysmorphic features  Ear, Nose, and Throat: Moist mucous membranes  CVS: S1S2+  Resp: No SOB, no wheeze or rhonchi  GI: soft NT/ND  Extremities: No edema or cyanosis  Skin:  redness on face      Neurological Exam:  Mental Status: Awake, alert and oriented x 3.  Able to follow simple and complex verbal commands. Able to name and repeat. fluent speech. No obvious aphasia or dysarthria noted.   Cranial Nerves: PERRL, EOMI, VFFC, sensation V1-V3 intact,  no obvious facial asymmetry, equal elevation of palate, scm/trap 5/5, tongue is midline on protrusion. no obvious papilledema on fundoscopic exam. hearing is grossly intact.   Motor: Normal bulk, tone and strength throughout uppers ; lowers 3-4/5   Sensation: Intact to light touch and pinprick throughout. no right/left confusion. no extinction to tactile on DSS.   Coordination: No dysmetria on FNF or HKS  Gait: walker /wheelchair     Data/Labs/Imaging which I personally reviewed.       LABS:                          10.3   10.41 )-----------( 259      ( 17 Sep 2024 08:31 )             33.2     09-17    144  |  111[H]  |  35[H]  ----------------------------<  144[H]  4.8   |  19[L]  |  2.82[H]    Ca    8.8      17 Sep 2024 08:32  Mg     2.4     09-17          Urinalysis Basic - ( 17 Sep 2024 08:32 )    Color: x / Appearance: x / SG: x / pH: x  Gluc: 144 mg/dL / Ketone: x  / Bili: x / Urobili: x   Blood: x / Protein: x / Nitrite: x   Leuk Esterase: x / RBC: x / WBC x   Sq Epi: x / Non Sq Epi: x / Bacteria: x      < from: MR Head No Cont (09.17.24 @ 07:15) >    ACC: [id number] EXAM:  MR BRAIN   ORDERED BY: RETA KLINE     PROCEDURE DATE:  09/17/2024          INTERPRETATION:  CLINICAL INDICATION: "spacing out"/AMS    TECHNIQUE: Multi-planar, multi-squence noncontrast brain MRI was   performed.    COMPARISON: 07/31/2024    FINDINGS:    VENTRICLES AND SULCI:  Normal.  INTRA-AXIAL:  No midline shift, acute intracranial hemorrhage or evidence   of acute cerebral ischemia. Chronic right cerebellar infarct. There are   patchy and confluent foci of hyperintense T2 signal within the   subcortical and periventricular white matter which are nonspecific but   likely related to chronic microvascular ischemic disease.  EXTRA-AXIAL:  No mass or collection.  VISUALIZED SINUSES: Mild mucosal thickening.  VISUALIZED MASTOIDS:  Clear.  CALVARIUM:  Normal.  CAROTID FLOW VOIDS: Normal.  MISCELLANEOUS:  None.    IMPRESSION:    No evidence for intracranial mass, acute territorial infarct, acute   intracranial hemorrhage, or midline shift.    --- End of Report ---            NAVIN HINDS MD; Attending Radiologist  This document has been electronically signed. Sep 17 2024  7:21AM    < end of copied text >      < from: CT Head No Cont (07.31.24 @ 15:06) >    ACC: [id number] EXAM:  CT CERVICAL SPINE   ORDERED BY: CHANTEL GERBER     ACC: [id number] EXAM:  CT BRAIN   ORDERED BY: RAHEEL LARSON     PROCEDURE DATE:  07/31/2024          INTERPRETATION:  CLINICAL INFORMATION: fall, slipped out of wheelchair at   midnight/possibly fell asleep while watching TV, R arm/L hand skin tears   - dressed by dtr, denies neck/body pain, no AC use    COMPARISON: Head CT 6/22/2024    CONTRAST:  IV Contrast: NONE  Complications: None reported at time of study completion    TECHNIQUE:    CT BRAIN: Serial axial images were obtained from the skull base to the   vertex using multi-slice helical technique. Sagittal and coronal   reformats were obtained.    CT CERVICAL SPINE: Axial images were obtained of the cervical spineusing   multislice helical technique. Reformatted coronal and sagittal images   were obtained.    FINDINGS:    CT BRAIN:    VENTRICLES AND SULCI: Age appropriate involutional changes.  INTRA-AXIAL: No mass effect, acute hemorrhage, or midline shift.There   are periventricular and subcortical white matter hypodensities,   consistent with microvascular type changes.  EXTRA-AXIAL: No mass or fluid collection. Basal cisterns are normal in   appearance.    VISUALIZED SINUSES:  Clear.  TYMPANOMASTOIDCAVITIES:  Clear.  VISUALIZED ORBITS: Bilateral lens replacement.  CALVARIUM: Intact.    MISCELLANEOUS: None.      CT CERVICAL SPINE:    VERTEBRAE:  Normal in height. No acute fracture. Multilevel degenerative   changes including marginal osteophytes.  ALIGNMENT: No subluxation or scoliosis.  INTERVERTEBRAL DISC SPACES: Multilevel degenerative disc osteophyte   complexes. Multilevel bilateral moderate to severe neural foraminal   stenoses.    VISUALIZED LUNGS: Bilateral chronic appearing interstitial prominence.    MISCELLANEOUS:  None.      IMPRESSION:    CT HEAD:  No evidence of acute intracranial pathology.    CT CERVICAL SPINE:  No acute fracture or traumatic subluxation.    Multi-level degenerative changes.        --- End of Report ---            STEVE ALLEN MD; Attending Radiologist  This document has been electronically signed. Jul 31 2024  3:18PM    < end of copied text >

## 2024-09-17 NOTE — PROGRESS NOTE ADULT - SUBJECTIVE AND OBJECTIVE BOX
CARDIOLOGY FOLLOW UP - Dr. Ackerman  DATE OF SERVICE: 9/17/24    CC no CP or SOB      REVIEW OF SYSTEMS:  CONSTITUTIONAL: No fever, weight loss, or fatigue  RESPIRATORY: No cough, wheezing, chills or hemoptysis; No Shortness of Breath  CARDIOVASCULAR: No chest pain, palpitations, passing out, dizziness  GASTROINTESTINAL: No abdominal or epigastric pain. No nausea, vomiting, or hematemesis; No diarrhea or constipation. No melena or hematochezia.      PHYSICAL EXAM:  T(C): 36.9 (09-17-24 @ 05:10), Max: 36.9 (09-17-24 @ 05:10)  HR: 91 (09-17-24 @ 05:29) (87 - 109)  BP: 146/81 (09-17-24 @ 05:10) (124/69 - 146/81)  RR: 18 (09-17-24 @ 05:10) (18 - 18)  SpO2: 98% (09-17-24 @ 05:10) (96% - 99%)  Wt(kg): --  I&O's Summary    16 Sep 2024 07:01  -  17 Sep 2024 07:00  --------------------------------------------------------  IN: 600 mL / OUT: 1800 mL / NET: -1200 mL        Appearance: Normal	  Cardiovascular: Normal S1 S2,RRR, No JVD, +murmur  Respiratory: Lungs clear to auscultation b/l  Gastrointestinal:  Soft, Non-tender, + BS	  Extremities: Normal range of motion, No clubbing, cyanosis or edema      Home Medications:  acetaminophen 325 mg oral tablet: 2 tab(s) orally every 6 hours as needed for  mild pain (11 Sep 2024 21:40)  buPROPion 150 mg/24 hours (XL) oral tablet, extended release: 1 tab(s) orally once a day (11 Sep 2024 21:40)  divalproex sodium 250 mg oral tablet, extended release: 1 tab(s) orally once a day (11 Sep 2024 21:40)  divalproex sodium 500 mg oral tablet, extended release: 1 tab(s) orally once a day (at bedtime) (11 Sep 2024 21:38)  lactulose 10 g/15 mL oral syrup: 30 milliliter(s) orally every 8 hours as needed for  constipation (11 Sep 2024 21:40)  levothyroxine 88 mcg (0.088 mg) oral tablet: 1 tab(s) orally once a day (11 Sep 2024 21:39)  LORazepam 1 mg oral tablet: 1 tab(s) orally once a day (at bedtime) (11 Sep 2024 21:40)  Melatonin 3 mg oral tablet: 1 tab(s) orally once a day (at bedtime) (11 Sep 2024 21:40)  polyethylene glycol 3350 oral powder for reconstitution: 17 gram(s) orally once a day (11 Sep 2024 21:40)  senna leaf extract oral tablet: 2 tab(s) orally once a day (at bedtime) (11 Sep 2024 21:40)  venlafaxine 75 mg oral capsule, extended release: 375 milligram(s) orally once a day (11 Sep 2024 21:39)      MEDICATIONS  (STANDING):  ampicillin/sulbactam  IVPB 3 Gram(s) IV Intermittent every 12 hours  buPROPion XL (24-Hour) . 150 milliGRAM(s) Oral daily  chlorhexidine 2% Cloths 1 Application(s) Topical daily  diphenhydrAMINE 25 milliGRAM(s) Oral once  divalproex  milliGRAM(s) Oral daily  divalproex  milliGRAM(s) Oral at bedtime  doxycycline IVPB 100 milliGRAM(s) IV Intermittent every 12 hours  heparin   Injectable 5000 Unit(s) SubCutaneous every 12 hours  influenza  Vaccine (HIGH DOSE) 0.5 milliLiter(s) IntraMuscular once  levothyroxine 88 MICROGram(s) Oral daily  LORazepam     Tablet 1 milliGRAM(s) Oral at bedtime  pantoprazole    Tablet 40 milliGRAM(s) Oral before breakfast  sodium bicarbonate 1300 milliGRAM(s) Oral three times a day  venlafaxine 300 milliGRAM(s) Oral daily  venlafaxine 75 milliGRAM(s) Oral daily      TELEMETRY: 	not on tele    ECG:  	  RADIOLOGY:   DIAGNOSTIC TESTING:  [ ] Echocardiogram:  [ ]  Catheterization:  [ ] Stress Test:    OTHER: 	  < from: MR Head No Cont (09.17.24 @ 07:15) >  IMPRESSION:    No evidence for intracranial mass, acute territorial infarct, acute   intracranial hemorrhage, or midline shift.    < end of copied text >    LABS:	 	                              10.3   10.41 )-----------( 259      ( 17 Sep 2024 08:31 )             33.2     09-17    144  |  111[H]  |  35[H]  ----------------------------<  144[H]  4.8   |  19[L]  |  2.82[H]    Ca    8.8      17 Sep 2024 08:32  Mg     2.4     09-17             CARDIOLOGY FOLLOW UP - Dr. Ackerman  DATE OF SERVICE: 9/17/24    CC no CP or SOB      REVIEW OF SYSTEMS:  CONSTITUTIONAL: No fever, weight loss, or fatigue  RESPIRATORY: No cough, wheezing, chills or hemoptysis; No Shortness of Breath  CARDIOVASCULAR: No chest pain, palpitations, passing out, dizziness  GASTROINTESTINAL: No abdominal or epigastric pain. No nausea, vomiting, or hematemesis; No diarrhea or constipation. No melena or hematochezia.      PHYSICAL EXAM:  T(C): 36.9 (09-17-24 @ 05:10), Max: 36.9 (09-17-24 @ 05:10)  HR: 91 (09-17-24 @ 05:29) (87 - 109)  BP: 146/81 (09-17-24 @ 05:10) (124/69 - 146/81)  RR: 18 (09-17-24 @ 05:10) (18 - 18)  SpO2: 98% (09-17-24 @ 05:10) (96% - 99%)  Wt(kg): --  I&O's Summary    16 Sep 2024 07:01  -  17 Sep 2024 07:00  --------------------------------------------------------  IN: 600 mL / OUT: 1800 mL / NET: -1200 mL        Appearance: Normal	  Cardiovascular: Normal S1 S2,RRR, No JVD, +murmur  Respiratory: Lungs clear to auscultation b/l  Gastrointestinal:  Soft, Non-tender, + BS	  Extremities: Normal range of motion, No clubbing, cyanosis or edema      Home Medications:  acetaminophen 325 mg oral tablet: 2 tab(s) orally every 6 hours as needed for  mild pain (11 Sep 2024 21:40)  buPROPion 150 mg/24 hours (XL) oral tablet, extended release: 1 tab(s) orally once a day (11 Sep 2024 21:40)  divalproex sodium 250 mg oral tablet, extended release: 1 tab(s) orally once a day (11 Sep 2024 21:40)  divalproex sodium 500 mg oral tablet, extended release: 1 tab(s) orally once a day (at bedtime) (11 Sep 2024 21:38)  lactulose 10 g/15 mL oral syrup: 30 milliliter(s) orally every 8 hours as needed for  constipation (11 Sep 2024 21:40)  levothyroxine 88 mcg (0.088 mg) oral tablet: 1 tab(s) orally once a day (11 Sep 2024 21:39)  LORazepam 1 mg oral tablet: 1 tab(s) orally once a day (at bedtime) (11 Sep 2024 21:40)  Melatonin 3 mg oral tablet: 1 tab(s) orally once a day (at bedtime) (11 Sep 2024 21:40)  polyethylene glycol 3350 oral powder for reconstitution: 17 gram(s) orally once a day (11 Sep 2024 21:40)  senna leaf extract oral tablet: 2 tab(s) orally once a day (at bedtime) (11 Sep 2024 21:40)  venlafaxine 75 mg oral capsule, extended release: 375 milligram(s) orally once a day (11 Sep 2024 21:39)      MEDICATIONS  (STANDING):  ampicillin/sulbactam  IVPB 3 Gram(s) IV Intermittent every 12 hours  buPROPion XL (24-Hour) . 150 milliGRAM(s) Oral daily  chlorhexidine 2% Cloths 1 Application(s) Topical daily  diphenhydrAMINE 25 milliGRAM(s) Oral once  divalproex  milliGRAM(s) Oral daily  divalproex  milliGRAM(s) Oral at bedtime  doxycycline IVPB 100 milliGRAM(s) IV Intermittent every 12 hours  heparin   Injectable 5000 Unit(s) SubCutaneous every 12 hours  influenza  Vaccine (HIGH DOSE) 0.5 milliLiter(s) IntraMuscular once  levothyroxine 88 MICROGram(s) Oral daily  LORazepam     Tablet 1 milliGRAM(s) Oral at bedtime  pantoprazole    Tablet 40 milliGRAM(s) Oral before breakfast  sodium bicarbonate 1300 milliGRAM(s) Oral three times a day  venlafaxine 300 milliGRAM(s) Oral daily  venlafaxine 75 milliGRAM(s) Oral daily      TELEMETRY: 	not on tele    ECG:  	NSR with first degree AV block HR 88; LBBB  RADIOLOGY:   DIAGNOSTIC TESTING:  [ ] Echocardiogram:  [ ]  Catheterization:  [ ] Stress Test:    OTHER: 	  < from: MR Head No Cont (09.17.24 @ 07:15) >  IMPRESSION:    No evidence for intracranial mass, acute territorial infarct, acute   intracranial hemorrhage, or midline shift.    < end of copied text >    LABS:	 	                              10.3   10.41 )-----------( 259      ( 17 Sep 2024 08:31 )             33.2     09-17    144  |  111[H]  |  35[H]  ----------------------------<  144[H]  4.8   |  19[L]  |  2.82[H]    Ca    8.8      17 Sep 2024 08:32  Mg     2.4     09-17

## 2024-09-18 ENCOUNTER — RESULT REVIEW (OUTPATIENT)
Age: 84
End: 2024-09-18

## 2024-09-18 DIAGNOSIS — L02.01 CUTANEOUS ABSCESS OF FACE: ICD-10-CM

## 2024-09-18 LAB
-  CLINDAMYCIN: SIGNIFICANT CHANGE UP
-  DAPTOMYCIN: SIGNIFICANT CHANGE UP
-  ERYTHROMYCIN: SIGNIFICANT CHANGE UP
-  GENTAMICIN: SIGNIFICANT CHANGE UP
-  LINEZOLID: SIGNIFICANT CHANGE UP
-  OXACILLIN: SIGNIFICANT CHANGE UP
-  PENICILLIN: SIGNIFICANT CHANGE UP
-  RIFAMPIN: SIGNIFICANT CHANGE UP
-  TETRACYCLINE: SIGNIFICANT CHANGE UP
-  TRIMETHOPRIM/SULFAMETHOXAZOLE: SIGNIFICANT CHANGE UP
-  VANCOMYCIN: SIGNIFICANT CHANGE UP
METHOD TYPE: SIGNIFICANT CHANGE UP
MRSA PCR RESULT.: DETECTED
S AUREUS DNA NOSE QL NAA+PROBE: DETECTED

## 2024-09-18 PROCEDURE — 99223 1ST HOSP IP/OBS HIGH 75: CPT

## 2024-09-18 PROCEDURE — 93306 TTE W/DOPPLER COMPLETE: CPT | Mod: 26

## 2024-09-18 PROCEDURE — 99233 SBSQ HOSP IP/OBS HIGH 50: CPT | Mod: FS,24

## 2024-09-18 RX ORDER — MORPHINE SULFATE 30 MG/1
2 TABLET, FILM COATED, EXTENDED RELEASE ORAL ONCE
Refills: 0 | Status: DISCONTINUED | OUTPATIENT
Start: 2024-09-18 | End: 2024-09-18

## 2024-09-18 RX ADMIN — CHLORHEXIDINE GLUCONATE ORAL RINSE 1 APPLICATION(S): 1.2 SOLUTION DENTAL at 08:35

## 2024-09-18 RX ADMIN — Medication 88 MICROGRAM(S): at 05:41

## 2024-09-18 RX ADMIN — MORPHINE SULFATE 2 MILLIGRAM(S): 30 TABLET, FILM COATED, EXTENDED RELEASE ORAL at 11:00

## 2024-09-18 RX ADMIN — PANTOPRAZOLE SODIUM 40 MILLIGRAM(S): 40 TABLET, DELAYED RELEASE ORAL at 05:41

## 2024-09-18 RX ADMIN — Medication 150 MILLIGRAM(S): at 08:34

## 2024-09-18 RX ADMIN — Medication 500 MILLIGRAM(S): at 21:54

## 2024-09-18 RX ADMIN — Medication 1300 MILLIGRAM(S): at 05:41

## 2024-09-18 RX ADMIN — Medication 300 MILLIGRAM(S): at 08:34

## 2024-09-18 RX ADMIN — AMPICILLIN, SULBACTAM 200 GRAM(S): 250; 125 INJECTION, POWDER, FOR SOLUTION INTRAMUSCULAR; INTRAVENOUS at 05:42

## 2024-09-18 RX ADMIN — Medication 75 MILLIGRAM(S): at 08:34

## 2024-09-18 RX ADMIN — Medication 1 MILLIGRAM(S): at 21:54

## 2024-09-18 RX ADMIN — Medication 5000 UNIT(S): at 17:18

## 2024-09-18 RX ADMIN — Medication 5000 UNIT(S): at 05:41

## 2024-09-18 RX ADMIN — MORPHINE SULFATE 2 MILLIGRAM(S): 30 TABLET, FILM COATED, EXTENDED RELEASE ORAL at 11:30

## 2024-09-18 RX ADMIN — Medication 1300 MILLIGRAM(S): at 21:54

## 2024-09-18 RX ADMIN — Medication 250 MILLIGRAM(S): at 08:34

## 2024-09-18 RX ADMIN — Medication 100 MILLIGRAM(S): at 13:42

## 2024-09-18 RX ADMIN — Medication 1300 MILLIGRAM(S): at 13:37

## 2024-09-18 NOTE — PROGRESS NOTE ADULT - SUBJECTIVE AND OBJECTIVE BOX
ENT ISSUE/POD: right facial abscess, s/p I&D POD 2    HPI: 84 year old, A&Ox4 Female, with PMHx of HTN, CKD, hypothyroid, depression, and OA, presented to ED for 4 days of worsening right facial/neck swelling and pain, CT neck without contrast revealed right facial cellulitis/myositis, without gross evidence of a drainable fluid collection. ENT consulted for further evaluation. Pt was being treated with IV abx for cellulitis. Pt states symptoms initially improved however ENT called back on 9/16 because pus was draining from the wound with no improvement of pain and swelling as per pt. Pt underwent bedside I&D with iodoform packing, POD 2. Pt denies SOB, fevers, cough, dysphagia.        PAST MEDICAL & SURGICAL HISTORY:  Depression      Hypothyroid      Kidney disease      Cataract      History of rectal surgery      History of tonsillectomy        Allergies    azithromycin (Rash)    Intolerances      MEDICATIONS  (STANDING):  buPROPion XL (24-Hour) . 150 milliGRAM(s) Oral daily  chlorhexidine 2% Cloths 1 Application(s) Topical daily  diphenhydrAMINE 25 milliGRAM(s) Oral once  divalproex  milliGRAM(s) Oral daily  divalproex  milliGRAM(s) Oral at bedtime  doxycycline IVPB 100 milliGRAM(s) IV Intermittent every 12 hours  heparin   Injectable 5000 Unit(s) SubCutaneous every 12 hours  influenza  Vaccine (HIGH DOSE) 0.5 milliLiter(s) IntraMuscular once  levothyroxine 88 MICROGram(s) Oral daily  LORazepam     Tablet 1 milliGRAM(s) Oral at bedtime  pantoprazole    Tablet 40 milliGRAM(s) Oral before breakfast  sodium bicarbonate 1300 milliGRAM(s) Oral three times a day  venlafaxine 75 milliGRAM(s) Oral daily  venlafaxine 300 milliGRAM(s) Oral daily    MEDICATIONS  (PRN):  acetaminophen     Tablet .. 650 milliGRAM(s) Oral every 6 hours PRN Temp greater or equal to 38C (100.4F), Mild Pain (1 - 3)  melatonin 3 milliGRAM(s) Oral at bedtime PRN Insomnia  polyethylene glycol 3350 17 Gram(s) Oral daily PRN Constipation  senna 2 Tablet(s) Oral at bedtime PRN Constipation      Social History: see consult note    Family history: see consult note    ROS:   ENT: all negative except as noted in HPI   Pulm: denies SOB, cough, hemoptysis  Neuro: denies numbness/tingling, loss of sensation  Endo: denies heat/cold intolerance, excessive sweating      Vital Signs Last 24 Hrs  T(C): 36.6 (18 Sep 2024 05:18), Max: 36.6 (18 Sep 2024 05:18)  T(F): 97.8 (18 Sep 2024 05:18), Max: 97.8 (18 Sep 2024 05:18)  HR: 89 (18 Sep 2024 05:18) (89 - 92)  BP: 118/74 (18 Sep 2024 05:18) (107/66 - 118/74)  BP(mean): --  RR: 18 (18 Sep 2024 05:18) (16 - 18)  SpO2: 96% (18 Sep 2024 05:18) (96% - 99%)    Parameters below as of 18 Sep 2024 05:18  Patient On (Oxygen Delivery Method): room air                              10.3   10.41 )-----------( 259      ( 17 Sep 2024 08:31 )             33.2    09-17    144  |  111[H]  |  35[H]  ----------------------------<  144[H]  4.8   |  19[L]  |  2.82[H]    Ca    8.8      17 Sep 2024 08:32  Mg     2.4     09-17         PHYSICAL EXAM:  Gen: NAD  Skin: right facial and neck erythema   Head: Normocephalic, Atraumatic  Face:  dressing removed, pus noted. Iodoform packing removed, wound irrigated and iodoform packing replaced. Surrounding area w/ +ttp, erythema with swelling/induration, warm to touch.   Eyes: no scleral injection  Nose: Nares bilaterally patent, no discharge  Mouth: dry oral mucosa. no pus expressed from Stensen's and Tacho's duct. no tenderness on gum or teeth on palpation. Mild trismus. No Stridor.   Neck: improving erythema on right platysma and anterior neck,  trachea midline, no masses  Lymphatic: No lymphadenopathy  Resp: breathing comfortably at room air, no stridor  Neuro: facial nerve intact, no facial droop         ENT ISSUE/POD: right facial abscess, s/p I&D POD 2    HPI: 84 year old, A&Ox4 Female, with PMHx of HTN, CKD, hypothyroid, depression, and OA, presented to ED for 4 days of worsening right facial/neck swelling and pain, CT neck without contrast revealed right facial cellulitis/myositis, without gross evidence of a drainable fluid collection. ENT consulted for further evaluation. Pt was being treated with IV abx for cellulitis. Pt states symptoms initially improved however ENT called back on 9/16 because pus was draining from the wound with no improvement of pain and swelling as per pt. Pt underwent bedside I&D with iodoform packing, POD 2. Pt states improvement of pain and trismus. She is able to tolerate a soft diet.  Pt denies SOB, fevers, cough, dysphagia. Prelim culture shows MRSA.        PAST MEDICAL & SURGICAL HISTORY:  Depression      Hypothyroid      Kidney disease      Cataract      History of rectal surgery      History of tonsillectomy        Allergies    azithromycin (Rash)    Intolerances      MEDICATIONS  (STANDING):  buPROPion XL (24-Hour) . 150 milliGRAM(s) Oral daily  chlorhexidine 2% Cloths 1 Application(s) Topical daily  diphenhydrAMINE 25 milliGRAM(s) Oral once  divalproex  milliGRAM(s) Oral daily  divalproex  milliGRAM(s) Oral at bedtime  doxycycline IVPB 100 milliGRAM(s) IV Intermittent every 12 hours  heparin   Injectable 5000 Unit(s) SubCutaneous every 12 hours  influenza  Vaccine (HIGH DOSE) 0.5 milliLiter(s) IntraMuscular once  levothyroxine 88 MICROGram(s) Oral daily  LORazepam     Tablet 1 milliGRAM(s) Oral at bedtime  pantoprazole    Tablet 40 milliGRAM(s) Oral before breakfast  sodium bicarbonate 1300 milliGRAM(s) Oral three times a day  venlafaxine 75 milliGRAM(s) Oral daily  venlafaxine 300 milliGRAM(s) Oral daily    MEDICATIONS  (PRN):  acetaminophen     Tablet .. 650 milliGRAM(s) Oral every 6 hours PRN Temp greater or equal to 38C (100.4F), Mild Pain (1 - 3)  melatonin 3 milliGRAM(s) Oral at bedtime PRN Insomnia  polyethylene glycol 3350 17 Gram(s) Oral daily PRN Constipation  senna 2 Tablet(s) Oral at bedtime PRN Constipation      Social History: see consult note    Family history: see consult note    ROS:   ENT: all negative except as noted in HPI   Pulm: denies SOB, cough, hemoptysis  Neuro: denies numbness/tingling, loss of sensation  Endo: denies heat/cold intolerance, excessive sweating      Vital Signs Last 24 Hrs  T(C): 36.6 (18 Sep 2024 05:18), Max: 36.6 (18 Sep 2024 05:18)  T(F): 97.8 (18 Sep 2024 05:18), Max: 97.8 (18 Sep 2024 05:18)  HR: 89 (18 Sep 2024 05:18) (89 - 92)  BP: 118/74 (18 Sep 2024 05:18) (107/66 - 118/74)  BP(mean): --  RR: 18 (18 Sep 2024 05:18) (16 - 18)  SpO2: 96% (18 Sep 2024 05:18) (96% - 99%)    Parameters below as of 18 Sep 2024 05:18  Patient On (Oxygen Delivery Method): room air                              10.3   10.41 )-----------( 259      ( 17 Sep 2024 08:31 )             33.2    09-17    144  |  111[H]  |  35[H]  ----------------------------<  144[H]  4.8   |  19[L]  |  2.82[H]    Ca    8.8      17 Sep 2024 08:32  Mg     2.4     09-17         PHYSICAL EXAM:  Gen: NAD  Skin: right facial and neck erythema   Head: Normocephalic, Atraumatic  Face:  dressing removed, pus noted on dressing. No iodoform packing noted, small amount of pus expressed, wound irrigated and 1 inch iodoform packing placed. Surrounding area w/ +ttp, erythema with swelling/induration, warm to touch.   Eyes: no scleral injection  Nose: Nares bilaterally patent, no discharge  Mouth: dry oral mucosa. no pus expressed from Stensen's and Wakeman's duct. no tenderness on gum or teeth on palpation. Mild trismus. No Stridor.   Neck: improving erythema on right platysma and anterior neck,  trachea midline, no masses  Lymphatic: No lymphadenopathy  Resp: breathing comfortably at room air, no stridor  Neuro: facial nerve intact, no facial droop

## 2024-09-18 NOTE — CONSULT NOTE ADULT - CONSULT REQUESTED DATE/TIME
12-Sep-2024 06:37
18-Sep-2024 18:05
13-Sep-2024 10:47
12-Sep-2024 11:43
11-Sep-2024 19:58
12-Sep-2024 14:34
16-Sep-2024

## 2024-09-18 NOTE — PROGRESS NOTE ADULT - ASSESSMENT
84 year old, A&Ox4 Female, with PMHx of HTN, CKD, hypothyroid, depression, and OA, presented to ED for 4 days of worsening right facial/neck swelling and pain, CT neck without contrast revealed right facial cellulitis/myositis, without gross evidence of a drainable fluid collection. ENT consulted for further evaluation. Pt was being treated with IV abx for cellulitis. Pt states symptoms initially improved however ENT called back on 9/16 because pus was draining from the wound with no improvement of pain and swelling as per pt. Pt underwent bedside I&D with iodoform packing, POD 2. Pt denies SOB, fevers, cough, dysphagia.   84 year old, A&Ox4 Female, with PMHx of HTN, CKD, hypothyroid, depression, and OA, presented to ED for 4 days of worsening right facial/neck swelling and pain, CT neck without contrast revealed right facial cellulitis/myositis, without gross evidence of a drainable fluid collection. ENT consulted for further evaluation. Pt was being treated with IV abx for cellulitis. Pt states symptoms initially improved however ENT called back on 9/16 because pus was draining from the wound with no improvement of pain and swelling as per pt. Pt underwent bedside I&D with iodoform packing, POD 2. Pt states improvement of pain and trismus. She is able to tolerate a soft diet. WBC trending down. Prelim culture shows MRSA. On exam, dressing removed, small amount of pus expressed from wound, iodoform packing placed.

## 2024-09-18 NOTE — PROGRESS NOTE ADULT - SUBJECTIVE AND OBJECTIVE BOX
SUBJECTIVE / OVERNIGHT EVENTS:    patient seen and examined  resting comfortably in bed  tx to [facility] 2/2 isolation    --------------------------------------------------------------------------------------------  LABS:                        10.3   10.41 )-----------( 259      ( 17 Sep 2024 08:31 )             33.2     09-17    144  |  111[H]  |  35[H]  ----------------------------<  144[H]  4.8   |  19[L]  |  2.82[H]    Ca    8.8      17 Sep 2024 08:32  Mg     2.4     09-17        CAPILLARY BLOOD GLUCOSE            Urinalysis Basic - ( 17 Sep 2024 08:32 )    Color: x / Appearance: x / SG: x / pH: x  Gluc: 144 mg/dL / Ketone: x  / Bili: x / Urobili: x   Blood: x / Protein: x / Nitrite: x   Leuk Esterase: x / RBC: x / WBC x   Sq Epi: x / Non Sq Epi: x / Bacteria: x        RADIOLOGY & ADDITIONAL TESTS:    Imaging Personally Reviewed:  [x] YES  [ ] NO    Consultant(s) Notes Reviewed:  [x] YES  [ ] NO    MEDICATIONS  (STANDING):  buPROPion XL (24-Hour) . 150 milliGRAM(s) Oral daily  chlorhexidine 2% Cloths 1 Application(s) Topical daily  diphenhydrAMINE 25 milliGRAM(s) Oral once  divalproex  milliGRAM(s) Oral daily  divalproex  milliGRAM(s) Oral at bedtime  doxycycline IVPB 100 milliGRAM(s) IV Intermittent every 12 hours  heparin   Injectable 5000 Unit(s) SubCutaneous every 12 hours  influenza  Vaccine (HIGH DOSE) 0.5 milliLiter(s) IntraMuscular once  levothyroxine 88 MICROGram(s) Oral daily  LORazepam     Tablet 1 milliGRAM(s) Oral at bedtime  pantoprazole    Tablet 40 milliGRAM(s) Oral before breakfast  sodium bicarbonate 1300 milliGRAM(s) Oral three times a day  venlafaxine 300 milliGRAM(s) Oral daily  venlafaxine 75 milliGRAM(s) Oral daily    MEDICATIONS  (PRN):  acetaminophen     Tablet .. 650 milliGRAM(s) Oral every 6 hours PRN Temp greater or equal to 38C (100.4F), Mild Pain (1 - 3)  melatonin 3 milliGRAM(s) Oral at bedtime PRN Insomnia  polyethylene glycol 3350 17 Gram(s) Oral daily PRN Constipation  senna 2 Tablet(s) Oral at bedtime PRN Constipation      Care Discussed with Consultants/Other Providers [x] YES  [ ] NO    Vital Signs Last 24 Hrs  T(C): 36.6 (18 Sep 2024 05:18), Max: 36.6 (18 Sep 2024 05:18)  T(F): 97.8 (18 Sep 2024 05:18), Max: 97.8 (18 Sep 2024 05:18)  HR: 89 (18 Sep 2024 05:18) (89 - 92)  BP: 118/74 (18 Sep 2024 05:18) (107/66 - 118/74)  BP(mean): --  RR: 18 (18 Sep 2024 05:18) (16 - 18)  SpO2: 96% (18 Sep 2024 05:18) (96% - 99%)    Parameters below as of 18 Sep 2024 05:18  Patient On (Oxygen Delivery Method): room air      I&O's Summary    17 Sep 2024 07:01  -  18 Sep 2024 07:00  --------------------------------------------------------  IN: 600 mL / OUT: 800 mL / NET: -200 mL    PHYSICAL EXAM:  GENERAL: NAD, well-developed, comfortable  HEENT: right sided facial swelling and neck erythema (improving); dsg c/d/i; TTP  CHEST/LUNG: Clear to auscultation bilaterally; No wheeze  HEART: Regular rate and rhythm; No murmurs, rubs, or gallops  ABDOMEN: Soft, Nontender, Nondistended; Bowel sounds present  NEURO: AAOx3, no focal weakness, 5/5 b/l extremity strength, b/l knee no arthritis, no effusion   EXTREMITIES:  2+ Peripheral Pulses, No clubbing, cyanosis, or edema  SKIN: as above

## 2024-09-18 NOTE — CONSULT NOTE ADULT - ASSESSMENT
84-year-old female with   anemia, hypothyroidism, depression, GERD, bipolar, CKD , presents for redness and swelling on her face for the past few days.  neuro called for episodes of AMS followed by falls and syncope.    CTH 7/31/24 neg   CT c spine neg   o/e AAOx3, IGLESIAS uppers> lowers. uses walker     Imprssion:   episodes of "spacing out" /AMS followed by occaional fall/syncope of unclear etiology.  no tongue bite, convulsions, incontienence or post ictal confusion lasts < 1 min ; low suspicion for seizure     - in or outpatient MRI brain and EEG   - check orthostatics   - limit sedating meds --> on significant psych meds, consider psych f/u.  venlafaxine , lorazapam 1mg QHS, VPA  AM 500mg QHS and wellbutrin 150mg daily (can lower seizure threshold)   - TTE at some point can also be outpatient and possibe event monitor   - non urgent vessel imaging, can do carotid duplex   - unasyn for cellulitis   - eventually EMILIO    - telemetry  - PT/OT/SS/SLP, OOBC  - check FS, glucose control <180  - GI/DVT ppx  - Thank you for allowing me to participate in the care of this patient. Call with questions.   Fabio Fox MD  Vascular Neurology  Office: [phone] 
84F w/ pmhx of HTN, CKD, Hypothyroidism, depression and AO presented to the ED for 4 days of worsening right facial/neck swelling and pain. In ED patient had creatinine of 3.03 and WBC of 22.22, CT without IV contrast was ordered and CT found right facial cellulitis/myositis without gross evidence of drainable fluid collection.   Patient had recent pus appreciated on the external surface of the abscess which was I&D and packed with iodoform. Patient latest WBC was 11.49.     Recc:   - No acute OMFS intervention at this time.   - Will follow patient, if abscess worsens please re-page OMFS   - Recc skin brianne abx, f/u culture   - Recc patient visit dentist to continue dental care.     
83 y/o female with PMH of anemia, hypothyroidism, depressin, GERD, bipolar disorder, CKD, who is admitted for right face cellulitis and getting further workup for frequent falls. According to the patient, she has had multiple instances and some were witnessed by her friends and family where she is seemingly doing well, but then she would experience an "aura" before she starts to "zone out" and stare blankly prior to falling to the ground. The patient does not think that she loses consciousness during these episodes. Follows with Dr. Ackerman as an outpatient where she reportedly had a negative 1 week cardiac event monitor. MRI of the head was negative for acute findings. A TTE done on 9/18/24 showed a severely decreased LV EF of 30-35% and regional WMAs. ECG also showed a LBBB and 1st degree AV block. LBBB not present on ECG back in July 2022. Patient not on telemetry. Unclear if there are any arrhythmia events while in hospital.    TTE 9/18/24 shows severely decreased LVSF with EF 30-35%; regional WMA; There is mild (grade 1) left ventricular diastolic dysfunction, with normal left ventricular filling pressure.   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 The entire inferior wall, mid and apical inferior septum, mid inferolateral segment, and apical lateral segment are akinetic. The entire anterior wall, basal and mid anterolateral wall, basal and mid anterior septum, basal inferoseptal segment, and basal inferolateral segment are hypokinetic.    Multiple falls, ?syncope of unclear etiology  Facial cellulitis  Newly reduced EF, CM    Recommendations:  - EKG shows NSR with first degree AV block and LBBB  - MRI negative  - Neuro workup negative thus far  - Given newly reduced EF compared to echo in May and multiple WMAs, with LBBB on ECG, recommend ischemic eval. Per documentation in chart, patient and son has refused this in the past  - She may benefit from CRT pacing in the future, but has not been on adequate GDMT plus in the presence of an active infection, not a candidate for transvenous device at this time  - Recommend transferring to telemetry unit to monitor for any arrhythmias in the event patient develops similar symptoms while in hospital  - Can consider ILR implant with patient and family are agreeable    Plan discussed with Dr. Das  
A/P    Patient is an 84-year-old female with a history of anemia, hypothyroidism, depression, GERD, bipolar, CKD, presents for redness and swelling on her face for the past few days.     #Right facial swelling and redness, Leukocytosis   -CT Neck 9/11/24 shows Right facial cellulitis/myositis, without gross evidence of a drainable fluid collection.  -CXR 9/11/24 shows Clear lungs  -trend WBC  -ENT following  -abx per med    #CKD  -Creat stable; Trend  -nephrology f/u    #Aortic Stenosis  -moderate AS on outpt TTE 5/24  -cont to monitor clinically with yearly TTE    #Cardiomyopathy, HFrEF  -outpatient TTE 5/24 shows LV systolic function is moderately reduced, moderate global hypokinesis; EF 40%; mild to moderate aortic regurgitation; moderate aortic stenosis  -no evidence of volume overload/HF  -no diuretic need  -per outpt visit, patient and son do not want to proceed with ischemic eval and do not want to start lv dysfxn medications as she remains asymptomatic    dvt ppx          
Patient is an 84-year-old female with a history of anemia, hypothyroidism, depression, GERD, bipolar, CKD who presents for redness and swelling on her face for the past few days. Patient reports right-sided facial swelling and redness including her neck and on her lower jaw that started on 9/8/24, worsening over the past few days. She reports  a dental exam for cracked teeth on day prior but no work performed. States someone told her she had 2 small pimple like lesion on her face, currently scabbed over.    R facial cellulitis  Leukocytosis due to above   - R lower face/neck erythema with induration, warmth and TTP with 2 small scabs noted--likely portal of entry  - CT neck soft tissue with R facial cellulitis/myositis, no e/o drainable fluid collection  - started on dexamethasone and unasyn with some improvement  - WBC downtrended, afebrile       Recommendations:  Follow Bcx - in process   Continue on unasyn  ENT following, iV steroids x24h  Monitor temps/WBC  Continue rest of care per primary team       Ash Graham M.D.  [facility], Division of Infectious Diseases  [phone]  After 5pm on weekdays and all day on weekends - please call [phone]  Available on [employer] TEAMS   
84 year old, A&Ox4 Female, with PMHx of HTN, CKD, hypothyroid, depression, and OA, presented to ED for 4-day history of progressive worsening of right facial/neck swelling and pain, with new onset of trismus. At ED, labs significant for leucocytosis (WBC 22.22) and CKD (creatinine 3.03). CT neck without contrast revealed right facial cellulitis/myositis, without gross evidence of a drainable fluid collection. ENT consulted for further evaluation. On exam, noted right submandibular erythema with swelling, warm to touch and TTP. swelling is firm without fluctuance. noted 2 black lesions (small wounds) around right submandibular region. additional erythema extends down to right platysma region and anterior neck. Warm to touch. Oral exam noted dry oral mucosa. no pus expressed from [place]'s and [place]'s duct. no tenderness on gum or teeth on palpation. Mild trismus.  Fiberoptic flexible laryngoscopy revealed findings of LPRD, otherwise, wide open airway with bilateral mobile vocal cords.

## 2024-09-18 NOTE — PROGRESS NOTE ADULT - ASSESSMENT
Patient is a 84 year old female with PMHx of Anemia, Depression, CKD, GERD and Hypothyroidism. Presents to [facility] for worsening redness and swelling on her face for the past few days.    Plan:    # Facial cellulitis:   - + leukocytosis, BCx w/ NGTD  - No drainable collection on CT, s/p laryngoscope no airway involvement   - Abx mgmt per ID  - S/p Dexamethasone 8MG Q8H for 24H only per ENT  - Encourage oral hydration   - Minced and moist diet  - Monitor temps/WBC  - 9/16 noted with increased duration/swelling and expressible pus -> s/p I&D by ENT; Cx noted with MRSA  - C/w isolation per protocol   - ID/ENT following    # Hx of frequent falls:  - Fall precautions  - Ortho negative  - MR Head 9/17/24 shows no intracranial mass, acute territorial infarct, acute intracranial hemorrhage, or midline shift  - EEG neg for seizures   - TTE completed, results pending  - Rec EP eval for hx of multiple falls vs syncope with LBBB, CMP, and first degree AV block   - Cards/Neuro following    # CKD:  - Bun/Cr 38/3.03  - Monitor I/O's  - Serial Cr  - Avoid nephrotoxins  - Renally dose medications  - C/w NaHCO3  - Renal following    # Depression:  - C/w current meds    # LPRD (laryngopharyngeal reflux disease):  - ENT recommendations appreciated   - PPI    # Hypothyroidism:  - C/w Synthroid    # DVT ppx:  - Heparin subq    Dispo: eventual EMILIO    [facility]  [phone]

## 2024-09-18 NOTE — PROGRESS NOTE ADULT - ASSESSMENT
84-year-old female with   anemia, hypothyroidism, depression, GERD, bipolar, CKD , presents for redness and swelling on her face for the past few days.  neuro called for episodes of AMS followed by falls and syncope.    CTH 7/31/24 neg   CT c spine neg   o/e AAOx3, IGLESIAS uppers> lowers. uses walker   orthostatics neg   MRI brain neg for acute findings.  chronic R cerebellar strtoke   EEG neg for seiuzres   + MRSA R lower face     Imprssion:   1) episodes of "spacing out" /AMS followed by occaional fall/syncope of unclear etiology.  no tongue bite, convulsions, incontienence or post ictal confusion lasts < 1 min ; low suspicion for seizure more likely from underlyikng psych condition   2) chronic appearing R cerebllar small vessel storke     - should be on asa and statin therapy for chornic appearing stroke if no objection   -  spoke with daughter 9/16 states psych meds have been for years r/o seizure   - limit sedating meds --> on significant psych meds, consider psych f/u.  venlafaxine , lorazapam 1mg QHS, VPA  AM 500mg QHS and wellbutrin 150mg daily (can lower seizure threshold)   - TTE at some point can also be outpatient and possibe event monitor   - non urgent vessel imaging, can do carotid duplex   - unasyn for cellulitis --> changed to doxycycline.  MRSA, now needs isolation   - eventually EMILIO    - telemetry  - PT/OT/SS/SLP, OOBC  - check FS, glucose control <180  - GI/DVT ppx  - Thank you for allowing me to participate in the care of this patient. Call with questions.      Fabio Fox MD  Vascular Neurology  Office: [phone]

## 2024-09-18 NOTE — CONSULT NOTE ADULT - REASON FOR ADMISSION
R face swelling and redness

## 2024-09-18 NOTE — PROGRESS NOTE ADULT - PROBLEM SELECTOR PLAN 1
- f/u final culture  - abx per ID  - pain control prn  - Daily packing changes and irrigation  - ENT will continue to follow  - Call with questions or concerns.

## 2024-09-18 NOTE — PROGRESS NOTE ADULT - SUBJECTIVE AND OBJECTIVE BOX
CARDIOLOGY FOLLOW UP - Dr. Ackerman  DATE OF SERVICE: 9/18/24    CC  no CP or SOB    REVIEW OF SYSTEMS:  CONSTITUTIONAL: No fever, weight loss, or fatigue  RESPIRATORY: No cough, wheezing, chills or hemoptysis; No Shortness of Breath  CARDIOVASCULAR: No chest pain, palpitations, passing out, dizziness  GASTROINTESTINAL: No abdominal or epigastric pain. No nausea, vomiting, or hematemesis; No diarrhea or constipation. No melena or hematochezia.      PHYSICAL EXAM:  T(C): 37.2 (09-18-24 @ 10:47), Max: 37.2 (09-18-24 @ 10:47)  HR: 85 (09-18-24 @ 10:47) (85 - 92)  BP: 119/66 (09-18-24 @ 10:47) (107/66 - 119/66)  RR: 18 (09-18-24 @ 10:47) (18 - 18)  SpO2: 95% (09-18-24 @ 10:47) (95% - 99%)  Wt(kg): --  I&O's Summary    17 Sep 2024 07:01  -  18 Sep 2024 07:00  --------------------------------------------------------  IN: 600 mL / OUT: 800 mL / NET: -200 mL        Appearance: Normal	  Cardiovascular: Normal S1 S2,RRR, No JVD, +murmur  Respiratory: Lungs clear to auscultation b/l  Gastrointestinal:  Soft, Non-tender, + BS	  Extremities: Normal range of motion, No clubbing, cyanosis or edema      Home Medications:  acetaminophen 325 mg oral tablet: 2 tab(s) orally every 6 hours as needed for  mild pain (11 Sep 2024 21:40)  buPROPion 150 mg/24 hours (XL) oral tablet, extended release: 1 tab(s) orally once a day (11 Sep 2024 21:40)  divalproex sodium 250 mg oral tablet, extended release: 1 tab(s) orally once a day (11 Sep 2024 21:40)  divalproex sodium 500 mg oral tablet, extended release: 1 tab(s) orally once a day (at bedtime) (11 Sep 2024 21:38)  lactulose 10 g/15 mL oral syrup: 30 milliliter(s) orally every 8 hours as needed for  constipation (11 Sep 2024 21:40)  levothyroxine 88 mcg (0.088 mg) oral tablet: 1 tab(s) orally once a day (11 Sep 2024 21:39)  LORazepam 1 mg oral tablet: 1 tab(s) orally once a day (at bedtime) (11 Sep 2024 21:40)  Melatonin 3 mg oral tablet: 1 tab(s) orally once a day (at bedtime) (11 Sep 2024 21:40)  polyethylene glycol 3350 oral powder for reconstitution: 17 gram(s) orally once a day (11 Sep 2024 21:40)  senna leaf extract oral tablet: 2 tab(s) orally once a day (at bedtime) (11 Sep 2024 21:40)  venlafaxine 75 mg oral capsule, extended release: 375 milligram(s) orally once a day (11 Sep 2024 21:39)      MEDICATIONS  (STANDING):  buPROPion XL (24-Hour) . 150 milliGRAM(s) Oral daily  chlorhexidine 2% Cloths 1 Application(s) Topical daily  diphenhydrAMINE 25 milliGRAM(s) Oral once  divalproex  milliGRAM(s) Oral daily  divalproex  milliGRAM(s) Oral at bedtime  doxycycline IVPB 100 milliGRAM(s) IV Intermittent every 12 hours  heparin   Injectable 5000 Unit(s) SubCutaneous every 12 hours  influenza  Vaccine (HIGH DOSE) 0.5 milliLiter(s) IntraMuscular once  levothyroxine 88 MICROGram(s) Oral daily  LORazepam     Tablet 1 milliGRAM(s) Oral at bedtime  pantoprazole    Tablet 40 milliGRAM(s) Oral before breakfast  sodium bicarbonate 1300 milliGRAM(s) Oral three times a day  venlafaxine 75 milliGRAM(s) Oral daily  venlafaxine 300 milliGRAM(s) Oral daily      TELEMETRY: 	  not on tele  ECG:  	  RADIOLOGY:   DIAGNOSTIC TESTING:  [ ] Echocardiogram:  < from: TTE W or WO Ultrasound Enhancing Agent (09.18.24 @ 09:18) >  CONCLUSIONS:      1. Left ventricular systolic function is severely decreased with an ejection fraction visually estimated at 30 to 35 %. Regional wall motion abnormalities present.   2. Multiple segmental abnormalities exist. See findings.   3. Normal right ventricular cavity size and normal right ventricular systolic function. Tricuspid annular plane systolic excursion (TAPSE) is 2.9 cm (normal >=1.7 cm). Tricuspid annular tissue Doppler S' is 17.0 cm/s (normal >10 cm/s).   4. No pericardial effusion seen.   5. Estimated pulmonary artery systolic pressure is 24 mmHg.   6. No prior echocardiogram is available for comparison.   7. Technically difficult image quality.   8. The inferior vena cava is normal in size (normal <2.1cm) withnormal inspiratory collapse (normal >50%) consistent with normal right atrial pressure (~3, range 0-5mmHg).      < end of copied text >         [ ]  Catheterization:  [ ] Stress Test:    OTHER: 	    LABS:	 	                            10.3   10.41 )-----------( 259      ( 17 Sep 2024 08:31 )             33.2     09-17    144  |  111[H]  |  35[H]  ----------------------------<  144[H]  4.8   |  19[L]  |  2.82[H]    Ca    8.8      17 Sep 2024 08:32  Mg     2.4     09-17

## 2024-09-18 NOTE — CONSULT NOTE ADULT - NS ATTEND AMEND GEN_ALL_CORE FT
May benefit from CRT, but with current infectious risk I would defer at this time.  THere is some concern of syncope as the etiolgy of falls.  We discussed possible ILR.

## 2024-09-18 NOTE — CONSULT NOTE ADULT - CONSULT REASON
Cellulitis
facial cellulitis
office pt
Azotemia
Syncope?
right facial swelling
episodes of AMS/fall/syncope

## 2024-09-18 NOTE — PROGRESS NOTE ADULT - SUBJECTIVE AND OBJECTIVE BOX
Neurology      S: patient seen. no significant neuro chagnes ; eeg neg         Medications: MEDICATIONS  (STANDING):  buPROPion XL (24-Hour) . 150 milliGRAM(s) Oral daily  chlorhexidine 2% Cloths 1 Application(s) Topical daily  diphenhydrAMINE 25 milliGRAM(s) Oral once  divalproex  milliGRAM(s) Oral daily  divalproex  milliGRAM(s) Oral at bedtime  doxycycline IVPB 100 milliGRAM(s) IV Intermittent every 12 hours  heparin   Injectable 5000 Unit(s) SubCutaneous every 12 hours  influenza  Vaccine (HIGH DOSE) 0.5 milliLiter(s) IntraMuscular once  levothyroxine 88 MICROGram(s) Oral daily  LORazepam     Tablet 1 milliGRAM(s) Oral at bedtime  pantoprazole    Tablet 40 milliGRAM(s) Oral before breakfast  sodium bicarbonate 1300 milliGRAM(s) Oral three times a day  venlafaxine 300 milliGRAM(s) Oral daily  venlafaxine 75 milliGRAM(s) Oral daily    MEDICATIONS  (PRN):  acetaminophen     Tablet .. 650 milliGRAM(s) Oral every 6 hours PRN Temp greater or equal to 38C (100.4F), Mild Pain (1 - 3)  melatonin 3 milliGRAM(s) Oral at bedtime PRN Insomnia  polyethylene glycol 3350 17 Gram(s) Oral daily PRN Constipation  senna 2 Tablet(s) Oral at bedtime PRN Constipation       Vitals:  Vital Signs Last 24 Hrs  T(C): 36.6 (18 Sep 2024 05:18), Max: 36.6 (18 Sep 2024 05:18)  T(F): 97.8 (18 Sep 2024 05:18), Max: 97.8 (18 Sep 2024 05:18)  HR: 89 (18 Sep 2024 05:18) (89 - 92)  BP: 118/74 (18 Sep 2024 05:18) (107/66 - 118/74)  BP(mean): --  RR: 18 (18 Sep 2024 05:18) (16 - 18)  SpO2: 96% (18 Sep 2024 05:18) (96% - 99%)    Parameters below as of 18 Sep 2024 05:18  Patient On (Oxygen Delivery Method): room air               Orthostatic VS  09-15-24 @ 04:24  Lying BP: 115/63 HR: 84  Sitting BP: 124/73 HR: 84  Standing BP: 130/75 HR: 89  Site: upper left arm  Mode: --  Orthostatic VS  09-14-24 @ 21:14  Lying BP: 109/69 HR: 79  Sitting BP: 105/66 HR: 85  Standing BP: 139/63 HR: 88  Site: upper right arm  Mode: electronic        General Exam:   General Appearance: Appropriately dressed and in no acute distress       Head: Normocephalic, atraumatic and no dysmorphic features  Ear, Nose, and Throat: Moist mucous membranes  CVS: S1S2+  Resp: No SOB, no wheeze or rhonchi  GI: soft NT/ND  Extremities: No edema or cyanosis  Skin:  redness on face      Neurological Exam:  Mental Status: Awake, alert and oriented x 3.  Able to follow simple and complex verbal commands. Able to name and repeat. fluent speech. No obvious aphasia or dysarthria noted.   Cranial Nerves: PERRL, EOMI, VFFC, sensation V1-V3 intact,  no obvious facial asymmetry, equal elevation of palate, scm/trap 5/5, tongue is midline on protrusion. no obvious papilledema on fundoscopic exam. hearing is grossly intact.   Motor: Normal bulk, tone and strength throughout uppers ; lowers 3-4/5   Sensation: Intact to light touch and pinprick throughout. no right/left confusion. no extinction to tactile on DSS.   Coordination: No dysmetria on FNF or HKS  Gait: walker /wheelchair     Data/Labs/Imaging which I personally reviewed.         LABS:                          10.3   10.41 )-----------( 259      ( 17 Sep 2024 08:31 )             33.2     09-17    144  |  111[H]  |  35[H]  ----------------------------<  144[H]  4.8   |  19[L]  |  2.82[H]    Ca    8.8      17 Sep 2024 08:32  Mg     2.4     09-17          Urinalysis Basic - ( 17 Sep 2024 08:32 )    Color: x / Appearance: x / SG: x / pH: x  Gluc: 144 mg/dL / Ketone: x  / Bili: x / Urobili: x   Blood: x / Protein: x / Nitrite: x   Leuk Esterase: x / RBC: x / WBC x   Sq Epi: x / Non Sq Epi: x / Bacteria: x          < from: MR Head No Cont (09.17.24 @ 07:15) >    ACC: [id number] EXAM:  MR BRAIN   ORDERED BY: RETA KLINE     PROCEDURE DATE:  09/17/2024          INTERPRETATION:  CLINICAL INDICATION: "spacing out"/AMS    TECHNIQUE: Multi-planar, multi-squence noncontrast brain MRI was   performed.    COMPARISON: 07/31/2024    FINDINGS:    VENTRICLES AND SULCI:  Normal.  INTRA-AXIAL:  No midline shift, acute intracranial hemorrhage or evidence   of acute cerebral ischemia. Chronic right cerebellar infarct. There are   patchy and confluent foci of hyperintense T2 signal within the   subcortical and periventricular white matter which are nonspecific but   likely related to chronic microvascular ischemic disease.  EXTRA-AXIAL:  No mass or collection.  VISUALIZED SINUSES: Mild mucosal thickening.  VISUALIZED MASTOIDS:  Clear.  CALVARIUM:  Normal.  CAROTID FLOW VOIDS: Normal.  MISCELLANEOUS:  None.    IMPRESSION:    No evidence for intracranial mass, acute territorial infarct, acute   intracranial hemorrhage, or midline shift.    --- End of Report ---            NAVIN HINDS MD; Attending Radiologist  This document has been electronically signed. Sep 17 2024  7:21AM    < end of copied text >      < from: CT Head No Cont (07.31.24 @ 15:06) >    ACC: [id number] EXAM:  CT CERVICAL SPINE   ORDERED BY: CHANTEL GERBER     ACC: [id number] EXAM:  CT BRAIN   ORDERED BY: RAHEEL LARSON     PROCEDURE DATE:  07/31/2024          INTERPRETATION:  CLINICAL INFORMATION: fall, slipped out of wheelchair at   midnight/possibly fell asleep while watching TV, R arm/L hand skin tears   - dressed by dtr, denies neck/body pain, no AC use    COMPARISON: Head CT 6/22/2024    CONTRAST:  IV Contrast: NONE  Complications: None reported at time of study completion    TECHNIQUE:    CT BRAIN: Serial axial images were obtained from the skull base to the   vertex using multi-slice helical technique. Sagittal and coronal   reformats were obtained.    CT CERVICAL SPINE: Axial images were obtained of the cervical spineusing   multislice helical technique. Reformatted coronal and sagittal images   were obtained.    FINDINGS:    CT BRAIN:    VENTRICLES AND SULCI: Age appropriate involutional changes.  INTRA-AXIAL: No mass effect, acute hemorrhage, or midline shift.There   are periventricular and subcortical white matter hypodensities,   consistent with microvascular type changes.  EXTRA-AXIAL: No mass or fluid collection. Basal cisterns are normal in   appearance.    VISUALIZED SINUSES:  Clear.  TYMPANOMASTOIDCAVITIES:  Clear.  VISUALIZED ORBITS: Bilateral lens replacement.  CALVARIUM: Intact.    MISCELLANEOUS: None.      CT CERVICAL SPINE:    VERTEBRAE:  Normal in height. No acute fracture. Multilevel degenerative   changes including marginal osteophytes.  ALIGNMENT: No subluxation or scoliosis.  INTERVERTEBRAL DISC SPACES: Multilevel degenerative disc osteophyte   complexes. Multilevel bilateral moderate to severe neural foraminal   stenoses.    VISUALIZED LUNGS: Bilateral chronic appearing interstitial prominence.    MISCELLANEOUS:  None.      IMPRESSION:    CT HEAD:  No evidence of acute intracranial pathology.    CT CERVICAL SPINE:  No acute fracture or traumatic subluxation.    Multi-level degenerative changes.        --- End of Report ---            STEVE ALLEN MD; Attending Radiologist  This document has been electronically signed. Jul 31 2024  3:18PM    < end of copied text >

## 2024-09-18 NOTE — PROGRESS NOTE ADULT - NS ATTEND AMEND GEN_ALL_CORE FT
ENT following for facial abscess     84 year old, A&Ox4 Female, with PMHx of HTN, CKD, hypothyroid, depression, and OA, presented to ED for 4 days of worsening right facial/neck swelling and pain. At ED, labs are significant for leucocytosis (WBC 22.22) and creatinine of 3.03.     9/11/24 As per radiology CT neck without contrast shows Right facial cellulitis/myositis, without gross evidence of a drainable fluid collection.    ENT consulted for further evaluation. Per patient, on Saturday, she went for a dental consultation for cracked tooth. No procedure was done. Then pt started noticing right facial swelling on Saturday evening. Pt presented to PCP, who later referred her to ENT, when swelling progressively worsened along with new onset of trismus. outpatient ENT sent her to ED for CT scan and iv antibiotics. Pt symptoms initially improved however ENT called back because pus is now draining from the wound and pt states pain and swelling remains the same.     9/16/24 Now s/p I&D of facial abscess right submandibular area. This morning blood and pus expressed, wound irrigated and iodoform packing replaced. Surrounding area w/ +ttp, erythema with swelling/induration, warm to touch. Afebrile. WBC 10.41    Physical exam shows dressing removed, small amount of pus expressed from wound, iodoform packing placed. induration / erythema improved.     Recommend:  Facial Abscess / Cellulitis   - f/u final culture  - abx per ID  - pain control prn  - Daily packing changes and irrigation  - ENT will continue to follow  - Call with questions or concerns.

## 2024-09-18 NOTE — CONSULT NOTE ADULT - SUBJECTIVE AND OBJECTIVE BOX
CHIEF COMPLAINT:    HISTORY OF PRESENT ILLNESS:      Allergies    azithromycin (Rash)    Intolerances    	    MEDICATIONS:  heparin   Injectable 5000 Unit(s) SubCutaneous every 12 hours    doxycycline IVPB 100 milliGRAM(s) IV Intermittent every 12 hours      acetaminophen     Tablet .. 650 milliGRAM(s) Oral every 6 hours PRN  buPROPion XL (24-Hour) . 150 milliGRAM(s) Oral daily  diphenhydrAMINE 25 milliGRAM(s) Oral once  divalproex  milliGRAM(s) Oral daily  divalproex  milliGRAM(s) Oral at bedtime  LORazepam     Tablet 1 milliGRAM(s) Oral at bedtime  melatonin 3 milliGRAM(s) Oral at bedtime PRN  venlafaxine 75 milliGRAM(s) Oral daily  venlafaxine 300 milliGRAM(s) Oral daily    pantoprazole    Tablet 40 milliGRAM(s) Oral before breakfast  polyethylene glycol 3350 17 Gram(s) Oral daily PRN  senna 2 Tablet(s) Oral at bedtime PRN    levothyroxine 88 MICROGram(s) Oral daily    chlorhexidine 2% Cloths 1 Application(s) Topical daily  influenza  Vaccine (HIGH DOSE) 0.5 milliLiter(s) IntraMuscular once  sodium bicarbonate 1300 milliGRAM(s) Oral three times a day      PAST MEDICAL & SURGICAL HISTORY:  Depression      Hypothyroid      Kidney disease      Cataract      History of rectal surgery      History of tonsillectomy    REVIEW OF SYSTEMS:  See HPI. All ROS negative unless otherwise noted    PHYSICAL EXAM:  T(C): 36.8 (09-18-24 @ 16:22), Max: 37.2 (09-18-24 @ 10:47)  HR: 85 (09-18-24 @ 16:22) (85 - 92)  BP: 104/65 (09-18-24 @ 16:22) (104/65 - 119/66)  RR: 18 (09-18-24 @ 16:22) (18 - 18)  SpO2: 94% (09-18-24 @ 16:22) (94% - 99%)  Wt(kg): --  I&O's Summary    17 Sep 2024 07:01  -  18 Sep 2024 07:00  --------------------------------------------------------  IN: 600 mL / OUT: 800 mL / NET: -200 mL    18 Sep 2024 07:01  -  18 Sep 2024 18:05  --------------------------------------------------------  IN: 480 mL / OUT: 1000 mL / NET: -520 mL    Appearance: Alert. NAD	  HEENT:   NC/AT	  Cardiovascular: +S1S2 RRR no m/g/r  Respiratory: CTA B/L	  Psychiatry: A & O x 3, Mood & affect appropriate  Gastrointestinal:  Soft, NT.ND., + BS	  Skin: No rashes	  Neurologic: Non-focal  Extremities: No edema BLE  Vascular: Peripheral pulses palpable 2+ bilaterally    LABS:	 	  CBC Full  -  ( 17 Sep 2024 08:31 )  WBC Count : 10.41 K/uL  Hemoglobin : 10.3 g/dL  Hematocrit : 33.2 %  Platelet Count - Automated : 259 K/uL  Mean Cell Volume : 100.9 fl  Mean Cell Hemoglobin : 31.3 pg  Mean Cell Hemoglobin Concentration : 31.0 gm/dL  Auto Neutrophil # : 5.73 K/uL  Auto Lymphocyte # : 1.15 K/uL  Auto Monocyte # : 1.35 K/uL  Auto Eosinophil # : 0.94 K/uL  Auto Basophil # : 0.10 K/uL  Auto Neutrophil % : 54.0 %  Auto Lymphocyte % : 11.0 %  Auto Monocyte % : 13.0 %  Auto Eosinophil % : 9.0 %  Auto Basophil % : 1.0 %    09-17    144  |  111[H]  |  35[H]  ----------------------------<  144[H]  4.8   |  19[L]  |  2.82[H]    Ca    8.8      17 Sep 2024 08:32  Mg     2.4     09-17        proBNP:   Lipid Profile:   HgA1c:   TSH:       CARDIAC MARKERS:                TELEMETRY: 	    ECG:  	  RADIOLOGY:  OTHER: 	    PREVIOUS DIAGNOSTIC TESTING:    Echocardiogram:    Catheterization:    Stress Test:  	  	  ASSESSMENT/PLAN: 	     CHIEF COMPLAINT: Frequent falls    HISTORY OF PRESENT ILLNESS:  83 y/o female with PMH of anemia, hypothyroidism, depressin, GERD, bipolar disorder, CKD, who is admitted for right face cellulitis and getting further workup for frequent falls. According to the patient, she has had multiple instances and some were witnessed by her friends and family where she is seemingly doing well, but then she would experience an "aura" before she starts to "zone out" and stare blankly prior to falling to the ground. The patient does not think that she loses consciousness during these episodes. Follows with Dr. Ackerman as an outpatient where she reportedly had a negative 1 week cardiac event monitor. MRI of the head was negative for acute findings. A TTE done on 9/18/24 showed a severely decreased LV EF of 30-35% and regional WMAs. ECG also showed a LBBB and 1st degree AV block. LBBB not present on ECG back in July 2022. Patient not on telemetry. Unclear if there are any arrhythmia events while in hospital.    Allergies  azithromycin (Rash)  Intolerances	    MEDICATIONS:  heparin   Injectable 5000 Unit(s) SubCutaneous every 12 hours  doxycycline IVPB 100 milliGRAM(s) IV Intermittent every 12 hours  acetaminophen     Tablet .. 650 milliGRAM(s) Oral every 6 hours PRN  buPROPion XL (24-Hour) . 150 milliGRAM(s) Oral daily  diphenhydrAMINE 25 milliGRAM(s) Oral once  divalproex  milliGRAM(s) Oral daily  divalproex  milliGRAM(s) Oral at bedtime  LORazepam     Tablet 1 milliGRAM(s) Oral at bedtime  melatonin 3 milliGRAM(s) Oral at bedtime PRN  venlafaxine 75 milliGRAM(s) Oral daily  venlafaxine 300 milliGRAM(s) Oral daily  pantoprazole    Tablet 40 milliGRAM(s) Oral before breakfast  polyethylene glycol 3350 17 Gram(s) Oral daily PRN  senna 2 Tablet(s) Oral at bedtime PRN  levothyroxine 88 MICROGram(s) Oral daily  chlorhexidine 2% Cloths 1 Application(s) Topical daily  influenza  Vaccine (HIGH DOSE) 0.5 milliLiter(s) IntraMuscular once  sodium bicarbonate 1300 milliGRAM(s) Oral three times a day    PAST MEDICAL & SURGICAL HISTORY:  Depression  Hypothyroid  Kidney disease  Cataract  History of rectal surgery  History of tonsillectomy    REVIEW OF SYSTEMS:  See HPI. All ROS negative unless otherwise noted    PHYSICAL EXAM:  T(C): 36.8 (09-18-24 @ 16:22), Max: 37.2 (09-18-24 @ 10:47)  HR: 85 (09-18-24 @ 16:22) (85 - 92)  BP: 104/65 (09-18-24 @ 16:22) (104/65 - 119/66)  RR: 18 (09-18-24 @ 16:22) (18 - 18)  SpO2: 94% (09-18-24 @ 16:22) (94% - 99%)  Wt(kg): --  I&O's Summary    17 Sep 2024 07:01  -  18 Sep 2024 07:00  --------------------------------------------------------  IN: 600 mL / OUT: 800 mL / NET: -200 mL    18 Sep 2024 07:01  -  18 Sep 2024 18:05  --------------------------------------------------------  IN: 480 mL / OUT: 1000 mL / NET: -520 mL    Appearance: Alert. NAD	  HEENT:   NC/AT	  Cardiovascular: +S1S2 RRR no m/g/r  Respiratory: CTA B/L	  Psychiatry: A & O x 3, Mood & affect appropriate  Gastrointestinal:  Soft, NT.ND., + BS	  Skin: No rashes	  Neurologic: Non-focal  Extremities: No edema BLE  Vascular: Peripheral pulses palpable 2+ bilaterally    LABS:	 	  CBC Full  -  ( 17 Sep 2024 08:31 )  WBC Count : 10.41 K/uL  Hemoglobin : 10.3 g/dL  Hematocrit : 33.2 %  Platelet Count - Automated : 259 K/uL  Mean Cell Volume : 100.9 fl  Mean Cell Hemoglobin : 31.3 pg  Mean Cell Hemoglobin Concentration : 31.0 gm/dL  Auto Neutrophil # : 5.73 K/uL  Auto Lymphocyte # : 1.15 K/uL  Auto Monocyte # : 1.35 K/uL  Auto Eosinophil # : 0.94 K/uL  Auto Basophil # : 0.10 K/uL  Auto Neutrophil % : 54.0 %  Auto Lymphocyte % : 11.0 %  Auto Monocyte % : 13.0 %  Auto Eosinophil % : 9.0 %  Auto Basophil % : 1.0 %    09-17    144  |  111[H]  |  35[H]  ----------------------------<  144[H]  4.8   |  19[L]  |  2.82[H]    Ca    8.8      17 Sep 2024 08:32  Mg     2.4     09-17        proBNP:   Lipid Profile:   HgA1c:   TSH:       CARDIAC MARKERS:                TELEMETRY: 	    ECG:  	  RADIOLOGY:  OTHER: 	    PREVIOUS DIAGNOSTIC TESTING:    Echocardiogram:    Catheterization:    Stress Test:  	  	  ASSESSMENT/PLAN: 	     CHIEF COMPLAINT: Frequent falls    HISTORY OF PRESENT ILLNESS:  85 y/o female with PMH of anemia, hypothyroidism, depressin, GERD, bipolar disorder, CKD, who is admitted for right face cellulitis and getting further workup for frequent falls. According to the patient, she has had multiple instances and some were witnessed by her friends and family where she is seemingly doing well, but then she would experience an "aura" before she starts to "zone out" and stare blankly prior to falling to the ground. The patient does not think that she loses consciousness during these episodes. Follows with Dr. Ackerman as an outpatient where she reportedly had a negative 1 week cardiac event monitor. MRI of the head was negative for acute findings. A TTE done on 9/18/24 showed a severely decreased LV EF of 30-35% and regional WMAs. ECG also showed a LBBB and 1st degree AV block. LBBB not present on ECG back in July 2022. Patient not on telemetry. Unclear if there are any arrhythmia events while in hospital.    Allergies  azithromycin (Rash)  Intolerances	    MEDICATIONS:  heparin   Injectable 5000 Unit(s) SubCutaneous every 12 hours  doxycycline IVPB 100 milliGRAM(s) IV Intermittent every 12 hours  acetaminophen     Tablet .. 650 milliGRAM(s) Oral every 6 hours PRN  buPROPion XL (24-Hour) . 150 milliGRAM(s) Oral daily  diphenhydrAMINE 25 milliGRAM(s) Oral once  divalproex  milliGRAM(s) Oral daily  divalproex  milliGRAM(s) Oral at bedtime  LORazepam     Tablet 1 milliGRAM(s) Oral at bedtime  melatonin 3 milliGRAM(s) Oral at bedtime PRN  venlafaxine 75 milliGRAM(s) Oral daily  venlafaxine 300 milliGRAM(s) Oral daily  pantoprazole    Tablet 40 milliGRAM(s) Oral before breakfast  polyethylene glycol 3350 17 Gram(s) Oral daily PRN  senna 2 Tablet(s) Oral at bedtime PRN  levothyroxine 88 MICROGram(s) Oral daily  chlorhexidine 2% Cloths 1 Application(s) Topical daily  influenza  Vaccine (HIGH DOSE) 0.5 milliLiter(s) IntraMuscular once  sodium bicarbonate 1300 milliGRAM(s) Oral three times a day    PAST MEDICAL & SURGICAL HISTORY:  Depression  Hypothyroid  Kidney disease  Cataract  History of rectal surgery  History of tonsillectomy    REVIEW OF SYSTEMS:  See HPI. All ROS negative unless otherwise noted    PHYSICAL EXAM:  T(C): 36.8 (09-18-24 @ 16:22), Max: 37.2 (09-18-24 @ 10:47)  HR: 85 (09-18-24 @ 16:22) (85 - 92)  BP: 104/65 (09-18-24 @ 16:22) (104/65 - 119/66)  RR: 18 (09-18-24 @ 16:22) (18 - 18)  SpO2: 94% (09-18-24 @ 16:22) (94% - 99%)  Wt(kg): --  I&O's Summary    17 Sep 2024 07:01  -  18 Sep 2024 07:00  --------------------------------------------------------  IN: 600 mL / OUT: 800 mL / NET: -200 mL    18 Sep 2024 07:01  -  18 Sep 2024 18:05  --------------------------------------------------------  IN: 480 mL / OUT: 1000 mL / NET: -520 mL    Appearance: Alert. NAD	  HEENT:   NC/AT	  Cardiovascular: +S1S2 RRR no m/g/r  Respiratory: CTA B/L	  Psychiatry: A & O x 3, Mood & affect appropriate  Gastrointestinal:  Soft, NT.ND., + BS	  Skin: No rashes	  Neurologic: Non-focal  Extremities: No edema BLE  Vascular: Peripheral pulses palpable 2+ bilaterally    LABS:	 	  CBC Full  -  ( 17 Sep 2024 08:31 )  WBC Count : 10.41 K/uL  Hemoglobin : 10.3 g/dL  Hematocrit : 33.2 %  Platelet Count - Automated : 259 K/uL  Mean Cell Volume : 100.9 fl  Mean Cell Hemoglobin : 31.3 pg  Mean Cell Hemoglobin Concentration : 31.0 gm/dL  Auto Neutrophil # : 5.73 K/uL  Auto Lymphocyte # : 1.15 K/uL  Auto Monocyte # : 1.35 K/uL  Auto Eosinophil # : 0.94 K/uL  Auto Basophil # : 0.10 K/uL  Auto Neutrophil % : 54.0 %  Auto Lymphocyte % : 11.0 %  Auto Monocyte % : 13.0 %  Auto Eosinophil % : 9.0 %  Auto Basophil % : 1.0 %    09-17    144  |  111[H]  |  35[H]  ----------------------------<  144[H]  4.8   |  19[L]  |  2.82[H]    Ca    8.8      17 Sep 2024 08:32  Mg     2.4     09-17    Echocardiogram:  TRANSTHORACIC ECHOCARDIOGRAM REPORT  ________________________________________________________________________________                                      _______       Pt. Name:       JOANNA MALDONADO Study Date:    9/18/2024  MRN:            [id number]        YOB: 1940  Accession #:    [id number]       Age:           84 years  Account#:       [id number]    Gender:        F  Heart Rate:                     Height:        65.00 in (165.10 cm)  Rhythm:                         Weight:        144.00 lb (65.32 kg)  Blood Pressure: 146/81 mmHg     BSA/BMI:       1.72 m² / 23.96 kg/m²  ________________________________________________________________________________________  Referring Physician:    [id number] Mina Rizo  Interpreting Physician: Lc Escobedo M.D.  Primary Sonographer:    Tami Storm [facility]  Fellow (Performing):    Lucas Matt  Fellow (Interpreting):  Lucas Matt    CPT:                ECHO TTE WITH CON COMP W DOPP - .m;DEFINITY ECHO              CONTRAST PER ML - .m;DEFINITY ECHO CONTRAST PER ML                      WASTED - .m  Indication(s):      Syncope and collapse - R55  Procedure:          Transthoracic echocardiogram with 2-D, M-mode and complete      spectral and color flow Doppler.  Ordering Location:  [facility]  Admission Status:   Inpatient  Contrast Injection: Verbal consent was obtained for injection of Ultrasonic                      Enhancing Agent following a discussion of risks and                benefits.                      Endocardial visualization enhanced with 2 ml of Definity                      Ultrasound enhancing agent (Lot#:[id number] Exp.Date:06/2025                      Discarded Dose:8ml).  UEA Reaction:       Patient had no adverse reaction after injection of                      Ultrasound Enhancing Agent.  Study Information:  Image quality for this study is technically difficult.    _______________________________________________________________________________________     CONCLUSIONS:      1. Left ventricular systolic function is severely decreased with an ejection fraction visually estimated at 30 to 35 %. Regional wall motion abnormalities present.   2. Multiple segmental abnormalities exist. See findings.   3. Normal right ventricular cavity size and normal right ventricular systolic function. Tricuspid annular plane systolic excursion (TAPSE) is 2.9 cm (normal >=1.7 cm). Tricuspid annular tissue Doppler S' is 17.0 cm/s (normal >10 cm/s).   4. No pericardial effusion seen.   5. Estimated pulmonary artery systolic pressure is 24 mmHg.   6. No prior echocardiogram is available for comparison.   7. Technically difficult image quality.   8. The inferior vena cava is normal in size (normal <2.1cm) withnormal inspiratory collapse (normal >50%) consistent with normal right atrial pressure (~3, range 0-5mmHg).    ________________________________________________________________________________________  FINDINGS:     Left Ventricle:  The left ventricular cavity is small. Left ventricular wall thickness is normal. Left ventricular systolic function is severely decreased with an ejection fraction visually estimated at 30 to 35%. There are regional wall motion abnormalities present. There is mild (grade 1) left ventricular diastolic dysfunction, with normal left ventricular filling pressure.  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 The entire inferior wall, mid and  apical inferior septum, mid inferolateral segment, and apical lateral segment  are akinetic. The entire anterior wall, basal and mid anterolateral wall, basal  and mid anterior septum, basal inferoseptal segment, and basal inferolateral  segment are hypokinetic.          Right Ventricle:  The right ventricular cavity is normal in size and right ventricular systolic function is normal. Tricuspid annular plane systolic excursion (TAPSE) is 2.9 cm (normal >=1.7 cm). Tricuspid annular tissue Doppler S' is 17.0 cm/s (normal >10 cm/s).     Left Atrium:  The left atrium is normal in size.     Right Atrium:  The right atrium is normal in size with an indexed area of 7.56 cm²/m².     Aortic Valve:  The aortic valve appears trileaflet with normal systolic excursion. There is mild aortic stenosis. There is mild aortic regurgitation.     Mitral Valve:  There is mitral valve thickening of the anterior and posterior leaflets. There is moderate posterior calcification of the mitral valve annulus. There is no evidence of mitral valve prolapse. There is mild mitral regurgitation.     Tricuspid Valve:  Structurally normal tricuspid valve with normal leaflet excursion. There is trace tricuspid regurgitation. Estimated pulmonary artery systolic pressure is 24 mmHg.     Pulmonic Valve:  The pulmonic valve was not well visualized. Thereis trace pulmonic regurgitation.     Pericardium:  No pericardial effusion seen.     Systemic Veins:  The inferior vena cava is normal in size (normal <2.1cm) with normal inspiratory collapse (normal >50%) consistent with normal right atrial pressure(~3, range 0-5mmHg).  ____________________________________________________________________  QUANTITATIVE DATA:  Left Ventricle Measurements: (Indexed to BSA)     IVSd (2D):   1.0 cm  LVPWd (2D):  0.8 cm  LVIDd (2D):  3.1 cm  LVIDs (2D):  2.5 cm  LV Mass:     71 g   41.3 g/m²  Visualized LV EF%: 30 to 35%     MV E Vmax:    1.78 m/s  e' lateral:   17.00 cm/s  e' medial:    17.00 cm/s  E/e' lateral: 10.47  E/e' medial:  10.47  E/e' Average: 10.47    Aorta Measurements: (Normal range) (Indexed to BSA)     Ao Root d 3.10 cm (2.7 - 3.3 cm) 1.80 cm/m²            Left Atrium Measurements: (Indexed to BSA)  LA Diam 2D: 3.30 cm         Right Ventricle Measurements:     TAPSE: 2.9 cm       LVOT / RVOT/ Qp/Qs Data: (Indexed to BSA)  LVOT Vmax:      1.09 m/s  LVOT Vmn:       0.790 m/s  LVOT VTI:       18.80 cm  LVOT peak grad: 5 mmHg  LVOT mean grad: 2.2 mmHg    Aortic Valve Measurements:  AV Vmax:                2.6 m/s  AV Peak Gradient:       26.8 mmHg  AV Mean Gradient:       15.0 mmHg  AV VTI:             44.6 cm  AV VTI Ratio:           0.42  AoV Dimensionless Index 0.42  AR Vmax                 3.85 m/s  AR PHT                  461 msec  AR [place]                2.45 m/s²    Mitral Valve Measurements:     MV E Vmax: 1.8 m/s       Tricuspid Valve Measurements:     TV S'             17.0 cm/s  TR Vmax:          2.3 m/s  TR Peak Gradient: 21.0 mmHg  RA Pressure:      3 mmHg  PASP:             24 mmHg    ________________________________________________________________________________________  Electronically signed on 9/18/2024 at 11:40:04 AM by Lc Escobedo M.D.         *** Final ***

## 2024-09-18 NOTE — PROGRESS NOTE ADULT - ASSESSMENT
A/P    Patient is an 84-year-old female with a history of anemia, hypothyroidism, depression, GERD, bipolar, CKD, presents for redness and swelling on her face for the past few days.     #Right facial swelling and redness, Leukocytosis   -improving  -CT Neck 9/11/24 shows Right facial cellulitis/myositis, without gross evidence of a drainable fluid collection.  -CXR 9/11/24 shows Clear lungs  -trend WBC; improving  -ENT and ID following  -sp I&D of abscess 9/16 - cx + MRSA  -abx per ID    #CKD  -Trend Creat  -nephrology f/u    #Hx of frequent falls  -pt describes random "zoning out/staring episodes" and then falling (has been occurring for about 2.5 years per pt)  -pt denies LOC during episodes; denies CP or SOB  -orthostatics negative  -MR Head 9/17/24 shows no intracranial mass, acute territorial infarct, acute intracranial hemorrhage, or midline shift.  -TTE 9/18/24 shows severely decreased LVSF with EF 30-35%; regional WMA; There is mild (grade 1) left ventricular diastolic dysfunction, with normal left ventricular filling pressure.   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 The entire inferior wall, mid and apical inferior septum, mid inferolateral segment, and apical lateral segment are akinetic. The entire anterior wall, basal and mid anterolateral wall, basal and mid anterior septum, basal inferoseptal segment, and basal inferolateral segment are hypokinetic.  -check tele  -neuro f/u  -EKG shows NSR with first degree AV block and LBBB, no acute ischemic changes  -consider ILR given multiple falls, known cmp  -Rec EP eval for hx of multiple falls vs syncope with LBBB, CMP, and first degree AV block     #Aortic Stenosis  -moderate AS on outpt TTE 5/24  -cont to monitor clinically with yearly TTE    #Cardiomyopathy, HFrEF  -outpatient TTE 5/24 shows LV systolic function is moderately reduced, moderate global hypokinesis; EF 40%; mild to moderate aortic regurgitation; moderate aortic stenosis  -no evidence of volume overload/HF  -no diuretic need  -per outpt visit, patient and son do not want to proceed with ischemic eval and do not want to start lv dysfxn medications as she remains asymptomatic    dvt ppx

## 2024-09-18 NOTE — PROGRESS NOTE ADULT - SUBJECTIVE AND OBJECTIVE BOX
Overnight events noted      VITAL:  T(C): , Max: 36.6 (09-18-24 @ 05:18)  T(F): , Max: 97.8 (09-18-24 @ 05:18)  HR: 89 (09-18-24 @ 05:18)  BP: 118/74 (09-18-24 @ 05:18)  BP(mean): --  RR: 18 (09-18-24 @ 05:18)  SpO2: 96% (09-18-24 @ 05:18)  Wt(kg): --      PHYSICAL EXAM:  Constitutional: NAD  HEENT: (+)R facial erythema  Neck:  No JVD  Respiratory: CTAB/L  Cardiovascular: S1 and S2  Gastrointestinal: BS+, soft, NT/ND  Extremities: No peripheral edema  Neurological: A/O x 3, no focal deficits  : No Arnold  Skin: +erythema on right mandible      LABS:                        10.3   10.41 )-----------( 259      ( 17 Sep 2024 08:31 )             33.2     Na(144)/K(4.8)/Cl(111)/HCO3(19)/BUN(35)/Cr(2.82)Glu(144)/Ca(8.8)/Mg(2.4)/PO4(--)    09-17 @ 08:32  Na(144)/K(5.0)/Cl(112)/HCO3(16)/BUN(45)/Cr(3.10)Glu(65)/Ca(9.0)/Mg(--)/PO4(--)    09-16 @ 06:48      IMPRESSION: 84F w/ dementia, bipolar disorder, and CKD4-5, 9/11/24 p/w R facial cellulitis    (1)Renal - nonproteinuric CKD4 with atrophic R kidney. Grossly stable numbers  (2)Metabolic acidosis - renally mediated - improving, on standing PO NaHCO3    (3)ID - R facial celluitis - on IV Unasyn  (4)Neuro - "aura" - Neuro on board - awaiting EEG       RECOMMEND:  (1)PO NaHCO3 as ordered  (2)Abx for GFR 15-20ml/min - present dosing is acceptable  (3)If for discharge, she can f/u at my office as previously scheduled on Wed 10/23/24 at 1040a              Ab Tripathi MD  [facility]  Office/on call physician: [phone]  Cell (7a-7p): [phone]

## 2024-09-18 NOTE — PROGRESS NOTE ADULT - ASSESSMENT
Patient is an 84-year-old female with a history of anemia, hypothyroidism, depression, GERD, bipolar, CKD who presents for redness and swelling on her face for the past few days. Patient reports right-sided facial swelling and redness including her neck and on her lower jaw that started on 9/8/24, worsening over the past few days. She reports  a dental exam for cracked teeth on day prior but no work performed. States someone told her she had 2 small pimple like lesion on her face, currently scabbed over.    R facial cellulitis with abscess with MRSA  Leukocytosis due to above   - initial CT neck soft tissue with R facial cellulitis/myositis, no e/o drainable fluid collection  - s/p dexamethasone x24h  - 9/16 noted with increased duration/swelling and expressible pus -- now s/p I&D by ENT   -- culture grew MRSA -- sensitivities reviewed   - MRI head with no acute findings   - OMFS eval appreciated   - COVID/Flu/RSV negative   - WBC normalized, remains afebrile, feels better     Recommendations:  ENT following, daily packing  changes and irrigation   Discontinued unasyn  Continue doxycycline 100mg Q12h until 9/25  Contact isolation per IC protocol  Continue rest of care per primary team       Ash Graham M.D.  [facility], Division of Infectious Diseases  [phone]  After 5pm on weekdays and all day on weekends - please call [phone]  Available on [employer] TEAMS

## 2024-09-18 NOTE — PROGRESS NOTE ADULT - SUBJECTIVE AND OBJECTIVE BOX
[facility] DIVISION OF INFECTIOUS DISEASES  LOUANN Dubois Y. Patel, S. Shah, G. Casimir  [phone]  ([phone] - weekdays after 5pm and weekends)    Name: JOANNA MALDONADO  Age/Gender: 84y Female  MRN: [id number]    Interval History:  Patient seen and examined this morning.   Feels better today, no new complaints.   Notes reviewed  No concerning overnight events  Afebrile   Allergies: azithromycin (Rash)      Objective:  Vitals:   T(F): 97.8 (09-18-24 @ 05:18), Max: 97.8 (09-18-24 @ 05:18)  HR: 89 (09-18-24 @ 05:18) (89 - 92)  BP: 118/74 (09-18-24 @ 05:18) (107/66 - 118/74)  RR: 18 (09-18-24 @ 05:18) (16 - 18)  SpO2: 96% (09-18-24 @ 05:18) (96% - 99%)  Physical Examination:  General: no acute distress  HEENT: anicteric, EOMI  Respiratory: no acc muscle use, breathing comfortably  Cardiovascular: S1 and S2 present  Gastrointestinal: normal appearing, nondistended  Extremities: no edema, no cyanosis  Skin: R submandibular dressing, incision noted-no active drainage, less erythema/swelling    Laboratory Studies:  CBC:                       10.3   10.41 )-----------( 259      ( 17 Sep 2024 08:31 )             33.2     WBC Trend:  10.41 09-17-24 @ 08:31  11.49 09-16-24 @ 06:48  11.90 09-15-24 @ 07:46  12.58 09-14-24 @ 07:16  10.94 09-13-24 @ 07:15  15.43 09-12-24 @ 07:34  22.22 09-11-24 @ 17:18    CMP: 09-17    144  |  111[H]  |  35[H]  ----------------------------<  144[H]  4.8   |  19[L]  |  2.82[H]    Ca    8.8      17 Sep 2024 08:32  Mg     2.4     09-17      Creatinine: 2.82 mg/dL (09-17-24 @ 08:32)  Creatinine: 3.10 mg/dL (09-16-24 @ 06:48)  Creatinine: 3.36 mg/dL (09-15-24 @ 07:36)  Creatinine: 3.15 mg/dL (09-14-24 @ 07:04)  Creatinine: 3.13 mg/dL (09-13-24 @ 07:08)  Creatinine: 2.97 mg/dL (09-12-24 @ 07:37)  Creatinine: 3.03 mg/dL (09-11-24 @ 17:18)    Microbiology: reviewed   Culture - Other (collected 09-16-24 @ 11:09)  Source: Drainage Drainage  Preliminary Report (09-17-24 @ 18:09):    Numerous Staphylococcus aureus  Organism: Methicillin resistant Staphylococcus aureus (09-18-24 @ 06:57)  Organism: Methicillin resistant Staphylococcus aureus (09-18-24 @ 06:57)      Method Type: EDYTA      -  Clindamycin: S <=0.25      -  Daptomycin: S 1      -  Erythromycin: R >4      -  Gentamicin: S <=1 Should not be used as monotherapy      -  Linezolid: S 4      -  Oxacillin: R >2      -  Penicillin: R >8      -  Rifampin: S <=1 Should not be used as monotherapy      -  Tetracycline: S <=1      -  Trimethoprim/Sulfamethoxazole: S <=0.5/9.5      -  Vancomycin: S 2    Culture - Blood (collected 09-11-24 @ 18:40)  Source: .Blood Blood-Peripheral  Final Report (09-17-24 @ 02:00):    No growth at 5 days    Culture - Blood (collected 09-11-24 @ 18:30)  Source: .Blood Blood-Peripheral  Final Report (09-17-24 @ 02:00):    No growth at 5 days    Influenza A Result: NotDete (17 Sep 2024 13:35)    Radiology: reviewed     Medications:  acetaminophen     Tablet .. 650 milliGRAM(s) Oral every 6 hours PRN  ampicillin/sulbactam  IVPB 3 Gram(s) IV Intermittent every 12 hours  buPROPion XL (24-Hour) . 150 milliGRAM(s) Oral daily  chlorhexidine 2% Cloths 1 Application(s) Topical daily  diphenhydrAMINE 25 milliGRAM(s) Oral once  divalproex  milliGRAM(s) Oral daily  divalproex  milliGRAM(s) Oral at bedtime  doxycycline IVPB 100 milliGRAM(s) IV Intermittent every 12 hours  heparin   Injectable 5000 Unit(s) SubCutaneous every 12 hours  influenza  Vaccine (HIGH DOSE) 0.5 milliLiter(s) IntraMuscular once  levothyroxine 88 MICROGram(s) Oral daily  LORazepam     Tablet 1 milliGRAM(s) Oral at bedtime  melatonin 3 milliGRAM(s) Oral at bedtime PRN  pantoprazole    Tablet 40 milliGRAM(s) Oral before breakfast  polyethylene glycol 3350 17 Gram(s) Oral daily PRN  senna 2 Tablet(s) Oral at bedtime PRN  sodium bicarbonate 1300 milliGRAM(s) Oral three times a day  venlafaxine 300 milliGRAM(s) Oral daily  venlafaxine 75 milliGRAM(s) Oral daily    Current Antimicrobials:  ampicillin/sulbactam  IVPB 3 Gram(s) IV Intermittent every 12 hours  doxycycline IVPB 100 milliGRAM(s) IV Intermittent every 12 hours    Prior/Completed Antimicrobials:  ampicillin/sulbactam  IVPB

## 2024-09-19 LAB
ALBUMIN SERPL ELPH-MCNC: 3.4 G/DL — SIGNIFICANT CHANGE UP (ref 3.3–5)
ALP SERPL-CCNC: 80 U/L — SIGNIFICANT CHANGE UP (ref 40–120)
ALT FLD-CCNC: 7 U/L — LOW (ref 10–45)
ANION GAP SERPL CALC-SCNC: 11 MMOL/L — SIGNIFICANT CHANGE UP (ref 5–17)
AST SERPL-CCNC: 8 U/L — LOW (ref 10–40)
BILIRUB SERPL-MCNC: 0.2 MG/DL — SIGNIFICANT CHANGE UP (ref 0.2–1.2)
BUN SERPL-MCNC: 32 MG/DL — HIGH (ref 7–23)
CALCIUM SERPL-MCNC: 8.8 MG/DL — SIGNIFICANT CHANGE UP (ref 8.4–10.5)
CHLORIDE SERPL-SCNC: 111 MMOL/L — HIGH (ref 96–108)
CO2 SERPL-SCNC: 20 MMOL/L — LOW (ref 22–31)
CREAT SERPL-MCNC: 3.01 MG/DL — HIGH (ref 0.5–1.3)
EGFR: 15 ML/MIN/1.73M2 — LOW
GLUCOSE SERPL-MCNC: 96 MG/DL — SIGNIFICANT CHANGE UP (ref 70–99)
HCT VFR BLD CALC: 32.3 % — LOW (ref 34.5–45)
HGB BLD-MCNC: 10.1 G/DL — LOW (ref 11.5–15.5)
MCHC RBC-ENTMCNC: 30.7 PG — SIGNIFICANT CHANGE UP (ref 27–34)
MCHC RBC-ENTMCNC: 31.3 GM/DL — LOW (ref 32–36)
MCV RBC AUTO: 98.2 FL — SIGNIFICANT CHANGE UP (ref 80–100)
NRBC # BLD: 0 /100 WBCS — SIGNIFICANT CHANGE UP (ref 0–0)
PLATELET # BLD AUTO: 275 K/UL — SIGNIFICANT CHANGE UP (ref 150–400)
POTASSIUM SERPL-MCNC: 4.5 MMOL/L — SIGNIFICANT CHANGE UP (ref 3.5–5.3)
POTASSIUM SERPL-SCNC: 4.5 MMOL/L — SIGNIFICANT CHANGE UP (ref 3.5–5.3)
PROT SERPL-MCNC: 6.3 G/DL — SIGNIFICANT CHANGE UP (ref 6–8.3)
RBC # BLD: 3.29 M/UL — LOW (ref 3.8–5.2)
RBC # FLD: 14.6 % — HIGH (ref 10.3–14.5)
SODIUM SERPL-SCNC: 142 MMOL/L — SIGNIFICANT CHANGE UP (ref 135–145)
WBC # BLD: 9.9 K/UL — SIGNIFICANT CHANGE UP (ref 3.8–10.5)
WBC # FLD AUTO: 9.9 K/UL — SIGNIFICANT CHANGE UP (ref 3.8–10.5)

## 2024-09-19 PROCEDURE — 99233 SBSQ HOSP IP/OBS HIGH 50: CPT | Mod: FS,24

## 2024-09-19 RX ORDER — MUPIROCIN 20 MG/G
1 OINTMENT TOPICAL
Refills: 0 | Status: COMPLETED | OUTPATIENT
Start: 2024-09-19 | End: 2024-09-24

## 2024-09-19 RX ADMIN — Medication 75 MILLIGRAM(S): at 12:38

## 2024-09-19 RX ADMIN — Medication 1300 MILLIGRAM(S): at 05:15

## 2024-09-19 RX ADMIN — Medication 1300 MILLIGRAM(S): at 21:56

## 2024-09-19 RX ADMIN — Medication 300 MILLIGRAM(S): at 12:37

## 2024-09-19 RX ADMIN — Medication 150 MILLIGRAM(S): at 12:38

## 2024-09-19 RX ADMIN — Medication 88 MICROGRAM(S): at 05:15

## 2024-09-19 RX ADMIN — Medication 100 MILLIGRAM(S): at 13:07

## 2024-09-19 RX ADMIN — Medication 500 MILLIGRAM(S): at 21:56

## 2024-09-19 RX ADMIN — CHLORHEXIDINE GLUCONATE ORAL RINSE 1 APPLICATION(S): 1.2 SOLUTION DENTAL at 13:07

## 2024-09-19 RX ADMIN — Medication 1300 MILLIGRAM(S): at 13:07

## 2024-09-19 RX ADMIN — Medication 5000 UNIT(S): at 19:07

## 2024-09-19 RX ADMIN — Medication 5000 UNIT(S): at 05:15

## 2024-09-19 RX ADMIN — PANTOPRAZOLE SODIUM 40 MILLIGRAM(S): 40 TABLET, DELAYED RELEASE ORAL at 05:15

## 2024-09-19 RX ADMIN — MUPIROCIN 1 APPLICATION(S): 20 OINTMENT TOPICAL at 19:07

## 2024-09-19 RX ADMIN — Medication 250 MILLIGRAM(S): at 12:37

## 2024-09-19 RX ADMIN — Medication 100 MILLIGRAM(S): at 02:54

## 2024-09-19 RX ADMIN — Medication 1 MILLIGRAM(S): at 21:56

## 2024-09-19 NOTE — PROGRESS NOTE ADULT - SUBJECTIVE AND OBJECTIVE BOX
SUBJECTIVE / OVERNIGHT EVENTS:    patient seen and examined  resting comfortably in bed  contact prec maintained  feeling much better this AM  excited her son is coming to visit  AM labs pending  --------------------------------------------------------------------------------------------  LABS:            CAPILLARY BLOOD GLUCOSE                RADIOLOGY & ADDITIONAL TESTS:    Imaging Personally Reviewed:  [x] YES  [ ] NO    Consultant(s) Notes Reviewed:  [x] YES  [ ] NO    MEDICATIONS  (STANDING):  buPROPion XL (24-Hour) . 150 milliGRAM(s) Oral daily  chlorhexidine 2% Cloths 1 Application(s) Topical daily  diphenhydrAMINE 25 milliGRAM(s) Oral once  divalproex  milliGRAM(s) Oral daily  divalproex  milliGRAM(s) Oral at bedtime  doxycycline IVPB 100 milliGRAM(s) IV Intermittent every 12 hours  heparin   Injectable 5000 Unit(s) SubCutaneous every 12 hours  influenza  Vaccine (HIGH DOSE) 0.5 milliLiter(s) IntraMuscular once  levothyroxine 88 MICROGram(s) Oral daily  LORazepam     Tablet 1 milliGRAM(s) Oral at bedtime  mupirocin 2% Nasal 1 Application(s) Both Nostrils two times a day  pantoprazole    Tablet 40 milliGRAM(s) Oral before breakfast  sodium bicarbonate 1300 milliGRAM(s) Oral three times a day  venlafaxine 75 milliGRAM(s) Oral daily  venlafaxine 300 milliGRAM(s) Oral daily    MEDICATIONS  (PRN):  acetaminophen     Tablet .. 650 milliGRAM(s) Oral every 6 hours PRN Temp greater or equal to 38C (100.4F), Mild Pain (1 - 3)  melatonin 3 milliGRAM(s) Oral at bedtime PRN Insomnia  polyethylene glycol 3350 17 Gram(s) Oral daily PRN Constipation  senna 2 Tablet(s) Oral at bedtime PRN Constipation      Care Discussed with Consultants/Other Providers [x] YES  [ ] NO    Vital Signs Last 24 Hrs  T(C): 36.7 (19 Sep 2024 04:36), Max: 37.2 (18 Sep 2024 10:47)  T(F): 98.1 (19 Sep 2024 04:36), Max: 98.9 (18 Sep 2024 10:47)  HR: 82 (19 Sep 2024 04:36) (81 - 88)  BP: 122/70 (19 Sep 2024 04:36) (104/65 - 125/70)  BP(mean): --  RR: 18 (19 Sep 2024 04:36) (18 - 19)  SpO2: 95% (19 Sep 2024 04:36) (94% - 98%)    Parameters below as of 19 Sep 2024 04:36  Patient On (Oxygen Delivery Method): room air      I&O's Summary    18 Sep 2024 07:01  -  19 Sep 2024 07:00  --------------------------------------------------------  IN: 1080 mL / OUT: 1450 mL / NET: -370 mL    PHYSICAL EXAM:  GENERAL: NAD, well-developed, comfortable  HEENT: right sided facial swelling and neck erythema (improving); dsg c/d/i; TTP  CHEST/LUNG: Clear to auscultation bilaterally; No wheeze  HEART: Regular rate and rhythm; No murmurs, rubs, or gallops  ABDOMEN: Soft, Nontender, Nondistended; Bowel sounds present  NEURO: AAOx3, no focal weakness, 5/5 b/l extremity strength, b/l knee no arthritis, no effusion   EXTREMITIES:  2+ Peripheral Pulses, No clubbing, cyanosis, or edema  SKIN: as above

## 2024-09-19 NOTE — PROGRESS NOTE ADULT - ASSESSMENT
84-year-old female with   anemia, hypothyroidism, depression, GERD, bipolar, CKD , presents for redness and swelling on her face for the past few days.  neuro called for episodes of AMS followed by falls and syncope.    CTH 7/31/24 neg   CT c spine neg   o/e AAOx3, IGLESIAS uppers> lowers. uses walker   orthostatics neg   MRI brain neg for acute findings.  chronic R cerebellar strtoke   EEG neg for seiuzres   + MRSA R lower face   TTE 9/18 reveiwed     Imprssion:   1) episodes of "spacing out" /AMS followed by occaional fall/syncope of unclear etiology.  no tongue bite, convulsions, incontienence or post ictal confusion lasts < 1 min ; low suspicion for seizure more likely from underlyikng psych condition   2) chronic appearing R cerebllar small vessel storke     - should be on asa and statin therapy for chornic appearing stroke if no objection   -  spoke with daughter 9/16 states psych meds have been for years r/o seizure   - limit sedating meds --> on significant psych meds, consider psych f/u.  venlafaxine , lorazapam 1mg QHS, VPA  AM 500mg QHS and wellbutrin 150mg daily (can lower seizure threshold)   -  possibe event monitor ; EP called for ILR   - non urgent vessel imaging, can do carotid duplex   - unasyn for cellulitis --> changed to doxycycline.  MRSA, now needs isolation ; doxy until 9/25   - eventually EMILIO    - telemetry  - PT/OT/SS/SLP, OOBC  - check FS, glucose control <180  - GI/DVT ppx  - Thank you for allowing me to participate in the care of this patient. Call with questions.      Fabio Fox MD  Vascular Neurology  Office: [phone]

## 2024-09-19 NOTE — PROGRESS NOTE ADULT - SUBJECTIVE AND OBJECTIVE BOX
Neurology      S: patient seen. no significant neuro chagnes ; eeg neg ; on contact     Medications: MEDICATIONS  (STANDING):  buPROPion XL (24-Hour) . 150 milliGRAM(s) Oral daily  chlorhexidine 2% Cloths 1 Application(s) Topical daily  diphenhydrAMINE 25 milliGRAM(s) Oral once  divalproex  milliGRAM(s) Oral daily  divalproex  milliGRAM(s) Oral at bedtime  doxycycline IVPB 100 milliGRAM(s) IV Intermittent every 12 hours  heparin   Injectable 5000 Unit(s) SubCutaneous every 12 hours  influenza  Vaccine (HIGH DOSE) 0.5 milliLiter(s) IntraMuscular once  levothyroxine 88 MICROGram(s) Oral daily  LORazepam     Tablet 1 milliGRAM(s) Oral at bedtime  mupirocin 2% Nasal 1 Application(s) Both Nostrils two times a day  pantoprazole    Tablet 40 milliGRAM(s) Oral before breakfast  sodium bicarbonate 1300 milliGRAM(s) Oral three times a day  venlafaxine 300 milliGRAM(s) Oral daily  venlafaxine 75 milliGRAM(s) Oral daily    MEDICATIONS  (PRN):  acetaminophen     Tablet .. 650 milliGRAM(s) Oral every 6 hours PRN Temp greater or equal to 38C (100.4F), Mild Pain (1 - 3)  melatonin 3 milliGRAM(s) Oral at bedtime PRN Insomnia  polyethylene glycol 3350 17 Gram(s) Oral daily PRN Constipation  senna 2 Tablet(s) Oral at bedtime PRN Constipation       Vitals:  Vital Signs Last 24 Hrs  T(C): 36.7 (19 Sep 2024 04:36), Max: 36.9 (19 Sep 2024 00:25)  T(F): 98.1 (19 Sep 2024 04:36), Max: 98.4 (19 Sep 2024 00:25)  HR: 82 (19 Sep 2024 04:36) (81 - 88)  BP: 122/70 (19 Sep 2024 04:36) (104/65 - 125/70)  BP(mean): --  RR: 18 (19 Sep 2024 04:36) (18 - 19)  SpO2: 95% (19 Sep 2024 04:36) (94% - 98%)    Parameters below as of 19 Sep 2024 04:36  Patient On (Oxygen Delivery Method): room air                 Orthostatic VS  09-15-24 @ 04:24  Lying BP: 115/63 HR: 84  Sitting BP: 124/73 HR: 84  Standing BP: 130/75 HR: 89  Site: upper left arm  Mode: --  Orthostatic VS  09-14-24 @ 21:14  Lying BP: 109/69 HR: 79  Sitting BP: 105/66 HR: 85  Standing BP: 139/63 HR: 88  Site: upper right arm  Mode: electronic        General Exam:   General Appearance: Appropriately dressed and in no acute distress       Head: Normocephalic, atraumatic and no dysmorphic features  Ear, Nose, and Throat: Moist mucous membranes  CVS: S1S2+  Resp: No SOB, no wheeze or rhonchi  GI: soft NT/ND  Extremities: No edema or cyanosis  Skin:  redness on face      Neurological Exam:  Mental Status: Awake, alert and oriented x 3.  Able to follow simple and complex verbal commands. Able to name and repeat. fluent speech. No obvious aphasia or dysarthria noted.   Cranial Nerves: PERRL, EOMI, VFFC, sensation V1-V3 intact,  no obvious facial asymmetry, equal elevation of palate, scm/trap 5/5, tongue is midline on protrusion. no obvious papilledema on fundoscopic exam. hearing is grossly intact.   Motor: Normal bulk, tone and strength throughout uppers ; lowers 3-4/5   Sensation: Intact to light touch and pinprick throughout. no right/left confusion. no extinction to tactile on DSS.   Coordination: No dysmetria on FNF or HKS  Gait: walker /wheelchair     Data/Labs/Imaging which I personally reviewed.           LABS:    no new labs         < from: MR Head No Cont (09.17.24 @ 07:15) >    ACC: [id number] EXAM:  MR BRAIN   ORDERED BY: RETA KLINE     PROCEDURE DATE:  09/17/2024          INTERPRETATION:  CLINICAL INDICATION: "spacing out"/AMS    TECHNIQUE: Multi-planar, multi-squence noncontrast brain MRI was   performed.    COMPARISON: 07/31/2024    FINDINGS:    VENTRICLES AND SULCI:  Normal.  INTRA-AXIAL:  No midline shift, acute intracranial hemorrhage or evidence   of acute cerebral ischemia. Chronic right cerebellar infarct. There are   patchy and confluent foci of hyperintense T2 signal within the   subcortical and periventricular white matter which are nonspecific but   likely related to chronic microvascular ischemic disease.  EXTRA-AXIAL:  No mass or collection.  VISUALIZED SINUSES: Mild mucosal thickening.  VISUALIZED MASTOIDS:  Clear.  CALVARIUM:  Normal.  CAROTID FLOW VOIDS: Normal.  MISCELLANEOUS:  None.    IMPRESSION:    No evidence for intracranial mass, acute territorial infarct, acute   intracranial hemorrhage, or midline shift.    --- End of Report ---            NAVIN HINDS MD; Attending Radiologist  This document has been electronically signed. Sep 17 2024  7:21AM    < end of copied text >      < from: CT Head No Cont (07.31.24 @ 15:06) >    ACC: [id number] EXAM:  CT CERVICAL SPINE   ORDERED BY: CHANTEL GERBER     ACC: [id number] EXAM:  CT BRAIN   ORDERED BY: RAHEEL LARSON     PROCEDURE DATE:  07/31/2024          INTERPRETATION:  CLINICAL INFORMATION: fall, slipped out of wheelchair at   midnight/possibly fell asleep while watching TV, R arm/L hand skin tears   - dressed by dtr, denies neck/body pain, no AC use    COMPARISON: Head CT 6/22/2024    CONTRAST:  IV Contrast: NONE  Complications: None reported at time of study completion    TECHNIQUE:    CT BRAIN: Serial axial images were obtained from the skull base to the   vertex using multi-slice helical technique. Sagittal and coronal   reformats were obtained.    CT CERVICAL SPINE: Axial images were obtained of the cervical spineusing   multislice helical technique. Reformatted coronal and sagittal images   were obtained.    FINDINGS:    CT BRAIN:    VENTRICLES AND SULCI: Age appropriate involutional changes.  INTRA-AXIAL: No mass effect, acute hemorrhage, or midline shift.There   are periventricular and subcortical white matter hypodensities,   consistent with microvascular type changes.  EXTRA-AXIAL: No mass or fluid collection. Basal cisterns are normal in   appearance.    VISUALIZED SINUSES:  Clear.  TYMPANOMASTOIDCAVITIES:  Clear.  VISUALIZED ORBITS: Bilateral lens replacement.  CALVARIUM: Intact.    MISCELLANEOUS: None.      CT CERVICAL SPINE:    VERTEBRAE:  Normal in height. No acute fracture. Multilevel degenerative   changes including marginal osteophytes.  ALIGNMENT: No subluxation or scoliosis.  INTERVERTEBRAL DISC SPACES: Multilevel degenerative disc osteophyte   complexes. Multilevel bilateral moderate to severe neural foraminal   stenoses.    VISUALIZED LUNGS: Bilateral chronic appearing interstitial prominence.    MISCELLANEOUS:  None.      IMPRESSION:    CT HEAD:  No evidence of acute intracranial pathology.    CT CERVICAL SPINE:  No acute fracture or traumatic subluxation.    Multi-level degenerative changes.        --- End of Report ---            STEVE ALLEN MD; Attending Radiologist  This document has been electronically signed. Jul 31 2024  3:18PM    < end of copied text >

## 2024-09-19 NOTE — PROGRESS NOTE ADULT - SUBJECTIVE AND OBJECTIVE BOX
Overnight events noted      VITAL:  T(C): , Max: 37.2 (09-18-24 @ 10:47)  T(F): , Max: 98.9 (09-18-24 @ 10:47)  HR: 82 (09-19-24 @ 04:36)  BP: 122/70 (09-19-24 @ 04:36)  BP(mean): --  RR: 18 (09-19-24 @ 04:36)  SpO2: 95% (09-19-24 @ 04:36)  Wt(kg): --      PHYSICAL EXAM:  Constitutional: NAD  HEENT: (+)R facial erythema  Neck:  No JVD  Respiratory: CTAB/L  Cardiovascular: S1 and S2  Gastrointestinal: BS+, soft, NT/ND  Extremities: No peripheral edema  Neurological: A/O x 3, no focal deficits  : No Arnold  Skin: +erythema on right mandible    LABS:                        10.3   10.41 )-----------( 259      ( 17 Sep 2024 08:31 )             33.2     Na(144)/K(4.8)/Cl(111)/HCO3(19)/BUN(35)/Cr(2.82)Glu(144)/Ca(8.8)/Mg(2.4)/PO4(--)    09-17 @ 08:32    < from: TTE W or WO Ultrasound Enhancing Agent (09.18.24 @ 09:18) >   1. Left ventricular systolic function is severely decreased with an ejection fraction visually estimated at 30 to 35 %. Regional wall motion abnormalities present.   2. Multiple segmental abnormalities exist. See findings.   3. Normal right ventricular cavity size and normal right ventricular systolic function. Tricuspid annular plane systolic excursion (TAPSE) is 2.9 cm (normal >=1.7 cm). Tricuspid annular tissue Doppler S' is 17.0 cm/s (normal >10 cm/s).   4. No pericardial effusion seen.   5. Estimated pulmonary artery systolic pressure is 24 mmHg.   6. No prior echocardiogram is available for comparison.   7. Technically difficult image quality.   8. The inferior vena cava is normal in size (normal <2.1cm) withnormal inspiratory collapse (normal >50%) consistent with normal right atrial pressure (~3, range 0-5mmHg).        IMPRESSION: 84F w/ dementia, bipolar disorder, HFrEF, and CKD4-5, 9/11/24 p/w R facial cellulitis    (1)Renal - nonproteinuric CKD4 with atrophic R kidney. Grossly stable numbers of late  (2)Metabolic acidosis - renally mediated - improved as of earlier this week, on standing PO NaHCO3    (3)ID - R facial celluitis - s/p IV Unasyn  (4)Neuro - "aura" - EEG negative for seizures       RECOMMEND:  (1)PO NaHCO3 as ordered  (2)Abx for GFR 15-20ml/min - present dosing is acceptable  (3)If for discharge, she can f/u at my office as previously scheduled on Wed 10/23/24 at 1040a              Ab Tripathi MD  [facility]  Office/on call physician: [phone]  Cell (7a-7p): [phone]       No pain, no sob      VITAL:  T(C): , Max: 37.2 (09-18-24 @ 10:47)  T(F): , Max: 98.9 (09-18-24 @ 10:47)  HR: 82 (09-19-24 @ 04:36)  BP: 122/70 (09-19-24 @ 04:36)  BP(mean): --  RR: 18 (09-19-24 @ 04:36)  SpO2: 95% (09-19-24 @ 04:36)  Wt(kg): --      PHYSICAL EXAM:  Constitutional: NAD  HEENT: (+)R facial wound-dressed, surrounding erythema improving (reduced in size from 1-2 days ago)  Neck:  No JVD  Respiratory: CTAB/L  Cardiovascular: S1 and S2  Gastrointestinal: BS+, soft, NT/ND  Extremities: No peripheral edema  Neurological: A/O x 3, no focal deficits  : No Arnold  Skin: reduced area of R facial erythema      LABS:                        10.3   10.41 )-----------( 259      ( 17 Sep 2024 08:31 )             33.2     Na(144)/K(4.8)/Cl(111)/HCO3(19)/BUN(35)/Cr(2.82)Glu(144)/Ca(8.8)/Mg(2.4)/PO4(--)    09-17 @ 08:32    < from: TTE W or WO Ultrasound Enhancing Agent (09.18.24 @ 09:18) >   1. Left ventricular systolic function is severely decreased with an ejection fraction visually estimated at 30 to 35 %. Regional wall motion abnormalities present.   2. Multiple segmental abnormalities exist. See findings.   3. Normal right ventricular cavity size and normal right ventricular systolic function. Tricuspid annular plane systolic excursion (TAPSE) is 2.9 cm (normal >=1.7 cm). Tricuspid annular tissue Doppler S' is 17.0 cm/s (normal >10 cm/s).   4. No pericardial effusion seen.   5. Estimated pulmonary artery systolic pressure is 24 mmHg.   6. No prior echocardiogram is available for comparison.   7. Technically difficult image quality.   8. The inferior vena cava is normal in size (normal <2.1cm) withnormal inspiratory collapse (normal >50%) consistent with normal right atrial pressure (~3, range 0-5mmHg).        IMPRESSION: 84F w/ dementia, bipolar disorder, HFrEF, and CKD4-5, 9/11/24 p/w R facial cellulitis    (1)Renal - nonproteinuric CKD4 with atrophic R kidney. Grossly stable numbers of late  (2)Metabolic acidosis - renally mediated - improved as of earlier this week, on standing PO NaHCO3    (3)ID - R facial celluitis - s/p IV Unasyn  (4)Neuro - "aura" - EEG negative for seizures       RECOMMEND:  (1)PO NaHCO3 as ordered  (2)Abx for GFR 15-20ml/min - present dosing is acceptable  (3)If for discharge, she can f/u at my office as previously scheduled on Wed 10/23/24 at 1040a              Ab Tripathi MD  [facility]  Office/on call physician: [phone]  Cell (7a-7p): [phone]       No pain, no sob      VITAL:  T(C): , Max: 37.2 (09-18-24 @ 10:47)  T(F): , Max: 98.9 (09-18-24 @ 10:47)  HR: 82 (09-19-24 @ 04:36)  BP: 122/70 (09-19-24 @ 04:36)  BP(mean): --  RR: 18 (09-19-24 @ 04:36)  SpO2: 95% (09-19-24 @ 04:36)  Wt(kg): --      PHYSICAL EXAM:  Constitutional: NAD  HEENT: (+)R facial wound-dressed, surrounding erythema improving (reduced in size from 1-2 days ago)  Neck:  No JVD  Respiratory: CTAB/L  Cardiovascular: S1 and S2  Gastrointestinal: BS+, soft, NT/ND  Extremities: No peripheral edema  Neurological: A/O x 3, no focal deficits  : No Arnold  Skin: reduced area of R facial erythema      LABS:                        10.3   10.41 )-----------( 259      ( 17 Sep 2024 08:31 )             33.2     Na(144)/K(4.8)/Cl(111)/HCO3(19)/BUN(35)/Cr(2.82)Glu(144)/Ca(8.8)/Mg(2.4)/PO4(--)    09-17 @ 08:32    < from: TTE W or WO Ultrasound Enhancing Agent (09.18.24 @ 09:18) >   1. Left ventricular systolic function is severely decreased with an ejection fraction visually estimated at 30 to 35 %. Regional wall motion abnormalities present.   2. Multiple segmental abnormalities exist. See findings.   3. Normal right ventricular cavity size and normal right ventricular systolic function. Tricuspid annular plane systolic excursion (TAPSE) is 2.9 cm (normal >=1.7 cm). Tricuspid annular tissue Doppler S' is 17.0 cm/s (normal >10 cm/s).   4. No pericardial effusion seen.   5. Estimated pulmonary artery systolic pressure is 24 mmHg.   6. No prior echocardiogram is available for comparison.   7. Technically difficult image quality.   8. The inferior vena cava is normal in size (normal <2.1cm) withnormal inspiratory collapse (normal >50%) consistent with normal right atrial pressure (~3, range 0-5mmHg).        IMPRESSION: 84F w/ dementia, bipolar disorder, HFrEF, and CKD4-5, 9/11/24 p/w R facial cellulitis    (1)Renal - nonproteinuric CKD4 with atrophic R kidney. Grossly stable numbers of late  (2)Metabolic acidosis - renally mediated - improved as of earlier this week, on standing PO NaHCO3    (3)ID - R facial celluitis - improving; s/p IV Unasyn and now on IV Doxycycline - ENT + ID on board  (4)Neuro - "aura" - EEG negative for seizures       RECOMMEND:  (1)PO NaHCO3 as ordered  (2)Abx for GFR 15-20ml/min - present dosing is acceptable  (3)If for discharge, she can f/u at my office as previously scheduled on Wed 10/23/24 at 1040a              Ab Tripathi MD  [facility]  Office/on call physician: [phone]  Cell (7a-7p): [phone]

## 2024-09-19 NOTE — PROGRESS NOTE ADULT - ASSESSMENT
83 y/o female with PMH of anemia, hypothyroidism, depressin, GERD, bipolar disorder, CKD, who is admitted for right face cellulitis and getting further workup for frequent falls. According to the patient, she has had multiple instances and some were witnessed by her friends and family where she is seemingly doing well, but then she would experience an "aura" before she starts to "zone out" and stare blankly prior to falling to the ground. The patient does not think that she loses consciousness during these episodes. Follows with Dr. Ackerman as an outpatient where she reportedly had a negative 1 week cardiac event monitor. MRI of the head was negative for acute findings. A TTE done on 9/18/24 showed a severely decreased LV EF of 30-35% and regional WMAs. ECG also showed a LBBB and 1st degree AV block. LBBB not present on ECG back in July 2022. Patient not on telemetry. Unclear if there are any arrhythmia events while in hospital.    TTE 9/18/24 shows severely decreased LVSF with EF 30-35%; regional WMA; There is mild (grade 1) left ventricular diastolic dysfunction, with normal left ventricular filling pressure.   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 The entire inferior wall, mid and apical inferior septum, mid inferolateral segment, and apical lateral segment are akinetic. The entire anterior wall, basal and mid anterolateral wall, basal and mid anterior septum, basal inferoseptal segment, and basal inferolateral segment are hypokinetic.    Multiple falls, ?syncope of unclear etiology  Facial cellulitis  Newly reduced EF, CM    Recommendations:  - EKG shows NSR with first degree AV block and LBBB  - MRI negative  - Neuro workup negative thus far  - Given newly reduced EF compared to echo in May and multiple WMAs, with LBBB on ECG, recommend ischemic eval. Per documentation in chart, patient and son has refused this in the past. However, now may be amenable for workup  - She may benefit from CRT pacing in the future, but has not been on adequate GDMT plus in the presence of an active infection, not a candidate for transvenous device at this time  - Continue telemetry monitoring  - Can consider ILR implant with patient and family are agreeable    Plan discussed with Dr. Das   83 y/o female with PMH of anemia, hypothyroidism, depressin, GERD, bipolar disorder, CKD, who is admitted for right face cellulitis and getting further workup for frequent falls. According to the patient, she has had multiple instances and some were witnessed by her friends and family where she is seemingly doing well, but then she would experience an "aura" before she starts to "zone out" and stare blankly prior to falling to the ground. The patient does not think that she loses consciousness during these episodes. Follows with Dr. Ackerman as an outpatient where she reportedly had a negative 1 week cardiac event monitor. MRI of the head was negative for acute findings. A TTE done on 9/18/24 showed a severely decreased LV EF of 30-35% and regional WMAs. ECG also showed a LBBB and 1st degree AV block. LBBB not present on ECG back in July 2022. Patient not on telemetry. Unclear if there are any arrhythmia events while in hospital.    TTE 9/18/24 shows severely decreased LVSF with EF 30-35%; regional WMA; There is mild (grade 1) left ventricular diastolic dysfunction, with normal left ventricular filling pressure.   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 The entire inferior wall, mid and apical inferior septum, mid inferolateral segment, and apical lateral segment are akinetic. The entire anterior wall, basal and mid anterolateral wall, basal and mid anterior septum, basal inferoseptal segment, and basal inferolateral segment are hypokinetic.    Multiple falls, ?syncope of unclear etiology  Facial cellulitis  Newly reduced EF, CM    Recommendations:  - EKG shows NSR with first degree AV block and LBBB  - MRI negative  - Neuro workup negative thus far  - Given newly reduced EF compared to echo in May and multiple WMAs, with LBBB on ECG, recommend ischemic eval. Per documentation in chart, patient and son has refused this in the past. However, now may be amenable for workup  - She may benefit from CRT pacing in the future, but has not been on adequate GDMT plus in the presence of an active infection, not a candidate for transvenous device at this time  - Continue telemetry monitoring  - Can consider ILR implant with patient and family are agreeable - still deciding    Plan discussed with Dr. Das

## 2024-09-19 NOTE — PROGRESS NOTE ADULT - ASSESSMENT
84 year old, A&Ox4 Female, with PMHx of HTN, CKD, hypothyroid, depression, and OA, presented to ED for 4 days of worsening right facial/neck swelling and pain, CT neck without contrast revealed right facial cellulitis/myositis, without gross evidence of a drainable fluid collection. ENT consulted for further evaluation. Pt was being treated with IV abx for cellulitis. Pt states symptoms initially improved however ENT called back on 9/16 because pus was draining from the wound with no improvement of pain and swelling as per pt. Pt underwent bedside I&D with iodoform packing, POD 3. Pt reports improvement of pain, swelling, and trismus. She is able to tolerate a soft diet. WBC trending down. Prelim culture shows MRSA. On exam, dressing removed, small amount of pus expressed from wound, iodoform packing placed.    84 year old, A&Ox4 Female, with PMHx of HTN, CKD, hypothyroid, depression, and OA, presented to ED for 4 days of worsening right facial/neck swelling and pain, CT neck without contrast revealed right facial cellulitis/myositis, without gross evidence of a drainable fluid collection. ENT consulted for further evaluation. Pt was being treated with IV abx for cellulitis. Pt states symptoms initially improved however ENT called back on 9/16 because pus was draining from the wound with no improvement of pain and swelling as per pt. Pt underwent bedside I&D with iodoform packing, POD 3. Pt reports improvement of pain, swelling, and trismus. She is able to tolerate a soft diet. WBC trending down. Prelim culture shows MRSA. On exam, dressing removed, small amount of pus expressed from wound, iodoform packing placed. Left facial swelling, erythema decreased. WBC 10.41

## 2024-09-19 NOTE — PROGRESS NOTE ADULT - SUBJECTIVE AND OBJECTIVE BOX
[facility]  LOUANN Dubois Y. Patel, S. Shah, G. Casimir  [phone]  ([phone] - weekdays after 5pm and weekends)    Name: JOANNA MALDONADO  Age/Gender: 84y Female  MRN: [id number]    Interval History:  Patient seen and examined this morning.   Feels swelling improving, mild pain.   No new complaints noted.  Notes reviewed  No concerning overnight events  Afebrile   Allergies: azithromycin (Rash)      Objective:  Vitals:   T(F): 98.1 (09-19-24 @ 04:36), Max: 98.9 (09-18-24 @ 10:47)  HR: 82 (09-19-24 @ 04:36) (81 - 88)  BP: 122/70 (09-19-24 @ 04:36) (104/65 - 125/70)  RR: 18 (09-19-24 @ 04:36) (18 - 19)  SpO2: 95% (09-19-24 @ 04:36) (94% - 98%)  Physical Examination:  General: no acute distress  HEENT: anicteric, EOMI  Respiratory: no acc muscle use, breathing comfortably  Cardiovascular: S1 and S2 present  Gastrointestinal: normal appearing, nondistended  Extremities: no edema, no cyanosis  Skin: R lower face dressing, less induration and erythema    Laboratory Studies:  CBC:   WBC Trend:  10.41 09-17-24 @ 08:31  11.49 09-16-24 @ 06:48  11.90 09-15-24 @ 07:46  12.58 09-14-24 @ 07:16  10.94 09-13-24 @ 07:15    CMP:   Creatinine: 2.82 mg/dL (09-17-24 @ 08:32)  Creatinine: 3.10 mg/dL (09-16-24 @ 06:48)  Creatinine: 3.36 mg/dL (09-15-24 @ 07:36)  Creatinine: 3.15 mg/dL (09-14-24 @ 07:04)  Creatinine: 3.13 mg/dL (09-13-24 @ 07:08)    Microbiology: reviewed   Culture - Other (collected 09-16-24 @ 11:09)  Source: Drainage Drainage  Preliminary Report (09-18-24 @ 17:30):    Numerous Methicillin Resistant Staphylococcus aureus  Organism: Methicillin resistant Staphylococcus aureus (09-18-24 @ 06:57)  Organism: Methicillin resistant Staphylococcus aureus (09-18-24 @ 06:57)      -  Clindamycin: S <=0.25      -  Oxacillin: R >2      -  Gentamicin: S <=1 Should not be used as monotherapy      -  Daptomycin: S 1      -  Linezolid: S 4      -  Vancomycin: S 2      -  Tetracycline: S <=1      Method Type: EDYTA      -  Penicillin: R >8      -  Rifampin: S <=1 Should not be used as monotherapy      -  Erythromycin: R >4      -  Trimethoprim/Sulfamethoxazole: S <=0.5/9.5    Culture - Blood (collected 09-11-24 @ 18:40)  Source: .Blood Blood-Peripheral  Final Report (09-17-24 @ 02:00):    No growth at 5 days    Culture - Blood (collected 09-11-24 @ 18:30)  Source: .Blood Blood-Peripheral  Final Report (09-17-24 @ 02:00):    No growth at 5 days    SARS-CoV-2 Result: NotDete (17 Sep 2024 13:35)    Radiology: reviewed     Medications:  acetaminophen     Tablet .. 650 milliGRAM(s) Oral every 6 hours PRN  buPROPion XL (24-Hour) . 150 milliGRAM(s) Oral daily  chlorhexidine 2% Cloths 1 Application(s) Topical daily  diphenhydrAMINE 25 milliGRAM(s) Oral once  divalproex  milliGRAM(s) Oral daily  divalproex  milliGRAM(s) Oral at bedtime  doxycycline IVPB 100 milliGRAM(s) IV Intermittent every 12 hours  heparin   Injectable 5000 Unit(s) SubCutaneous every 12 hours  influenza  Vaccine (HIGH DOSE) 0.5 milliLiter(s) IntraMuscular once  levothyroxine 88 MICROGram(s) Oral daily  LORazepam     Tablet 1 milliGRAM(s) Oral at bedtime  melatonin 3 milliGRAM(s) Oral at bedtime PRN  mupirocin 2% Nasal 1 Application(s) Both Nostrils two times a day  pantoprazole    Tablet 40 milliGRAM(s) Oral before breakfast  polyethylene glycol 3350 17 Gram(s) Oral daily PRN  senna 2 Tablet(s) Oral at bedtime PRN  sodium bicarbonate 1300 milliGRAM(s) Oral three times a day  venlafaxine 300 milliGRAM(s) Oral daily  venlafaxine 75 milliGRAM(s) Oral daily    Current Antimicrobials:  doxycycline IVPB 100 milliGRAM(s) IV Intermittent every 12 hours    Prior/Completed Antimicrobials:  ampicillin/sulbactam  IVPB

## 2024-09-19 NOTE — PROGRESS NOTE ADULT - SUBJECTIVE AND OBJECTIVE BOX
ENT ISSUE/POD:  right facial abscess, s/p I&D POD 3        HPI: 84 year old, A&Ox4 Female, with PMHx of HTN, CKD, hypothyroid, depression, and OA, presented to ED for 4 days of worsening right facial/neck swelling and pain, CT neck without contrast revealed right facial cellulitis/myositis, without gross evidence of a drainable fluid collection. ENT consulted for further evaluation. Pt was being treated with IV abx for cellulitis. Pt states symptoms initially improved however ENT called back on 9/16 because pus was draining from the wound with no improvement of pain and swelling as per pt. Pt underwent bedside I&D with iodoform packing, POD 3. Pt reports improvement of pain, swelling, and trismus. She is able to tolerate a soft diet.  Pt denies SOB, fevers, cough, dysphagia. Prelim culture shows MRSA.          PAST MEDICAL & SURGICAL HISTORY:  Depression      Hypothyroid      Kidney disease      Cataract      History of rectal surgery      History of tonsillectomy        Allergies    azithromycin (Rash)    Intolerances      MEDICATIONS  (STANDING):  buPROPion XL (24-Hour) . 150 milliGRAM(s) Oral daily  chlorhexidine 2% Cloths 1 Application(s) Topical daily  diphenhydrAMINE 25 milliGRAM(s) Oral once  divalproex  milliGRAM(s) Oral daily  divalproex  milliGRAM(s) Oral at bedtime  doxycycline IVPB 100 milliGRAM(s) IV Intermittent every 12 hours  heparin   Injectable 5000 Unit(s) SubCutaneous every 12 hours  influenza  Vaccine (HIGH DOSE) 0.5 milliLiter(s) IntraMuscular once  levothyroxine 88 MICROGram(s) Oral daily  LORazepam     Tablet 1 milliGRAM(s) Oral at bedtime  pantoprazole    Tablet 40 milliGRAM(s) Oral before breakfast  sodium bicarbonate 1300 milliGRAM(s) Oral three times a day  venlafaxine 75 milliGRAM(s) Oral daily  venlafaxine 300 milliGRAM(s) Oral daily    MEDICATIONS  (PRN):  acetaminophen     Tablet .. 650 milliGRAM(s) Oral every 6 hours PRN Temp greater or equal to 38C (100.4F), Mild Pain (1 - 3)  melatonin 3 milliGRAM(s) Oral at bedtime PRN Insomnia  polyethylene glycol 3350 17 Gram(s) Oral daily PRN Constipation  senna 2 Tablet(s) Oral at bedtime PRN Constipation      Social History: see consult    Family history: see consult    ROS:   ENT: all negative except as noted in HPI   Pulm: denies SOB, cough, hemoptysis  Neuro: denies numbness/tingling, loss of sensation  Endo: denies heat/cold intolerance, excessive sweating      Vital Signs Last 24 Hrs  T(C): 36.7 (19 Sep 2024 04:36), Max: 37.2 (18 Sep 2024 10:47)  T(F): 98.1 (19 Sep 2024 04:36), Max: 98.9 (18 Sep 2024 10:47)  HR: 82 (19 Sep 2024 04:36) (81 - 88)  BP: 122/70 (19 Sep 2024 04:36) (104/65 - 125/70)  BP(mean): --  RR: 18 (19 Sep 2024 04:36) (18 - 19)  SpO2: 95% (19 Sep 2024 04:36) (94% - 98%)    Parameters below as of 19 Sep 2024 04:36  Patient On (Oxygen Delivery Method): room air                              10.3   10.41 )-----------( 259      ( 17 Sep 2024 08:31 )             33.2    09-17    144  |  111[H]  |  35[H]  ----------------------------<  144[H]  4.8   |  19[L]  |  2.82[H]    Ca    8.8      17 Sep 2024 08:32  Mg     2.4     09-17         PHYSICAL EXAM:  Gen: NAD  Skin: right facial and neck erythema   Head: Normocephalic, Atraumatic  Face: dressing removed, minimal pus noted on dressing. No iodoform packing noted, small amount of pus expressed, wound irrigated and 1 inch iodoform packing placed. Surrounding area w/ +ttp, erythema with swelling/induration, warm to touch.   Eyes: no scleral injection  Nose: Nares bilaterally patent, no discharge  Mouth: dry oral mucosa. no pus expressed from Stensen's and [place]'s duct. no tenderness on gum or teeth on palpation. Mild trismus. No Stridor.   Neck: improving erythema on right platysma and anterior neck,  trachea midline, no masses  Lymphatic: No lymphadenopathy  Resp: breathing comfortably at room air, no stridor  Neuro: facial nerve intact, no facial droop       ENT ISSUE/POD:  right facial abscess, s/p I&D POD 3        HPI: 84 year old, A&Ox4 Female, with PMHx of HTN, CKD, hypothyroid, depression, and OA, presented to ED for 4 days of worsening right facial/neck swelling and pain, CT neck without contrast revealed right facial cellulitis/myositis, without gross evidence of a drainable fluid collection. ENT consulted for further evaluation. Pt was being treated with IV abx for cellulitis. Pt states symptoms initially improved however ENT called back on 9/16 because pus was draining from the wound with no improvement of pain and swelling as per pt. Pt underwent bedside I&D with iodoform packing, POD 3. Pt reports improvement of pain, swelling, and trismus. She is able to tolerate a soft diet.  Pt denies SOB, fevers, cough, dysphagia. Prelim culture shows MRSA.          PAST MEDICAL & SURGICAL HISTORY:  Depression      Hypothyroid      Kidney disease      Cataract      History of rectal surgery      History of tonsillectomy        Allergies    azithromycin (Rash)    Intolerances      MEDICATIONS  (STANDING):  buPROPion XL (24-Hour) . 150 milliGRAM(s) Oral daily  chlorhexidine 2% Cloths 1 Application(s) Topical daily  diphenhydrAMINE 25 milliGRAM(s) Oral once  divalproex  milliGRAM(s) Oral daily  divalproex  milliGRAM(s) Oral at bedtime  doxycycline IVPB 100 milliGRAM(s) IV Intermittent every 12 hours  heparin   Injectable 5000 Unit(s) SubCutaneous every 12 hours  influenza  Vaccine (HIGH DOSE) 0.5 milliLiter(s) IntraMuscular once  levothyroxine 88 MICROGram(s) Oral daily  LORazepam     Tablet 1 milliGRAM(s) Oral at bedtime  pantoprazole    Tablet 40 milliGRAM(s) Oral before breakfast  sodium bicarbonate 1300 milliGRAM(s) Oral three times a day  venlafaxine 75 milliGRAM(s) Oral daily  venlafaxine 300 milliGRAM(s) Oral daily    MEDICATIONS  (PRN):  acetaminophen     Tablet .. 650 milliGRAM(s) Oral every 6 hours PRN Temp greater or equal to 38C (100.4F), Mild Pain (1 - 3)  melatonin 3 milliGRAM(s) Oral at bedtime PRN Insomnia  polyethylene glycol 3350 17 Gram(s) Oral daily PRN Constipation  senna 2 Tablet(s) Oral at bedtime PRN Constipation      Social History: see consult    Family history: see consult    ROS:   ENT: all negative except as noted in HPI   Pulm: denies SOB, cough, hemoptysis  Neuro: denies numbness/tingling, loss of sensation  Endo: denies heat/cold intolerance, excessive sweating      Vital Signs Last 24 Hrs  T(C): 36.7 (19 Sep 2024 04:36), Max: 37.2 (18 Sep 2024 10:47)  T(F): 98.1 (19 Sep 2024 04:36), Max: 98.9 (18 Sep 2024 10:47)  HR: 82 (19 Sep 2024 04:36) (81 - 88)  BP: 122/70 (19 Sep 2024 04:36) (104/65 - 125/70)  BP(mean): --  RR: 18 (19 Sep 2024 04:36) (18 - 19)  SpO2: 95% (19 Sep 2024 04:36) (94% - 98%)    Parameters below as of 19 Sep 2024 04:36  Patient On (Oxygen Delivery Method): room air                              10.3   10.41 )-----------( 259      ( 17 Sep 2024 08:31 )             33.2    09-17    144  |  111[H]  |  35[H]  ----------------------------<  144[H]  4.8   |  19[L]  |  2.82[H]    Ca    8.8      17 Sep 2024 08:32  Mg     2.4     09-17         PHYSICAL EXAM:  Gen: NAD  Skin: right facial and neck erythema   Head: Normocephalic, Atraumatic  Face: dressing removed, minimal pus noted on dressing. Iodoform packing removed, small amount of pus expressed, wound irrigated and 1 inch iodoform packing placed. +ttp, erythema with swelling/induration, warm to touch decreased, no fluctuance.   Eyes: no scleral injection  Nose: Nares bilaterally patent, no discharge  Mouth: dry oral mucosa. no pus expressed from [place]'s and [place]'s duct. no tenderness on gum or teeth on palpation. Mild trismus. No Stridor.   Neck: improving erythema on right platysma and anterior neck,  trachea midline, no masses  Lymphatic: No lymphadenopathy  Resp: breathing comfortably at room air, no stridor  Neuro: facial nerve intact, no facial droop

## 2024-09-19 NOTE — PROGRESS NOTE ADULT - SUBJECTIVE AND OBJECTIVE BOX
24H hour events: No acute overnight events    MEDICATIONS:  heparin   Injectable 5000 Unit(s) SubCutaneous every 12 hours  doxycycline IVPB 100 milliGRAM(s) IV Intermittent every 12 hours  acetaminophen     Tablet .. 650 milliGRAM(s) Oral every 6 hours PRN  buPROPion XL (24-Hour) . 150 milliGRAM(s) Oral daily  diphenhydrAMINE 25 milliGRAM(s) Oral once  divalproex  milliGRAM(s) Oral daily  divalproex  milliGRAM(s) Oral at bedtime  LORazepam     Tablet 1 milliGRAM(s) Oral at bedtime  melatonin 3 milliGRAM(s) Oral at bedtime PRN  venlafaxine 75 milliGRAM(s) Oral daily  venlafaxine 300 milliGRAM(s) Oral daily  pantoprazole    Tablet 40 milliGRAM(s) Oral before breakfast  polyethylene glycol 3350 17 Gram(s) Oral daily PRN  senna 2 Tablet(s) Oral at bedtime PRN  levothyroxine 88 MICROGram(s) Oral daily  chlorhexidine 2% Cloths 1 Application(s) Topical daily  influenza  Vaccine (HIGH DOSE) 0.5 milliLiter(s) IntraMuscular once  mupirocin 2% Nasal 1 Application(s) Both Nostrils two times a day  sodium bicarbonate 1300 milliGRAM(s) Oral three times a day    REVIEW OF SYSTEMS:  See HPI, otherwise ROS negative.    PHYSICAL EXAM:  T(C): 36.7 (09-19-24 @ 04:36), Max: 36.9 (09-19-24 @ 00:25)  HR: 82 (09-19-24 @ 04:36) (81 - 88)  BP: 122/70 (09-19-24 @ 04:36) (104/65 - 125/70)  RR: 18 (09-19-24 @ 04:36) (18 - 19)  SpO2: 95% (09-19-24 @ 04:36) (94% - 98%)  Wt(kg): --  I&O's Summary    18 Sep 2024 07:01  -  19 Sep 2024 07:00  --------------------------------------------------------  IN: 1080 mL / OUT: 1450 mL / NET: -370 mL    Appearance: Alert. NAD	  HEENT:   NC/AT	  Cardiovascular: +S1S2   Respiratory: CTA B/L	  Psychiatry: A & O x 3, Mood & affect appropriate  Neurologic: Non-focal  Extremities: No edema BLE  Vascular: Peripheral pulses palpable 2+ bilaterally    LABS:	 	  CBC Full  -  ( 19 Sep 2024 10:35 )  WBC Count : 9.90 K/uL  Hemoglobin : 10.1 g/dL  Hematocrit : 32.3 %  Platelet Count - Automated : 275 K/uL  Mean Cell Volume : 98.2 fl  Mean Cell Hemoglobin : 30.7 pg  Mean Cell Hemoglobin Concentration : 31.3 gm/dL  Auto Neutrophil # : x  Auto Lymphocyte # : x  Auto Monocyte # : x  Auto Eosinophil # : x  Auto Basophil # : x  Auto Neutrophil % : x  Auto Lymphocyte % : x  Auto Monocyte % : x  Auto Eosinophil % : x  Auto Basophil % : x    09-19    142  |  111[H]  |  32[H]  ----------------------------<  96  4.5   |  20[L]  |  3.01[H]    Ca    8.8      19 Sep 2024 10:36    TPro  6.3  /  Alb  3.4  /  TBili  0.2  /  DBili  x   /  AST  8[L]  /  ALT  7[L]  /  AlkPhos  80  09-19    TELEMETRY: SR, LBBB 80s    ECHO:  TRANSTHORACIC ECHOCARDIOGRAM REPORT  ________________________________________________________________________________                                      _______       Pt. Name:       JOANNA MALDONADO Study Date:    9/18/2024  MRN:            [id number]        YOB: 1940  Accession #:    [id number]       Age:           84 years  Account#:       [id number]    Gender:        F  Heart Rate:                     Height:        65.00 in (165.10 cm)  Rhythm:                         Weight:        144.00 lb (65.32 kg)  Blood Pressure: 146/81 mmHg     BSA/BMI:       1.72 m² / 23.96 kg/m²  ________________________________________________________________________________________  Referring Physician:    [id number] Mina Rizo  Interpreting Physician: Lc Escobedo M.D.  Primary Sonographer:    Tami Storm [facility]  Fellow (Performing):    Lucas Matt  Fellow (Interpreting):  Lucas Matt    CPT:                ECHO TTE WITH CON COMP W DOPP - .m;DEFINITY ECHO              CONTRAST PER ML - .m;DEFINITY ECHO CONTRAST PER ML                      WASTED - .m  Indication(s):      Syncope and collapse - R55  Procedure:          Transthoracic echocardiogram with 2-D, M-mode and complete      spectral and color flow Doppler.  Ordering Location:  [facility]  Admission Status:   Inpatient  Contrast Injection: Verbal consent was obtained for injection of Ultrasonic                      Enhancing Agent following a discussion of risks and                benefits.                      Endocardial visualization enhanced with 2 ml of Definity                      Ultrasound enhancing agent (Lot#:[id number] Exp.Date:06/2025                      Discarded Dose:8ml).  UEA Reaction:       Patient had no adverse reaction after injection of                      Ultrasound Enhancing Agent.  Study Information:  Image quality for this study is technically difficult.    _______________________________________________________________________________________     CONCLUSIONS:      1. Left ventricular systolic function is severely decreased with an ejection fraction visually estimated at 30 to 35 %. Regional wall motion abnormalities present.   2. Multiple segmental abnormalities exist. See findings.   3. Normal right ventricular cavity size and normal right ventricular systolic function. Tricuspid annular plane systolic excursion (TAPSE) is 2.9 cm (normal >=1.7 cm). Tricuspid annular tissue Doppler S' is 17.0 cm/s (normal >10 cm/s).   4. No pericardial effusion seen.   5. Estimated pulmonary artery systolic pressure is 24 mmHg.   6. No prior echocardiogram is available for comparison.   7. Technically difficult image quality.   8. The inferior vena cava is normal in size (normal <2.1cm) withnormal inspiratory collapse (normal >50%) consistent with normal right atrial pressure (~3, range 0-5mmHg).    ________________________________________________________________________________________  FINDINGS:     Left Ventricle:  The left ventricular cavity is small. Left ventricular wall thickness is normal. Left ventricular systolic function is severely decreased with an ejection fraction visually estimated at 30 to 35%. There are regional wall motion abnormalities present. There is mild (grade 1) left ventricular diastolic dysfunction, with normal left ventricular filling pressure.  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 The entire inferior wall, mid and  apical inferior septum, mid inferolateral segment, and apical lateral segment  are akinetic. The entire anterior wall, basal and mid anterolateral wall, basal  and mid anterior septum, basal inferoseptal segment, and basal inferolateral  segment are hypokinetic.          Right Ventricle:  The right ventricular cavity is normal in size and right ventricular systolic function is normal. Tricuspid annular plane systolic excursion (TAPSE) is 2.9 cm (normal >=1.7 cm). Tricuspid annular tissue Doppler S' is 17.0 cm/s (normal >10 cm/s).     Left Atrium:  The left atrium is normal in size.     Right Atrium:  The right atrium is normal in size with an indexed area of 7.56 cm²/m².     Aortic Valve:  The aortic valve appears trileaflet with normal systolic excursion. There is mild aortic stenosis. There is mild aortic regurgitation.     Mitral Valve:  There is mitral valve thickening of the anterior and posterior leaflets. There is moderate posterior calcification of the mitral valve annulus. There is no evidence of mitral valve prolapse. There is mild mitral regurgitation.     Tricuspid Valve:  Structurally normal tricuspid valve with normal leaflet excursion. There is trace tricuspid regurgitation. Estimated pulmonary artery systolic pressure is 24 mmHg.     Pulmonic Valve:  The pulmonic valve was not well visualized. Thereis trace pulmonic regurgitation.     Pericardium:  No pericardial effusion seen.     Systemic Veins:  The inferior vena cava is normal in size (normal <2.1cm) with normal inspiratory collapse (normal >50%) consistent with normal right atrial pressure(~3, range 0-5mmHg).  ____________________________________________________________________  QUANTITATIVE DATA:  Left Ventricle Measurements: (Indexed to BSA)     IVSd (2D):   1.0 cm  LVPWd (2D):  0.8 cm  LVIDd (2D):  3.1 cm  LVIDs (2D):  2.5 cm  LV Mass:     71 g   41.3 g/m²  Visualized LV EF%: 30 to 35%     MV E Vmax:    1.78 m/s  e' lateral:   17.00 cm/s  e' medial:    17.00 cm/s  E/e' lateral: 10.47  E/e' medial:  10.47  E/e' Average: 10.47    Aorta Measurements: (Normal range) (Indexed to BSA)     Ao Root d 3.10 cm (2.7 - 3.3 cm) 1.80 cm/m²            Left Atrium Measurements: (Indexed to BSA)  LA Diam 2D: 3.30 cm         Right Ventricle Measurements:     TAPSE: 2.9 cm       LVOT / RVOT/ Qp/Qs Data: (Indexed to BSA)  LVOT Vmax:      1.09 m/s  LVOT Vmn:       0.790 m/s  LVOT VTI:       18.80 cm  LVOT peak grad: 5 mmHg  LVOT mean grad: 2.2 mmHg    Aortic Valve Measurements:  AV Vmax:                2.6 m/s  AV Peak Gradient:       26.8 mmHg  AV Mean Gradient:       15.0 mmHg  AV VTI:             44.6 cm  AV VTI Ratio:           0.42  AoV Dimensionless Index 0.42  AR Vmax                 3.85 m/s  AR PHT                  461 msec  AR Schley                2.45 m/s²    Mitral Valve Measurements:     MV E Vmax: 1.8 m/s       Tricuspid Valve Measurements:     TV S'             17.0 cm/s  TR Vmax:          2.3 m/s  TR Peak Gradient: 21.0 mmHg  RA Pressure:      3 mmHg  PASP:             24 mmHg    ________________________________________________________________________________________  Electronically signed on 9/18/2024 at 11:40:04 AM by Lc Escobedo M.D.

## 2024-09-19 NOTE — PROGRESS NOTE ADULT - ASSESSMENT
Patient is a 84 year old female with PMHx of Anemia, Depression, CKD, GERD and Hypothyroidism. Presents to [facility] for worsening redness and swelling on her face for the past few days.    Plan:    # Facial cellulitis:   - + leukocytosis, BCx w/ NGTD  - No drainable collection on CT, s/p laryngoscope no airway involvement   - Abx mgmt per ID  - S/p Dexamethasone 8MG Q8H for 24H only per ENT  - Encourage oral hydration   - Minced and moist diet  - Monitor temps/WBC  - 9/16 noted with increased duration/swelling and expressible pus -> s/p I&D by ENT; Cx noted with MRSA  - C/w isolation per protocol   - ID/ENT following    # Hx of frequent falls:  - Fall precautions  - Ortho negative  - MR Head 9/17/24 shows no intracranial mass, acute territorial infarct, acute intracranial hemorrhage, or midline shift  - EEG neg for seizures   - TTE noted with severely decreased LVSF with EF 30-35%; regional WMA; There is mild (grade 1) left ventricular diastolic dysfunction, with normal left ventricular filling pressure.   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 The entire inferior wall, mid and apical inferior septum, mid inferolateral segment, and apical lateral segment are akinetic. The entire anterior wall, basal and mid anterolateral wall, basal and mid anterior septum, basal inferoseptal segment, and basal inferolateral segment are hypokinetic.  - EP consulted -> monitor on tele and can consider ILR implant when patient and family are agreeable   - Cards/Neuro/EP following    # CKD:  - Monitor I/O's  - Serial Cr  - Avoid nephrotoxins  - Renally dose medications  - C/w NaHCO3  - Renal following    # Depression:  - C/w current meds    # LPRD (laryngopharyngeal reflux disease):  - ENT recommendations appreciated   - PPI    # Hypothyroidism:  - C/w Synthroid    # DVT ppx:  - Heparin subq    Dispo: eventual EMILIO    [employer]  [phone]

## 2024-09-19 NOTE — PROGRESS NOTE ADULT - ASSESSMENT
Patient is an 84-year-old female with a history of anemia, hypothyroidism, depression, GERD, bipolar, CKD who presents for redness and swelling on her face for the past few days. Patient reports right-sided facial swelling and redness including her neck and on her lower jaw that started on 9/8/24, worsening over the past few days. She reports  a dental exam for cracked teeth on day prior but no work performed. States someone told her she had 2 small pimple like lesion on her face, currently scabbed over.    R facial cellulitis with abscess with MRSA  Leukocytosis due to above, resolved   - initial CT neck soft tissue with R facial cellulitis/myositis, no e/o drainable fluid collection  - s/p dexamethasone x24h  - 9/16 noted with increased duration/swelling and expressible pus -- now s/p I&D by ENT   -- culture grew MRSA -- sensitivities reviewed   - MRI head with no acute findings   - OMFS eval appreciated   - COVID/Flu/RSV negative   - WBC normalized, remains afebrile, feels improving  s/p unasyn 9/11-9/18    Recommendations:  ENT following, daily packing changes and irrigation   Continue doxycycline 100mg Q12h until 9/25  Contact isolation per IC protocol  Continue rest of care per primary team       Ash Graham M.D.  [facility], Division of Infectious Diseases  [phone]  After 5pm on weekdays and all day on weekends - please call [phone]  Available on [employer] TEAMS    Quality 110: Preventive Care And Screening: Influenza Immunization: Influenza Immunization not Administered for Documented Reasons. Detail Level: Generalized

## 2024-09-19 NOTE — PROGRESS NOTE ADULT - SUBJECTIVE AND OBJECTIVE BOX
CARDIOLOGY FOLLOW UP - Dr. Ackerman  DATE OF SERVICE: 9/19/24    CC  no CP or SOB    REVIEW OF SYSTEMS:  CONSTITUTIONAL: No fever, weight loss, or fatigue  RESPIRATORY: No cough, wheezing, chills or hemoptysis; No Shortness of Breath  CARDIOVASCULAR: No chest pain, palpitations, passing out, dizziness  GASTROINTESTINAL: No abdominal or epigastric pain. No nausea, vomiting, or hematemesis; No diarrhea or constipation. No melena or hematochezia.      PHYSICAL EXAM:  T(C): 36.7 (09-19-24 @ 04:36), Max: 37.2 (09-18-24 @ 10:47)  HR: 82 (09-19-24 @ 04:36) (81 - 88)  BP: 122/70 (09-19-24 @ 04:36) (104/65 - 125/70)  RR: 18 (09-19-24 @ 04:36) (18 - 19)  SpO2: 95% (09-19-24 @ 04:36) (94% - 98%)  Wt(kg): --  I&O's Summary    18 Sep 2024 07:01  -  19 Sep 2024 07:00  --------------------------------------------------------  IN: 1080 mL / OUT: 1450 mL / NET: -370 mL        Appearance: Normal	  Cardiovascular: Normal S1 S2,RRR, No JVD, +murmur  Respiratory: Lungs clear to auscultation b/l  Gastrointestinal:  Soft, Non-tender, + BS	  Extremities: Normal range of motion, No clubbing, cyanosis or edema      Home Medications:  acetaminophen 325 mg oral tablet: 2 tab(s) orally every 6 hours as needed for  mild pain (11 Sep 2024 21:40)  buPROPion 150 mg/24 hours (XL) oral tablet, extended release: 1 tab(s) orally once a day (11 Sep 2024 21:40)  divalproex sodium 250 mg oral tablet, extended release: 1 tab(s) orally once a day (11 Sep 2024 21:40)  divalproex sodium 500 mg oral tablet, extended release: 1 tab(s) orally once a day (at bedtime) (11 Sep 2024 21:38)  lactulose 10 g/15 mL oral syrup: 30 milliliter(s) orally every 8 hours as needed for  constipation (11 Sep 2024 21:40)  levothyroxine 88 mcg (0.088 mg) oral tablet: 1 tab(s) orally once a day (11 Sep 2024 21:39)  LORazepam 1 mg oral tablet: 1 tab(s) orally once a day (at bedtime) (11 Sep 2024 21:40)  Melatonin 3 mg oral tablet: 1 tab(s) orally once a day (at bedtime) (11 Sep 2024 21:40)  polyethylene glycol 3350 oral powder for reconstitution: 17 gram(s) orally once a day (11 Sep 2024 21:40)  senna leaf extract oral tablet: 2 tab(s) orally once a day (at bedtime) (11 Sep 2024 21:40)  venlafaxine 75 mg oral capsule, extended release: 375 milligram(s) orally once a day (11 Sep 2024 21:39)      MEDICATIONS  (STANDING):  buPROPion XL (24-Hour) . 150 milliGRAM(s) Oral daily  chlorhexidine 2% Cloths 1 Application(s) Topical daily  diphenhydrAMINE 25 milliGRAM(s) Oral once  divalproex  milliGRAM(s) Oral daily  divalproex  milliGRAM(s) Oral at bedtime  doxycycline IVPB 100 milliGRAM(s) IV Intermittent every 12 hours  heparin   Injectable 5000 Unit(s) SubCutaneous every 12 hours  influenza  Vaccine (HIGH DOSE) 0.5 milliLiter(s) IntraMuscular once  levothyroxine 88 MICROGram(s) Oral daily  LORazepam     Tablet 1 milliGRAM(s) Oral at bedtime  mupirocin 2% Nasal 1 Application(s) Both Nostrils two times a day  pantoprazole    Tablet 40 milliGRAM(s) Oral before breakfast  sodium bicarbonate 1300 milliGRAM(s) Oral three times a day  venlafaxine 75 milliGRAM(s) Oral daily  venlafaxine 300 milliGRAM(s) Oral daily      TELEMETRY: 	  NSR  ECG:  	  RADIOLOGY:   DIAGNOSTIC TESTING:  [ ] Echocardiogram:  [ ]  Catheterization:  [ ] Stress Test:    OTHER: 	    LABS:

## 2024-09-19 NOTE — PROGRESS NOTE ADULT - ASSESSMENT
A/P    Patient is an 84-year-old female with a history of anemia, hypothyroidism, depression, GERD, bipolar, CKD, presents for redness and swelling on her face for the past few days.     #Right facial swelling and redness, Leukocytosis   -improving  -CT Neck 9/11/24 shows Right facial cellulitis/myositis, without gross evidence of a drainable fluid collection.  -CXR 9/11/24 shows Clear lungs  -trend WBC; improving  -ENT and ID following  -sp I&D of abscess 9/16 - cx + MRSA  -abx per ID    #CKD  -Trend Creat  -nephrology f/u    #Hx of frequent falls  -pt describes random "zoning out/staring episodes" and then falling (has been occurring for about 2.5 years per pt)  -pt denies LOC during episodes; denies CP or SOB  -orthostatics negative  -MR Head 9/17/24 shows no intracranial mass, acute territorial infarct, acute intracranial hemorrhage, or midline shift.  -TTE 9/18/24 shows severely decreased LVSF with EF 30-35%; regional WMA; There is mild (grade 1) left ventricular diastolic dysfunction, with normal left ventricular filling pressure.   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 The entire inferior wall, mid and apical inferior septum, mid inferolateral segment, and apical lateral segment are akinetic. The entire anterior wall, basal and mid anterolateral wall, basal and mid anterior septum, basal inferoseptal segment, and basal inferolateral segment are hypokinetic.  -check tele  -neuro f/u  -EKG shows NSR with first degree AV block and LBBB, no acute ischemic changes  -consider ILR given multiple falls, known cmp  -EP eval noted; ILR implant if pt and family agreeable     #Aortic Stenosis  -moderate AS on outpt TTE 5/24  -cont to monitor clinically with yearly TTE    #Cardiomyopathy, HFrEF  -outpatient TTE 5/24 shows LV systolic function is moderately reduced, moderate global hypokinesis; EF 40%; mild to moderate aortic regurgitation; moderate aortic stenosis  -Repeat TTE as above  -no evidence of volume overload/HF  -no diuretic need  -per outpt visit, patient and son do not want to proceed with ischemic eval and do not want to start lv dysfxn medications as she remains asymptomatic  -Start GDMT and consider ischemic eval if pt and family agreeable    dvt ppx

## 2024-09-19 NOTE — PROGRESS NOTE ADULT - NS ATTEND AMEND GEN_ALL_CORE FT
ENT following for facial abscess     84 year old, A&Ox4 Female, with PMHx of HTN, CKD, hypothyroid, depression, and OA, presented to ED for 4 days of worsening right facial/neck swelling and pain. At ED, labs are significant for leucocytosis (WBC 22.22) and creatinine of 3.03.     9/11/24 As per radiology CT neck without contrast shows Right facial cellulitis/myositis, without gross evidence of a drainable fluid collection.    ENT consulted for further evaluation. Per patient, on Saturday, she went for a dental consultation for cracked tooth. No procedure was done. Then pt started noticing right facial swelling on Saturday evening. Pt presented to PCP, who later referred her to ENT, when swelling progressively worsened along with new onset of trismus. outpatient ENT sent her to ED for CT scan and iv antibiotics. Pt symptoms initially improved however ENT called back because pus is now draining from the wound and pt states pain and swelling remains the same.     9/16/24 Now s/p I&D of facial abscess right submandibular area. This morning blood and pus expressed, wound irrigated and iodoform packing replaced. Surrounding area w/ +ttp, erythema with swelling/induration, warm to touch. Afebrile. WBC 10.41    Physical exam shows dressing exchanged. minimal amount of pus expressed from wound, iodoform packing placed. induration / erythema improved.     Recommend:  Facial Abscess / Cellulitis   - f/u final culture  - abx per ID  - pain control prn  - Daily packing changes and irrigation  - ENT will continue to follow  - Call with questions or concerns.

## 2024-09-20 ENCOUNTER — TRANSCRIPTION ENCOUNTER (OUTPATIENT)
Age: 84
End: 2024-09-20

## 2024-09-20 LAB
ANION GAP SERPL CALC-SCNC: 15 MMOL/L — SIGNIFICANT CHANGE UP (ref 5–17)
BUN SERPL-MCNC: 35 MG/DL — HIGH (ref 7–23)
CALCIUM SERPL-MCNC: 9 MG/DL — SIGNIFICANT CHANGE UP (ref 8.4–10.5)
CHLORIDE SERPL-SCNC: 110 MMOL/L — HIGH (ref 96–108)
CO2 SERPL-SCNC: 19 MMOL/L — LOW (ref 22–31)
CREAT SERPL-MCNC: 3.02 MG/DL — HIGH (ref 0.5–1.3)
EGFR: 15 ML/MIN/1.73M2 — LOW
GLUCOSE BLDC GLUCOMTR-MCNC: 85 MG/DL — SIGNIFICANT CHANGE UP (ref 70–99)
GLUCOSE SERPL-MCNC: 58 MG/DL — LOW (ref 70–99)
HCT VFR BLD CALC: 35.6 % — SIGNIFICANT CHANGE UP (ref 34.5–45)
HGB BLD-MCNC: 10.8 G/DL — LOW (ref 11.5–15.5)
MCHC RBC-ENTMCNC: 30.3 GM/DL — LOW (ref 32–36)
MCHC RBC-ENTMCNC: 30.3 PG — SIGNIFICANT CHANGE UP (ref 27–34)
MCV RBC AUTO: 100 FL — SIGNIFICANT CHANGE UP (ref 80–100)
NRBC # BLD: 0 /100 WBCS — SIGNIFICANT CHANGE UP (ref 0–0)
PLATELET # BLD AUTO: 254 K/UL — SIGNIFICANT CHANGE UP (ref 150–400)
POTASSIUM SERPL-MCNC: 5.2 MMOL/L — SIGNIFICANT CHANGE UP (ref 3.5–5.3)
POTASSIUM SERPL-SCNC: 5.2 MMOL/L — SIGNIFICANT CHANGE UP (ref 3.5–5.3)
RBC # BLD: 3.56 M/UL — LOW (ref 3.8–5.2)
RBC # FLD: 14.6 % — HIGH (ref 10.3–14.5)
SODIUM SERPL-SCNC: 144 MMOL/L — SIGNIFICANT CHANGE UP (ref 135–145)
WBC # BLD: 10.22 K/UL — SIGNIFICANT CHANGE UP (ref 3.8–10.5)
WBC # FLD AUTO: 10.22 K/UL — SIGNIFICANT CHANGE UP (ref 3.8–10.5)

## 2024-09-20 PROCEDURE — 99233 SBSQ HOSP IP/OBS HIGH 50: CPT | Mod: FS,24

## 2024-09-20 PROCEDURE — 99231 SBSQ HOSP IP/OBS SF/LOW 25: CPT

## 2024-09-20 RX ORDER — DOXYCYCLINE HYCLATE 100 MG
100 CAPSULE ORAL EVERY 12 HOURS
Refills: 0 | Status: DISCONTINUED | OUTPATIENT
Start: 2024-09-20 | End: 2024-09-24

## 2024-09-20 RX ORDER — METOPROLOL TARTRATE 50 MG
25 TABLET ORAL DAILY
Refills: 0 | Status: DISCONTINUED | OUTPATIENT
Start: 2024-09-20 | End: 2024-09-24

## 2024-09-20 RX ADMIN — Medication 1300 MILLIGRAM(S): at 21:56

## 2024-09-20 RX ADMIN — Medication 1 MILLIGRAM(S): at 21:56

## 2024-09-20 RX ADMIN — Medication 500 MILLIGRAM(S): at 21:56

## 2024-09-20 RX ADMIN — Medication 5000 UNIT(S): at 05:50

## 2024-09-20 RX ADMIN — Medication 1300 MILLIGRAM(S): at 05:50

## 2024-09-20 RX ADMIN — CHLORHEXIDINE GLUCONATE ORAL RINSE 1 APPLICATION(S): 1.2 SOLUTION DENTAL at 12:03

## 2024-09-20 RX ADMIN — Medication 100 MILLIGRAM(S): at 02:28

## 2024-09-20 RX ADMIN — MUPIROCIN 1 APPLICATION(S): 20 OINTMENT TOPICAL at 05:50

## 2024-09-20 RX ADMIN — Medication 5000 UNIT(S): at 18:45

## 2024-09-20 RX ADMIN — Medication 250 MILLIGRAM(S): at 13:55

## 2024-09-20 RX ADMIN — PANTOPRAZOLE SODIUM 40 MILLIGRAM(S): 40 TABLET, DELAYED RELEASE ORAL at 05:51

## 2024-09-20 RX ADMIN — Medication 88 MICROGRAM(S): at 05:51

## 2024-09-20 RX ADMIN — Medication 100 MILLIGRAM(S): at 18:45

## 2024-09-20 RX ADMIN — Medication 1300 MILLIGRAM(S): at 13:56

## 2024-09-20 RX ADMIN — Medication 75 MILLIGRAM(S): at 13:55

## 2024-09-20 RX ADMIN — Medication 300 MILLIGRAM(S): at 13:54

## 2024-09-20 RX ADMIN — Medication 150 MILLIGRAM(S): at 13:54

## 2024-09-20 RX ADMIN — MUPIROCIN 1 APPLICATION(S): 20 OINTMENT TOPICAL at 18:46

## 2024-09-20 NOTE — PROGRESS NOTE ADULT - SUBJECTIVE AND OBJECTIVE BOX
SUBJECTIVE / OVERNIGHT EVENTS:    Patient seen and examined at bedside. No events noted overnight. Resting comfortably in bed eating breakfast      --------------------------------------------------------------------------------------------  LABS:                        10.8   10.22 )-----------( 254      ( 20 Sep 2024 06:58 )             35.6     09-20    144  |  110[H]  |  35[H]  ----------------------------<  58[L]  5.2   |  19[L]  |  3.02[H]    Ca    9.0      20 Sep 2024 06:59    TPro  6.3  /  Alb  3.4  /  TBili  0.2  /  DBili  x   /  AST  8[L]  /  ALT  7[L]  /  AlkPhos  80  09-19      CAPILLARY BLOOD GLUCOSE      POCT Blood Glucose.: 85 mg/dL (20 Sep 2024 08:06)        Urinalysis Basic - ( 20 Sep 2024 06:59 )    Color: x / Appearance: x / SG: x / pH: x  Gluc: 58 mg/dL / Ketone: x  / Bili: x / Urobili: x   Blood: x / Protein: x / Nitrite: x   Leuk Esterase: x / RBC: x / WBC x   Sq Epi: x / Non Sq Epi: x / Bacteria: x        RADIOLOGY & ADDITIONAL TESTS:    Imaging Personally Reviewed:  [x] YES  [ ] NO    Consultant(s) Notes Reviewed:  [x] YES  [ ] NO    MEDICATIONS  (STANDING):  buPROPion XL (24-Hour) . 150 milliGRAM(s) Oral daily  chlorhexidine 2% Cloths 1 Application(s) Topical daily  diphenhydrAMINE 25 milliGRAM(s) Oral once  divalproex  milliGRAM(s) Oral daily  divalproex  milliGRAM(s) Oral at bedtime  doxycycline monohydrate Capsule 100 milliGRAM(s) Oral every 12 hours  heparin   Injectable 5000 Unit(s) SubCutaneous every 12 hours  influenza  Vaccine (HIGH DOSE) 0.5 milliLiter(s) IntraMuscular once  levothyroxine 88 MICROGram(s) Oral daily  LORazepam     Tablet 1 milliGRAM(s) Oral at bedtime  mupirocin 2% Nasal 1 Application(s) Both Nostrils two times a day  pantoprazole    Tablet 40 milliGRAM(s) Oral before breakfast  sodium bicarbonate 1300 milliGRAM(s) Oral three times a day  venlafaxine 300 milliGRAM(s) Oral daily  venlafaxine 75 milliGRAM(s) Oral daily    MEDICATIONS  (PRN):  acetaminophen     Tablet .. 650 milliGRAM(s) Oral every 6 hours PRN Temp greater or equal to 38C (100.4F), Mild Pain (1 - 3)  melatonin 3 milliGRAM(s) Oral at bedtime PRN Insomnia  polyethylene glycol 3350 17 Gram(s) Oral daily PRN Constipation  senna 2 Tablet(s) Oral at bedtime PRN Constipation      Care Discussed with Consultants/Other Providers [x] YES  [ ] NO    Vital Signs Last 24 Hrs  T(C): 36.7 (20 Sep 2024 06:03), Max: 36.8 (19 Sep 2024 12:55)  T(F): 98 (20 Sep 2024 06:03), Max: 98.2 (19 Sep 2024 12:55)  HR: 84 (20 Sep 2024 06:03) (80 - 85)  BP: 123/63 (20 Sep 2024 06:03) (123/63 - 133/71)  BP(mean): --  RR: 18 (20 Sep 2024 06:03) (18 - 18)  SpO2: 93% (20 Sep 2024 06:03) (93% - 96%)    Parameters below as of 20 Sep 2024 06:03  Patient On (Oxygen Delivery Method): room air      I&O's Summary    19 Sep 2024 07:01  -  20 Sep 2024 07:00  --------------------------------------------------------  IN: 630 mL / OUT: 2600 mL / NET: -1970 mL          PHYSICAL EXAM:  GENERAL: NAD, well-developed, comfortable  HEENT: right sided facial swelling and neck erythema (improving); dsg c/d/i; TTP  CHEST/LUNG: Clear to auscultation bilaterally; No wheeze  HEART: Regular rate and rhythm; No murmurs, rubs, or gallops  ABDOMEN: Soft, Nontender, Nondistended; Bowel sounds present  NEURO: AAOx3, no focal weakness, 5/5 b/l extremity strength, b/l knee no arthritis, no effusion   EXTREMITIES:  2+ Peripheral Pulses, No clubbing, cyanosis, or edema  SKIN: as above

## 2024-09-20 NOTE — PROGRESS NOTE ADULT - PROBLEM SELECTOR PLAN 1
- Abx as per ID  - pain control prn  - Recommend f/u in ENT office as outpt. May see Dr. Glaser, Dr. Valentin, Dr. Cool, Dr. Barnett. Call [phone] to make an appointment.  - No further ENT intervention required at this time, please call back if needed.

## 2024-09-20 NOTE — PROGRESS NOTE ADULT - ASSESSMENT
83 y/o female with PMH of anemia, hypothyroidism, depressin, GERD, bipolar disorder, CKD, who is admitted for right face cellulitis and getting further workup for frequent falls. According to the patient, she has had multiple instances and some were witnessed by her friends and family where she is seemingly doing well, but then she would experience an "aura" before she starts to "zone out" and stare blankly prior to falling to the ground. The patient does not think that she loses consciousness during these episodes. Follows with Dr. Ackerman as an outpatient where she reportedly had a negative 1 week cardiac event monitor. MRI of the head was negative for acute findings. A TTE done on 9/18/24 showed a severely decreased LV EF of 30-35% and regional WMAs. ECG also showed a LBBB and 1st degree AV block. LBBB not present on ECG back in July 2022. Patient not on telemetry. Unclear if there are any arrhythmia events while in hospital.    TTE 9/18/24 shows severely decreased LVSF with EF 30-35%; regional WMA; There is mild (grade 1) left ventricular diastolic dysfunction, with normal left ventricular filling pressure.   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 The entire inferior wall, mid and apical inferior septum, mid inferolateral segment, and apical lateral segment are akinetic. The entire anterior wall, basal and mid anterolateral wall, basal and mid anterior septum, basal inferoseptal segment, and basal inferolateral segment are hypokinetic.    Multiple falls, ?syncope of unclear etiology  Facial cellulitis  Newly reduced EF, CM    Recommendations:  - EKG shows NSR with first degree AV block and LBBB  - MRI negative  - Neuro workup negative thus far  - Given newly reduced EF compared to echo in May and multiple WMAs, with LBBB on ECG, recommend ischemic eval. Per documentation in chart, patient and son has refused this in the past. However, now may be amenable for workup  - She may benefit from CRT pacing in the future, but has not been on adequate GDMT plus in the presence of an active infection, not a candidate for transvenous device at this time  - Continue telemetry monitoring  - Per son at bedside, they are agreeable for patient to have stress test. Continues to be uncertain about ILR after 4th discussion with patient and family. Another option would be to d/c on 30-day monitor, which the son says he prefers and then decide on ILR after 30 days is up.    No urgent EP intervention indicated at this time, Will sign off. Reconsult PRN  Plan discussed with Dr. Das

## 2024-09-20 NOTE — PROGRESS NOTE ADULT - ASSESSMENT
Patient is a 84 year old female with PMHx of Anemia, Depression, CKD, GERD and Hypothyroidism. Presents to [facility] for worsening redness and swelling on her face for the past few days.    Plan:    # Facial cellulitis:   - + leukocytosis, BCx w/ NGTD  - No drainable collection on CT, s/p laryngoscope no airway involvement   - Abx mgmt per ID  - S/p Dexamethasone 8MG Q8H for 24H only per ENT  - Encourage oral hydration   - Minced and moist diet  - Monitor temps/WBC  - 9/16 noted with increased duration/swelling and expressible pus -> s/p I&D by ENT; Cx noted with MRSA  - Continue doxycycline 100mg Q12h until 9/25 - changed to PO today  - Local wound care per ENT  - C/w isolation per protocol   - ID/ENT following    # Hx of frequent falls:  - Fall precautions  - Ortho negative  - MR Head 9/17/24 shows no intracranial mass, acute territorial infarct, acute intracranial hemorrhage, or midline shift  - EEG neg for seizures   - TTE noted with severely decreased LVSF with EF 30-35%; regional WMA; There is mild (grade 1) left ventricular diastolic dysfunction, with normal left ventricular filling pressure.   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 The entire inferior wall, mid and apical inferior septum, mid inferolateral segment, and apical lateral segment are akinetic. The entire anterior wall, basal and mid anterolateral wall, basal and mid anterior septum, basal inferoseptal segment, and basal inferolateral segment are hypokinetic.  - EP consulted -> monitor on tele and can consider ILR implant when patient and family are agreeable, Awaiting Cards/ EP decision Inpt vs outpt. If Outpt, Pt can be discharged and f/u outpt  - Cards/Neuro/EP following    # CKD:  - Monitor I/O's  - Serial Cr  - Avoid nephrotoxins  - Renally dose medications  - C/w NaHCO3  - Renal following    # Depression:  - C/w current meds    # LPRD (laryngopharyngeal reflux disease):  - ENT recommendations appreciated   - PPI    # Hypothyroidism:  - C/w Synthroid    # DVT ppx:  - Heparin subq    Dispo: [facility]  * Awaiting Cards/ EP decision Inpt vs outpt. If Outpt, Pt can be discharged and f/u outpt    [employer]

## 2024-09-20 NOTE — DISCHARGE NOTE PROVIDER - NSDCMRMEDTOKEN_GEN_ALL_CORE_FT
acetaminophen 325 mg oral tablet: 2 tab(s) orally every 6 hours as needed for  mild pain  buPROPion 150 mg/24 hours (XL) oral tablet, extended release: 1 tab(s) orally once a day  divalproex sodium 250 mg oral tablet, extended release: 1 tab(s) orally once a day  divalproex sodium 500 mg oral tablet, extended release: 1 tab(s) orally once a day (at bedtime)  lactulose 10 g/15 mL oral syrup: 30 milliliter(s) orally every 8 hours as needed for  constipation  levothyroxine 88 mcg (0.088 mg) oral tablet: 1 tab(s) orally once a day  LORazepam 1 mg oral tablet: 1 tab(s) orally once a day (at bedtime)  Melatonin 3 mg oral tablet: 1 tab(s) orally once a day (at bedtime)  polyethylene glycol 3350 oral powder for reconstitution: 17 gram(s) orally once a day  senna leaf extract oral tablet: 2 tab(s) orally once a day (at bedtime)  venlafaxine 75 mg oral capsule, extended release: 375 milligram(s) orally once a day   acetaminophen 325 mg oral tablet: 2 tab(s) orally every 6 hours as needed for  mild pain  buPROPion 150 mg/24 hours (XL) oral tablet, extended release: 1 tab(s) orally once a day  divalproex sodium 250 mg oral tablet, extended release: 1 tab(s) orally once a day  divalproex sodium 500 mg oral tablet, extended release: 1 tab(s) orally once a day (at bedtime)  doxycycline monohydrate 50 mg oral capsule: 2 cap(s) orally every 12 hours Continue until 9/27/24  lactulose 10 g/15 mL oral syrup: 30 milliliter(s) orally every 8 hours as needed for  constipation  levothyroxine 88 mcg (0.088 mg) oral tablet: 1 tab(s) orally once a day  LORazepam 1 mg oral tablet: 1 tab(s) orally once a day (at bedtime)  Melatonin 3 mg oral tablet: 1 tab(s) orally once a day (at bedtime)  metoprolol succinate 25 mg oral tablet, extended release: 1 tab(s) orally once a day  pantoprazole 40 mg oral delayed release tablet: 1 tab(s) orally once a day (before a meal)  polyethylene glycol 3350 oral powder for reconstitution: 17 gram(s) orally once a day  senna leaf extract oral tablet: 2 tab(s) orally once a day (at bedtime)  sodium bicarbonate 650 mg oral tablet: 2 tab(s) orally 3 times a day  venlafaxine 75 mg oral capsule, extended release: 375 milligram(s) orally once a day

## 2024-09-20 NOTE — DISCHARGE NOTE PROVIDER - NPI NUMBER (FOR SYS[employer] USE ONLY) :
[[id number]],[[id number]],[[id number]] [[id number]],[[id number]],[UNKNOWN],[[id number]] [[id number]],[[id number]],[UNKNOWN],[[id number]]

## 2024-09-20 NOTE — DISCHARGE NOTE PROVIDER - HOSPITAL COURSE
HPI:  Patient is an 84-year-old female with a history of anemia, hypothyroidism, depression, GERD, bipolar, CKD , presents for redness and swelling on her face for the past few days.   Patient reports  right-sided facial swelling and redness including her neck and on her lower jaw that started on 9/8/24, worsening over the past few days. she reports  a dental exam for cracked teeth on day prior but no work performed . she reports no fevers. She was evaluated by her PMD on day of admission and subsequently referred to ENT who referred her to the hospital. She reports   ED course : evaluated by ENT s/p laryngoscope , no airway involvement   (11 Sep 2024 21:10)    Hospital Course: Patient was admitted for further medical management. CT neck without contrast revealed right facial cellulitis/myositis, without gross evidence of a drainable fluid collection. ENT consulted for further evaluation. Pt was being treated with IV abx for cellulitis. Pt states symptoms initially improved however ENT called back on 9/16 because pus was draining from the wound with no improvement of pain and swelling as per pt. Pt underwent bedside I&D with iodoform packing, Pt reports significant improvement of pain, swelling, and trismus. Currently able to tolerate soft diet.  culture with MRSA. S/p unasyn 9/11-9/18. Per ID - patient to continue doxycycline 100mg Q12h until 9/25.     Patient was transferred to telemetry unit for further workup for frequent falls. According to the patient, she has had multiple instances and some were witnessed by her friends and family where she is seemingly doing well, but then she would experience an "aura" before she starts to "zone out" and stare blankly prior to falling to the ground. The patient does not think that she loses consciousness during these episodes. Follows with Dr. Ackerman as an outpatient where she reportedly had a negative 1 week cardiac event monitor. MRI of the head was negative for acute findings. A TTE done on 9/18/24 showed a severely decreased LV EF of 30-35% and regional WMAs. ECG also showed a LBBB and 1st degree AV block. LBBB not present on ECG back in July 2022.      Given newly reduced EF compared to echo in May and multiple WMAs, with LBBB on ECG, recommend ischemic eval. Per documentation in chart, patient and son has refused this in the past. However, now may be amenable for workup. She may benefit from CRT pacing in the future, but has not been on adequate GDMT plus in the presence of an active infection, not a candidate for transvenous device at this time    As per EP conversation with son at bedside, they are agreeable for patient to have stress test. Continues to be uncertain about ILR after 4th discussion with patient and family. Another option would be to d/c on 30-day monitor, which the son says he prefers and then decide on ILR after 30 days is up.    Discharge/Dispo/Med rec discussed with attending Dr. ____. Patient medically cleared for discharge ____ with outpatient follow up     Important Medication Changes and Reason:    Active or Pending Issues Requiring Follow-up:  - Recommend f/u in ENT office as outpt. May see Dr. Glaser, Dr. Valentin, Dr. Cool, Dr. Barnett. Call [phone] to make an appointment.     Advanced Directives:   [ ] Full code  [ ] DNR  [ ] Hospice    Discharge Diagnoses:         HPI:  Patient is an 84-year-old female with a history of anemia, hypothyroidism, depression, GERD, bipolar, CKD , presents for redness and swelling on her face for the past few days.   Patient reports  right-sided facial swelling and redness including her neck and on her lower jaw that started on 9/8/24, worsening over the past few days. she reports  a dental exam for cracked teeth on day prior but no work performed . she reports no fevers. She was evaluated by her PMD on day of admission and subsequently referred to ENT who referred her to the hospital. She reports   ED course : evaluated by ENT s/p laryngoscope , no airway involvement   (11 Sep 2024 21:10)    Hospital Course: Patient was admitted for further medical management. CT neck without contrast revealed right facial cellulitis/myositis, without gross evidence of a drainable fluid collection. ENT consulted for further evaluation. Pt was being treated with IV abx for cellulitis. Pt states symptoms initially improved however ENT called back on 9/16 because pus was draining from the wound with no improvement of pain and swelling as per pt. Pt underwent bedside I&D with iodoform packing, Pt reports significant improvement of pain, swelling, and trismus. Currently able to tolerate soft diet.  culture with MRSA. S/p unasyn 9/11-9/18. Per ID - patient to continue doxycycline 100mg Q12h until 9/27.     Patient was transferred to telemetry unit for further workup for frequent falls. According to the patient, she has had multiple instances and some were witnessed by her friends and family where she is seemingly doing well, but then she would experience an "aura" before she starts to "zone out" and stare blankly prior to falling to the ground. The patient does not think that she loses consciousness during these episodes. Follows with Dr. Ackerman as an outpatient where she reportedly had a negative 1 week cardiac event monitor. MRI of the head was negative for acute findings. A TTE done on 9/18/24 showed a severely decreased LV EF of 30-35% and regional WMAs. ECG also showed a LBBB and 1st degree AV block. LBBB not present on ECG back in July 2022.      Given newly reduced EF compared to echo in May and multiple WMAs, with LBBB on ECG, recommend ischemic eval. Per documentation in chart, patient and son has refused this in the past. However, now may be amenable for workup. She may benefit from CRT pacing in the future, but has not been on adequate GDMT plus in the presence of an active infection, not a candidate for transvenous device at this time    Now s/p ILR implant. Post procedural instructions reviewed with patient and son (at bedside). Written instructions provided.     Discharge/Dispo/Med rec discussed with attending Dr. Snider. Patient medically cleared for discharge today with outpatient follow up     Important Medication Changes and Reason:    Active or Pending Issues Requiring Follow-up:  - Recommend f/u in ENT office as outpt. May see Dr. Glaser, Dr. Valentin, Dr. Cool, or Dr. Barnett. Call [phone] to make an appointment.    Advanced Directives:   [ ] Full code  [ ] DNR  [ ] Hospice    Discharge Diagnoses:

## 2024-09-20 NOTE — DISCHARGE NOTE PROVIDER - PROVIDER TOKENS
PROVIDER:[TOKEN:[[id number]],FOLLOWUP:[1 week]],FREE:[LAST:[Dr. Glaser],PHONE:[(   )    -],FAX:[(   )    -],ADDRESS:[Dr. Valentin, Dr. Cool, Dr. Barnett. Call [phone]]],PROVIDER:[TOKEN:[[id number]]] PROVIDER:[[id number]],FOLLOWUP:[1 week]],PROVIDER:[TOKEN:[[id number]]],FREE:[LAST:[Dr. Glaser],PHONE:[(   )    -],FAX:[(   )    -],ADDRESS:[Dr. Valentin, Dr. Cool, Dr. Barnett. Call [phone]]],PROVIDER:[TOKEN:[[id number]]] PROVIDER:[TOKEN:[[id number]],FOLLOWUP:[1 week]],PROVIDER:[TOKEN:[[id number]]],PROVIDER:[TOKEN:[[id number]]],FREE:[LAST:[Dr. Glaser],PHONE:[(   )    -],FAX:[(   )    -],ADDRESS:[Dr. Valentin, Dr. Cool, Dr. Barnett. Call [phone]]],PROVIDER:[TOKEN:[[id number]]]

## 2024-09-20 NOTE — PROGRESS NOTE ADULT - PROBLEM SELECTOR PROBLEM 1
Facial abscess
Facial cellulitis
Facial abscess

## 2024-09-20 NOTE — DISCHARGE NOTE PROVIDER - CARE PROVIDER_API CALL
Lionel Daniel  Internal Medicine  [address] [place] [address], [address]  [place] [address]  Phone: [phone]  Fax: [phone]  Follow Up Time: 1 week    Dr. Glaser,   Dr. Valentin, Dr. Cool, Dr. Barnett. Call [phone]  Phone: (   )    -  Fax: (   )    -  Follow Up Time:     Karlo Ackerman  Cardiovascular Disease  [address] [place] [address], [address]  [place] [address]  Phone: [phone]  Fax: [phone]  Follow Up Time:    Lionel Daniel  Internal Medicine  [address] [place] [address], [address]  [place] [address]  Phone: [phone]  Fax: [phone]  Follow Up Time: 1 week    Karlo Ackerman  Cardiovascular Disease  [address] [facility] [facility] [address]  [place] [address]  Phone: [phone]  Fax: [phone]  Follow Up Time:     Dr. Glaser,   Dr. Valentin, Dr. Cool, Dr. Barnett. Call [phone]  Phone: (   )    -  Fax: (   )    -  Follow Up Time:     Ash Graham  Infectious Disease  [address] [place] [address], [address]  [place] [address]  Phone: [phone]  Fax: [phone]  Follow Up Time:    Lionel Daniel  Internal Medicine  [address] [facility], [address]  [place] [address]  Phone: [phone]  Fax: [phone]  Follow Up Time: 1 week    Karlo Ackermanhen  Cardiovascular Disease  [address] [facility], [address]  [place] [address]  Phone: [phone]  Fax: [phone]  Follow Up Time:     Ash Graham  Infectious Disease  [address] [place] [address], [address]  [place] [address]  Phone: [phone]  Fax: [phone]  Follow Up Time:     Dr. Glaser,   Dr. Valentin, Dr. Cool, Dr. Barnett. Call [phone]  Phone: (   )    -  Fax: (   )    -  Follow Up Time:     Ab Tripathi  Nephrology  [address] [facility] [address]  [place] [address]  Phone: [phone]  Fax: [phone]  Follow Up Time:

## 2024-09-20 NOTE — PROGRESS NOTE ADULT - SUBJECTIVE AND OBJECTIVE BOX
Overnight events noted      VITAL:  T(C): , Max: 36.7 (09-19-24 @ 16:30)  T(F): , Max: 98 (09-19-24 @ 16:30)  HR: 90 (09-20-24 @ 11:45)  BP: 110/72 (09-20-24 @ 11:45)  BP(mean): --  RR: 18 (09-20-24 @ 11:45)  SpO2: 96% (09-20-24 @ 11:45)  Wt(kg): --      PHYSICAL EXAM:  Constitutional: NAD  HEENT: (+)R facial wound-dressed, surrounding erythema improving (reduced in size from 1-2 days ago)  Neck:  No JVD  Respiratory: CTAB/L  Cardiovascular: S1 and S2  Gastrointestinal: BS+, soft, NT/ND  Extremities: No peripheral edema  Neurological: A/O x 3, no focal deficits  : No Arnold  Skin: reduced area of R facial erythema    LABS:                        10.8   10.22 )-----------( 254      ( 20 Sep 2024 06:58 )             35.6     Na(144)/K(5.2)/Cl(110)/HCO3(19)/BUN(35)/Cr(3.02)Glu(58)/Ca(9.0)/Mg(--)/PO4(--)    09-20 @ 06:59  Na(142)/K(4.5)/Cl(111)/HCO3(20)/BUN(32)/Cr(3.01)Glu(96)/Ca(8.8)/Mg(--)/PO4(--)    09-19 @ 10:36      IMPRESSION: 84F w/ dementia, bipolar disorder, HFrEF, and CKD4-5, 9/11/24 p/w R facial cellulitis    (1)Renal - nonproteinuric CKD4 with atrophic R kidney. Grossly stable numbers of late  (2)Metabolic acidosis - now controlled, on standing PO NaHCO3  (3)HYperkalemia- mild/acceptable for now  (4)ID - R facial celluitis - improving; s/p IV Unasyn and now on IV Doxycycline - ENT + ID on board  (5)Neuro - "aura" - EEG negative for seizures       RECOMMEND:  (1)PO NaHCO3 as ordered  (2)Abx for GFR 15-20ml/min - present dosing is acceptable  (3)If for discharge, she can f/u at my office as previously scheduled on Wed 10/23/24 at 1040a              Ab Tripathi MD  [facility] [place]  Office/on call physician: [phone]  Cell (7a-7p): [phone]       Overnight events noted      VITAL:  T(C): , Max: 36.7 (09-19-24 @ 16:30)  T(F): , Max: 98 (09-19-24 @ 16:30)  HR: 90 (09-20-24 @ 11:45)  BP: 110/72 (09-20-24 @ 11:45)  BP(mean): --  RR: 18 (09-20-24 @ 11:45)  SpO2: 96% (09-20-24 @ 11:45)  Wt(kg): --      PHYSICAL EXAM:  Constitutional: NAD  HEENT: (+)R facial wound-dressed, surrounding erythema improving (reduced in size from 1-2 days ago)  Neck:  No JVD  Respiratory: CTAB/L  Cardiovascular: S1 and S2  Gastrointestinal: BS+, soft, NT/ND  Extremities: No peripheral edema  Neurological: A/O x 3, no focal deficits  : No Arnold  Skin: reduced area of R facial erythema    LABS:                        10.8   10.22 )-----------( 254      ( 20 Sep 2024 06:58 )             35.6     Na(144)/K(5.2)/Cl(110)/HCO3(19)/BUN(35)/Cr(3.02)Glu(58)/Ca(9.0)/Mg(--)/PO4(--)    09-20 @ 06:59  Na(142)/K(4.5)/Cl(111)/HCO3(20)/BUN(32)/Cr(3.01)Glu(96)/Ca(8.8)/Mg(--)/PO4(--)    09-19 @ 10:36      IMPRESSION: 84F w/ dementia, bipolar disorder, HFrEF, and CKD4-5, 9/11/24 p/w R facial cellulitis    (1)Renal - nonproteinuric CKD4 with atrophic R kidney. Grossly stable numbers of late  (2)Metabolic acidosis - now controlled, on standing PO NaHCO3  (3)HYperkalemia- mild/acceptable for now  (4)ID - R facial celluitis - improving; advanced to PO Doxycycline  (5)Neuro - "aura" - EEG negative for seizures       RECOMMEND:  (1)PO NaHCO3 as ordered  (2)Abx for GFR 15-20ml/min - present dosing is acceptable  (3)If for discharge, she can f/u at my office as previously scheduled on Wed 10/23/24 at 1040a              Ab Tripathi MD  [facility]  Office/on call physician: [phone]  Cell (7a-7p): [phone]       no pain, no sob  daughter at bedside      VITAL:  T(C): , Max: 36.7 (09-19-24 @ 16:30)  T(F): , Max: 98 (09-19-24 @ 16:30)  HR: 90 (09-20-24 @ 11:45)  BP: 110/72 (09-20-24 @ 11:45)  BP(mean): --  RR: 18 (09-20-24 @ 11:45)  SpO2: 96% (09-20-24 @ 11:45)  Wt(kg): --      PHYSICAL EXAM:  Constitutional: NAD  HEENT: (+)R facial wound-dressed  Neck:  No JVD  Respiratory: CTAB/L  Cardiovascular: S1 and S2  Gastrointestinal: BS+, soft, NT/ND  Extremities: No peripheral edema  Neurological: A/O x 3, no focal deficits  : No Arnold  Skin: reduced area of R facial erythema    LABS:                        10.8   10.22 )-----------( 254      ( 20 Sep 2024 06:58 )             35.6     Na(144)/K(5.2)/Cl(110)/HCO3(19)/BUN(35)/Cr(3.02)Glu(58)/Ca(9.0)/Mg(--)/PO4(--)    09-20 @ 06:59  Na(142)/K(4.5)/Cl(111)/HCO3(20)/BUN(32)/Cr(3.01)Glu(96)/Ca(8.8)/Mg(--)/PO4(--)    09-19 @ 10:36      IMPRESSION: 84F w/ dementia, bipolar disorder, HFrEF, and CKD4-5, 9/11/24 p/w R facial cellulitis    (1)Renal - nonproteinuric CKD4 with atrophic R kidney. Grossly stable numbers of late  (2)Metabolic acidosis - now controlled, on standing PO NaHCO3  (3)Hyperkalemia- mild/acceptable for now  (4)ID - R facial celluitis - improving; advanced to PO Doxycycline  (5)Neuro - "aura" - EEG negative for seizures   (6)Cardiac - HFrEF on echo - now planned for nuclear stress test    RECOMMEND:  (1)PO NaHCO3 as ordered  (2)Abx for GFR 15-20ml/min - present dosing is acceptable  (3)F/U nuclear stress test              bA Tripathi MD  [facility]  Office/on call physician: [phone]  Cell (7a-7j): [phone]

## 2024-09-20 NOTE — PROGRESS NOTE ADULT - ASSESSMENT
84 year old, A&Ox4 Female, with PMHx of HTN, CKD, hypothyroid, depression, and OA, presented to ED for 4 days of worsening right facial/neck swelling and pain, CT neck without contrast revealed right facial cellulitis/myositis, without gross evidence of a drainable fluid collection. ENT consulted for further evaluation. Pt was being treated with IV abx for cellulitis. Pt states symptoms initially improved however ENT called back on 9/16 because pus was draining from the wound with no improvement of pain and swelling as per pt. Pt underwent bedside I&D with iodoform packing, POD 4. Pt reports significant improvement of pain, swelling, and trismus. Currently able to tolerate soft diet. On exam, minimal pus noted on dressing, iodoform packing had fallen out overnight. No pus was expressed, wound was irrigated, and covered with gauze and tegaderm. Mild TTP. Erythema with minimal swelling/induration. No palpable fluctuance. Improving erythema on right platysma and anterior neck.

## 2024-09-20 NOTE — DISCHARGE NOTE PROVIDER - CARE PROVIDERS DIRECT ADDRESSES
,DirectAddress_Unknown,DirectAddress_Unknown,[email or web address] ,DirectAddress_Unknown,[email or web address],DirectAddress_Unknown,[email or web address] ,[email or web address],[email or web address],DirectAddress_Unknown,[email or web address]

## 2024-09-20 NOTE — PROGRESS NOTE ADULT - SUBJECTIVE AND OBJECTIVE BOX
CARDIOLOGY FOLLOW UP NOTE - DR. DAS    Patient Name: JOANNA MALDONADO    Date of Service: 09-20-24 @ 14:09    Patient seen and examined    Subjective:    cv: denies chest pain, dyspnea, palpitations, dizziness  pulmonary: denies cough  GI: denies abdominal pain, nausea, vomiting  vascular/legs: no edema   skin: no rash  ROS: otherwise negative   overnight events:      PHYSICAL EXAM:  T(C): 36.4 (09-20-24 @ 11:45), Max: 36.7 (09-19-24 @ 16:30)  HR: 90 (09-20-24 @ 11:45) (80 - 90)  BP: 110/72 (09-20-24 @ 11:45) (110/72 - 126/70)  RR: 18 (09-20-24 @ 11:45) (18 - 18)  SpO2: 96% (09-20-24 @ 11:45) (93% - 96%)  Wt(kg): --  I&O's Summary    19 Sep 2024 07:01  -  20 Sep 2024 07:00  --------------------------------------------------------  IN: 630 mL / OUT: 2600 mL / NET: -1970 mL      Daily     Daily     Appearance: Normal	  Cardiovascular: Normal S1 S2,RRR, No JVD, No murmurs  Respiratory: Lungs clear to auscultation	  Gastrointestinal:  Soft, Non-tender, + BS	  Extremities: Normal range of motion, No clubbing, cyanosis or edema      Home Medications:  acetaminophen 325 mg oral tablet: 2 tab(s) orally every 6 hours as needed for  mild pain (11 Sep 2024 21:40)  buPROPion 150 mg/24 hours (XL) oral tablet, extended release: 1 tab(s) orally once a day (11 Sep 2024 21:40)  divalproex sodium 250 mg oral tablet, extended release: 1 tab(s) orally once a day (11 Sep 2024 21:40)  divalproex sodium 500 mg oral tablet, extended release: 1 tab(s) orally once a day (at bedtime) (11 Sep 2024 21:38)  lactulose 10 g/15 mL oral syrup: 30 milliliter(s) orally every 8 hours as needed for  constipation (11 Sep 2024 21:40)  levothyroxine 88 mcg (0.088 mg) oral tablet: 1 tab(s) orally once a day (11 Sep 2024 21:39)  LORazepam 1 mg oral tablet: 1 tab(s) orally once a day (at bedtime) (11 Sep 2024 21:40)  Melatonin 3 mg oral tablet: 1 tab(s) orally once a day (at bedtime) (11 Sep 2024 21:40)  polyethylene glycol 3350 oral powder for reconstitution: 17 gram(s) orally once a day (11 Sep 2024 21:40)  senna leaf extract oral tablet: 2 tab(s) orally once a day (at bedtime) (11 Sep 2024 21:40)  venlafaxine 75 mg oral capsule, extended release: 375 milligram(s) orally once a day (11 Sep 2024 21:39)      MEDICATIONS  (STANDING):  buPROPion XL (24-Hour) . 150 milliGRAM(s) Oral daily  chlorhexidine 2% Cloths 1 Application(s) Topical daily  diphenhydrAMINE 25 milliGRAM(s) Oral once  divalproex  milliGRAM(s) Oral daily  divalproex  milliGRAM(s) Oral at bedtime  doxycycline monohydrate Capsule 100 milliGRAM(s) Oral every 12 hours  heparin   Injectable 5000 Unit(s) SubCutaneous every 12 hours  influenza  Vaccine (HIGH DOSE) 0.5 milliLiter(s) IntraMuscular once  levothyroxine 88 MICROGram(s) Oral daily  LORazepam     Tablet 1 milliGRAM(s) Oral at bedtime  mupirocin 2% Nasal 1 Application(s) Both Nostrils two times a day  pantoprazole    Tablet 40 milliGRAM(s) Oral before breakfast  sodium bicarbonate 1300 milliGRAM(s) Oral three times a day  venlafaxine 75 milliGRAM(s) Oral daily  venlafaxine 300 milliGRAM(s) Oral daily      TELEMETRY: 	    ECG:  	  RADIOLOGY:   DIAGNOSTIC TESTING:  [ ] Echocardiogram:  [ ] Catheterization:  [ ] Stress Test:    OTHER: 	    LABS:	 	    CARDIAC MARKERS:                                      10.8   10.22 )-----------( 254      ( 20 Sep 2024 06:58 )             35.6     09-20    144  |  110[H]  |  35[H]  ----------------------------<  58[L]  5.2   |  19[L]  |  3.02[H]    Ca    9.0      20 Sep 2024 06:59    TPro  6.3  /  Alb  3.4  /  TBili  0.2  /  DBili  x   /  AST  8[L]  /  ALT  7[L]  /  AlkPhos  80  09-19    proBNP:     Lipid Profile:   HgA1c:     Creatinine: 3.02 mg/dL (09-20-24 @ 06:59)  Creatinine: 3.01 mg/dL (09-19-24 @ 10:36)

## 2024-09-20 NOTE — PROGRESS NOTE ADULT - SUBJECTIVE AND OBJECTIVE BOX
24H hour events: No acute overnight events. Telemetry unremarkable    MEDICATIONS:  heparin   Injectable 5000 Unit(s) SubCutaneous every 12 hours  doxycycline monohydrate Capsule 100 milliGRAM(s) Oral every 12 hours  acetaminophen     Tablet .. 650 milliGRAM(s) Oral every 6 hours PRN  buPROPion XL (24-Hour) . 150 milliGRAM(s) Oral daily  diphenhydrAMINE 25 milliGRAM(s) Oral once  divalproex  milliGRAM(s) Oral daily  divalproex  milliGRAM(s) Oral at bedtime  LORazepam     Tablet 1 milliGRAM(s) Oral at bedtime  melatonin 3 milliGRAM(s) Oral at bedtime PRN  venlafaxine 75 milliGRAM(s) Oral daily  venlafaxine 300 milliGRAM(s) Oral daily  pantoprazole    Tablet 40 milliGRAM(s) Oral before breakfast  polyethylene glycol 3350 17 Gram(s) Oral daily PRN  senna 2 Tablet(s) Oral at bedtime PRN  levothyroxine 88 MICROGram(s) Oral daily  chlorhexidine 2% Cloths 1 Application(s) Topical daily  influenza  Vaccine (HIGH DOSE) 0.5 milliLiter(s) IntraMuscular once  mupirocin 2% Nasal 1 Application(s) Both Nostrils two times a day  sodium bicarbonate 1300 milliGRAM(s) Oral three times a day    REVIEW OF SYSTEMS:  See HPI, otherwise ROS negative.    PHYSICAL EXAM:  T(C): 36.7 (09-20-24 @ 06:03), Max: 36.8 (09-19-24 @ 12:55)  HR: 84 (09-20-24 @ 06:03) (80 - 85)  BP: 123/63 (09-20-24 @ 06:03) (123/63 - 133/71)  RR: 18 (09-20-24 @ 06:03) (18 - 18)  SpO2: 93% (09-20-24 @ 06:03) (93% - 96%)  Wt(kg): --  I&O's Summary    19 Sep 2024 07:01  -  20 Sep 2024 07:00  --------------------------------------------------------  IN: 630 mL / OUT: 2600 mL / NET: -1970 mL    Appearance: Alert. NAD	  HEENT:   NC/AT	  Cardiovascular: +S1S2  Respiratory: CTA B/L	  Psychiatry: A & O x 3, Mood & affect appropriate  Neurologic: Non-focal  Extremities: No edema BLE  Vascular: Peripheral pulses palpable 2+ bilaterally    LABS:	 	  CBC Full  -  ( 20 Sep 2024 06:58 )  WBC Count : 10.22 K/uL  Hemoglobin : 10.8 g/dL  Hematocrit : 35.6 %  Platelet Count - Automated : 254 K/uL  Mean Cell Volume : 100.0 fl  Mean Cell Hemoglobin : 30.3 pg  Mean Cell Hemoglobin Concentration : 30.3 gm/dL  Auto Neutrophil # : x  Auto Lymphocyte # : x  Auto Monocyte # : x  Auto Eosinophil # : x  Auto Basophil # : x  Auto Neutrophil % : x  Auto Lymphocyte % : x  Auto Monocyte % : x  Auto Eosinophil % : x  Auto Basophil % : x    09-20    144  |  110[H]  |  35[H]  ----------------------------<  58[L]  5.2   |  19[L]  |  3.02[H]  09-19    142  |  111[H]  |  32[H]  ----------------------------<  96  4.5   |  20[L]  |  3.01[H]    Ca    9.0      20 Sep 2024 06:59  Ca    8.8      19 Sep 2024 10:36    TPro  6.3  /  Alb  3.4  /  TBili  0.2  /  DBili  x   /  AST  8[L]  /  ALT  7[L]  /  AlkPhos  80  09-19    TELEMETRY: SR 80-90s  	    ECHO:  TRANSTHORACIC ECHOCARDIOGRAM REPORT  ________________________________________________________________________________                                      _______       Pt. Name:       JOANNA MALDONADO Study Date:    9/18/2024  MRN:            [id number]        YOB: 1940  Accession #:    [id number]       Age:           84 years  Account#:       [id number]    Gender:        F  Heart Rate:                     Height:        65.00 in (165.10 cm)  Rhythm:                         Weight:        144.00 lb (65.32 kg)  Blood Pressure: 146/81 mmHg     BSA/BMI:       1.72 m² / 23.96 kg/m²  ________________________________________________________________________________________  Referring Physician:    [id number] Mina Rizo  Interpreting Physician: Lc Escobedo M.D.  Primary Sonographer:    Tami Storm [facility]  Fellow (Performing):    Lucas Matt  Fellow (Interpreting):  Lucas Matt    CPT:                ECHO TTE WITH CON COMP W DOPP - .m;DEFINITY ECHO              CONTRAST PER ML - .m;DEFINITY ECHO CONTRAST PER ML                      WASTED - .m  Indication(s):      Syncope and collapse - R55  Procedure:          Transthoracic echocardiogram with 2-D, M-mode and complete      spectral and color flow Doppler.  Ordering Location:  [facility]  Admission Status:   Inpatient  Contrast Injection: Verbal consent was obtained for injection of Ultrasonic                      Enhancing Agent following a discussion of risks and                benefits.                      Endocardial visualization enhanced with 2 ml of Definity                      Ultrasound enhancing agent (Lot#:[id number] Exp.Date:06/2025                      Discarded Dose:8ml).  UEA Reaction:       Patient had no adverse reaction after injection of                      Ultrasound Enhancing Agent.  Study Information:  Image quality for this study is technically difficult.    _______________________________________________________________________________________     CONCLUSIONS:      1. Left ventricular systolic function is severely decreased with an ejection fraction visually estimated at 30 to 35 %. Regional wall motion abnormalities present.   2. Multiple segmental abnormalities exist. See findings.   3. Normal right ventricular cavity size and normal right ventricular systolic function. Tricuspid annular plane systolic excursion (TAPSE) is 2.9 cm (normal >=1.7 cm). Tricuspid annular tissue Doppler S' is 17.0 cm/s (normal >10 cm/s).   4. No pericardial effusion seen.   5. Estimated pulmonary artery systolic pressure is 24 mmHg.   6. No prior echocardiogram is available for comparison.   7. Technically difficult image quality.   8. The inferior vena cava is normal in size (normal <2.1cm) withnormal inspiratory collapse (normal >50%) consistent with normal right atrial pressure (~3, range 0-5mmHg).    ________________________________________________________________________________________  FINDINGS:     Left Ventricle:  The left ventricular cavity is small. Left ventricular wall thickness is normal. Left ventricular systolic function is severely decreased with an ejection fraction visually estimated at 30 to 35%. There are regional wall motion abnormalities present. There is mild (grade 1) left ventricular diastolic dysfunction, with normal left ventricular filling pressure.  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 The entire inferior wall, mid and  apical inferior septum, mid inferolateral segment, and apical lateral segment  are akinetic. The entire anterior wall, basal and mid anterolateral wall, basal  and mid anterior septum, basal inferoseptal segment, and basal inferolateral  segment are hypokinetic.          Right Ventricle:  The right ventricular cavity is normal in size and right ventricular systolic function is normal. Tricuspid annular plane systolic excursion (TAPSE) is 2.9 cm (normal >=1.7 cm). Tricuspid annular tissue Doppler S' is 17.0 cm/s (normal >10 cm/s).     Left Atrium:  The left atrium is normal in size.     Right Atrium:  The right atrium is normal in size with an indexed area of 7.56 cm²/m².     Aortic Valve:  The aortic valve appears trileaflet with normal systolic excursion. There is mild aortic stenosis. There is mild aortic regurgitation.     Mitral Valve:  There is mitral valve thickening of the anterior and posterior leaflets. There is moderate posterior calcification of the mitral valve annulus. There is no evidence of mitral valve prolapse. There is mild mitral regurgitation.     Tricuspid Valve:  Structurally normal tricuspid valve with normal leaflet excursion. There is trace tricuspid regurgitation. Estimated pulmonary artery systolic pressure is 24 mmHg.     Pulmonic Valve:  The pulmonic valve was not well visualized. Thereis trace pulmonic regurgitation.     Pericardium:  No pericardial effusion seen.     Systemic Veins:  The inferior vena cava is normal in size (normal <2.1cm) with normal inspiratory collapse (normal >50%) consistent with normal right atrial pressure(~3, range 0-5mmHg).  ____________________________________________________________________  QUANTITATIVE DATA:  Left Ventricle Measurements: (Indexed to BSA)     IVSd (2D):   1.0 cm  LVPWd (2D):  0.8 cm  LVIDd (2D):  3.1 cm  LVIDs (2D):  2.5 cm  LV Mass:     71 g   41.3 g/m²  Visualized LV EF%: 30 to 35%     MV E Vmax:    1.78 m/s  e' lateral:   17.00 cm/s  e' medial:    17.00 cm/s  E/e' lateral: 10.47  E/e' medial:  10.47  E/e' Average: 10.47    Aorta Measurements: (Normal range) (Indexed to BSA)     Ao Root d 3.10 cm (2.7 - 3.3 cm) 1.80 cm/m²            Left Atrium Measurements: (Indexed to BSA)  LA Diam 2D: 3.30 cm         Right Ventricle Measurements:     TAPSE: 2.9 cm       LVOT / RVOT/ Qp/Qs Data: (Indexed to BSA)  LVOT Vmax:      1.09 m/s  LVOT Vmn:       0.790 m/s  LVOT VTI:       18.80 cm  LVOT peak grad: 5 mmHg  LVOT mean grad: 2.2 mmHg    Aortic Valve Measurements:  AV Vmax:                2.6 m/s  AV Peak Gradient:       26.8 mmHg  AV Mean Gradient:       15.0 mmHg  AV VTI:             44.6 cm  AV VTI Ratio:           0.42  AoV Dimensionless Index 0.42  AR Vmax                 3.85 m/s  AR PHT                  461 msec  AR Weber                2.45 m/s²    Mitral Valve Measurements:     MV E Vmax: 1.8 m/s       Tricuspid Valve Measurements:     TV S'             17.0 cm/s  TR Vmax:          2.3 m/s  TR Peak Gradient: 21.0 mmHg  RA Pressure:      3 mmHg  PASP:             24 mmHg    ________________________________________________________________________________________  Electronically signed on 9/18/2024 at 11:40:04 AM by Lc Escobedo M.D.

## 2024-09-20 NOTE — PROGRESS NOTE ADULT - NS ATTEND AMEND GEN_ALL_CORE FT
ENT following for facial abscess     84 year old, A&Ox4 Female, with PMHx of HTN, CKD, hypothyroid, depression, and OA, presented to ED for 4 days of worsening right facial/neck swelling and pain. At ED, labs are significant for leucocytosis (WBC 22.22) and creatinine of 3.03.     9/11/24 As per radiology CT neck without contrast shows Right facial cellulitis/myositis, without gross evidence of a drainable fluid collection.    ENT consulted for further evaluation. Per patient, on Saturday, she went for a dental consultation for cracked tooth. No procedure was done. Then pt started noticing right facial swelling on Saturday evening. Pt presented to PCP, who later referred her to ENT, when swelling progressively worsened along with new onset of trismus. outpatient ENT sent her to ED for CT scan and iv antibiotics. Pt symptoms initially improved however ENT called back because pus is now draining from the wound and pt states pain and swelling remains the same.     9/16/24 Now s/p I&D of facial abscess right submandibular area. This morning blood and pus expressed, wound irrigated and iodoform packing replaced. Surrounding area w/ +ttp, erythema with swelling/induration, warm to touch. Afebrile. WBC 10.41    Physical exam shows No pus was expressed, wound was irrigated, and covered with gauze and tegaderm. Mild TTP. Erythema with minimal swelling/induration. No palpable fluctuance. Improving erythema on right platysma and anterior neck.     Recommend:  Facial Abscess / Cellulitis   - Abx as per ID  - pain control prn  - Recommend f/u in ENT office as outpt. May see Dr. Glaser, Dr. Valentin, Dr. Cool, Dr. Barnett. Call [phone] to make an appointment.  - No further ENT intervention required at this time, please call back if needed.

## 2024-09-20 NOTE — PROGRESS NOTE ADULT - ASSESSMENT
Patient is an 84-year-old female with a history of anemia, hypothyroidism, depression, GERD, bipolar, CKD who presents for redness and swelling on her face for the past few days. Patient reports right-sided facial swelling and redness including her neck and on her lower jaw that started on 9/8/24, worsening over the past few days. She reports  a dental exam for cracked teeth on day prior but no work performed. States someone told her she had 2 small pimple like lesion on her face, currently scabbed over.    R facial cellulitis with abscess with MRSA  - initial CT neck soft tissue with R facial cellulitis/myositis, no e/o drainable fluid collection  - s/p dexamethasone x24h  - 9/16 noted with increased induration and expressible pus -- s/p I&D by ENT - cx with MRSA   -- s/p irrigation this am, no purulence expressed, no need for further packing   - WBC normalized, remains afebrile, much improved     s/p unasyn 9/11-9/18    Recommendations:  Continue doxycycline 100mg Q12h until 9/25 - changed to PO today  Local wound care per ENT  Contact isolation per IC protocol  Continue rest of care per primary team     Stable from ID standpoint at this time.   Discharge planning per primary team.     D/w ENT; JAKE Locke; FILIBERTO Graham M.D.  [facility], Division of Infectious Diseases  [phone]  After 5pm on weekdays and all day on weekends - please call [phone]  Available on [employer] TEAMS

## 2024-09-20 NOTE — PROGRESS NOTE ADULT - SUBJECTIVE AND OBJECTIVE BOX
ENT ISSUE/POD: right facial abscess, s/p I&D POD 4    HPI: 84 year old, A&Ox4 Female, with PMHx of HTN, CKD, hypothyroid, depression, and OA, presented to ED for 4 days of worsening right facial/neck swelling and pain, CT neck without contrast revealed right facial cellulitis/myositis, without gross evidence of a drainable fluid collection. ENT consulted for further evaluation. Pt was being treated with IV abx for cellulitis. Pt states symptoms initially improved however ENT called back on 9/16 because pus was draining from the wound with no improvement of pain and swelling as per pt. Pt underwent bedside I&D with iodoform packing, POD 4. Pt reports significant improvement of pain, swelling, and trismus. Currently able to tolerate soft diet. As per pt, dressing and packing fell out overnight while pt was sleeping. Pt denies SOB, fevers, cough, dysphagia. Prelim culture shows MRSA.        PAST MEDICAL & SURGICAL HISTORY:  Depression      Hypothyroid      Kidney disease      Cataract      History of rectal surgery      History of tonsillectomy        Allergies    azithromycin (Rash)    Intolerances      MEDICATIONS  (STANDING):  buPROPion XL (24-Hour) . 150 milliGRAM(s) Oral daily  chlorhexidine 2% Cloths 1 Application(s) Topical daily  diphenhydrAMINE 25 milliGRAM(s) Oral once  divalproex  milliGRAM(s) Oral daily  divalproex  milliGRAM(s) Oral at bedtime  doxycycline monohydrate Capsule 100 milliGRAM(s) Oral every 12 hours  heparin   Injectable 5000 Unit(s) SubCutaneous every 12 hours  influenza  Vaccine (HIGH DOSE) 0.5 milliLiter(s) IntraMuscular once  levothyroxine 88 MICROGram(s) Oral daily  LORazepam     Tablet 1 milliGRAM(s) Oral at bedtime  mupirocin 2% Nasal 1 Application(s) Both Nostrils two times a day  pantoprazole    Tablet 40 milliGRAM(s) Oral before breakfast  sodium bicarbonate 1300 milliGRAM(s) Oral three times a day  venlafaxine 300 milliGRAM(s) Oral daily  venlafaxine 75 milliGRAM(s) Oral daily    MEDICATIONS  (PRN):  acetaminophen     Tablet .. 650 milliGRAM(s) Oral every 6 hours PRN Temp greater or equal to 38C (100.4F), Mild Pain (1 - 3)  melatonin 3 milliGRAM(s) Oral at bedtime PRN Insomnia  polyethylene glycol 3350 17 Gram(s) Oral daily PRN Constipation  senna 2 Tablet(s) Oral at bedtime PRN Constipation      Social History: see consult    Family history: see consult    ROS:   ENT: all negative except as noted in HPI   Pulm: denies SOB, cough, hemoptysis  Neuro: denies numbness/tingling, loss of sensation  Endo: denies heat/cold intolerance, excessive sweating      Vital Signs Last 24 Hrs  T(C): 36.7 (20 Sep 2024 06:03), Max: 36.8 (19 Sep 2024 12:55)  T(F): 98 (20 Sep 2024 06:03), Max: 98.2 (19 Sep 2024 12:55)  HR: 84 (20 Sep 2024 06:03) (80 - 85)  BP: 123/63 (20 Sep 2024 06:03) (123/63 - 133/71)  BP(mean): --  RR: 18 (20 Sep 2024 06:03) (18 - 18)  SpO2: 93% (20 Sep 2024 06:03) (93% - 96%)    Parameters below as of 20 Sep 2024 06:03  Patient On (Oxygen Delivery Method): room air                              10.8   10.22 )-----------( 254      ( 20 Sep 2024 06:58 )             35.6    09-20    144  |  110[H]  |  35[H]  ----------------------------<  58[L]  5.2   |  19[L]  |  3.02[H]    Ca    9.0      20 Sep 2024 06:59    TPro  6.3  /  Alb  3.4  /  TBili  0.2  /  DBili  x   /  AST  8[L]  /  ALT  7[L]  /  AlkPhos  80  09-19       PHYSICAL EXAM:  Gen: NAD  Skin: improving right facial and neck erythema  Head: Normocephalic, Atraumatic  Face: Minimal pus noted on dressing, iodoform packing had fallen out overnight. No pus was expressed, wound was irrigated, and covered with gauze and tegaderm. Mild TTP. Erythema with minimal swelling/induration. No palpable fluctuance.   Eyes: no scleral injection  Nose: Nares bilaterally patent, no discharge  Mouth: No pus expressed from Stensen's and Piedmont's duct. No tenderness on gum or teeth on palpation. No Stridor / Drooling / Trismus. Oropharynx clear. Mucosa moist.   Neck: improving erythema on right platysma and anterior neck,  trachea midline, no masses  Lymphatic: No lymphadenopathy  Resp: breathing comfortably at room air, no stridor  Neuro: facial nerve intact, no facial droop

## 2024-09-20 NOTE — DISCHARGE NOTE PROVIDER - NSDCFUADDAPPT_GEN_ALL_CORE_FT
APPTS ARE READY TO BE MADE: [x] YES    Best Family or Patient Contact (if needed):    Additional Information about above appointments (if needed):    1:   2:   3:     Other comments or requests:    Follow up at Dr. Tripathi's office 10/23/24 as previously scheduled      APPTS ARE READY TO BE MADE: [x] YES    Best Family or Patient Contact (if needed):    Additional Information about above appointments (if needed):    1:   2:   3:     Other comments or requests:    Follow up at Dr. Tripathi's office 10/23/24 as previously scheduled      APPTS ARE READY TO BE MADE: [x] YES    Best Family or Patient Contact (if needed):    Additional Information about above appointments (if needed):    1:   2:   3:     Other comments or requests:     Patient is being discharged to [facility]. Caregiver will arrange follow up.

## 2024-09-20 NOTE — PROGRESS NOTE ADULT - ASSESSMENT
A/P    Patient is an 84-year-old female with a history of anemia, hypothyroidism, depression, GERD, bipolar, CKD, presents for redness and swelling on her face for the past few days.     #Right facial swelling and redness, Leukocytosis   -improving  -CT Neck 9/11/24 shows Right facial cellulitis/myositis, without gross evidence of a drainable fluid collection.  -CXR 9/11/24 shows Clear lungs  -ENT and ID following  -sp I&D of abscess 9/16 - cx + MRSA  -abx per ID    #CKD  -Trend Creat  -nephrology f/u    #Hx of frequent falls with lbbb. cmp  -pt describes random "zoning out/staring episodes" and then falling (has been occurring for about 2.5 years per pt)  -pt denies LOC during episodes; denies CP or SOB  -orthostatics negative  -MR Head 9/17/24 shows no intracranial mass, acute territorial infarct, acute intracranial hemorrhage, or midline shift.  -TTE 9/18/24 shows severely decreased LVSF with EF 30-35%; regional WMA; There is mild (grade 1) left ventricular diastolic dysfunction, with normal left ventricular filling pressure.   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 The entire inferior wall, mid and apical inferior septum, mid inferolateral segment, and apical lateral segment are akinetic. The entire anterior wall, basal and mid anterolateral wall, basal and mid anterior septum, basal inferoseptal segment, and basal inferolateral segment are hypokinetic.  -check tele  -neuro f/u  -EKG shows NSR with first degree AV block and LBBB, no acute ischemic changes  -plan for stress, family now agreeable to proceed  -sean eval noted, family initially considering only ext holter but dtr seems more agreeable to ILR  -will decide after stress    #Aortic Stenosis  -cont to monitor    #Cardiomyopathy, HFrEF  -outpatient TTE 5/24 shows LV systolic function is moderately reduced, moderate global hypokinesis; EF 40%; mild to moderate aortic regurgitation; moderate aortic stenosis  -Repeat TTE as above  -no evidence of volume overload/HF  -no diuretic need  -per outpt visit, patient and son do not want to proceed with ischemic eval and do not want to start lv dysfxn medications as she remains asymptomatic  -family now agreeable to nuclear stress as ischemic eval   -d/w dtr at bedside if stress abnl cath would pose risk of kathi givne age, dec gfr  -start toprol xl 12 5qd    dvt ppx    55 minutes spent on total encounter; more than 50% of the visit was spent counseling and/or coordinating care by the attending physician.

## 2024-09-20 NOTE — DISCHARGE NOTE PROVIDER - NSDCCPCAREPLAN_GEN_ALL_CORE_FT
PRINCIPAL DISCHARGE DIAGNOSIS  Diagnosis: Cellulitis  Assessment and Plan of Treatment: Take all of your antibiotics as ordered.  Call your Health Care Provider within two days of arriving home to make a follow up appointment within one week.  If the affected cellulitic area increases in redness, warmth, pain or swelling call your Health Care Provider.  If you develop fever, chills, and/or malaise, call your Health Care Provider.        SECONDARY DISCHARGE DIAGNOSES  Diagnosis: Frequent falls  Assessment and Plan of Treatment: Continue close outpatient follow up with cardiologist    Diagnosis: Heart failure with reduced ejection fraction  Assessment and Plan of Treatment: echocardiogram noted with EF of 30-35%  please continue close follow up with cardiologist     PRINCIPAL DISCHARGE DIAGNOSIS  Diagnosis: Cellulitis  Assessment and Plan of Treatment: Take all of your antibiotics as ordered.  Call your Health Care Provider within two days of arriving home to make a follow up appointment within one week.  If the affected cellulitic area increases in redness, warmth, pain or swelling call your Health Care Provider.  If you develop fever, chills, and/or malaise, call your Health Care Provider.        SECONDARY DISCHARGE DIAGNOSES  Diagnosis: Frequent falls  Assessment and Plan of Treatment: Continue close outpatient follow up with cardiologist  Physical therapy    Diagnosis: Heart failure with reduced ejection fraction  Assessment and Plan of Treatment: - Given newly reduced EF compared to echo in May and Stress test with infarct, no ischemia, you may benefit from CRT pacing in the future, but has not been on adequate GDMT plus in the presence of an active infection, not a candidate for transvenous device at this time. Pt and family agreeable to ILR implant.   Now s/p ILR implant. Post procedural instructions reviewed with patient and son (at bedside). Written instructions provided.   Please continue close follow up with cardiologist  Weigh yourself daily.  If you gain 3lbs in 3 days, or 5lbs in a week call your Health Care Provider.  Do not eat or drink foods containing more than 2000mg of salt (sodium) in your diet every day.  Call your Health Care Provider if you have any swelling or increased swelling in your feet, ankles, and/or stomach.  Take all of your medication as directed.  If you become dizzy call your Health Care Provider.

## 2024-09-20 NOTE — PROGRESS NOTE ADULT - SUBJECTIVE AND OBJECTIVE BOX
[facility] DIVISION OF INFECTIOUS DISEASES  LOUANN Dubois Y. Patel, S. Shah, G. Casimir  [phone]  ([phone] - weekdays after 5pm and weekends)    Name: JOANNA MALDONADO  Age/Gender: 84y Female  MRN: [id number]    Interval History:  Patient seen and examined this morning.   Feels much better, no new complaints.   Notes reviewed  No concerning overnight events  Afebrile   Allergies: azithromycin (Rash)      Objective:  Vitals:   T(F): 98 (09-20-24 @ 06:03), Max: 98.2 (09-19-24 @ 12:55)  HR: 84 (09-20-24 @ 06:03) (80 - 85)  BP: 123/63 (09-20-24 @ 06:03) (123/63 - 133/71)  RR: 18 (09-20-24 @ 06:03) (18 - 18)  SpO2: 93% (09-20-24 @ 06:03) (93% - 96%)  Physical Examination:  General: no acute distress, nontoxic appearing   HEENT: NC/AT, anicteric, EOMI  Respiratory: no acc muscle use, breathing comfortably  Cardiovascular: S1 and S2 present  Gastrointestinal: normal appearing, nondistended  Extremities: no edema, no cyanosis  Skin: R lower face dressing, dec swelling, no erythema or TTP    Laboratory Studies:  CBC:                       10.8   10.22 )-----------( 254      ( 20 Sep 2024 06:58 )             35.6     WBC Trend:  10.22 09-20-24 @ 06:58  9.90 09-19-24 @ 10:35  10.41 09-17-24 @ 08:31  11.49 09-16-24 @ 06:48  11.90 09-15-24 @ 07:46  12.58 09-14-24 @ 07:16    CMP: 09-20    144  |  110[H]  |  35[H]  ----------------------------<  58[L]  5.2   |  19[L]  |  3.02[H]    Ca    9.0      20 Sep 2024 06:59    TPro  6.3  /  Alb  3.4  /  TBili  0.2  /  DBili  x   /  AST  8[L]  /  ALT  7[L]  /  AlkPhos  80  09-19    Creatinine: 3.02 mg/dL (09-20-24 @ 06:59)  Creatinine: 3.01 mg/dL (09-19-24 @ 10:36)  Creatinine: 2.82 mg/dL (09-17-24 @ 08:32)  Creatinine: 3.10 mg/dL (09-16-24 @ 06:48)  Creatinine: 3.36 mg/dL (09-15-24 @ 07:36)  Creatinine: 3.15 mg/dL (09-14-24 @ 07:04)    LIVER FUNCTIONS - ( 19 Sep 2024 10:36 )  Alb: 3.4 g/dL / Pro: 6.3 g/dL / ALK PHOS: 80 U/L / ALT: 7 U/L / AST: 8 U/L / GGT: x           Microbiology: reviewed   Culture - Other (collected 09-16-24 @ 11:09)  Source: Drainage Drainage  Preliminary Report (09-18-24 @ 17:30):    Numerous Methicillin Resistant Staphylococcus aureus  Organism: Methicillin resistant Staphylococcus aureus (09-18-24 @ 06:57)  Organism: Methicillin resistant Staphylococcus aureus (09-18-24 @ 06:57)      Method Type: EDYTA      -  Clindamycin: S <=0.25      -  Daptomycin: S 1      -  Erythromycin: R >4      -  Gentamicin: S <=1 Should not be used as monotherapy      -  Linezolid: S 4      -  Oxacillin: R >2      -  Penicillin: R >8      -  Rifampin: S <=1 Should not be used as monotherapy      -  Tetracycline: S <=1      -  Trimethoprim/Sulfamethoxazole: S <=0.5/9.5      -  Vancomycin: S 2    Culture - Blood (collected 09-11-24 @ 18:40)  Source: .Blood Blood-Peripheral  Final Report (09-17-24 @ 02:00):    No growth at 5 days    Culture - Blood (collected 09-11-24 @ 18:30)  Source: .Blood Blood-Peripheral  Final Report (09-17-24 @ 02:00):    No growth at 5 days    SARS-CoV-2 Result: NotDetec (17 Sep 2024 13:35)    Radiology: reviewed     Medications:  acetaminophen     Tablet .. 650 milliGRAM(s) Oral every 6 hours PRN  buPROPion XL (24-Hour) . 150 milliGRAM(s) Oral daily  chlorhexidine 2% Cloths 1 Application(s) Topical daily  diphenhydrAMINE 25 milliGRAM(s) Oral once  divalproex  milliGRAM(s) Oral daily  divalproex  milliGRAM(s) Oral at bedtime  doxycycline IVPB 100 milliGRAM(s) IV Intermittent every 12 hours  heparin   Injectable 5000 Unit(s) SubCutaneous every 12 hours  influenza  Vaccine (HIGH DOSE) 0.5 milliLiter(s) IntraMuscular once  levothyroxine 88 MICROGram(s) Oral daily  LORazepam     Tablet 1 milliGRAM(s) Oral at bedtime  melatonin 3 milliGRAM(s) Oral at bedtime PRN  mupirocin 2% Nasal 1 Application(s) Both Nostrils two times a day  pantoprazole    Tablet 40 milliGRAM(s) Oral before breakfast  polyethylene glycol 3350 17 Gram(s) Oral daily PRN  senna 2 Tablet(s) Oral at bedtime PRN  sodium bicarbonate 1300 milliGRAM(s) Oral three times a day  venlafaxine 300 milliGRAM(s) Oral daily  venlafaxine 75 milliGRAM(s) Oral daily    Current Antimicrobials:  doxycycline IVPB 100 milliGRAM(s) IV Intermittent every 12 hours    Prior/Completed Antimicrobials:  ampicillin/sulbactam  IVPB

## 2024-09-21 LAB
ANION GAP SERPL CALC-SCNC: 15 MMOL/L — SIGNIFICANT CHANGE UP (ref 5–17)
BUN SERPL-MCNC: 40 MG/DL — HIGH (ref 7–23)
CALCIUM SERPL-MCNC: 9.1 MG/DL — SIGNIFICANT CHANGE UP (ref 8.4–10.5)
CHLORIDE SERPL-SCNC: 111 MMOL/L — HIGH (ref 96–108)
CO2 SERPL-SCNC: 19 MMOL/L — LOW (ref 22–31)
CREAT SERPL-MCNC: 3.19 MG/DL — HIGH (ref 0.5–1.3)
CULTURE RESULTS: ABNORMAL
EGFR: 14 ML/MIN/1.73M2 — LOW
GLUCOSE SERPL-MCNC: 60 MG/DL — LOW (ref 70–99)
HCT VFR BLD CALC: 34.9 % — SIGNIFICANT CHANGE UP (ref 34.5–45)
HGB BLD-MCNC: 10.9 G/DL — LOW (ref 11.5–15.5)
MCHC RBC-ENTMCNC: 31.2 GM/DL — LOW (ref 32–36)
MCHC RBC-ENTMCNC: 31.3 PG — SIGNIFICANT CHANGE UP (ref 27–34)
MCV RBC AUTO: 100.3 FL — HIGH (ref 80–100)
NRBC # BLD: 0 /100 WBCS — SIGNIFICANT CHANGE UP (ref 0–0)
ORGANISM # SPEC MICROSCOPIC CNT: ABNORMAL
ORGANISM # SPEC MICROSCOPIC CNT: ABNORMAL
PLATELET # BLD AUTO: 275 K/UL — SIGNIFICANT CHANGE UP (ref 150–400)
POTASSIUM SERPL-MCNC: 5.6 MMOL/L — HIGH (ref 3.5–5.3)
POTASSIUM SERPL-SCNC: 5.6 MMOL/L — HIGH (ref 3.5–5.3)
RBC # BLD: 3.48 M/UL — LOW (ref 3.8–5.2)
RBC # FLD: 14.5 % — SIGNIFICANT CHANGE UP (ref 10.3–14.5)
SODIUM SERPL-SCNC: 145 MMOL/L — SIGNIFICANT CHANGE UP (ref 135–145)
SPECIMEN SOURCE: SIGNIFICANT CHANGE UP
TROPONIN T, HIGH SENSITIVITY RESULT: 35 NG/L — SIGNIFICANT CHANGE UP (ref 0–51)
WBC # BLD: 11.78 K/UL — HIGH (ref 3.8–10.5)
WBC # FLD AUTO: 11.78 K/UL — HIGH (ref 3.8–10.5)

## 2024-09-21 RX ADMIN — MUPIROCIN 1 APPLICATION(S): 20 OINTMENT TOPICAL at 17:51

## 2024-09-21 RX ADMIN — Medication 150 MILLIGRAM(S): at 13:23

## 2024-09-21 RX ADMIN — Medication 1300 MILLIGRAM(S): at 23:16

## 2024-09-21 RX ADMIN — PANTOPRAZOLE SODIUM 40 MILLIGRAM(S): 40 TABLET, DELAYED RELEASE ORAL at 06:32

## 2024-09-21 RX ADMIN — Medication 1300 MILLIGRAM(S): at 06:32

## 2024-09-21 RX ADMIN — Medication 100 MILLIGRAM(S): at 17:48

## 2024-09-21 RX ADMIN — Medication 500 MILLIGRAM(S): at 23:16

## 2024-09-21 RX ADMIN — CHLORHEXIDINE GLUCONATE ORAL RINSE 1 APPLICATION(S): 1.2 SOLUTION DENTAL at 13:26

## 2024-09-21 RX ADMIN — Medication 100 MILLIGRAM(S): at 06:33

## 2024-09-21 RX ADMIN — Medication 88 MICROGRAM(S): at 06:32

## 2024-09-21 RX ADMIN — Medication 75 MILLIGRAM(S): at 13:23

## 2024-09-21 RX ADMIN — Medication 5000 UNIT(S): at 17:49

## 2024-09-21 RX ADMIN — Medication 300 MILLIGRAM(S): at 13:23

## 2024-09-21 RX ADMIN — MUPIROCIN 1 APPLICATION(S): 20 OINTMENT TOPICAL at 06:33

## 2024-09-21 RX ADMIN — Medication 25 MILLIGRAM(S): at 06:32

## 2024-09-21 RX ADMIN — Medication 250 MILLIGRAM(S): at 13:23

## 2024-09-21 RX ADMIN — Medication 1 MILLIGRAM(S): at 23:15

## 2024-09-21 RX ADMIN — Medication 1300 MILLIGRAM(S): at 13:34

## 2024-09-21 RX ADMIN — Medication 5000 UNIT(S): at 06:44

## 2024-09-21 NOTE — PROGRESS NOTE ADULT - SUBJECTIVE AND OBJECTIVE BOX
CARDIOLOGY FOLLOW UP - Dr. Ackerman  DATE OF SERVICE: 9/21/24    CC no CP or SOB      REVIEW OF SYSTEMS:  CONSTITUTIONAL: No fever, weight loss, or fatigue  RESPIRATORY: No cough, wheezing, chills or hemoptysis; No Shortness of Breath  CARDIOVASCULAR: No chest pain, palpitations, passing out, dizziness  GASTROINTESTINAL: No abdominal or epigastric pain. No nausea, vomiting, or hematemesis; No diarrhea or constipation. No melena or hematochezia.      PHYSICAL EXAM:  T(C): 36.6 (09-21-24 @ 04:25), Max: 36.7 (09-20-24 @ 20:41)  HR: 79 (09-21-24 @ 04:25) (79 - 90)  BP: 105/60 (09-21-24 @ 04:25) (105/60 - 111/60)  RR: 18 (09-21-24 @ 04:25) (18 - 18)  SpO2: 97% (09-21-24 @ 04:25) (96% - 97%)  Wt(kg): --  I&O's Summary    20 Sep 2024 07:01  -  21 Sep 2024 07:00  --------------------------------------------------------  IN: 0 mL / OUT: 900 mL / NET: -900 mL        Appearance: Normal	  Cardiovascular: Normal S1 S2,RRR, No JVD, +murmur  Respiratory: Lungs clear to auscultation	  Gastrointestinal:  Soft, Non-tender, + BS	  Extremities: Normal range of motion, No clubbing, cyanosis or edema      Home Medications:  acetaminophen 325 mg oral tablet: 2 tab(s) orally every 6 hours as needed for  mild pain (11 Sep 2024 21:40)  buPROPion 150 mg/24 hours (XL) oral tablet, extended release: 1 tab(s) orally once a day (11 Sep 2024 21:40)  divalproex sodium 250 mg oral tablet, extended release: 1 tab(s) orally once a day (11 Sep 2024 21:40)  divalproex sodium 500 mg oral tablet, extended release: 1 tab(s) orally once a day (at bedtime) (11 Sep 2024 21:38)  lactulose 10 g/15 mL oral syrup: 30 milliliter(s) orally every 8 hours as needed for  constipation (11 Sep 2024 21:40)  levothyroxine 88 mcg (0.088 mg) oral tablet: 1 tab(s) orally once a day (11 Sep 2024 21:39)  LORazepam 1 mg oral tablet: 1 tab(s) orally once a day (at bedtime) (11 Sep 2024 21:40)  Melatonin 3 mg oral tablet: 1 tab(s) orally once a day (at bedtime) (11 Sep 2024 21:40)  polyethylene glycol 3350 oral powder for reconstitution: 17 gram(s) orally once a day (11 Sep 2024 21:40)  senna leaf extract oral tablet: 2 tab(s) orally once a day (at bedtime) (11 Sep 2024 21:40)  venlafaxine 75 mg oral capsule, extended release: 375 milligram(s) orally once a day (11 Sep 2024 21:39)      MEDICATIONS  (STANDING):  buPROPion XL (24-Hour) . 150 milliGRAM(s) Oral daily  chlorhexidine 2% Cloths 1 Application(s) Topical daily  diphenhydrAMINE 25 milliGRAM(s) Oral once  divalproex  milliGRAM(s) Oral daily  divalproex  milliGRAM(s) Oral at bedtime  doxycycline monohydrate Capsule 100 milliGRAM(s) Oral every 12 hours  heparin   Injectable 5000 Unit(s) SubCutaneous every 12 hours  influenza  Vaccine (HIGH DOSE) 0.5 milliLiter(s) IntraMuscular once  levothyroxine 88 MICROGram(s) Oral daily  LORazepam     Tablet 1 milliGRAM(s) Oral at bedtime  metoprolol succinate ER 25 milliGRAM(s) Oral daily  mupirocin 2% Nasal 1 Application(s) Both Nostrils two times a day  pantoprazole    Tablet 40 milliGRAM(s) Oral before breakfast  sodium bicarbonate 1300 milliGRAM(s) Oral three times a day  venlafaxine 300 milliGRAM(s) Oral daily  venlafaxine 75 milliGRAM(s) Oral daily      TELEMETRY: 	  NSR  ECG:  	  RADIOLOGY:   DIAGNOSTIC TESTING:  [ ] Echocardiogram:  [ ]  Catheterization:  [ ] Stress Test:    OTHER: 	    LABS:	 	    Troponin T, High Sensitivity Result: 35 ng/L [0 - 51] (09-21 @ 07:54)                          10.9   11.78 )-----------( 275      ( 21 Sep 2024 07:54 )             34.9     09-21    145  |  111[H]  |  40[H]  ----------------------------<  60[L]  5.6[H]   |  19[L]  |  3.19[H]    Ca    9.1      21 Sep 2024 07:54    TPro  6.3  /  Alb  3.4  /  TBili  0.2  /  DBili  x   /  AST  8[L]  /  ALT  7[L]  /  AlkPhos  80  09-19

## 2024-09-21 NOTE — PROGRESS NOTE ADULT - SUBJECTIVE AND OBJECTIVE BOX
[facility] OF INF[facility]OUS DISEASES  LOUANN Dubois Y. Patel, S. Shah, G. Casimir  [phone]  ([phone] - weekdays after 5pm and weekends)    Name: JOANNA MALDONADO  Age/Gender: 84y Female  MRN: [id number]    Interval History:  Patient seen and examined this morning.   Feels better, slight pain, no new complaints.   Notes reviewed  No concerning overnight events  Afebrile   Allergies: azithromycin (Rash)      Objective:  Vitals:   T(F): 97.8 (09-21-24 @ 04:25), Max: 98 (09-20-24 @ 20:41)  HR: 79 (09-21-24 @ 04:25) (79 - 90)  BP: 105/60 (09-21-24 @ 04:25) (105/60 - 111/60)  RR: 18 (09-21-24 @ 04:25) (18 - 18)  SpO2: 97% (09-21-24 @ 04:25) (96% - 97%)  Physical Examination:  General: no acute distress  HEENT: NC/AT, anicteric, EOMI  Respiratory: no acc muscle use, breathing comfortably  Cardiovascular: S1 and S2 present  Gastrointestinal: normal appearing, nondistended  Extremities: no edema, no cyanosis  Skin: R lower cheek incision with no active or expressible drainage, mild erythema    Laboratory Studies:  CBC:                       10.8   10.22 )-----------( 254      ( 20 Sep 2024 06:58 )             35.6     WBC Trend:  10.22 09-20-24 @ 06:58  9.90 09-19-24 @ 10:35  10.41 09-17-24 @ 08:31  11.49 09-16-24 @ 06:48  11.90 09-15-24 @ 07:46    CMP: 09-21    145  |  111[H]  |  40[H]  ----------------------------<  60[L]  5.6[H]   |  19[L]  |  3.19[H]    Ca    9.1      21 Sep 2024 07:54    TPro  6.3  /  Alb  3.4  /  TBili  0.2  /  DBili  x   /  AST  8[L]  /  ALT  7[L]  /  AlkPhos  80  09-19    Creatinine: 3.19 mg/dL (09-21-24 @ 07:54)  Creatinine: 3.02 mg/dL (09-20-24 @ 06:59)  Creatinine: 3.01 mg/dL (09-19-24 @ 10:36)  Creatinine: 2.82 mg/dL (09-17-24 @ 08:32)  Creatinine: 3.10 mg/dL (09-16-24 @ 06:48)  Creatinine: 3.36 mg/dL (09-15-24 @ 07:36)    LIVER FUNCTIONS - ( 19 Sep 2024 10:36 )  Alb: 3.4 g/dL / Pro: 6.3 g/dL / ALK PHOS: 80 U/L / ALT: 7 U/L / AST: 8 U/L / GGT: x           Microbiology: reviewed   Culture - Other (collected 09-16-24 @ 11:09)  Source: Drainage Drainage  Preliminary Report (09-18-24 @ 17:30):    Numerous Methicillin Resistant Staphylococcus aureus  Organism: Methicillin resistant Staphylococcus aureus (09-18-24 @ 06:57)  Organism: Methicillin resistant Staphylococcus aureus (09-18-24 @ 06:57)      Method Type: EDYTA      -  Clindamycin: S <=0.25      -  Daptomycin: S 1      -  Erythromycin: R >4      -  Gentamicin: S <=1 Should not be used as monotherapy      -  Linezolid: S 4      -  Oxacillin: R >2      -  Penicillin: R >8      -  Rifampin: S <=1 Should not be used as monotherapy      -  Tetracycline: S <=1      -  Trimethoprim/Sulfamethoxazole: S <=0.5/9.5      -  Vancomycin: S 2    Culture - Blood (collected 09-11-24 @ 18:40)  Source: .Blood Blood-Peripheral  Final Report (09-17-24 @ 02:00):    No growth at 5 days    Culture - Blood (collected 09-11-24 @ 18:30)  Source: .Blood Blood-Peripheral  Final Report (09-17-24 @ 02:00):    No growth at 5 days    SARS-CoV-2 Result: NotDetec (17 Sep 2024 13:35)    Radiology: reviewed     Medications:  acetaminophen     Tablet .. 650 milliGRAM(s) Oral every 6 hours PRN  buPROPion XL (24-Hour) . 150 milliGRAM(s) Oral daily  chlorhexidine 2% Cloths 1 Application(s) Topical daily  diphenhydrAMINE 25 milliGRAM(s) Oral once  divalproex  milliGRAM(s) Oral daily  divalproex  milliGRAM(s) Oral at bedtime  doxycycline monohydrate Capsule 100 milliGRAM(s) Oral every 12 hours  heparin   Injectable 5000 Unit(s) SubCutaneous every 12 hours  influenza  Vaccine (HIGH DOSE) 0.5 milliLiter(s) IntraMuscular once  levothyroxine 88 MICROGram(s) Oral daily  LORazepam     Tablet 1 milliGRAM(s) Oral at bedtime  melatonin 3 milliGRAM(s) Oral at bedtime PRN  metoprolol succinate ER 25 milliGRAM(s) Oral daily  mupirocin 2% Nasal 1 Application(s) Both Nostrils two times a day  pantoprazole    Tablet 40 milliGRAM(s) Oral before breakfast  polyethylene glycol 3350 17 Gram(s) Oral daily PRN  senna 2 Tablet(s) Oral at bedtime PRN  sodium bicarbonate 1300 milliGRAM(s) Oral three times a day  venlafaxine 300 milliGRAM(s) Oral daily  venlafaxine 75 milliGRAM(s) Oral daily    Current Antimicrobials:  doxycycline monohydrate Capsule 100 milliGRAM(s) Oral every 12 hours    Prior/Completed Antimicrobials:  ampicillin/sulbactam  IVPB

## 2024-09-21 NOTE — PROGRESS NOTE ADULT - SUBJECTIVE AND OBJECTIVE BOX
Patient is a 84y old  Female who presents with a chief complaint of R face swelling and redness (21 Sep 2024 09:12)      SUBJECTIVE / OVERNIGHT EVENTS:        Vital Signs Last 24 Hrs  T(C): 36.6 (21 Sep 2024 10:55), Max: 36.7 (20 Sep 2024 20:41)  T(F): 97.9 (21 Sep 2024 10:55), Max: 98 (20 Sep 2024 20:41)  HR: 76 (21 Sep 2024 10:55) (76 - 87)  BP: 114/64 (21 Sep 2024 10:55) (105/60 - 114/64)  BP(mean): --  RR: 18 (21 Sep 2024 10:55) (18 - 18)  SpO2: 96% (21 Sep 2024 10:55) (96% - 97%)    Parameters below as of 21 Sep 2024 10:55  Patient On (Oxygen Delivery Method): room air      I&O's Summary    20 Sep 2024 07:01  -  21 Sep 2024 07:00  --------------------------------------------------------  IN: 0 mL / OUT: 900 mL / NET: -900 mL        General: no acute distress  HEENT: NC/AT, anicteric, EOMI  Respiratory: no acc muscle use, breathing comfortably  Cardiovascular: S1 and S2 present  Gastrointestinal: normal appearing, nondistended  Extremities: no edema, no cyanosis  Skin: R lower cheek incision with no active or expressible drainage, mild erythema        LABS:                        10.9   11.78 )-----------( 275      ( 21 Sep 2024 07:54 )             34.9     09-21    145  |  111[H]  |  40[H]  ----------------------------<  60[L]  5.6[H]   |  19[L]  |  3.19[H]    Ca    9.1      21 Sep 2024 07:54        CAPILLARY BLOOD GLUCOSE            Urinalysis Basic - ( 21 Sep 2024 07:54 )    Color: x / Appearance: x / SG: x / pH: x  Gluc: 60 mg/dL / Ketone: x  / Bili: x / Urobili: x   Blood: x / Protein: x / Nitrite: x   Leuk Esterase: x / RBC: x / WBC x   Sq Epi: x / Non Sq Epi: x / Bacteria: x        RADIOLOGY & ADDITIONAL TESTS:    Imaging Personally Reviewed:  [x] YES  [ ] NO    Consultant(s) Notes Reviewed:  [x] YES  [ ] NO      MEDICATIONS  (STANDING):  buPROPion XL (24-Hour) . 150 milliGRAM(s) Oral daily  chlorhexidine 2% Cloths 1 Application(s) Topical daily  diphenhydrAMINE 25 milliGRAM(s) Oral once  divalproex  milliGRAM(s) Oral at bedtime  divalproex  milliGRAM(s) Oral daily  doxycycline monohydrate Capsule 100 milliGRAM(s) Oral every 12 hours  heparin   Injectable 5000 Unit(s) SubCutaneous every 12 hours  influenza  Vaccine (HIGH DOSE) 0.5 milliLiter(s) IntraMuscular once  levothyroxine 88 MICROGram(s) Oral daily  LORazepam     Tablet 1 milliGRAM(s) Oral at bedtime  metoprolol succinate ER 25 milliGRAM(s) Oral daily  mupirocin 2% Nasal 1 Application(s) Both Nostrils two times a day  pantoprazole    Tablet 40 milliGRAM(s) Oral before breakfast  sodium bicarbonate 1300 milliGRAM(s) Oral three times a day  venlafaxine 75 milliGRAM(s) Oral daily  venlafaxine 300 milliGRAM(s) Oral daily    MEDICATIONS  (PRN):  acetaminophen     Tablet .. 650 milliGRAM(s) Oral every 6 hours PRN Temp greater or equal to 38C (100.4F), Mild Pain (1 - 3)  melatonin 3 milliGRAM(s) Oral at bedtime PRN Insomnia  polyethylene glycol 3350 17 Gram(s) Oral daily PRN Constipation  senna 2 Tablet(s) Oral at bedtime PRN Constipation      Care Discussed with Consultants/Other Providers [x] YES  [ ] NO    HEALTH ISSUES - PROBLEM Dx:  Facial cellulitis    Neck swelling    LPRD (laryngopharyngeal reflux disease)    Myositis    Stage 4 chronic kidney disease    Depression, major    Need for prophylactic measure    Hypothyroidism    Facial abscess         Patient is a 84y old  Female who presents with a chief complaint of R face swelling and redness (21 Sep 2024 09:12)      SUBJECTIVE / OVERNIGHT EVENTS:    patient feels well and has no active complaints  no chest pain, no shortness of breath, no n/v     Vital Signs Last 24 Hrs  T(C): 36.6 (21 Sep 2024 10:55), Max: 36.7 (20 Sep 2024 20:41)  T(F): 97.9 (21 Sep 2024 10:55), Max: 98 (20 Sep 2024 20:41)  HR: 76 (21 Sep 2024 10:55) (76 - 87)  BP: 114/64 (21 Sep 2024 10:55) (105/60 - 114/64)  BP(mean): --  RR: 18 (21 Sep 2024 10:55) (18 - 18)  SpO2: 96% (21 Sep 2024 10:55) (96% - 97%)    Parameters below as of 21 Sep 2024 10:55  Patient On (Oxygen Delivery Method): room air      I&O's Summary    20 Sep 2024 07:01  -  21 Sep 2024 07:00  --------------------------------------------------------  IN: 0 mL / OUT: 900 mL / NET: -900 mL        General: no acute distress  HEENT: NC/AT, anicteric, EOMI  Respiratory: no acc muscle use, breathing comfortably  Cardiovascular: S1 and S2 present  Gastrointestinal: normal appearing, nondistended  Extremities: no edema, no cyanosis  Skin: R lower cheek incision with no active or expressible drainage, mild erythema        LABS:                        10.9   11.78 )-----------( 275      ( 21 Sep 2024 07:54 )             34.9     09-21    145  |  111[H]  |  40[H]  ----------------------------<  60[L]  5.6[H]   |  19[L]  |  3.19[H]    Ca    9.1      21 Sep 2024 07:54        CAPILLARY BLOOD GLUCOSE            Urinalysis Basic - ( 21 Sep 2024 07:54 )    Color: x / Appearance: x / SG: x / pH: x  Gluc: 60 mg/dL / Ketone: x  / Bili: x / Urobili: x   Blood: x / Protein: x / Nitrite: x   Leuk Esterase: x / RBC: x / WBC x   Sq Epi: x / Non Sq Epi: x / Bacteria: x        RADIOLOGY & ADDITIONAL TESTS:    Imaging Personally Reviewed:  [x] YES  [ ] NO    Consultant(s) Notes Reviewed:  [x] YES  [ ] NO      MEDICATIONS  (STANDING):  buPROPion XL (24-Hour) . 150 milliGRAM(s) Oral daily  chlorhexidine 2% Cloths 1 Application(s) Topical daily  diphenhydrAMINE 25 milliGRAM(s) Oral once  divalproex  milliGRAM(s) Oral at bedtime  divalproex  milliGRAM(s) Oral daily  doxycycline monohydrate Capsule 100 milliGRAM(s) Oral every 12 hours  heparin   Injectable 5000 Unit(s) SubCutaneous every 12 hours  influenza  Vaccine (HIGH DOSE) 0.5 milliLiter(s) IntraMuscular once  levothyroxine 88 MICROGram(s) Oral daily  LORazepam     Tablet 1 milliGRAM(s) Oral at bedtime  metoprolol succinate ER 25 milliGRAM(s) Oral daily  mupirocin 2% Nasal 1 Application(s) Both Nostrils two times a day  pantoprazole    Tablet 40 milliGRAM(s) Oral before breakfast  sodium bicarbonate 1300 milliGRAM(s) Oral three times a day  venlafaxine 75 milliGRAM(s) Oral daily  venlafaxine 300 milliGRAM(s) Oral daily    MEDICATIONS  (PRN):  acetaminophen     Tablet .. 650 milliGRAM(s) Oral every 6 hours PRN Temp greater or equal to 38C (100.4F), Mild Pain (1 - 3)  melatonin 3 milliGRAM(s) Oral at bedtime PRN Insomnia  polyethylene glycol 3350 17 Gram(s) Oral daily PRN Constipation  senna 2 Tablet(s) Oral at bedtime PRN Constipation      Care Discussed with Consultants/Other Providers [x] YES  [ ] NO    HEALTH ISSUES - PROBLEM Dx:  Facial cellulitis    Neck swelling    LPRD (laryngopharyngeal reflux disease)    Myositis    Stage 4 chronic kidney disease    Depression, major    Need for prophylactic measure    Hypothyroidism    Facial abscess

## 2024-09-21 NOTE — PROGRESS NOTE ADULT - ASSESSMENT
Patient is a 84 year old female with PMHx of Anemia, Depression, CKD, GERD and Hypothyroidism. Presents to [facility] for worsening redness and swelling on her face for the past few days.    Plan:    # Facial cellulitis:   - + leukocytosis, BCx w/ NGTD  - No drainable collection on CT, s/p laryngoscope no airway involvement   - Abx mgmt per ID  - S/p Dexamethasone 8MG Q8H for 24H only per ENT  - Encourage oral hydration   - Minced and moist diet  - Monitor temps/WBC  - 9/16 noted with increased duration/swelling and expressible pus -> s/p I&D by ENT; Cx noted with MRSA  - Continue doxycycline 100mg Q12h until 9/25 - changed to PO today  - Local wound care per ENT  - C/w isolation per protocol   - ID/ENT following    # Hx of frequent falls:  - Fall precautions  - Ortho negative  - MR Head 9/17/24 shows no intracranial mass, acute territorial infarct, acute intracranial hemorrhage, or midline shift  - EEG neg for seizures   - TTE noted with severely decreased LVSF with EF 30-35%; regional WMA; per cardio pt now amenable to nuc stress test for ischemic eval  - Cards/Neuro/EP following    # CKD:  - Monitor I/O's  - Serial Cr  - Avoid nephrotoxins  - Renally dose medications  - C/w NaHCO3  - Renal following    # Depression:  - C/w current meds    # LPRD (laryngopharyngeal reflux disease):  - ENT recommendations appreciated   - PPI    # Hypothyroidism:  - C/w Synthroid    # DVT ppx:  - Heparin subq    Dispo: [facility]  * Awaiting Cards/ EP decision Inpt vs outpt. If Outpt, Pt can be discharged and f/u outpt    [facility]

## 2024-09-21 NOTE — PROVIDER CONTACT NOTE (CRITICAL VALUE NOTIFICATION) - ACTION/TREATMENT ORDERED:
PA made aware. ID & ENT following patient
FILIBERTO Mendoza made aware. No new orders at this time. Will continue with IV abx.  Pt care continued

## 2024-09-21 NOTE — PROVIDER CONTACT NOTE (CRITICAL VALUE NOTIFICATION) - BACKGROUND
s/p I&D by ENT
Pt admitted  for R facial cellulitis with abscess. On Isolation precautions for MRSA. Currently on IV abx.

## 2024-09-21 NOTE — PROGRESS NOTE ADULT - ASSESSMENT
Patient is an 84-year-old female with a history of anemia, hypothyroidism, depression, GERD, bipolar, CKD who presents for redness and swelling on her face for the past few days. Patient reports right-sided facial swelling and redness including her neck and on her lower jaw that started on 9/8/24, worsening over the past few days. She reports  a dental exam for cracked teeth on day prior but no work performed. States someone told her she had 2 small pimple like lesion on her face, currently scabbed over.    R facial cellulitis with abscess with MRSA  - initial CT neck soft tissue with R facial cellulitis/myositis, no e/o drainable fluid collection  - s/p dexamethasone x24h  - 9/16 noted with increased induration and expressible pus -- s/p I&D by ENT - cx with MRSA   -- s/p irrigation this am, no purulence expressed, no need for further packing   - WBC normalized, remains afebrile, much improved     s/p unasyn 9/11-9/18    Recommendations:  Continue doxycycline 100mg PO Q12h until 9/25  Local wound care per ENT  Contact isolation per IC protocol  Continue rest of care per primary team     Stable from ID standpoint at this time.   Discharge planning per primary team.     Ash Graham M.D.  [facility], Division of Infectious Diseases  [phone]  After 5pm on weekdays and all day on weekends - please call [phone]  Available on [employer] TEAMS

## 2024-09-21 NOTE — PROGRESS NOTE ADULT - ASSESSMENT
A/P    Patient is an 84-year-old female with a history of anemia, hypothyroidism, depression, GERD, bipolar, CKD, presents for redness and swelling on her face for the past few days.     #Right facial swelling and redness, Leukocytosis   -improving  -CT Neck 9/11/24 shows Right facial cellulitis/myositis, without gross evidence of a drainable fluid collection.  -CXR 9/11/24 shows Clear lungs  -ENT and ID following  -sp I&D of abscess 9/16 - cx + MRSA  -abx per ID    #CKD  -Trend Creat  -hyperkalemia noted  -nephrology f/u    #Hx of frequent falls with lbbb. cmp  -pt describes random "zoning out/staring episodes" and then falling (has been occurring for about 2.5 years per pt)  -pt denies LOC during episodes; denies CP or SOB  -orthostatics negative  -MR Head 9/17/24 shows no intracranial mass, acute territorial infarct, acute intracranial hemorrhage, or midline shift.  -TTE 9/18/24 shows severely decreased LVSF with EF 30-35%; regional WMA; There is mild (grade 1) left ventricular diastolic dysfunction, with normal left ventricular filling pressure.   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 The entire inferior wall, mid and apical inferior septum, mid inferolateral segment, and apical lateral segment are akinetic. The entire anterior wall, basal and mid anterolateral wall, basal and mid anterior septum, basal inferoseptal segment, and basal inferolateral segment are hypokinetic.  -check tele  -neuro f/u  -EKG shows NSR with first degree AV block and LBBB, no acute ischemic changes  -plan for stress, family now agreeable to proceed  -eps eval noted, family initially considering only ext holter but dtr seems more agreeable to ILR  -will decide after stress    #Aortic Stenosis  -cont to monitor    #Cardiomyopathy, HFrEF  -outpatient TTE 5/24 shows LV systolic function is moderately reduced, moderate global hypokinesis; EF 40%; mild to moderate aortic regurgitation; moderate aortic stenosis  -Repeat TTE as above  -no evidence of volume overload/HF  -no diuretic need  -per outpt visit, patient and son do not want to proceed with ischemic eval and do not want to start lv dysfxn medications as she remains asymptomatic  -family now agreeable to nuclear stress as ischemic eval   -d/w dtr at bedside if stress abnl cath would pose risk of akthi given age, dec gfr  -Cont Toprol XL    dvt ppx

## 2024-09-22 LAB
ANION GAP SERPL CALC-SCNC: 14 MMOL/L — SIGNIFICANT CHANGE UP (ref 5–17)
BUN SERPL-MCNC: 44 MG/DL — HIGH (ref 7–23)
CALCIUM SERPL-MCNC: 8.9 MG/DL — SIGNIFICANT CHANGE UP (ref 8.4–10.5)
CHLORIDE SERPL-SCNC: 111 MMOL/L — HIGH (ref 96–108)
CO2 SERPL-SCNC: 18 MMOL/L — LOW (ref 22–31)
CREAT SERPL-MCNC: 3.24 MG/DL — HIGH (ref 0.5–1.3)
EGFR: 13 ML/MIN/1.73M2 — LOW
GLUCOSE SERPL-MCNC: 86 MG/DL — SIGNIFICANT CHANGE UP (ref 70–99)
POTASSIUM SERPL-MCNC: 4.5 MMOL/L — SIGNIFICANT CHANGE UP (ref 3.5–5.3)
POTASSIUM SERPL-SCNC: 4.5 MMOL/L — SIGNIFICANT CHANGE UP (ref 3.5–5.3)
SODIUM SERPL-SCNC: 143 MMOL/L — SIGNIFICANT CHANGE UP (ref 135–145)

## 2024-09-22 PROCEDURE — 78452 HT MUSCLE IMAGE SPECT MULT: CPT | Mod: 26

## 2024-09-22 PROCEDURE — 93016 CV STRESS TEST SUPVJ ONLY: CPT

## 2024-09-22 PROCEDURE — 93018 CV STRESS TEST I&R ONLY: CPT

## 2024-09-22 RX ADMIN — CHLORHEXIDINE GLUCONATE ORAL RINSE 1 APPLICATION(S): 1.2 SOLUTION DENTAL at 14:16

## 2024-09-22 RX ADMIN — Medication 5000 UNIT(S): at 05:47

## 2024-09-22 RX ADMIN — Medication 1 MILLIGRAM(S): at 22:11

## 2024-09-22 RX ADMIN — Medication 5000 UNIT(S): at 18:32

## 2024-09-22 RX ADMIN — PANTOPRAZOLE SODIUM 40 MILLIGRAM(S): 40 TABLET, DELAYED RELEASE ORAL at 05:46

## 2024-09-22 RX ADMIN — Medication 1300 MILLIGRAM(S): at 13:28

## 2024-09-22 RX ADMIN — Medication 100 MILLIGRAM(S): at 18:32

## 2024-09-22 RX ADMIN — Medication 1300 MILLIGRAM(S): at 22:10

## 2024-09-22 RX ADMIN — Medication 250 MILLIGRAM(S): at 13:27

## 2024-09-22 RX ADMIN — Medication 100 MILLIGRAM(S): at 05:46

## 2024-09-22 RX ADMIN — Medication 88 MICROGRAM(S): at 05:46

## 2024-09-22 RX ADMIN — Medication 500 MILLIGRAM(S): at 22:11

## 2024-09-22 RX ADMIN — Medication 25 MILLIGRAM(S): at 05:46

## 2024-09-22 RX ADMIN — Medication 150 MILLIGRAM(S): at 13:27

## 2024-09-22 RX ADMIN — Medication 300 MILLIGRAM(S): at 13:27

## 2024-09-22 RX ADMIN — MUPIROCIN 1 APPLICATION(S): 20 OINTMENT TOPICAL at 05:47

## 2024-09-22 RX ADMIN — Medication 75 MILLIGRAM(S): at 13:26

## 2024-09-22 RX ADMIN — MUPIROCIN 1 APPLICATION(S): 20 OINTMENT TOPICAL at 18:32

## 2024-09-22 RX ADMIN — Medication 1300 MILLIGRAM(S): at 05:47

## 2024-09-22 NOTE — PROGRESS NOTE ADULT - ASSESSMENT
A/P    Patient is an 84-year-old female with a history of anemia, hypothyroidism, depression, GERD, bipolar, CKD, presents for redness and swelling on her face for the past few days.     #Right facial swelling and redness, Leukocytosis   -improving  -CT Neck 9/11/24 shows Right facial cellulitis/myositis, without gross evidence of a drainable fluid collection.  -CXR 9/11/24 shows Clear lungs  -ENT and ID following  -sp I&D of abscess 9/16 - cx + MRSA  -abx per ID    #CKD  -Trend Creat  -hyperkalemia noted  -nephrology f/u    #Hx of frequent falls with lbbb. cmp  -pt describes random "zoning out/staring episodes" and then falling (has been occurring for about 2.5 years per pt)  -pt denies LOC during episodes; denies CP or SOB  -orthostatics negative  -MR Head 9/17/24 shows no intracranial mass, acute territorial infarct, acute intracranial hemorrhage, or midline shift.  -TTE 9/18/24 shows severely decreased LVSF with EF 30-35%; regional WMA; There is mild (grade 1) left ventricular diastolic dysfunction, with normal left ventricular filling pressure.   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 The entire inferior wall, mid and apical inferior septum, mid inferolateral segment, and apical lateral segment are akinetic. The entire anterior wall, basal and mid anterolateral wall, basal and mid anterior septum, basal inferoseptal segment, and basal inferolateral segment are hypokinetic.  -neuro f/u  -EKG shows NSR with first degree AV block and LBBB, no acute ischemic changes  -plan for stress, family now agreeable to proceed  -eps eval noted, family initially considering only ext holter but dtr seems more agreeable to ILR  -will decide after stress    #Aortic Stenosis  -cont to monitor    #Cardiomyopathy, HFrEF  -outpatient TTE 5/24 shows LV systolic function is moderately reduced, moderate global hypokinesis; EF 40%; mild to moderate aortic regurgitation; moderate aortic stenosis  -Repeat TTE as above  -no evidence of volume overload/HF  -no diuretic need  -per outpt visit, patient and son do not want to proceed with ischemic eval and do not want to start lv dysfxn medications as she remains asymptomatic  -family now agreeable to nuclear stress as ischemic eval   -Cont Toprol XL    dvt ppx      35 minutes spent on total encounter; more than 50% of the visit was spent counseling and/or coordinating care by the attending physician.

## 2024-09-22 NOTE — PROGRESS NOTE ADULT - SUBJECTIVE AND OBJECTIVE BOX
[facility] DIVISION OF INFECTIOUS DISEASES  LOUANN Dubois Y. Patel, S. Shah, G. Casimir  [phone]  ([phone] - weekdays after 5pm and weekends)    Name: JOANNA MALDONADO  Age/Gender: 84y Female  MRN: [id number]    Interval History:  Patient seen and examined this morning.   No new complaints noted.  Notes reviewed  No concerning overnight events  Afebrile   Allergies: azithromycin (Rash)      Objective:  Vitals:   T(F): 98.1 (09-22-24 @ 05:11), Max: 98.1 (09-21-24 @ 18:05)  HR: 70 (09-22-24 @ 05:11) (70 - 82)  BP: 109/60 (09-22-24 @ 05:11) (109/60 - 114/64)  RR: 18 (09-22-24 @ 05:11) (18 - 18)  SpO2: 97% (09-22-24 @ 05:11) (96% - 97%)  Physical Examination:  General: no acute distress, nontoxic appearing   HEENT: NC/AT, anicteric, EOMI  Respiratory: no acc muscle use, breathing comfortably  Cardiovascular: S1 and S2 present  Gastrointestinal: normal appearing, nondistended  Extremities: no edema, no cyanosis  Skin: R face min erythema, incision clean no drainage    Laboratory Studies:  CBC:                       10.9   11.78 )-----------( 275      ( 21 Sep 2024 07:54 )             34.9     WBC Trend:  11.78 09-21-24 @ 07:54  10.22 09-20-24 @ 06:58  9.90 09-19-24 @ 10:35  10.41 09-17-24 @ 08:31  11.49 09-16-24 @ 06:48    CMP: 09-22    143  |  111[H]  |  44[H]  ----------------------------<  86  4.5   |  18[L]  |  3.24[H]    Ca    8.9      22 Sep 2024 09:18      Creatinine: 3.24 mg/dL (09-22-24 @ 09:18)  Creatinine: 3.19 mg/dL (09-21-24 @ 07:54)  Creatinine: 3.02 mg/dL (09-20-24 @ 06:59)  Creatinine: 3.01 mg/dL (09-19-24 @ 10:36)  Creatinine: 2.82 mg/dL (09-17-24 @ 08:32)  Creatinine: 3.10 mg/dL (09-16-24 @ 06:48)    Microbiology: reviewed   Culture - Other (collected 09-16-24 @ 11:09)  Source: Drainage Drainage  Final Report (09-21-24 @ 15:41):    Numerous Methicillin Resistant Staphylococcus aureus  Organism: Methicillin resistant Staphylococcus aureus (09-21-24 @ 15:41)  Organism: Methicillin resistant Staphylococcus aureus (09-21-24 @ 15:41)      Method Type: EDYTA      -  Clindamycin: S <=0.25      -  Daptomycin: S 1      -  Erythromycin: R >4      -  Gentamicin: S <=1 Should not be used as monotherapy      -  Linezolid: S 4      -  Oxacillin: R >2      -  Penicillin: R >8      -  Rifampin: S <=1 Should not be used as monotherapy      -  Tetracycline: S <=1      -  Trimethoprim/Sulfamethoxazole: S <=0.5/9.5      -  Vancomycin: S 2    Culture - Blood (collected 09-11-24 @ 18:40)  Source: .Blood Blood-Peripheral  Final Report (09-17-24 @ 02:00):    No growth at 5 days    Culture - Blood (collected 09-11-24 @ 18:30)  Source: .Blood Blood-Peripheral  Final Report (09-17-24 @ 02:00):    No growth at 5 days    SARS-CoV-2 Result: [place] (17 Sep 2024 13:35)    Radiology: reviewed     Medications:  acetaminophen     Tablet .. 650 milliGRAM(s) Oral every 6 hours PRN  buPROPion XL (24-Hour) . 150 milliGRAM(s) Oral daily  chlorhexidine 2% Cloths 1 Application(s) Topical daily  diphenhydrAMINE 25 milliGRAM(s) Oral once  divalproex  milliGRAM(s) Oral daily  divalproex  milliGRAM(s) Oral at bedtime  doxycycline monohydrate Capsule 100 milliGRAM(s) Oral every 12 hours  heparin   Injectable 5000 Unit(s) SubCutaneous every 12 hours  influenza  Vaccine (HIGH DOSE) 0.5 milliLiter(s) IntraMuscular once  levothyroxine 88 MICROGram(s) Oral daily  LORazepam     Tablet 1 milliGRAM(s) Oral at bedtime  melatonin 3 milliGRAM(s) Oral at bedtime PRN  metoprolol succinate ER 25 milliGRAM(s) Oral daily  mupirocin 2% Nasal 1 Application(s) Both Nostrils two times a day  pantoprazole    Tablet 40 milliGRAM(s) Oral before breakfast  polyethylene glycol 3350 17 Gram(s) Oral daily PRN  senna 2 Tablet(s) Oral at bedtime PRN  sodium bicarbonate 1300 milliGRAM(s) Oral three times a day  venlafaxine 75 milliGRAM(s) Oral daily  venlafaxine 300 milliGRAM(s) Oral daily    Current Antimicrobials:  doxycycline monohydrate Capsule 100 milliGRAM(s) Oral every 12 hours    Prior/Completed Antimicrobials:  ampicillin/sulbactam  IVPB

## 2024-09-22 NOTE — PROGRESS NOTE ADULT - SUBJECTIVE AND OBJECTIVE BOX
Patient seen and examined at bedside. No events noted overnight. Resting comfortably in bed. On Contact precautions        VITAL:  T(C): , Max: 36.7 (09-21-24 @ 18:05)  T(F): , Max: 98.1 (09-21-24 @ 18:05)  HR: 70 (09-22-24 @ 05:11)  BP: 109/60 (09-22-24 @ 05:11)  BP(mean): --  RR: 18 (09-22-24 @ 05:11)  SpO2: 97% (09-22-24 @ 05:11)  Wt(kg): --      PHYSICAL EXAM:  Constitutional: NAD  HEENT: (+)R facial wound  Neck:  No JVD  Respiratory: CTAB/L  Cardiovascular: S1 and S2  Gastrointestinal: BS+, soft, NT/ND  Extremities: No peripheral edema  Neurological: A/O x 3, no focal deficits  : No Arnold  Skin: reduced area of R facial erythema      LABS:                        10.9   11.78 )-----------( 275      ( 21 Sep 2024 07:54 )             34.9     Na(143)/K(4.5)/Cl(111)/HCO3(18)/BUN(44)/Cr(3.24)Glu(86)/Ca(8.9)/Mg(--)/PO4(--)    09-22 @ 09:18  Na(145)/K(5.6)/Cl(111)/HCO3(19)/BUN(40)/Cr(3.19)Glu(60)/Ca(9.1)/Mg(--)/PO4(--)    09-21 @ 07:54  Na(144)/K(5.2)/Cl(110)/HCO3(19)/BUN(35)/Cr(3.02)Glu(58)/Ca(9.0)/Mg(--)/PO4(--)    09-20 @ 06:59  Na(142)/K(4.5)/Cl(111)/HCO3(20)/BUN(32)/Cr(3.01)Glu(96)/Ca(8.8)/Mg(--)/PO4(--)    09-19 @ 10:36    Urinalysis Basic - ( 22 Sep 2024 09:18 )    Color: x / Appearance: x / SG: x / pH: x  Gluc: 86 mg/dL / Ketone: x  / Bili: x / Urobili: x   Blood: x / Protein: x / Nitrite: x   Leuk Esterase: x / RBC: x / WBC x   Sq Epi: x / Non Sq Epi: x / Bacteria: x

## 2024-09-22 NOTE — PROGRESS NOTE ADULT - ASSESSMENT
Patient is an 84-year-old female with a history of anemia, hypothyroidism, depression, GERD, bipolar, CKD who presents for redness and swelling on her face for the past few days. Patient reports right-sided facial swelling and redness including her neck and on her lower jaw that started on 9/8/24, worsening over the past few days. She reports  a dental exam for cracked teeth on day prior but no work performed. States someone told her she had 2 small pimple like lesion on her face, currently scabbed over.    R facial cellulitis with abscess with MRSA  - initial CT neck soft tissue with R facial cellulitis/myositis, no e/o drainable fluid collection  - s/p dexamethasone x24h  - 9/16 noted with increased induration and expressible pus -- s/p I&D by ENT - cx with MRSA   -- s/p irrigation this am, no purulence expressed, no need for further packing   - WBC normalized, remains afebrile, much improved     s/p unasyn 9/11-9/18    Recommendations:  Continue doxycycline 100mg PO Q12h until 9/25  Local wound care per ENT  Contact isolation per IC protocol  Continue rest of care per primary team     Stable from ID standpoint at this time.   Discharge planning per primary team.     Ash Graham M.D.  [facility], Division of Infectious Diseases  [phone]  After 5pm on weekdays and all day on weekends - please call [phone]  Available on [employer] TEAMS

## 2024-09-22 NOTE — PROGRESS NOTE ADULT - ASSESSMENT
Patient is a 84 year old female with PMHx of Anemia, Depression, CKD, GERD and Hypothyroidism. Presents to [facility] for worsening redness and swelling on her face for the past few days.    Plan:    # Facial cellulitis:   - + leukocytosis, BCx w/ NGTD  - No drainable collection on CT, s/p laryngoscope no airway involvement   - Abx mgmt per ID  - S/p Dexamethasone 8MG Q8H for 24H only per ENT  - Encourage oral hydration   - Minced and moist diet  - Monitor temps/WBC  - 9/16 noted with increased duration/swelling and expressible pus -> s/p I&D by ENT; Cx noted with MRSA  - Continue doxycycline per ID  - Local wound care per ENT  - C/w isolation per protocol   - ID/ENT following    # Hx of frequent falls:  - Fall precautions  - Ortho negative  - MR Head 9/17/24 shows no intracranial mass, acute territorial infarct, acute intracranial hemorrhage, or midline shift  - EEG neg for seizures   - TTE noted with severely decreased LVSF with EF 30-35%; regional WMA; per cardio pt now amenable to nuc stress test for ischemic eval  - Cards/Neuro/EP following    # CKD:  - Monitor I/O's  - Serial Cr  - Avoid nephrotoxins  - Renally dose medications  - C/w NaHCO3  - Renal following    # Depression:  - C/w current meds    # LPRD (laryngopharyngeal reflux disease):  - ENT recommendations appreciated   - PPI    # Hypothyroidism:  - C/w Synthroid    # DVT ppx:  - Heparin subq    Dispo: [facility]  * Awaiting Cards/ EP decision Inpt vs outpt. If Outpt, Pt can be discharged and f/u outpt    [facility]

## 2024-09-22 NOTE — PROGRESS NOTE ADULT - SUBJECTIVE AND OBJECTIVE BOX
CARDIOLOGY FOLLOW UP - Dr. Ackerman  Date of Service: 09-22-24 @ 08:47    CC: no cp/sob    Review of Systems:  Constitutional: No fever, weight loss, or fatigue  Respiratory: No cough, wheezing, or hemoptysis, no shortness of breath  Cardiovascular: No chest pain, palpitations, passing out, dizziness, or leg swelling  Gastrointestinal: No abd or epigastric pain. No nausea, vomiting, or hematemesis; no diarrhea or consiptaiton, no melena or hematochezia  Vascular: No edema     TELEMETRY:    PHYSICAL EXAM:  T(C): 36.7 (09-22-24 @ 05:11), Max: 36.7 (09-21-24 @ 18:05)  HR: 70 (09-22-24 @ 05:11) (70 - 82)  BP: 109/60 (09-22-24 @ 05:11) (109/60 - 114/64)  RR: 18 (09-22-24 @ 05:11) (18 - 18)  SpO2: 97% (09-22-24 @ 05:11) (96% - 97%)  Wt(kg): --  I&O's Summary    21 Sep 2024 07:01  -  22 Sep 2024 07:00  --------------------------------------------------------  IN: 0 mL / OUT: 1700 mL / NET: -1700 mL        Appearance: Normal	  Cardiovascular: Normal S1 S2,RRR, No JVD, No murmurs  Respiratory: Lungs clear to auscultation	  Gastrointestinal:  Soft, Non-tender, + BS	  Extremities: Normal range of motion, No clubbing, cyanosis or edema  Vascular: Peripheral pulses palpable 2+ bilaterally       Home Medications:  acetaminophen 325 mg oral tablet: 2 tab(s) orally every 6 hours as needed for  mild pain (11 Sep 2024 21:40)  buPROPion 150 mg/24 hours (XL) oral tablet, extended release: 1 tab(s) orally once a day (11 Sep 2024 21:40)  divalproex sodium 250 mg oral tablet, extended release: 1 tab(s) orally once a day (11 Sep 2024 21:40)  divalproex sodium 500 mg oral tablet, extended release: 1 tab(s) orally once a day (at bedtime) (11 Sep 2024 21:38)  lactulose 10 g/15 mL oral syrup: 30 milliliter(s) orally every 8 hours as needed for  constipation (11 Sep 2024 21:40)  levothyroxine 88 mcg (0.088 mg) oral tablet: 1 tab(s) orally once a day (11 Sep 2024 21:39)  LORazepam 1 mg oral tablet: 1 tab(s) orally once a day (at bedtime) (11 Sep 2024 21:40)  Melatonin 3 mg oral tablet: 1 tab(s) orally once a day (at bedtime) (11 Sep 2024 21:40)  polyethylene glycol 3350 oral powder for reconstitution: 17 gram(s) orally once a day (11 Sep 2024 21:40)  senna leaf extract oral tablet: 2 tab(s) orally once a day (at bedtime) (11 Sep 2024 21:40)  venlafaxine 75 mg oral capsule, extended release: 375 milligram(s) orally once a day (11 Sep 2024 21:39)        MEDICATIONS  (STANDING):  buPROPion XL (24-Hour) . 150 milliGRAM(s) Oral daily  chlorhexidine 2% Cloths 1 Application(s) Topical daily  diphenhydrAMINE 25 milliGRAM(s) Oral once  divalproex  milliGRAM(s) Oral daily  divalproex  milliGRAM(s) Oral at bedtime  doxycycline monohydrate Capsule 100 milliGRAM(s) Oral every 12 hours  heparin   Injectable 5000 Unit(s) SubCutaneous every 12 hours  influenza  Vaccine (HIGH DOSE) 0.5 milliLiter(s) IntraMuscular once  levothyroxine 88 MICROGram(s) Oral daily  LORazepam     Tablet 1 milliGRAM(s) Oral at bedtime  metoprolol succinate ER 25 milliGRAM(s) Oral daily  mupirocin 2% Nasal 1 Application(s) Both Nostrils two times a day  pantoprazole    Tablet 40 milliGRAM(s) Oral before breakfast  sodium bicarbonate 1300 milliGRAM(s) Oral three times a day  venlafaxine 300 milliGRAM(s) Oral daily  venlafaxine 75 milliGRAM(s) Oral daily        EKG:  RADIOLOGY:  DIAGNOSTIC TESTING:  [ ] Echocardiogram:  [ ] Catherterization:  [ ] Stress Test:  OTHER:     LABS:	 	                          10.9   11.78 )-----------( 275      ( 21 Sep 2024 07:54 )             34.9     09-21    145  |  111[H]  |  40[H]  ----------------------------<  60[L]  5.6[H]   |  19[L]  |  3.19[H]    Ca    9.1      21 Sep 2024 07:54            CARDIAC MARKERS:

## 2024-09-22 NOTE — PROGRESS NOTE ADULT - ASSESSMENT
IMPRESSION: 84F w/ dementia, bipolar disorder, HFrEF, and CKD4-5, 9/11/24 p/w R facial cellulitis    (1)Renal - nonproteinuric CKD4 with atrophic R kidney. Grossly stable numbers of late  (2)Metabolic acidosis - now controlled, on standing PO NaHCO3  (3)Hyperkalemia- mild/acceptable for now  (4)ID - R facial celluitis - improving; advanced to PO Doxycycline  (5)Neuro - "aura" - EEG negative for seizures   (6)Cardiac - HFrEF on echo - now planned for nuclear stress test    RECOMMEND:  (1)PO NaHCO3 as ordered  (2)Abx for GFR 15-20ml/min - present dosing is acceptable  (3)Daily BMP  (4)F/U nuclear stress test      Brandi Monroe NP  [facility]  Office/on call physician: [phone]

## 2024-09-22 NOTE — PROGRESS NOTE ADULT - SUBJECTIVE AND OBJECTIVE BOX
Patient is a 84y old  Female who presents with a chief complaint of R face swelling and redness (22 Sep 2024 10:14)      SUBJECTIVE / OVERNIGHT EVENTS:    patient feels well and has no active complaints  no chest pain, no shortness of breath, no n/v     Vital Signs Last 24 Hrs  T(C): 36.7 (22 Sep 2024 21:00), Max: 36.7 (22 Sep 2024 05:11)  T(F): 98 (22 Sep 2024 21:00), Max: 98.1 (22 Sep 2024 05:11)  HR: 67 (22 Sep 2024 21:00) (67 - 70)  BP: 101/62 (22 Sep 2024 21:00) (101/62 - 109/60)  BP(mean): --  RR: 18 (22 Sep 2024 21:00) (18 - 18)  SpO2: 98% (22 Sep 2024 21:00) (97% - 98%)    Parameters below as of 22 Sep 2024 21:00  Patient On (Oxygen Delivery Method): room air      I&O's Summary    21 Sep 2024 07:01  -  22 Sep 2024 07:00  --------------------------------------------------------  IN: 0 mL / OUT: 1700 mL / NET: -1700 mL        General: no acute distress  HEENT: NC/AT, anicteric, EOMI  Respiratory: no acc muscle use, breathing comfortably  Cardiovascular: S1 and S2 present  Gastrointestinal: normal appearing, nondistended  Extremities: no edema, no cyanosis  Skin: R lower cheek incision with no active or expressible drainage, mild erythema        LABS:                        10.9   11.78 )-----------( 275      ( 21 Sep 2024 07:54 )             34.9     09-22    143  |  111[H]  |  44[H]  ----------------------------<  86  4.5   |  18[L]  |  3.24[H]    Ca    8.9      22 Sep 2024 09:18        CAPILLARY BLOOD GLUCOSE            Urinalysis Basic - ( 22 Sep 2024 09:18 )    Color: x / Appearance: x / SG: x / pH: x  Gluc: 86 mg/dL / Ketone: x  / Bili: x / Urobili: x   Blood: x / Protein: x / Nitrite: x   Leuk Esterase: x / RBC: x / WBC x   Sq Epi: x / Non Sq Epi: x / Bacteria: x        RADIOLOGY & ADDITIONAL TESTS:    Imaging Personally Reviewed:  [x] YES  [ ] NO    Consultant(s) Notes Reviewed:  [x] YES  [ ] NO      MEDICATIONS  (STANDING):  buPROPion XL (24-Hour) . 150 milliGRAM(s) Oral daily  chlorhexidine 2% Cloths 1 Application(s) Topical daily  diphenhydrAMINE 25 milliGRAM(s) Oral once  divalproex  milliGRAM(s) Oral daily  divalproex  milliGRAM(s) Oral at bedtime  doxycycline monohydrate Capsule 100 milliGRAM(s) Oral every 12 hours  heparin   Injectable 5000 Unit(s) SubCutaneous every 12 hours  influenza  Vaccine (HIGH DOSE) 0.5 milliLiter(s) IntraMuscular once  levothyroxine 88 MICROGram(s) Oral daily  LORazepam     Tablet 1 milliGRAM(s) Oral at bedtime  metoprolol succinate ER 25 milliGRAM(s) Oral daily  mupirocin 2% Nasal 1 Application(s) Both Nostrils two times a day  pantoprazole    Tablet 40 milliGRAM(s) Oral before breakfast  sodium bicarbonate 1300 milliGRAM(s) Oral three times a day  venlafaxine 300 milliGRAM(s) Oral daily  venlafaxine 75 milliGRAM(s) Oral daily    MEDICATIONS  (PRN):  acetaminophen     Tablet .. 650 milliGRAM(s) Oral every 6 hours PRN Temp greater or equal to 38C (100.4F), Mild Pain (1 - 3)  melatonin 3 milliGRAM(s) Oral at bedtime PRN Insomnia  polyethylene glycol 3350 17 Gram(s) Oral daily PRN Constipation  senna 2 Tablet(s) Oral at bedtime PRN Constipation      Care Discussed with Consultants/Other Providers [x] YES  [ ] NO    HEALTH ISSUES - PROBLEM Dx:  Facial cellulitis    Neck swelling    LPRD (laryngopharyngeal reflux disease)    Myositis    Stage 4 chronic kidney disease    Depression, major    Need for prophylactic measure    Hypothyroidism    Facial abscess

## 2024-09-23 DIAGNOSIS — R55 SYNCOPE AND COLLAPSE: ICD-10-CM

## 2024-09-23 PROCEDURE — 33285 INSJ SUBQ CAR RHYTHM MNTR: CPT

## 2024-09-23 PROCEDURE — ZZZZZ: CPT

## 2024-09-23 RX ADMIN — Medication 1300 MILLIGRAM(S): at 13:15

## 2024-09-23 RX ADMIN — Medication 1 MILLIGRAM(S): at 21:43

## 2024-09-23 RX ADMIN — Medication 150 MILLIGRAM(S): at 13:08

## 2024-09-23 RX ADMIN — CHLORHEXIDINE GLUCONATE ORAL RINSE 1 APPLICATION(S): 1.2 SOLUTION DENTAL at 13:08

## 2024-09-23 RX ADMIN — Medication 5000 UNIT(S): at 05:13

## 2024-09-23 RX ADMIN — Medication 75 MILLIGRAM(S): at 13:07

## 2024-09-23 RX ADMIN — Medication 1300 MILLIGRAM(S): at 21:43

## 2024-09-23 RX ADMIN — Medication 500 MILLIGRAM(S): at 21:42

## 2024-09-23 RX ADMIN — PANTOPRAZOLE SODIUM 40 MILLIGRAM(S): 40 TABLET, DELAYED RELEASE ORAL at 05:12

## 2024-09-23 RX ADMIN — Medication 1300 MILLIGRAM(S): at 05:13

## 2024-09-23 RX ADMIN — Medication 100 MILLIGRAM(S): at 05:13

## 2024-09-23 RX ADMIN — Medication 88 MICROGRAM(S): at 05:13

## 2024-09-23 RX ADMIN — MUPIROCIN 1 APPLICATION(S): 20 OINTMENT TOPICAL at 18:23

## 2024-09-23 RX ADMIN — Medication 25 MILLIGRAM(S): at 05:13

## 2024-09-23 RX ADMIN — Medication 100 MILLIGRAM(S): at 18:23

## 2024-09-23 RX ADMIN — MUPIROCIN 1 APPLICATION(S): 20 OINTMENT TOPICAL at 05:13

## 2024-09-23 RX ADMIN — Medication 300 MILLIGRAM(S): at 13:07

## 2024-09-23 RX ADMIN — Medication 250 MILLIGRAM(S): at 13:08

## 2024-09-23 RX ADMIN — Medication 5000 UNIT(S): at 18:23

## 2024-09-23 NOTE — PROGRESS NOTE ADULT - SUBJECTIVE AND OBJECTIVE BOX
Neurology      S: patient seen. no significant neuro chagnes ; eeg neg ; on contact       Medications: MEDICATIONS  (STANDING):  buPROPion XL (24-Hour) . 150 milliGRAM(s) Oral daily  chlorhexidine 2% Cloths 1 Application(s) Topical daily  diphenhydrAMINE 25 milliGRAM(s) Oral once  divalproex  milliGRAM(s) Oral daily  divalproex  milliGRAM(s) Oral at bedtime  doxycycline monohydrate Capsule 100 milliGRAM(s) Oral every 12 hours  heparin   Injectable 5000 Unit(s) SubCutaneous every 12 hours  influenza  Vaccine (HIGH DOSE) 0.5 milliLiter(s) IntraMuscular once  levothyroxine 88 MICROGram(s) Oral daily  LORazepam     Tablet 1 milliGRAM(s) Oral at bedtime  metoprolol succinate ER 25 milliGRAM(s) Oral daily  mupirocin 2% Nasal 1 Application(s) Both Nostrils two times a day  pantoprazole    Tablet 40 milliGRAM(s) Oral before breakfast  sodium bicarbonate 1300 milliGRAM(s) Oral three times a day  venlafaxine 300 milliGRAM(s) Oral daily  venlafaxine 75 milliGRAM(s) Oral daily    MEDICATIONS  (PRN):  acetaminophen     Tablet .. 650 milliGRAM(s) Oral every 6 hours PRN Temp greater or equal to 38C (100.4F), Mild Pain (1 - 3)  melatonin 3 milliGRAM(s) Oral at bedtime PRN Insomnia  polyethylene glycol 3350 17 Gram(s) Oral daily PRN Constipation  senna 2 Tablet(s) Oral at bedtime PRN Constipation       Vitals:  Vital Signs Last 24 Hrs  T(C): 36.7 (23 Sep 2024 11:06), Max: 36.8 (23 Sep 2024 05:02)  T(F): 98.1 (23 Sep 2024 11:06), Max: 98.2 (23 Sep 2024 05:02)  HR: 61 (23 Sep 2024 11:06) (61 - 74)  BP: 106/69 (23 Sep 2024 11:06) (101/62 - 106/69)  BP(mean): --  RR: 18 (23 Sep 2024 11:06) (18 - 18)  SpO2: 97% (23 Sep 2024 11:06) (97% - 98%)    Parameters below as of 23 Sep 2024 11:06  Patient On (Oxygen Delivery Method): room air               Orthostatic VS  09-15-24 @ 04:24  Lying BP: 115/63 HR: 84  Sitting BP: 124/73 HR: 84  Standing BP: 130/75 HR: 89  Site: upper left arm  Mode: --  Orthostatic VS  09-14-24 @ 21:14  Lying BP: 109/69 HR: 79  Sitting BP: 105/66 HR: 85  Standing BP: 139/63 HR: 88  Site: upper right arm  Mode: electronic        General Exam:   General Appearance: Appropriately dressed and in no acute distress       Head: Normocephalic, atraumatic and no dysmorphic features  Ear, Nose, and Throat: Moist mucous membranes  CVS: S1S2+  Resp: No SOB, no wheeze or rhonchi  GI: soft NT/ND  Extremities: No edema or cyanosis  Skin:  redness on face      Neurological Exam:  Mental Status: Awake, alert and oriented x 3.  Able to follow simple and complex verbal commands. Able to name and repeat. fluent speech. No obvious aphasia or dysarthria noted.   Cranial Nerves: PERRL, EOMI, VFFC, sensation V1-V3 intact,  no obvious facial asymmetry, equal elevation of palate, scm/trap 5/5, tongue is midline on protrusion. no obvious papilledema on fundoscopic exam. hearing is grossly intact.   Motor: Normal bulk, tone and strength throughout uppers ; lowers 3-4/5   Sensation: Intact to light touch and pinprick throughout. no right/left confusion. no extinction to tactile on DSS.   Coordination: No dysmetria on FNF or HKS  Gait: walker /wheelchair     Data/Labs/Imaging which I personally reviewed.         LABS:      09-22    143  |  111[H]  |  44[H]  ----------------------------<  86  4.5   |  18[L]  |  3.24[H]    Ca    8.9      22 Sep 2024 09:18          Urinalysis Basic - ( 22 Sep 2024 09:18 )    Color: x / Appearance: x / SG: x / pH: x  Gluc: 86 mg/dL / Ketone: x  / Bili: x / Urobili: x   Blood: x / Protein: x / Nitrite: x   Leuk Esterase: x / RBC: x / WBC x   Sq Epi: x / Non Sq Epi: x / Bacteria: x            < from: MR Head No Cont (09.17.24 @ 07:15) >    ACC: [id number] EXAM:  MR BRAIN   ORDERED BY: RETA KLINE     PROCEDURE DATE:  09/17/2024          INTERPRETATION:  CLINICAL INDICATION: "spacing out"/AMS    TECHNIQUE: Multi-planar, multi-squence noncontrast brain MRI was   performed.    COMPARISON: 07/31/2024    FINDINGS:    VENTRICLES AND SULCI:  Normal.  INTRA-AXIAL:  No midline shift, acute intracranial hemorrhage or evidence   of acute cerebral ischemia. Chronic right cerebellar infarct. There are   patchy and confluent foci of hyperintense T2 signal within the   subcortical and periventricular white matter which are nonspecific but   likely related to chronic microvascular ischemic disease.  EXTRA-AXIAL:  No mass or collection.  VISUALIZED SINUSES: Mild mucosal thickening.  VISUALIZED MASTOIDS:  Clear.  CALVARIUM:  Normal.  CAROTID FLOW VOIDS: Normal.  MISCELLANEOUS:  None.    IMPRESSION:    No evidence for intracranial mass, acute territorial infarct, acute   intracranial hemorrhage, or midline shift.    --- End of Report ---            NAVIN HINDS MD; Attending Radiologist  This document has been electronically signed. Sep 17 2024  7:21AM    < end of copied text >      < from: CT Head No Cont (07.31.24 @ 15:06) >    ACC: [id number] EXAM:  CT CERVICAL SPINE   ORDERED BY: CHANTEL GERBER     ACC: [id number] EXAM:  CT BRAIN   ORDERED BY: RAHEEL LARSON     PROCEDURE DATE:  07/31/2024          INTERPRETATION:  CLINICAL INFORMATION: fall, slipped out of wheelchair at   midnight/possibly fell asleep while watching TV, R arm/L hand skin tears   - dressed by dtr, denies neck/body pain, no AC use    COMPARISON: Head CT 6/22/2024    CONTRAST:  IV Contrast: NONE  Complications: None reported at time of study completion    TECHNIQUE:    CT BRAIN: Serial axial images were obtained from the skull base to the   vertex using multi-slice helical technique. Sagittal and coronal   reformats were obtained.    CT CERVICAL SPINE: Axial images were obtained of the cervical spineusing   multislice helical technique. Reformatted coronal and sagittal images   were obtained.    FINDINGS:    CT BRAIN:    VENTRICLES AND SULCI: Age appropriate involutional changes.  INTRA-AXIAL: No mass effect, acute hemorrhage, or midline shift.There   are periventricular and subcortical white matter hypodensities,   consistent with microvascular type changes.  EXTRA-AXIAL: No mass or fluid collection. Basal cisterns are normal in   appearance.    VISUALIZED SINUSES:  Clear.  TYMPANOMASTOIDCAVITIES:  Clear.  VISUALIZED ORBITS: Bilateral lens replacement.  CALVARIUM: Intact.    MISCELLANEOUS: None.      CT CERVICAL SPINE:    VERTEBRAE:  Normal in height. No acute fracture. Multilevel degenerative   changes including marginal osteophytes.  ALIGNMENT: No subluxation or scoliosis.  INTERVERTEBRAL DISC SPACES: Multilevel degenerative disc osteophyte   complexes. Multilevel bilateral moderate to severe neural foraminal   stenoses.    VISUALIZED LUNGS: Bilateral chronic appearing interstitial prominence.    MISCELLANEOUS:  None.      IMPRESSION:    CT HEAD:  No evidence of acute intracranial pathology.    CT CERVICAL SPINE:  No acute fracture or traumatic subluxation.    Multi-level degenerative changes.        --- End of Report ---            STEVE ALLEN MD; Attending Radiologist  This document has been electronically signed. Jul 31 2024  3:18PM    < end of copied text >

## 2024-09-23 NOTE — PROGRESS NOTE ADULT - ASSESSMENT
83 y/o female with PMH of anemia, hypothyroidism, depressin, GERD, bipolar disorder, CKD, who is admitted for right face cellulitis and getting further workup for frequent falls. According to the patient, she has had multiple instances and some were witnessed by her friends and family where she is seemingly doing well, but then she would experience an "aura" before she starts to "zone out" and stare blankly prior to falling to the ground. The patient does not think that she loses consciousness during these episodes. Follows with Dr. Ackerman as an outpatient where she reportedly had a negative 1 week cardiac event monitor. MRI of the head was negative for acute findings. A TTE done on 9/18/24 showed a severely decreased LV EF of 30-35% and regional WMAs. ECG also showed a LBBB and 1st degree AV block. LBBB not present on ECG back in July 2022. Patient not on telemetry. Unclear if there are any arrhythmia events while in hospital.    TTE 9/18/24 shows severely decreased LVSF with EF 30-35%; regional WMA; There is mild (grade 1) left ventricular diastolic dysfunction, with normal left ventricular filling pressure.   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 The entire inferior wall, mid and apical inferior septum, mid inferolateral segment, and apical lateral segment are akinetic. The entire anterior wall, basal and mid anterolateral wall, basal and mid anterior septum, basal inferoseptal segment, and basal inferolateral segment are hypokinetic.    Multiple falls, ?syncope of unclear etiology  Facial cellulitis  Newly reduced EF, CM    Recommendations:  - EKG shows NSR with first degree AV block and LBBB  - MRI negative  - Neuro workup negative thus far  - Given newly reduced EF compared to echo in May and multiple WMAs, with LBBB on ECG, recommend ischemic eval. Stress test with infarct, no ischemia  - She may benefit from CRT pacing in the future, but has not been on adequate GDMT plus in the presence of an active infection, not a candidate for transvenous device at this time. Pt and family agreeable to ILR implant.   -- Now s/p ILR implant. Post procedural instructions reviewed with patient and son (at bedside). Written instructions provided.   - Continue telemetry monitoring inpatient. Keep K>4, Mg>2  - Miller will call pt/family w/ wound check date/time.   - EP will sign off, please reconsult with any questions. Discussed with EP attending and primary team.  83 y/o female with PMH of anemia, hypothyroidism, depressin, GERD, bipolar disorder, CKD, who is admitted for right face cellulitis and getting further workup for frequent falls. According to the patient, she has had multiple instances and some were witnessed by her friends and family where she is seemingly doing well, but then she would experience an "aura" before she starts to "zone out" and stare blankly prior to falling to the ground. The patient does not think that she loses consciousness during these episodes. Follows with Dr. Ackerman as an outpatient where she reportedly had a negative 1 week cardiac event monitor. MRI of the head was negative for acute findings. A TTE done on 9/18/24 showed a severely decreased LV EF of 30-35% and regional WMAs. ECG also showed a LBBB and 1st degree AV block. LBBB not present on ECG back in July 2022. Patient not on telemetry. Unclear if there are any arrhythmia events while in hospital.    TTE 9/18/24 shows severely decreased LVSF with EF 30-35%; regional WMA; There is mild (grade 1) left ventricular diastolic dysfunction, with normal left ventricular filling pressure.   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 The entire inferior wall, mid and apical inferior septum, mid inferolateral segment, and apical lateral segment are akinetic. The entire anterior wall, basal and mid anterolateral wall, basal and mid anterior septum, basal inferoseptal segment, and basal inferolateral segment are hypokinetic.    Multiple falls, ?syncope of unclear etiology  Facial cellulitis  Newly reduced EF, CM    Recommendations:  - EKG shows NSR with first degree AV block and LBBB  - MRI negative  - Neuro workup negative thus far  - Given newly reduced EF compared to echo in May and multiple WMAs, with LBBB on ECG, recommend ischemic eval. Stress test with infarct, no ischemia  - She may benefit from CRT pacing in the future, but has not been on adequate GDMT plus in the presence of an active infection, not a candidate for transvenous device at this time. Pt and family agreeable to ILR implant.   -- Consent obtained with son. Now s/p ILR implant. Post procedural instructions reviewed with patient and son (at bedside). Written instructions provided.   - Continue telemetry monitoring inpatient. Keep K>4, Mg>2  -  will call pt/family w/ wound check date/time.   - EP will sign off, please reconsult with any questions. Discussed with EP attending and primary team.

## 2024-09-23 NOTE — PROGRESS NOTE ADULT - ASSESSMENT
Patient is a 84 year old female with PMHx of Anemia, Depression, CKD, GERD and Hypothyroidism. Presents to [facility] for worsening redness and swelling on her face for the past few days.    Plan:    # Facial cellulitis:   - + leukocytosis, BCx w/ NGTD  - No drainable collection on CT, s/p laryngoscope no airway involvement   - Abx mgmt per ID  - S/p Dexamethasone 8MG Q8H for 24H only per ENT  - Encourage oral hydration   - Minced and moist diet  - Monitor temps/WBC  - 9/16 noted with increased duration/swelling and expressible pus -> s/p I&D by ENT; Cx noted with MRSA  - Continue doxycycline per ID  - Local wound care per ENT  - C/w isolation per protocol   - ID/ENT following    # Hx of frequent falls:  - Fall precautions  - Ortho negative  - MR Head 9/17/24 shows no intracranial mass, acute territorial infarct, acute intracranial hemorrhage, or midline shift  - EEG neg for seizures   - TTE noted with severely decreased LVSF with EF 30-35%; regional WMA;   - Pharm Nuclear Stress Test 9/22/24 shows medium-sized, severe defect(s) in the apical and anteroseptal walls that are fixed consistent with an infarct. The left ventricle is severely reduced in function and mildly enlarged in size. The post stress left ventricular EF is 38 %. Diffuse left ventricular wall motion hypokinesis.  - Medical management for now   - Cards/Neuro/EP following    # CKD:  - Monitor I/O's  - Serial Cr  - Avoid nephrotoxins  - Renally dose medications  - C/w NaHCO3  - Renal following    # Depression:  - C/w current meds    # LPRD (laryngopharyngeal reflux disease):  - ENT recommendations appreciated   - PPI    # Hypothyroidism:  - C/w Synthroid    # DVT ppx:  - Heparin subq    Dispo: [facility]      [facility]  [phone]

## 2024-09-23 NOTE — PROGRESS NOTE ADULT - SUBJECTIVE AND OBJECTIVE BOX
CARDIOLOGY FOLLOW UP - Dr. Ackerman  DATE OF SERVICE: 9/23/24    CC no CP or SOB      REVIEW OF SYSTEMS:  CONSTITUTIONAL: No fever, weight loss, or fatigue  RESPIRATORY: No cough, wheezing, chills or hemoptysis; No Shortness of Breath  CARDIOVASCULAR: No chest pain, palpitations, passing out, dizziness  GASTROINTESTINAL: No abdominal or epigastric pain. No nausea, vomiting, or hematemesis; No diarrhea or constipation. No melena or hematochezia.      PHYSICAL EXAM:  T(C): 36.8 (09-23-24 @ 05:02), Max: 36.8 (09-23-24 @ 05:02)  HR: 74 (09-23-24 @ 05:02) (67 - 74)  BP: 105/62 (09-23-24 @ 05:02) (101/62 - 105/62)  RR: 18 (09-23-24 @ 05:02) (18 - 18)  SpO2: 98% (09-23-24 @ 05:02) (98% - 98%)  Wt(kg): --  I&O's Summary    22 Sep 2024 07:01  -  23 Sep 2024 07:00  --------------------------------------------------------  IN: 250 mL / OUT: 800 mL / NET: -550 mL        Appearance: Normal	  Cardiovascular: Normal S1 S2,RRR, No JVD, +murmur  Respiratory: Lungs clear to auscultation	  Gastrointestinal:  Soft, Non-tender, + BS	  Extremities: Normal range of motion, No clubbing, cyanosis or edema      Home Medications:  acetaminophen 325 mg oral tablet: 2 tab(s) orally every 6 hours as needed for  mild pain (11 Sep 2024 21:40)  buPROPion 150 mg/24 hours (XL) oral tablet, extended release: 1 tab(s) orally once a day (11 Sep 2024 21:40)  divalproex sodium 250 mg oral tablet, extended release: 1 tab(s) orally once a day (11 Sep 2024 21:40)  divalproex sodium 500 mg oral tablet, extended release: 1 tab(s) orally once a day (at bedtime) (11 Sep 2024 21:38)  lactulose 10 g/15 mL oral syrup: 30 milliliter(s) orally every 8 hours as needed for  constipation (11 Sep 2024 21:40)  levothyroxine 88 mcg (0.088 mg) oral tablet: 1 tab(s) orally once a day (11 Sep 2024 21:39)  LORazepam 1 mg oral tablet: 1 tab(s) orally once a day (at bedtime) (11 Sep 2024 21:40)  Melatonin 3 mg oral tablet: 1 tab(s) orally once a day (at bedtime) (11 Sep 2024 21:40)  polyethylene glycol 3350 oral powder for reconstitution: 17 gram(s) orally once a day (11 Sep 2024 21:40)  senna leaf extract oral tablet: 2 tab(s) orally once a day (at bedtime) (11 Sep 2024 21:40)  venlafaxine 75 mg oral capsule, extended release: 375 milligram(s) orally once a day (11 Sep 2024 21:39)      MEDICATIONS  (STANDING):  buPROPion XL (24-Hour) . 150 milliGRAM(s) Oral daily  chlorhexidine 2% Cloths 1 Application(s) Topical daily  diphenhydrAMINE 25 milliGRAM(s) Oral once  divalproex  milliGRAM(s) Oral daily  divalproex  milliGRAM(s) Oral at bedtime  doxycycline monohydrate Capsule 100 milliGRAM(s) Oral every 12 hours  heparin   Injectable 5000 Unit(s) SubCutaneous every 12 hours  influenza  Vaccine (HIGH DOSE) 0.5 milliLiter(s) IntraMuscular once  levothyroxine 88 MICROGram(s) Oral daily  LORazepam     Tablet 1 milliGRAM(s) Oral at bedtime  metoprolol succinate ER 25 milliGRAM(s) Oral daily  mupirocin 2% Nasal 1 Application(s) Both Nostrils two times a day  pantoprazole    Tablet 40 milliGRAM(s) Oral before breakfast  sodium bicarbonate 1300 milliGRAM(s) Oral three times a day  venlafaxine 300 milliGRAM(s) Oral daily  venlafaxine 75 milliGRAM(s) Oral daily      TELEMETRY: 	  NSR with first degree AV block  ECG:  	  RADIOLOGY:   DIAGNOSTIC TESTING:  [ ] Echocardiogram:  [ ]  Catheterization:  [ ] Stress Test:    < from: Nuclear Stress Test-Pharmacologic.. (09.22.24 @ 10:27) >  Conclusions:   1. Myocardial Perfusion: Abnormal.   2. Qualitative Perfusion:      - medium-sized, severe defect(s) in the apical and anteroseptal walls that are fixed consistent with an infarct.   3. The left ventricle is severely reduced in function and mildly enlarged in size. The post stress left ventricular EF is 38 %. The stress end diastolic volume is 124 ml and systolic volume is 76 ml.   4. Diffuse left ventricular wall motion hypokinesis.    < end of copied text >    OTHER: 	    LABS:	 	    Troponin T, High Sensitivity Result: 35 ng/L [0 - 51] (09-21 @ 07:54)      09-22    143  |  111[H]  |  44[H]  ----------------------------<  86  4.5   |  18[L]  |  3.24[H]    Ca    8.9      22 Sep 2024 09:18

## 2024-09-23 NOTE — PROGRESS NOTE ADULT - ASSESSMENT
Patient is an 84-year-old female with a history of anemia, hypothyroidism, depression, GERD, bipolar, CKD who presents for redness and swelling on her face for the past few days. Patient reports right-sided facial swelling and redness including her neck and on her lower jaw that started on 9/8/24, worsening over the past few days. She reports  a dental exam for cracked teeth on day prior but no work performed. States someone told her she had 2 small pimple like lesion on her face, currently scabbed over.    R facial cellulitis with abscess with MRSA  - initial CT neck soft tissue with R facial cellulitis/myositis, no e/o drainable fluid collection  - s/p dexamethasone x24h  - 9/16 noted with increased induration and expressible pus -- s/p I&D by ENT - cx with MRSA   -- s/p irrigation this am, no purulence expressed, no need for further packing   - WBC normalized, remains afebrile, much improved, slight swelling R submandibular area    s/p unasyn 9/11-9/18    Recommendations:  Continue doxycycline 100mg PO Q12h - extend to 9/27  Local wound care per ENT  Contact isolation per IC protocol  Continue rest of care per primary team     Stable from ID standpoint at this time.   Discharge planning per primary team.   Patient to follow up with us as outpt     Ash Graham M.D.  [facility], Division of Infectious Diseases  [phone]  After 5pm on weekdays and all day on weekends - please call [phone]  Available on [employer] TEAMS

## 2024-09-23 NOTE — PROGRESS NOTE ADULT - SUBJECTIVE AND OBJECTIVE BOX
[facility] DIVISION OF INFECTIOUS DISEASES  LOUANN Dubois Y. Patel, S. Shah, G. David  [phone]  ([phone] - weekdays after 5pm and weekends)    Name: JOANNA MALDONADO  Age/Gender: 84y Female  MRN: [id number]    Interval History:  Patient seen and examined this morning.   No new complaints noted.  Notes reviewed  No concerning overnight events  Afebrile   Allergies: azithromycin (Rash)      Objective:  Vitals:   T(F): 98.2 (09-23-24 @ 05:02), Max: 98.2 (09-23-24 @ 05:02)  HR: 74 (09-23-24 @ 05:02) (67 - 74)  BP: 105/62 (09-23-24 @ 05:02) (101/62 - 105/62)  RR: 18 (09-23-24 @ 05:02) (18 - 18)  SpO2: 98% (09-23-24 @ 05:02) (98% - 98%)  Physical Examination:  General: no acute distress  HEENT: NC/AT, anicteric, EOMI  Respiratory: no acc muscle use, breathing comfortably  Cardiovascular: S1 and S2 present  Gastrointestinal: normal appearing, nondistended  Extremities: no edema, no cyanosis  Skin: R lower face incision dry, no drainage, no erythema, no warmth  slight swelling R submandibular area but overall less    Laboratory Studies:  CBC:   WBC Trend:  11.78 09-21-24 @ 07:54  10.22 09-20-24 @ 06:58  9.90 09-19-24 @ 10:35  10.41 09-17-24 @ 08:31    CMP: 09-22    143  |  111[H]  |  44[H]  ----------------------------<  86  4.5   |  18[L]  |  3.24[H]    Ca    8.9      22 Sep 2024 09:18    Creatinine: 3.24 mg/dL (09-22-24 @ 09:18)  Creatinine: 3.19 mg/dL (09-21-24 @ 07:54)  Creatinine: 3.02 mg/dL (09-20-24 @ 06:59)  Creatinine: 3.01 mg/dL (09-19-24 @ 10:36)  Creatinine: 2.82 mg/dL (09-17-24 @ 08:32)    Microbiology: reviewed   Culture - Other (collected 09-16-24 @ 11:09)  Source: Drainage Drainage  Final Report (09-21-24 @ 15:41):    Numerous Methicillin Resistant Staphylococcus aureus  Organism: Methicillin resistant Staphylococcus aureus (09-21-24 @ 15:41)  Organism: Methicillin resistant Staphylococcus aureus (09-21-24 @ 15:41)      Method Type: EDYTA      -  Clindamycin: S <=0.25      -  Daptomycin: S 1      -  Erythromycin: R >4      -  Gentamicin: S <=1 Should not be used as monotherapy      -  Linezolid: S 4      -  Oxacillin: R >2      -  Penicillin: R >8      -  Rifampin: S <=1 Should not be used as monotherapy      -  Tetracycline: S <=1      -  Trimethoprim/Sulfamethoxazole: S <=0.5/9.5      -  Vancomycin: S 2    Culture - Blood (collected 09-11-24 @ 18:40)  Source: .Blood Blood-Peripheral  Final Report (09-17-24 @ 02:00):    No growth at 5 days    Culture - Blood (collected 09-11-24 @ 18:30)  Source: .Blood Blood-Peripheral  Final Report (09-17-24 @ 02:00):    No growth at 5 days    SARS-CoV-2 Result: [place] (17 Sep 2024 13:35)    Radiology: reviewed     Medications:  acetaminophen     Tablet .. 650 milliGRAM(s) Oral every 6 hours PRN  buPROPion XL (24-Hour) . 150 milliGRAM(s) Oral daily  chlorhexidine 2% Cloths 1 Application(s) Topical daily  diphenhydrAMINE 25 milliGRAM(s) Oral once  divalproex  milliGRAM(s) Oral daily  divalproex  milliGRAM(s) Oral at bedtime  doxycycline monohydrate Capsule 100 milliGRAM(s) Oral every 12 hours  heparin   Injectable 5000 Unit(s) SubCutaneous every 12 hours  influenza  Vaccine (HIGH DOSE) 0.5 milliLiter(s) IntraMuscular once  levothyroxine 88 MICROGram(s) Oral daily  LORazepam     Tablet 1 milliGRAM(s) Oral at bedtime  melatonin 3 milliGRAM(s) Oral at bedtime PRN  metoprolol succinate ER 25 milliGRAM(s) Oral daily  mupirocin 2% Nasal 1 Application(s) Both Nostrils two times a day  pantoprazole    Tablet 40 milliGRAM(s) Oral before breakfast  polyethylene glycol 3350 17 Gram(s) Oral daily PRN  senna 2 Tablet(s) Oral at bedtime PRN  sodium bicarbonate 1300 milliGRAM(s) Oral three times a day  venlafaxine 300 milliGRAM(s) Oral daily  venlafaxine 75 milliGRAM(s) Oral daily    Current Antimicrobials:  doxycycline monohydrate Capsule 100 milliGRAM(s) Oral every 12 hours    Prior/Completed Antimicrobials:  ampicillin/sulbactam  IVPB

## 2024-09-23 NOTE — PROGRESS NOTE ADULT - ASSESSMENT
84-year-old female with   anemia, hypothyroidism, depression, GERD, bipolar, CKD , presents for redness and swelling on her face for the past few days.  neuro called for episodes of AMS followed by falls and syncope.    CTH 7/31/24 neg   CT c spine neg   o/e AAOx3, IGLESIAS uppers> lowers. uses walker   orthostatics neg   MRI brain neg for acute findings.  chronic R cerebellar strtoke   EEG neg for seiuzres   + MRSA R lower face   TTE 9/18 reveiwed     Imprssion:   1) episodes of "spacing out" /AMS followed by occaional fall/syncope of unclear etiology.  no tongue bite, convulsions, incontienence or post ictal confusion lasts < 1 min ; low suspicion for seizure more likely from underlyikng psych condition   2) chronic appearing R cerebllar small vessel storke       - should be on asa and statin therapy for chornic appearing stroke if no objection   -  spoke with daughter 9/16 states psych meds have been for years r/o seizure   - limit sedating meds --> on significant psych meds, consider psych f/u.  venlafaxine , lorazapam 1mg QHS, VPA  AM 500mg QHS and wellbutrin 150mg daily (can lower seizure threshold)   -  possibe event monitor ; EP called for ILR   - non urgent vessel imaging, can do carotid duplex   - unasyn for cellulitis --> changed to doxycycline.  MRSA, now needs isolation ; doxy until 9/25   - eventually EMILIO    - telemetry  - PT/OT/SS/SLP, OOBC  - check FS, glucose control <180  - GI/DVT ppx  - Thank you for allowing me to participate in the care of this patient. Call with questions.      Fabio Fox MD  Vascular Neurology  Office: [phone]

## 2024-09-23 NOTE — PROGRESS NOTE ADULT - SUBJECTIVE AND OBJECTIVE BOX
SUBJECTIVE / OVERNIGHT EVENTS:    Patient seen and examined at bedside. No events noted overnight. Resting comfortably in bed. S/p stress test yesterday        --------------------------------------------------------------------------------------------  LABS:    09-22    143  |  111[H]  |  44[H]  ----------------------------<  86  4.5   |  18[L]  |  3.24[H]    Ca    8.9      22 Sep 2024 09:18        CAPILLARY BLOOD GLUCOSE            Urinalysis Basic - ( 22 Sep 2024 09:18 )    Color: x / Appearance: x / SG: x / pH: x  Gluc: 86 mg/dL / Ketone: x  / Bili: x / Urobili: x   Blood: x / Protein: x / Nitrite: x   Leuk Esterase: x / RBC: x / WBC x   Sq Epi: x / Non Sq Epi: x / Bacteria: x        RADIOLOGY & ADDITIONAL TESTS:    Imaging Personally Reviewed:  [x] YES  [ ] NO    Consultant(s) Notes Reviewed:  [x] YES  [ ] NO    MEDICATIONS  (STANDING):  buPROPion XL (24-Hour) . 150 milliGRAM(s) Oral daily  chlorhexidine 2% Cloths 1 Application(s) Topical daily  diphenhydrAMINE 25 milliGRAM(s) Oral once  divalproex  milliGRAM(s) Oral daily  divalproex  milliGRAM(s) Oral at bedtime  doxycycline monohydrate Capsule 100 milliGRAM(s) Oral every 12 hours  heparin   Injectable 5000 Unit(s) SubCutaneous every 12 hours  influenza  Vaccine (HIGH DOSE) 0.5 milliLiter(s) IntraMuscular once  levothyroxine 88 MICROGram(s) Oral daily  LORazepam     Tablet 1 milliGRAM(s) Oral at bedtime  metoprolol succinate ER 25 milliGRAM(s) Oral daily  mupirocin 2% Nasal 1 Application(s) Both Nostrils two times a day  pantoprazole    Tablet 40 milliGRAM(s) Oral before breakfast  sodium bicarbonate 1300 milliGRAM(s) Oral three times a day  venlafaxine 75 milliGRAM(s) Oral daily  venlafaxine 300 milliGRAM(s) Oral daily    MEDICATIONS  (PRN):  acetaminophen     Tablet .. 650 milliGRAM(s) Oral every 6 hours PRN Temp greater or equal to 38C (100.4F), Mild Pain (1 - 3)  melatonin 3 milliGRAM(s) Oral at bedtime PRN Insomnia  polyethylene glycol 3350 17 Gram(s) Oral daily PRN Constipation  senna 2 Tablet(s) Oral at bedtime PRN Constipation      Care Discussed with Consultants/Other Providers [x] YES  [ ] NO    Vital Signs Last 24 Hrs  T(C): 36.8 (23 Sep 2024 05:02), Max: 36.8 (23 Sep 2024 05:02)  T(F): 98.2 (23 Sep 2024 05:02), Max: 98.2 (23 Sep 2024 05:02)  HR: 74 (23 Sep 2024 05:02) (67 - 74)  BP: 105/62 (23 Sep 2024 05:02) (101/62 - 105/62)  BP(mean): --  RR: 18 (23 Sep 2024 05:02) (18 - 18)  SpO2: 98% (23 Sep 2024 05:02) (98% - 98%)    Parameters below as of 23 Sep 2024 05:02  Patient On (Oxygen Delivery Method): room air      I&O's Summary    22 Sep 2024 07:01  -  23 Sep 2024 07:00  --------------------------------------------------------  IN: 250 mL / OUT: 800 mL / NET: -550 mL      General: no acute distress  HEENT: NC/AT, anicteric, EOMI  Respiratory: no acc muscle use, breathing comfortably  Cardiovascular: S1 and S2 present  Gastrointestinal: normal appearing, nondistended  Extremities: no edema, no cyanosis  Skin: R lower cheek incision with no active or expressible drainage, mild erythema

## 2024-09-23 NOTE — PROGRESS NOTE ADULT - SUBJECTIVE AND OBJECTIVE BOX
Overnight events noted      VITAL:  T(C): , Max: 36.8 (09-23-24 @ 05:02)  T(F): , Max: 98.2 (09-23-24 @ 05:02)  HR: 74 (09-23-24 @ 05:02)  BP: 105/62 (09-23-24 @ 05:02)  BP(mean): --  RR: 18 (09-23-24 @ 05:02)  SpO2: 98% (09-23-24 @ 05:02)  Wt(kg): --      PHYSICAL EXAM:  Constitutional: NAD  HEENT: (+)R facial wound-dressed  Neck:  No JVD  Respiratory: CTAB/L  Cardiovascular: S1 and S2  Gastrointestinal: BS+, soft, NT/ND  Extremities: No peripheral edema  Neurological: A/O x 3, no focal deficits  : No Arnold  Skin: reduced area of R facial erythema    LABS:    Na(143)/K(4.5)/Cl(111)/HCO3(18)/BUN(44)/Cr(3.24)Glu(86)/Ca(8.9)/Mg(--)/PO4(--)    09-22 @ 09:18  Na(145)/K(5.6)/Cl(111)/HCO3(19)/BUN(40)/Cr(3.19)Glu(60)/Ca(9.1)/Mg(--)/PO4(--)    09-21 @ 07:54      IMAGING:  SPECT 9/22/24 - medium severe fixed defects c/w infarct      IMPRESSION: 84F w/ dementia, bipolar disorder, HFrEF, and CKD4-5, 9/11/24 p/w R facial cellulitis    (1)Renal - nonproteinuric CKD4 with atrophic R kidney. Grossly stable numbers of late  (2)Metabolic acidosis - HCO3 low but acceptable for now, on standing PO NaHCO3  (3)Hyperkalemia- improved as of 9/22  (4)ID - R facial celluitis - improved; advanced to PO Doxycycline  (5)Cardiac - HFrEF on echo - fixed defects on SPECT      RECOMMEND:  (1)PO NaHCO3 as ordered  (2)Abx for GFR 15-20ml/min - present dosing is acceptable  (3)D/C planning per primary team; f/u at my office as previously scheduled        Ab Tripathi MD  [facility]  Office/on call physician: [phone]  Cell (7a-7p): [phone]       Overnight events noted      VITAL:  T(C): , Max: 36.8 (09-23-24 @ 05:02)  T(F): , Max: 98.2 (09-23-24 @ 05:02)  HR: 74 (09-23-24 @ 05:02)  BP: 105/62 (09-23-24 @ 05:02)  BP(mean): --  RR: 18 (09-23-24 @ 05:02)  SpO2: 98% (09-23-24 @ 05:02)  Wt(kg): --      PHYSICAL EXAM:  Constitutional: NAD  HEENT: (+)R facial wound-dressed  Neck:  No JVD  Respiratory: CTAB/L  Cardiovascular: S1 and S2  Gastrointestinal: BS+, soft, NT/ND  Extremities: No peripheral edema  Neurological: A/O x 3, no focal deficits  : No Arnold  Skin: reduced area of R facial erythema    LABS:    Na(143)/K(4.5)/Cl(111)/HCO3(18)/BUN(44)/Cr(3.24)Glu(86)/Ca(8.9)/Mg(--)/PO4(--)    09-22 @ 09:18  Na(145)/K(5.6)/Cl(111)/HCO3(19)/BUN(40)/Cr(3.19)Glu(60)/Ca(9.1)/Mg(--)/PO4(--)    09-21 @ 07:54      IMAGING:  SPECT 9/22/24 - medium severe fixed defects c/w infarct      IMPRESSION: 84F w/ dementia, bipolar disorder, HFrEF, and CKD4-5, 9/11/24 p/w R facial cellulitis    (1)Renal - nonproteinuric CKD4 with atrophic R kidney. Grossly stable numbers of late  (2)Metabolic acidosis - HCO3 low but acceptable for now, on standing PO NaHCO3  (3)Hyperkalemia- improved as of 9/22  (4)ID - R facial celluitis - improved; advanced to PO Doxycycline  (5)Cardiac - HFrEF on echo - fixed defects on SPECT      RECOMMEND:  (1)PO NaHCO3 as ordered  (2)Abx for GFR 15-20ml/min - present dosing is acceptable  (3)D/C planning per primary team; f/u at my office 10/23/24 as previously scheduled        Ab Tripathi MD  [facility]  Office/on call physician: [phone]  Cell (7a-7p): [phone]       No pain, no sob      VITAL:  T(C): , Max: 36.8 (09-23-24 @ 05:02)  T(F): , Max: 98.2 (09-23-24 @ 05:02)  HR: 74 (09-23-24 @ 05:02)  BP: 105/62 (09-23-24 @ 05:02)  RR: 18 (09-23-24 @ 05:02)  SpO2: 98% (09-23-24 @ 05:02)      PHYSICAL EXAM:  Constitutional: NAD  HEENT: NCAT, DMM  Neck:  No JVD  Respiratory: CTAB/L  Cardiovascular: S1 and S2  Gastrointestinal: BS+, soft, NT/ND  Extremities: No peripheral edema  Neurological: A/O x 3, no focal deficits  : No Arnold  Skin: R facial erythema resolved    LABS:    Na(143)/K(4.5)/Cl(111)/HCO3(18)/BUN(44)/Cr(3.24)Glu(86)/Ca(8.9)/Mg(--)/PO4(--)    09-22 @ 09:18  Na(145)/K(5.6)/Cl(111)/HCO3(19)/BUN(40)/Cr(3.19)Glu(60)/Ca(9.1)/Mg(--)/PO4(--)    09-21 @ 07:54      IMAGING:  SPECT 9/22/24 - medium severe fixed defects c/w infarct      IMPRESSION: 84F w/ dementia, bipolar disorder, HFrEF, and CKD4-5, 9/11/24 p/w R facial cellulitis    (1)Renal - nonproteinuric CKD4 with atrophic R kidney. Grossly stable numbers of late  (2)Metabolic acidosis - HCO3 low but acceptable for now, on standing PO NaHCO3  (3)Hyperkalemia- improved as of 9/22  (4)ID - R facial celluitis - improved; advanced to PO Doxycycline  (5)Cardiac - HFrEF on echo - fixed defects on SPECT - awaiting loop recorder      RECOMMEND:  (1)PO NaHCO3 as ordered  (2)Abx for GFR 15-20ml/min - present dosing is acceptable  (3)D/C planning per primary team; f/u at my office 10/23/24 as previously scheduled        Ab Tripathi MD  [facility]  Office/on call physician: [phone]  Cell (7a-7p): [phone]

## 2024-09-23 NOTE — PROGRESS NOTE ADULT - NS ATTEND AMEND GEN_ALL_CORE FT
Per Dr. Siddiqi the patient has had a two-week outpatient monitor (results no available for my review). Sinus with first-degree AV block, LBBB, ? falls vs. syncope, declining EP study. ILR not unreasonable.     GARRETT Fierro

## 2024-09-23 NOTE — PROGRESS NOTE ADULT - SUBJECTIVE AND OBJECTIVE BOX
24H hour events: EP reconsulted; pt/family agreeable to ILR implant and wants to proceed. Tele without events.     MEDICATIONS:  heparin   Injectable 5000 Unit(s) SubCutaneous every 12 hours  metoprolol succinate ER 25 milliGRAM(s) Oral daily  doxycycline monohydrate Capsule 100 milliGRAM(s) Oral every 12 hours  acetaminophen     Tablet .. 650 milliGRAM(s) Oral every 6 hours PRN  buPROPion XL (24-Hour) . 150 milliGRAM(s) Oral daily  diphenhydrAMINE 25 milliGRAM(s) Oral once  divalproex  milliGRAM(s) Oral daily  divalproex  milliGRAM(s) Oral at bedtime  LORazepam     Tablet 1 milliGRAM(s) Oral at bedtime  melatonin 3 milliGRAM(s) Oral at bedtime PRN  venlafaxine 300 milliGRAM(s) Oral daily  venlafaxine 75 milliGRAM(s) Oral daily  pantoprazole    Tablet 40 milliGRAM(s) Oral before breakfast  polyethylene glycol 3350 17 Gram(s) Oral daily PRN  senna 2 Tablet(s) Oral at bedtime PRN  levothyroxine 88 MICROGram(s) Oral daily  chlorhexidine 2% Cloths 1 Application(s) Topical daily  influenza  Vaccine (HIGH DOSE) 0.5 milliLiter(s) IntraMuscular once  mupirocin 2% Nasal 1 Application(s) Both Nostrils two times a day  sodium bicarbonate 1300 milliGRAM(s) Oral three times a day      REVIEW OF SYSTEMS:  Complete 12point ROS negative.    PHYSICAL EXAM:  T(C): 36.7 (09-23-24 @ 11:06), Max: 36.8 (09-23-24 @ 05:02)  HR: 61 (09-23-24 @ 11:06) (61 - 74)  BP: 106/69 (09-23-24 @ 11:06) (101/62 - 106/69)  RR: 18 (09-23-24 @ 11:06) (18 - 18)  SpO2: 97% (09-23-24 @ 11:06) (97% - 98%)  Wt(kg): --  I&O's Summary    22 Sep 2024 07:01  -  23 Sep 2024 07:00  --------------------------------------------------------  IN: 250 mL / OUT: 800 mL / NET: -550 mL    23 Sep 2024 07:01  -  23 Sep 2024 18:15  --------------------------------------------------------  IN: 0 mL / OUT: 650 mL / NET: -650 mL        Appearance: Normal	  HEENT: NC/AT  Cardiovascular: Normal S1 S2  Respiratory: Lungs clear to auscultation	  Psychiatry: A & O x 2-3, Mood & affect appropriate  Neurologic: Non-focal    LABS:	 	      09-22    143  |  111[H]  |  44[H]  ----------------------------<  86  4.5   |  18[L]  |  3.24[H]    Ca    8.9      22 Sep 2024 09:18      [ ] Echocardiogram: < from: TTE W or WO Ultrasound Enhancing Agent (09.18.24 @ 09:18) >  CONCLUSIONS:      1. Left ventricular systolic function is severely decreased with an ejection fraction visually estimated at 30 to 35 %. Regional wall motion abnormalities present.   2. Multiple segmental abnormalities exist. See findings.   3. Normal right ventricular cavity size and normal right ventricular systolic function. Tricuspid annular plane systolic excursion (TAPSE) is 2.9 cm (normal >=1.7 cm). Tricuspid annular tissue Doppler S' is 17.0 cm/s (normal >10 cm/s).   4. No pericardial effusion seen.   5. Estimated pulmonary artery systolic pressure is 24 mmHg.   6. No prior echocardiogram is available for comparison.   7. Technically difficult image quality.   8. The inferior vena cava is normal in size (normal <2.1cm) withnormal inspiratory collapse (normal >50%) consistent with normal right atrial pressure (~3, range 0-5mmHg).    ________________________________________________________________________________________  FINDINGS:     Left Ventricle:  The left ventricular cavity is small. Left ventricular wall thickness is normal. Left ventricular systolic function is severely decreased with an ejection fraction visually estimated at 30 to 35%. There are regional wall motion abnormalities present. There is mild (grade 1) left ventricular diastolic dysfunction, with normal left ventricular filling pressure.  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 The entire inferior wall, mid and  apical inferior septum, mid inferolateral segment, and apical lateral segment  are akinetic. The entire anterior wall, basal and mid anterolateral wall, basal  and mid anterior septum, basal inferoseptal segment, and basal inferolateral  segment are hypokinetic.          Right Ventricle:  The right ventricular cavity is normal in size and right ventricular systolic function is normal. Tricuspid annular plane systolic excursion (TAPSE) is 2.9 cm (normal >=1.7 cm). Tricuspid annular tissue Doppler S' is 17.0 cm/s (normal >10 cm/s).     Left Atrium:  The left atrium is normal in size.     Right Atrium:  The right atrium is normal in size with an indexed area of 7.56 cm²/m².     Aortic Valve:  The aortic valve appears trileaflet with normal systolic excursion. There is mild aortic stenosis. There is mild aortic regurgitation.     Mitral Valve:  There is mitral valve thickening of the anterior and posterior leaflets. There is moderate posterior calcification of the mitral valve annulus. There is no evidence of mitral valve prolapse. There is mild mitral regurgitation.     Tricuspid Valve:  Structurally normal tricuspid valve with normal leaflet excursion. There is trace tricuspid regurgitation. Estimated pulmonary artery systolic pressure is 24 mmHg.     Pulmonic Valve:  The pulmonic valve was not well visualized. Thereis trace pulmonic regurgitation.     Pericardium:  No pericardial effusion seen.     Systemic Veins:  The inferior vena cava is normal in size (normal <2.1cm) with normal inspiratory collapse (normal >50%) consistent with normal right atrial pressure(~3, range 0-5mmHg).  ____________________________________________________________________  QUANTITATIVE DATA:  Left Ventricle Measurements: (Indexed to BSA)     IVSd (2D):   1.0 cm  LVPWd (2D):  0.8 cm  LVIDd (2D):  3.1 cm  LVIDs (2D):  2.5 cm  LV Mass:     71 g   41.3 g/m²  Visualized LV EF%: 30 to 35%     MV E Vmax:    1.78 m/s  e' lateral:   17.00 cm/s  e' medial:    17.00 cm/s  E/e' lateral: 10.47  E/e' medial:  10.47  E/e' Average: 10.47    Aorta Measurements: (Normal range) (Indexed to BSA)     Ao Root d 3.10 cm (2.7 - 3.3 cm) 1.80 cm/m²            Left Atrium Measurements: (Indexed to BSA)  LA Diam 2D: 3.30 cm         Right Ventricle Measurements:     TAPSE: 2.9 cm       LVOT / RVOT/ Qp/Qs Data: (Indexed to BSA)  LVOT Vmax:      1.09 m/s  LVOT Vmn:       0.790 m/s  LVOT VTI:       18.80 cm  LVOT peak grad: 5 mmHg  LVOT mean grad: 2.2 mmHg    Aortic Valve Measurements:  AV Vmax:                2.6 m/s  AV Peak Gradient:       26.8 mmHg  AV Mean Gradient:       15.0 mmHg  AV VTI:             44.6 cm  AV VTI Ratio:           0.42  AoV Dimensionless Index 0.42  AR Vmax                 3.85 m/s  AR PHT                  461 msec  AR Callahan                2.45 m/s²    Mitral Valve Measurements:     MV E Vmax: 1.8 m/s       Tricuspid Valve Measurements:     TV S'             17.0 cm/s  TR Vmax:          2.3 m/s  TR Peak Gradient: 21.0 mmHg  RA Pressure:      3 mmHg  PASP:             24 mmHg    ________________________________________________________________________________________  Electronically signed on 9/18/2024 at 11:40:04 AM by Lc Escobedo M.D.       < end of copied text >    < from: Nuclear Stress Test-Pharmacologic.. (09.22.24 @ 10:27) >  --------------------------------------------------------------------------------------------------------------------------------------------------------Conclusions:   1. Myocardial Perfusion: Abnormal.   2. Qualitative Perfusion:      - medium-sized, severe defect(s) in the apical and anteroseptal walls that are fixed consistent with an infarct.   3. The left ventricle is severely reduced in function and mildly enlarged in size. The post stress left ventricular EF is 38 %. The stress end diastolic volume is 124 ml and systolic volume is 76 ml.   4. Diffuse left ventricular wall motion hypokinesis.      < end of copied text >

## 2024-09-23 NOTE — PROGRESS NOTE ADULT - ASSESSMENT
A/P    Patient is an 84-year-old female with a history of anemia, hypothyroidism, depression, GERD, bipolar, CKD, presents for redness and swelling on her face for the past few days.     #Right facial swelling and redness, Leukocytosis   -improving  -CT Neck 9/11/24 shows Right facial cellulitis/myositis, without gross evidence of a drainable fluid collection.  -CXR 9/11/24 shows Clear lungs  -ENT and ID following  -sp I&D of abscess 9/16 - cx + MRSA  -abx per ID    #CKD  -Trend Creat  -nephrology f/u    #Hx of frequent falls with lbbb. cmp  -pt describes random "zoning out/staring episodes" and then falling (has been occurring for about 2.5 years per pt)  -pt denies LOC during episodes; denies CP or SOB  -orthostatics negative  -MR Head 9/17/24 shows no intracranial mass, acute territorial infarct, acute intracranial hemorrhage, or midline shift.  -TTE 9/18/24 shows severely decreased LVSF with EF 30-35%; regional WMA; There is mild (grade 1) left ventricular diastolic dysfunction, with normal left ventricular filling pressure.   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 The entire inferior wall, mid and apical inferior septum, mid inferolateral segment, and apical lateral segment are akinetic. The entire anterior wall, basal and mid anterolateral wall, basal and mid anterior septum, basal inferoseptal segment, and basal inferolateral segment are hypokinetic.  -neuro f/u  -EKG shows NSR with first degree AV block and LBBB, no acute ischemic changes  -Pharm Nuclear Stress Test 9/22/24 shows medium-sized, severe defect(s) in the apical and anteroseptal walls that are fixed consistent with an infarct. The left ventricle is severely reduced in function and mildly enlarged in size. The post stress left ventricular EF is 38 %. Diffuse left ventricular wall motion hypokinesis.  -Medical management for now   -eps tereal noted, family initially considering only ext holter but dtr seems more agreeable to ILR  -if daughter still agreeable to ILR, re-consult EP for ILR placement    #Aortic Stenosis  -cont to monitor    #Cardiomyopathy, HFrEF  -outpatient TTE 5/24 shows LV systolic function is moderately reduced, moderate global hypokinesis; EF 40%; mild to moderate aortic regurgitation; moderate aortic stenosis  -Repeat TTE as above  -no evidence of volume overload/HF  -no diuretic need  -per outpt visit, patient and son do not want to proceed with ischemic eval and do not want to start lv dysfxn medications as she remains asymptomatic  -family agreed to stress test, results as above; treat medically  -Cont Toprol XL    dvt ppx

## 2024-09-24 ENCOUNTER — TRANSCRIPTION ENCOUNTER (OUTPATIENT)
Age: 84
End: 2024-09-24

## 2024-09-24 VITALS
TEMPERATURE: 98 F | RESPIRATION RATE: 18 BRPM | OXYGEN SATURATION: 96 % | DIASTOLIC BLOOD PRESSURE: 71 MMHG | HEART RATE: 67 BPM | SYSTOLIC BLOOD PRESSURE: 111 MMHG

## 2024-09-24 LAB
ANION GAP SERPL CALC-SCNC: 13 MMOL/L — SIGNIFICANT CHANGE UP (ref 5–17)
ANION GAP SERPL CALC-SCNC: 16 MMOL/L — SIGNIFICANT CHANGE UP (ref 5–17)
BUN SERPL-MCNC: 51 MG/DL — HIGH (ref 7–23)
BUN SERPL-MCNC: 53 MG/DL — HIGH (ref 7–23)
CALCIUM SERPL-MCNC: 8.6 MG/DL — SIGNIFICANT CHANGE UP (ref 8.4–10.5)
CALCIUM SERPL-MCNC: 8.7 MG/DL — SIGNIFICANT CHANGE UP (ref 8.4–10.5)
CHLORIDE SERPL-SCNC: 111 MMOL/L — HIGH (ref 96–108)
CHLORIDE SERPL-SCNC: 111 MMOL/L — HIGH (ref 96–108)
CO2 SERPL-SCNC: 13 MMOL/L — LOW (ref 22–31)
CO2 SERPL-SCNC: 14 MMOL/L — LOW (ref 22–31)
CREAT SERPL-MCNC: 3.3 MG/DL — HIGH (ref 0.5–1.3)
CREAT SERPL-MCNC: 3.46 MG/DL — HIGH (ref 0.5–1.3)
EGFR: 12 ML/MIN/1.73M2 — LOW
EGFR: 13 ML/MIN/1.73M2 — LOW
GLUCOSE SERPL-MCNC: 72 MG/DL — SIGNIFICANT CHANGE UP (ref 70–99)
GLUCOSE SERPL-MCNC: 86 MG/DL — SIGNIFICANT CHANGE UP (ref 70–99)
HCT VFR BLD CALC: 34.7 % — SIGNIFICANT CHANGE UP (ref 34.5–45)
HGB BLD-MCNC: 11.1 G/DL — LOW (ref 11.5–15.5)
MCHC RBC-ENTMCNC: 31.7 PG — SIGNIFICANT CHANGE UP (ref 27–34)
MCHC RBC-ENTMCNC: 32 GM/DL — SIGNIFICANT CHANGE UP (ref 32–36)
MCV RBC AUTO: 99.1 FL — SIGNIFICANT CHANGE UP (ref 80–100)
NRBC # BLD: 0 /100 WBCS — SIGNIFICANT CHANGE UP (ref 0–0)
PLATELET # BLD AUTO: 269 K/UL — SIGNIFICANT CHANGE UP (ref 150–400)
POTASSIUM SERPL-MCNC: 5.1 MMOL/L — SIGNIFICANT CHANGE UP (ref 3.5–5.3)
POTASSIUM SERPL-MCNC: 7.2 MMOL/L — CRITICAL HIGH (ref 3.5–5.3)
POTASSIUM SERPL-SCNC: 5.1 MMOL/L — SIGNIFICANT CHANGE UP (ref 3.5–5.3)
POTASSIUM SERPL-SCNC: 7.2 MMOL/L — CRITICAL HIGH (ref 3.5–5.3)
RBC # BLD: 3.5 M/UL — LOW (ref 3.8–5.2)
RBC # FLD: 14.5 % — SIGNIFICANT CHANGE UP (ref 10.3–14.5)
SODIUM SERPL-SCNC: 138 MMOL/L — SIGNIFICANT CHANGE UP (ref 135–145)
SODIUM SERPL-SCNC: 140 MMOL/L — SIGNIFICANT CHANGE UP (ref 135–145)
WBC # BLD: 8.4 K/UL — SIGNIFICANT CHANGE UP (ref 3.8–10.5)
WBC # FLD AUTO: 8.4 K/UL — SIGNIFICANT CHANGE UP (ref 3.8–10.5)

## 2024-09-24 PROCEDURE — 97161 PT EVAL LOW COMPLEX 20 MIN: CPT

## 2024-09-24 PROCEDURE — 85018 HEMOGLOBIN: CPT

## 2024-09-24 PROCEDURE — 71045 X-RAY EXAM CHEST 1 VIEW: CPT

## 2024-09-24 PROCEDURE — 83605 ASSAY OF LACTIC ACID: CPT

## 2024-09-24 PROCEDURE — 82947 ASSAY GLUCOSE BLOOD QUANT: CPT

## 2024-09-24 PROCEDURE — 87040 BLOOD CULTURE FOR BACTERIA: CPT

## 2024-09-24 PROCEDURE — 80048 BASIC METABOLIC PNL TOTAL CA: CPT

## 2024-09-24 PROCEDURE — 96375 TX/PRO/DX INJ NEW DRUG ADDON: CPT

## 2024-09-24 PROCEDURE — 97530 THERAPEUTIC ACTIVITIES: CPT

## 2024-09-24 PROCEDURE — 82803 BLOOD GASES ANY COMBINATION: CPT

## 2024-09-24 PROCEDURE — 70551 MRI BRAIN STEM W/O DYE: CPT | Mod: MC

## 2024-09-24 PROCEDURE — 87186 SC STD MICRODIL/AGAR DIL: CPT

## 2024-09-24 PROCEDURE — C1764: CPT

## 2024-09-24 PROCEDURE — 87637 SARSCOV2&INF A&B&RSV AMP PRB: CPT

## 2024-09-24 PROCEDURE — 87070 CULTURE OTHR SPECIMN AEROBIC: CPT

## 2024-09-24 PROCEDURE — 82962 GLUCOSE BLOOD TEST: CPT

## 2024-09-24 PROCEDURE — 93005 ELECTROCARDIOGRAM TRACING: CPT

## 2024-09-24 PROCEDURE — 84132 ASSAY OF SERUM POTASSIUM: CPT

## 2024-09-24 PROCEDURE — 84295 ASSAY OF SERUM SODIUM: CPT

## 2024-09-24 PROCEDURE — 87640 STAPH A DNA AMP PROBE: CPT

## 2024-09-24 PROCEDURE — 82435 ASSAY OF BLOOD CHLORIDE: CPT

## 2024-09-24 PROCEDURE — 36415 COLL VENOUS BLD VENIPUNCTURE: CPT

## 2024-09-24 PROCEDURE — 84484 ASSAY OF TROPONIN QUANT: CPT

## 2024-09-24 PROCEDURE — 95816 EEG AWAKE AND DROWSY: CPT

## 2024-09-24 PROCEDURE — C8929: CPT

## 2024-09-24 PROCEDURE — A9500: CPT

## 2024-09-24 PROCEDURE — 83735 ASSAY OF MAGNESIUM: CPT

## 2024-09-24 PROCEDURE — 99285 EMERGENCY DEPT VISIT HI MDM: CPT | Mod: 25

## 2024-09-24 PROCEDURE — 78452 HT MUSCLE IMAGE SPECT MULT: CPT | Mod: MC

## 2024-09-24 PROCEDURE — 85025 COMPLETE CBC W/AUTO DIFF WBC: CPT

## 2024-09-24 PROCEDURE — 85027 COMPLETE CBC AUTOMATED: CPT

## 2024-09-24 PROCEDURE — 87641 MR-STAPH DNA AMP PROBE: CPT

## 2024-09-24 PROCEDURE — 82330 ASSAY OF CALCIUM: CPT

## 2024-09-24 PROCEDURE — 70490 CT SOFT TISSUE NECK W/O DYE: CPT | Mod: MC

## 2024-09-24 PROCEDURE — 97116 GAIT TRAINING THERAPY: CPT

## 2024-09-24 PROCEDURE — 80053 COMPREHEN METABOLIC PANEL: CPT

## 2024-09-24 PROCEDURE — 96374 THER/PROPH/DIAG INJ IV PUSH: CPT

## 2024-09-24 PROCEDURE — 87077 CULTURE AEROBIC IDENTIFY: CPT

## 2024-09-24 PROCEDURE — 93017 CV STRESS TEST TRACING ONLY: CPT

## 2024-09-24 PROCEDURE — 33285 INSJ SUBQ CAR RHYTHM MNTR: CPT

## 2024-09-24 PROCEDURE — 85014 HEMATOCRIT: CPT

## 2024-09-24 PROCEDURE — 97110 THERAPEUTIC EXERCISES: CPT

## 2024-09-24 RX ORDER — SODIUM BICARBONATE 650 MG
2 TABLET ORAL
Qty: 0 | Refills: 0 | DISCHARGE
Start: 2024-09-24

## 2024-09-24 RX ORDER — PANTOPRAZOLE SODIUM 40 MG/1
1 TABLET, DELAYED RELEASE ORAL
Qty: 0 | Refills: 0 | DISCHARGE
Start: 2024-09-24

## 2024-09-24 RX ORDER — METOPROLOL TARTRATE 50 MG
1 TABLET ORAL
Qty: 0 | Refills: 0 | DISCHARGE
Start: 2024-09-24

## 2024-09-24 RX ORDER — DOXYCYCLINE HYCLATE 100 MG
2 CAPSULE ORAL
Qty: 0 | Refills: 0 | DISCHARGE
Start: 2024-09-24 | End: 2024-09-27

## 2024-09-24 RX ADMIN — Medication 150 MILLIGRAM(S): at 11:53

## 2024-09-24 RX ADMIN — Medication 5000 UNIT(S): at 05:05

## 2024-09-24 RX ADMIN — Medication 100 MILLIGRAM(S): at 05:04

## 2024-09-24 RX ADMIN — Medication 1300 MILLIGRAM(S): at 05:04

## 2024-09-24 RX ADMIN — CHLORHEXIDINE GLUCONATE ORAL RINSE 1 APPLICATION(S): 1.2 SOLUTION DENTAL at 12:01

## 2024-09-24 RX ADMIN — PANTOPRAZOLE SODIUM 40 MILLIGRAM(S): 40 TABLET, DELAYED RELEASE ORAL at 07:12

## 2024-09-24 RX ADMIN — Medication 88 MICROGRAM(S): at 05:04

## 2024-09-24 RX ADMIN — Medication 300 MILLIGRAM(S): at 11:54

## 2024-09-24 RX ADMIN — Medication 1300 MILLIGRAM(S): at 15:20

## 2024-09-24 RX ADMIN — Medication 100 MILLIGRAM(S): at 17:20

## 2024-09-24 RX ADMIN — Medication 250 MILLIGRAM(S): at 11:53

## 2024-09-24 RX ADMIN — Medication 25 MILLIGRAM(S): at 05:05

## 2024-09-24 RX ADMIN — MUPIROCIN 1 APPLICATION(S): 20 OINTMENT TOPICAL at 07:12

## 2024-09-24 RX ADMIN — Medication 75 MILLIGRAM(S): at 11:53

## 2024-09-24 RX ADMIN — Medication 5000 UNIT(S): at 17:20

## 2024-09-24 NOTE — PROGRESS NOTE ADULT - SUBJECTIVE AND OBJECTIVE BOX
[facility] DIVISION OF INFECTIOUS DISEASES  LOUANN Dubois Y. Patel, S. Shah, G. Casimir  [phone]  ([phone] - weekdays after 5pm and weekends)    Name: JOANNA MALDONADO  Age/Gender: 84y Female  MRN: [id number]    Interval History:  Patient seen and examined this morning.   No new complaints noted.  Notes reviewed, s/p ILR yesterday.  No concerning overnight events  Afebrile   Allergies: azithromycin (Rash)    Objective:  Vitals:   T(F): 97.6 (09-24-24 @ 05:38), Max: 98.1 (09-23-24 @ 11:06)  HR: 67 (09-24-24 @ 05:38) (61 - 67)  BP: 118/72 (09-24-24 @ 05:38) (104/60 - 118/72)  RR: 18 (09-24-24 @ 05:38) (18 - 18)  SpO2: 98% (09-24-24 @ 05:38) (97% - 98%)  Physical Examination:  General: no acute distress, nta   HEENT: NC/AT, anicteric, EOMI  Respiratory: no acc muscle use, breathing comfortably  Cardiovascular: S1 and S2 present  Gastrointestinal: normal appearing, nondistended  Extremities: no edema, no cyanosis  Skin: R lower face incision dry, no drainage, no erythema  slight swelling R submandibular area but overall less    Laboratory Studies:  CBC:                       11.1   8.40  )-----------( 269      ( 24 Sep 2024 07:09 )             34.7     WBC Trend:  8.40 09-24-24 @ 07:09  11.78 09-21-24 @ 07:54  10.22 09-20-24 @ 06:58  9.90 09-19-24 @ 10:35    CMP: 09-24    138  |  111[H]  |  51[H]  ----------------------------<  72  7.2[HH]   |  14[L]  |  3.30[H]    Ca    8.7      24 Sep 2024 07:09    Creatinine: 3.30 mg/dL (09-24-24 @ 07:09)  Creatinine: 3.24 mg/dL (09-22-24 @ 09:18)  Creatinine: 3.19 mg/dL (09-21-24 @ 07:54)  Creatinine: 3.02 mg/dL (09-20-24 @ 06:59)  Creatinine: 3.01 mg/dL (09-19-24 @ 10:36)    Microbiology: reviewed   Culture - Other (collected 09-16-24 @ 11:09)  Source: Drainage Drainage  Final Report (09-21-24 @ 15:41):    Numerous Methicillin Resistant Staphylococcus aureus  Organism: Methicillin resistant Staphylococcus aureus (09-21-24 @ 15:41)  Organism: Methicillin resistant Staphylococcus aureus (09-21-24 @ 15:41)      Method Type: EDYTA      -  Clindamycin: S <=0.25      -  Daptomycin: S 1      -  Erythromycin: R >4      -  Gentamicin: S <=1 Should not be used as monotherapy      -  Linezolid: S 4      -  Oxacillin: R >2      -  Penicillin: R >8      -  Rifampin: S <=1 Should not be used as monotherapy      -  Tetracycline: S <=1      -  Trimethoprim/Sulfamethoxazole: S <=0.5/9.5      -  Vancomycin: S 2    Culture - Blood (collected 09-11-24 @ 18:40)  Source: .Blood Blood-Peripheral  Final Report (09-17-24 @ 02:00):    No growth at 5 days    Culture - Blood (collected 09-11-24 @ 18:30)  Source: .Blood Blood-Peripheral  Final Report (09-17-24 @ 02:00):    No growth at 5 days    [facility]-2 [facility] (17 Sep 2024 13:35)    Radiology: reviewed     Medications:  acetaminophen     Tablet .. 650 milliGRAM(s) Oral every 6 hours PRN  buPROPion XL (24-Hour) . 150 milliGRAM(s) Oral daily  chlorhexidine 2% Cloths 1 Application(s) Topical daily  diphenhydrAMINE 25 milliGRAM(s) Oral once  divalproex  milliGRAM(s) Oral daily  divalproex  milliGRAM(s) Oral at bedtime  doxycycline monohydrate Capsule 100 milliGRAM(s) Oral every 12 hours  heparin   Injectable 5000 Unit(s) SubCutaneous every 12 hours  influenza  Vaccine (HIGH DOSE) 0.5 milliLiter(s) IntraMuscular once  levothyroxine 88 MICROGram(s) Oral daily  LORazepam     Tablet 1 milliGRAM(s) Oral at bedtime  melatonin 3 milliGRAM(s) Oral at bedtime PRN  metoprolol succinate ER 25 milliGRAM(s) Oral daily  pantoprazole    Tablet 40 milliGRAM(s) Oral before breakfast  polyethylene glycol 3350 17 Gram(s) Oral daily PRN  senna 2 Tablet(s) Oral at bedtime PRN  sodium bicarbonate 1300 milliGRAM(s) Oral three times a day  venlafaxine 75 milliGRAM(s) Oral daily  venlafaxine 300 milliGRAM(s) Oral daily    Current Antimicrobials:  doxycycline monohydrate Capsule 100 milliGRAM(s) Oral every 12 hours    Prior/Completed Antimicrobials:  ampicillin/sulbactam  IVPB

## 2024-09-24 NOTE — DISCHARGE NOTE NURSING/CASE MANAGEMENT/SOCIAL WORK - PATIENT PORTAL LINK FT
You can access the FollowMyHealth Patient Portal offered by [facility] by registering at the following website: [email or web address]. By joining [facility]’s FollowMyHealth portal, you will also be able to view your health information using other applications (apps) compatible with our system.

## 2024-09-24 NOTE — PROGRESS NOTE ADULT - ASSESSMENT
Patient is a 84 year old female with PMHx of Anemia, Depression, CKD, GERD and Hypothyroidism. Presents to [facility] for worsening redness and swelling on her face for the past few days.    Plan:    # Facial cellulitis:   - + leukocytosis, BCx w/ NGTD  - No drainable collection on CT, s/p laryngoscope no airway involvement   - Abx mgmt per ID  - S/p Dexamethasone 8MG Q8H for 24H only per ENT  - Encourage oral hydration   - Minced and moist diet  - Monitor temps/WBC  - 9/16 noted with increased duration/swelling and expressible pus -> s/p I&D by ENT; Cx noted with MRSA  - Local wound care per ENT  - C/w isolation per protocol   - ID/ENT following    # Hx of frequent falls:  - Fall precautions  - Ortho negative  - MR Head 9/17/24 shows no intracranial mass, acute territorial infarct, acute intracranial hemorrhage, or midline shift  - EEG neg for seizures   - TTE noted with severely decreased LVSF with EF 30-35%; regional WMA;   - Pharm Nuclear Stress Test 9/22/24 shows medium-sized, severe defect(s) in the apical and anteroseptal walls that are fixed consistent with an infarct. The left ventricle is severely reduced in function and mildly enlarged in size. The post stress left ventricular EF is 38 %. Diffuse left ventricular wall motion hypokinesis.  - Medical management for now   - sp ILR implant with EP 9/23  - Cards/Neuro following. EP signed off    # CKD:  - Monitor I/O's  - Serial Cr  - Avoid nephrotoxins  - Renally dose medications  - C/w NaHCO3  - Renal following    # Depression:  - C/w current meds    # LPRD (laryngopharyngeal reflux disease):  - ENT recommendations appreciated   - PPI    # Hypothyroidism:  - C/w Synthroid    # DVT ppx:  - Heparin subq    Dispo: [facility]ILIO    [facility]  [phone]

## 2024-09-24 NOTE — PROGRESS NOTE ADULT - ASSESSMENT
Patient is an 84-year-old female with a history of anemia, hypothyroidism, depression, GERD, bipolar, CKD who presents for redness and swelling on her face for the past few days. Patient reports right-sided facial swelling and redness including her neck and on her lower jaw that started on 9/8/24, worsening over the past few days. She reports  a dental exam for cracked teeth on day prior but no work performed. States someone told her she had 2 small pimple like lesion on her face, currently scabbed over.    R facial cellulitis with abscess with MRSA  - initial CT neck soft tissue with R facial cellulitis/myositis, no e/o drainable fluid collection  - s/p dexamethasone x24h  - 9/16 noted with increased induration and expressible pus -- s/p I&D by ENT - cx with MRSA   -- s/p irrigation this am, no purulence expressed, no need for further packing   - WBC normalized, remains afebrile, much improved, slight swelling R submandibular area    s/p unasyn 9/11-9/18    Recommendations:  Continue doxycycline 100mg PO Q12h - extend to 9/27  Local wound care per ENT  Contact isolation per IC protocol  Continue rest of care per primary team     Stable from ID standpoint at this time.   Discharge planning per primary team.   Patient to follow up with us as outpt     Ash Graham M.D.  [facility], Division of Infectious Diseases  [phone]  After 5pm on weekdays and all day on weekends - please call [phone]  Available on [employer] TEAMS

## 2024-09-24 NOTE — PROGRESS NOTE ADULT - ASSESSMENT
A/P    Patient is an 84-year-old female with a history of anemia, hypothyroidism, depression, GERD, bipolar, CKD, presents for redness and swelling on her face for the past few days.     #Right facial swelling and redness, Leukocytosis   -improving  -CT Neck 9/11/24 shows Right facial cellulitis/myositis, without gross evidence of a drainable fluid collection.  -CXR 9/11/24 shows Clear lungs  -ENT and ID following  -sp I&D of abscess 9/16 - cx + MRSA  -abx per ID    #CKD  -Trend Creat  -hyperkalemia noted; recheck BMP  -nephrology f/u    #Hx of frequent falls with lbbb. cmp  -pt describes random "zoning out/staring episodes" and then falling (has been occurring for about 2.5 years per pt)  -pt denies LOC during episodes; denies CP or SOB  -orthostatics negative  -MR Head 9/17/24 shows no intracranial mass, acute territorial infarct, acute intracranial hemorrhage, or midline shift.  -TTE 9/18/24 shows severely decreased LVSF with EF 30-35%; regional WMA; There is mild (grade 1) left ventricular diastolic dysfunction, with normal left ventricular filling pressure.   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 The entire inferior wall, mid and apical inferior septum, mid inferolateral segment, and apical lateral segment are akinetic. The entire anterior wall, basal and mid anterolateral wall, basal and mid anterior septum, basal inferoseptal segment, and basal inferolateral segment are hypokinetic.  -neuro f/u  -EKG shows NSR with first degree AV block and LBBB, no acute ischemic changes  -Pharm Nuclear Stress Test 9/22/24 shows medium-sized, severe defect(s) in the apical and anteroseptal walls that are fixed consistent with an infarct. The left ventricle is severely reduced in function and mildly enlarged in size. The post stress left ventricular EF is 38 %. Diffuse left ventricular wall motion hypokinesis.  -Medical management for CMP  -sp ILR implant with EP 9/23    #Aortic Stenosis  -cont to monitor    #Cardiomyopathy, HFrEF  -outpatient TTE 5/24 shows LV systolic function is moderately reduced, moderate global hypokinesis; EF 40%; mild to moderate aortic regurgitation; moderate aortic stenosis  -Repeat TTE as above  -no evidence of volume overload/HF  -no diuretic need  -per outpt visit, patient and son do not want to proceed with ischemic eval and do not want to start lv dysfxn medications as she remains asymptomatic  -family agreed to stress test, results as above; treat medically  -Cont Toprol XL    dvt ppx

## 2024-09-24 NOTE — PROGRESS NOTE ADULT - PROVIDER SPECIALTY LIST ADULT
Cardiology
Cardiology
ENT
Infectious Disease
Internal Medicine
Nephrology
Nephrology
Neurology
Neurology
Cardio-OB
Cardiology
ENT
ENT
Electrophysiology
Infectious Disease
Infectious Disease
Internal Medicine
Nephrology
Neurology
OMFS
Cardiology
Infectious Disease
Infectious Disease
Internal Medicine
Nephrology
Cardiology
ENT
Electrophysiology
Electrophysiology
Infectious Disease
Internal Medicine
Neurology
ENT

## 2024-09-24 NOTE — PROGRESS NOTE ADULT - SUBJECTIVE AND OBJECTIVE BOX
Overnight events noted      VITAL:  T(C): , Max: 36.7 (09-23-24 @ 11:06)  T(F): , Max: 98.1 (09-23-24 @ 11:06)  HR: 67 (09-24-24 @ 05:38)  BP: 118/72 (09-24-24 @ 05:38)  BP(mean): --  RR: 18 (09-24-24 @ 05:38)  SpO2: 98% (09-24-24 @ 05:38)  Wt(kg): --      PHYSICAL EXAM:  Constitutional: NAD  HEENT: NCAT, DMM  Neck:  No JVD  Respiratory: CTAB/L  Cardiovascular: S1 and S2  Gastrointestinal: BS+, soft, NT/ND  Extremities: No peripheral edema  Neurological: A/O x 3, no focal deficits  : No Arnold  Skin: R facial erythema resolved      LABS:                        11.1   8.40  )-----------( 269      ( 24 Sep 2024 07:09 )             34.7     Na(138)/K(7.2)/Cl(111)/HCO3(14)/BUN(51)/Cr(3.30)Glu(72)/Ca(8.7)/Mg(--)/PO4(--)    09-24 @ 07:09  Na(143)/K(4.5)/Cl(111)/HCO3(18)/BUN(44)/Cr(3.24)Glu(86)/Ca(8.9)/Mg(--)/PO4(--)    09-22 @ 09:18      IMPRESSION: 84F w/ dementia, bipolar disorder, HFrEF, and CKD4-5, 9/11/24 p/w R facial cellulitis    (1)Renal - nonproteinuric CKD4 with atrophic R kidney. Grossly stable numbers of late  (2)Metabolic acidosis - low HCO3 level today but in setting of severe hemolysis - anomalous labwork - planned for repeat labwork this a.m.  (3)Hyperkalemia- severe hemolysis/anomalous labwork - planned for repeat labwork this a.m.  (4)ID - R facial celluitis - improved; advanced to PO Doxycycline  (5)Cardiac - HFrEF on echo - fixed defects on SPECT - s/p ILR      RECOMMEND:  (1)PO NaHCO3 as ordered  (2)F/U repeat BMP - Lokelma 10gm po x 1 if K 5.5 or higher on nonhemolyzed specimen; Insulin/D50/IV Calcium if K 6.0 or higher on nonhemolyzed specimen  (3)D/C planning per primary team; f/u at my office 10/23/24 as previously scheduled            Ab Tripathi MD  [facility]  Office/on call physician: [phone]  Cell (7a-7p): [phone]       no pain, no sob    VITAL:  T(C): , Max: 36.7 (09-23-24 @ 11:06)  T(F): , Max: 98.1 (09-23-24 @ 11:06)  HR: 67 (09-24-24 @ 05:38)  BP: 118/72 (09-24-24 @ 05:38)  BP(mean): --  RR: 18 (09-24-24 @ 05:38)  SpO2: 98% (09-24-24 @ 05:38)  Wt(kg): --      PHYSICAL EXAM:  Constitutional: NAD  HEENT: NCAT, DMM  Neck:  No JVD  Respiratory: CTAB/L  Cardiovascular: S1 and S2  Gastrointestinal: BS+, soft, NT/ND  Extremities: No peripheral edema  Neurological: A/O x 3, no focal deficits  : No Arnold  Skin: R facial erythema resolved      LABS:                        11.1   8.40  )-----------( 269      ( 24 Sep 2024 07:09 )             34.7     Na(138)/K(7.2)/Cl(111)/HCO3(14)/BUN(51)/Cr(3.30)Glu(72)/Ca(8.7)/Mg(--)/PO4(--)    09-24 @ 07:09  Na(143)/K(4.5)/Cl(111)/HCO3(18)/BUN(44)/Cr(3.24)Glu(86)/Ca(8.9)/Mg(--)/PO4(--)    09-22 @ 09:18      IMPRESSION: 84F w/ dementia, bipolar disorder, HFrEF, and CKD4-5, 9/11/24 p/w R facial cellulitis    (1)Renal - nonproteinuric CKD4 with atrophic R kidney. Grossly stable numbers of late  (2)Metabolic acidosis - low HCO3 level today but in setting of severe hemolysis - anomalous labwork - planned for repeat labwork this a.m.  (3)Hyperkalemia- severe hemolysis/anomalous labwork - planned for repeat labwork this a.m.  (4)ID - R facial celluitis - improved; advanced to PO Doxycycline  (5)Cardiac - HFrEF on echo - fixed defects on SPECT - s/p ILR      RECOMMEND:  (1)PO NaHCO3 as ordered  (2)F/U repeat BMP - Lokelma 10gm po x 1 if K 5.5 or higher on nonhemolyzed specimen; Insulin/D50/IV Calcium if K 6.0 or higher on nonhemolyzed specimen  (3)D/C planning per primary team; f/u at my office 10/23/24 as previously scheduled            bA Tripathi MD  [facility]  Office/on call physician: [phone]  Cell (7a-7o): [phone]

## 2024-09-24 NOTE — PROGRESS NOTE ADULT - REASON FOR ADMISSION
R face swelling and redness

## 2024-09-24 NOTE — DISCHARGE NOTE NURSING/CASE MANAGEMENT/SOCIAL WORK - NSDCPEFALRISK_GEN_ALL_CORE
For information on Fall & Injury Prevention, visit: [email or web address] OR  [email or web address] OR  https://www.cdc.gov/steadi/patient.html

## 2024-09-24 NOTE — PROGRESS NOTE ADULT - SUBJECTIVE AND OBJECTIVE BOX
CARDIOLOGY FOLLOW UP - Dr. Ackerman  DATE OF SERVICE: 9/24/24    CC no CP or SOB      REVIEW OF SYSTEMS:  CONSTITUTIONAL: No fever, weight loss, or fatigue  RESPIRATORY: No cough, wheezing, chills or hemoptysis; No Shortness of Breath  CARDIOVASCULAR: No chest pain, palpitations, passing out, dizziness  GASTROINTESTINAL: No abdominal or epigastric pain. No nausea, vomiting, or hematemesis; No diarrhea or constipation. No melena or hematochezia.      PHYSICAL EXAM:  T(C): 36.4 (09-24-24 @ 05:38), Max: 36.7 (09-23-24 @ 11:06)  HR: 67 (09-24-24 @ 05:38) (61 - 67)  BP: 118/72 (09-24-24 @ 05:38) (104/60 - 118/72)  RR: 18 (09-24-24 @ 05:38) (18 - 18)  SpO2: 98% (09-24-24 @ 05:38) (97% - 98%)  Wt(kg): --  I&O's Summary    23 Sep 2024 07:01  -  24 Sep 2024 07:00  --------------------------------------------------------  IN: 240 mL / OUT: 1900 mL / NET: -1660 mL        Appearance: Normal	  Cardiovascular: Normal S1 S2,RRR, No JVD, +murmur  Respiratory: Lungs clear to auscultation	  Gastrointestinal:  Soft, Non-tender, + BS	  Extremities: Normal range of motion, No clubbing, cyanosis or edema      Home Medications:  acetaminophen 325 mg oral tablet: 2 tab(s) orally every 6 hours as needed for  mild pain (11 Sep 2024 21:40)  buPROPion 150 mg/24 hours (XL) oral tablet, extended release: 1 tab(s) orally once a day (11 Sep 2024 21:40)  divalproex sodium 250 mg oral tablet, extended release: 1 tab(s) orally once a day (11 Sep 2024 21:40)  divalproex sodium 500 mg oral tablet, extended release: 1 tab(s) orally once a day (at bedtime) (11 Sep 2024 21:38)  lactulose 10 g/15 mL oral syrup: 30 milliliter(s) orally every 8 hours as needed for  constipation (11 Sep 2024 21:40)  levothyroxine 88 mcg (0.088 mg) oral tablet: 1 tab(s) orally once a day (11 Sep 2024 21:39)  LORazepam 1 mg oral tablet: 1 tab(s) orally once a day (at bedtime) (11 Sep 2024 21:40)  Melatonin 3 mg oral tablet: 1 tab(s) orally once a day (at bedtime) (11 Sep 2024 21:40)  polyethylene glycol 3350 oral powder for reconstitution: 17 gram(s) orally once a day (11 Sep 2024 21:40)  senna leaf extract oral tablet: 2 tab(s) orally once a day (at bedtime) (11 Sep 2024 21:40)  venlafaxine 75 mg oral capsule, extended release: 375 milligram(s) orally once a day (11 Sep 2024 21:39)      MEDICATIONS  (STANDING):  buPROPion XL (24-Hour) . 150 milliGRAM(s) Oral daily  chlorhexidine 2% Cloths 1 Application(s) Topical daily  diphenhydrAMINE 25 milliGRAM(s) Oral once  divalproex  milliGRAM(s) Oral daily  divalproex  milliGRAM(s) Oral at bedtime  doxycycline monohydrate Capsule 100 milliGRAM(s) Oral every 12 hours  heparin   Injectable 5000 Unit(s) SubCutaneous every 12 hours  influenza  Vaccine (HIGH DOSE) 0.5 milliLiter(s) IntraMuscular once  levothyroxine 88 MICROGram(s) Oral daily  LORazepam     Tablet 1 milliGRAM(s) Oral at bedtime  metoprolol succinate ER 25 milliGRAM(s) Oral daily  pantoprazole    Tablet 40 milliGRAM(s) Oral before breakfast  sodium bicarbonate 1300 milliGRAM(s) Oral three times a day  venlafaxine 300 milliGRAM(s) Oral daily  venlafaxine 75 milliGRAM(s) Oral daily      TELEMETRY: 	  NSR with first degree AV block  ECG:  	  RADIOLOGY:   DIAGNOSTIC TESTING:  [ ] Echocardiogram:  [ ]  Catheterization:  [ ] Stress Test:    OTHER: 	    LABS:	 	    Troponin T, High Sensitivity Result: 35 ng/L [0 - 51] (09-21 @ 07:54)                          11.1   8.40  )-----------( 269      ( 24 Sep 2024 07:09 )             34.7     09-24    138  |  111[H]  |  51[H]  ----------------------------<  72  7.2[HH]   |  14[L]  |  3.30[H]    Ca    8.7      24 Sep 2024 07:09

## 2024-09-24 NOTE — PROGRESS NOTE ADULT - SUBJECTIVE AND OBJECTIVE BOX
SUBJECTIVE / OVERNIGHT EVENTS:    patient seen and examined  resting comfortably in bed having breakfast  no c/o at this time  no events noted overnight  sp ILR  K elevated this AM - recheck BMP  --------------------------------------------------------------------------------------------  LABS:                        11.1   8.40  )-----------( 269      ( 24 Sep 2024 07:09 )             34.7     09-24    138  |  111[H]  |  51[H]  ----------------------------<  72  7.2[HH]   |  14[L]  |  3.30[H]    Ca    8.7      24 Sep 2024 07:09        CAPILLARY BLOOD GLUCOSE            Urinalysis Basic - ( 24 Sep 2024 07:09 )    Color: x / Appearance: x / SG: x / pH: x  Gluc: 72 mg/dL / Ketone: x  / Bili: x / Urobili: x   Blood: x / Protein: x / Nitrite: x   Leuk Esterase: x / RBC: x / WBC x   Sq Epi: x / Non Sq Epi: x / Bacteria: x        RADIOLOGY & ADDITIONAL TESTS:    Imaging Personally Reviewed:  [x] YES  [ ] NO    Consultant(s) Notes Reviewed:  [x] YES  [ ] NO    MEDICATIONS  (STANDING):  buPROPion XL (24-Hour) . 150 milliGRAM(s) Oral daily  chlorhexidine 2% Cloths 1 Application(s) Topical daily  diphenhydrAMINE 25 milliGRAM(s) Oral once  divalproex  milliGRAM(s) Oral daily  divalproex  milliGRAM(s) Oral at bedtime  doxycycline monohydrate Capsule 100 milliGRAM(s) Oral every 12 hours  heparin   Injectable 5000 Unit(s) SubCutaneous every 12 hours  influenza  Vaccine (HIGH DOSE) 0.5 milliLiter(s) IntraMuscular once  levothyroxine 88 MICROGram(s) Oral daily  LORazepam     Tablet 1 milliGRAM(s) Oral at bedtime  metoprolol succinate ER 25 milliGRAM(s) Oral daily  pantoprazole    Tablet 40 milliGRAM(s) Oral before breakfast  sodium bicarbonate 1300 milliGRAM(s) Oral three times a day  venlafaxine 75 milliGRAM(s) Oral daily  venlafaxine 300 milliGRAM(s) Oral daily    MEDICATIONS  (PRN):  acetaminophen     Tablet .. 650 milliGRAM(s) Oral every 6 hours PRN Temp greater or equal to 38C (100.4F), Mild Pain (1 - 3)  melatonin 3 milliGRAM(s) Oral at bedtime PRN Insomnia  polyethylene glycol 3350 17 Gram(s) Oral daily PRN Constipation  senna 2 Tablet(s) Oral at bedtime PRN Constipation      Care Discussed with Consultants/Other Providers [x] YES  [ ] NO    Vital Signs Last 24 Hrs  T(C): 36.4 (24 Sep 2024 05:38), Max: 36.7 (23 Sep 2024 11:06)  T(F): 97.6 (24 Sep 2024 05:38), Max: 98.1 (23 Sep 2024 11:06)  HR: 67 (24 Sep 2024 05:38) (61 - 67)  BP: 118/72 (24 Sep 2024 05:38) (104/60 - 118/72)  BP(mean): --  RR: 18 (24 Sep 2024 05:38) (18 - 18)  SpO2: 98% (24 Sep 2024 05:38) (97% - 98%)    Parameters below as of 24 Sep 2024 05:38  Patient On (Oxygen Delivery Method): room air      I&O's Summary    23 Sep 2024 07:01  -  24 Sep 2024 07:00  --------------------------------------------------------  IN: 240 mL / OUT: 1900 mL / NET: -1660 mL    PE:  GENERAL: no acute distress  HEENT: NC/AT, anicteric, EOMI  Respiratory: no acc muscle use, breathing comfortably  Cardiovascular: S1 and S2 present  Gastrointestinal: normal appearing, nondistended  Extremities: no edema, no cyanosis  Skin: R lower cheek incision with no active or expressible drainage, mild erythema (improving)

## 2024-09-24 NOTE — DISCHARGE NOTE NURSING/CASE MANAGEMENT/SOCIAL WORK - NSDCVIVACCINE_GEN_ALL_CORE_FT
COVID-19, mRNA, LNP-S, PF, 30 mcg/0.3 mL dose, robin-sucrose (Pf[employer]); 02-Aug-2022 18:56; Eileen Palomino (RN); [employer]; [id number] (Exp. Date: 20-Sep-2022); IntraMuscular; Deltoid Left.; 0.3 milliLiter(s);   Tdap; 29-Jul-2022 06:25; Rona Garg (RN); [employer]; [id number] (Exp. Date: 18-Apr-2024); IntraMuscular; Deltoid Left.; 0.5 milliLiter(s); VIS (VIS Published: 09-May-2013, VIS Presented: 29-Jul-2022);

## 2024-09-24 NOTE — PROGRESS NOTE ADULT - TIME BILLING
Agree with above ACP note.  cv stable  cont current tx  med/neuro w/u  family declining medical therapy/ischemic eval for known cmp  consider ILR given multiple falls, known cmp
Agree with above ACP note.  cv stable  plan for stress, family now agreeable to proceed  eps arleth noted, family initially considering only ext holter but dtr seems more agreeable to ILR  will decide after stress
agree with above  cv stable  cont current mgmt
patient seen and examined.  Agree with above ACP note.  cv stable  cont abx   will d.w son today regarding proceeding with nuclear stress  he refused in past  pt high risk for kathi givne age, sev dec gfr  eps eval appreciated  consider loop pending above
Agree with above ACP note.  cv stable  cont current tx  med/neuro w/u  family declining medical therapy/ischemic eval for known cmp  consider ILR given multiple falls, known cmp
Agree with above ACP note.  cv stable  stress with fixed defects no ischemia   cont med tx of cmp  cont bb as ordered  no clinical hf/diuretic need  s/p ILR  no CV CI to d/c today
Patient seen and examined.  Agree with above ACP note.  cv stable  stress with fixed defects no ischemia   cont med tx of cmp  cont bb as ordered  no clinical hf/diuretic need  family now agreeable for ILR for multiple episodes of falls vs syncope with LBBB/CMP  no CV CI to d/c today after ILR    d/w son at bedside
Agree with above ACP note.  cv stable  cont current tx  med/neuro w/u  family declining medical therapy/ischemic eval for known cmp  consider ILR given multiple falls, known cmp

## 2024-10-18 NOTE — PROGRESS NOTE ADULT - SUBJECTIVE AND OBJECTIVE BOX
CARDIOLOGY FOLLOW UP - Dr. Ackerman  DATE OF SERVICE: 9/13/24    CC   no CP or SOB  Right neck and facial redness and swelling improving    REVIEW OF SYSTEMS:  CONSTITUTIONAL: No fever, weight loss, or fatigue  RESPIRATORY: No cough, wheezing, chills or hemoptysis; No Shortness of Breath  CARDIOVASCULAR: No chest pain, palpitations, passing out, dizziness  GASTROINTESTINAL: No abdominal or epigastric pain. No nausea, vomiting, or hematemesis; No diarrhea or constipation. No melena or hematochezia.      PHYSICAL EXAM:  T(C): 36.9 (09-13-24 @ 11:50), Max: 36.9 (09-13-24 @ 11:50)  HR: 83 (09-13-24 @ 11:50) (83 - 91)  BP: 160/61 (09-13-24 @ 11:50) (120/71 - 160/61)  RR: 18 (09-13-24 @ 11:50) (18 - 18)  SpO2: 97% (09-13-24 @ 11:50) (95% - 99%)  Wt(kg): --  I&O's Summary    12 Sep 2024 07:01  -  13 Sep 2024 07:00  --------------------------------------------------------  IN: 150 mL / OUT: 1200 mL / NET: -1050 mL    13 Sep 2024 07:01  -  13 Sep 2024 13:35  --------------------------------------------------------  IN: 0 mL / OUT: 800 mL / NET: -800 mL        Appearance: Normal	  Cardiovascular: Normal S1 S2,RRR, No JVD, +murmur  Respiratory: Lungs clear to auscultation b/l  Gastrointestinal:  Soft, Non-tender, + BS	  Extremities: Normal range of motion, No clubbing, cyanosis or edema      Home Medications:  acetaminophen 325 mg oral tablet: 2 tab(s) orally every 6 hours as needed for  mild pain (11 Sep 2024 21:40)  buPROPion 150 mg/24 hours (XL) oral tablet, extended release: 1 tab(s) orally once a day (11 Sep 2024 21:40)  divalproex sodium 250 mg oral tablet, extended release: 1 tab(s) orally once a day (11 Sep 2024 21:40)  divalproex sodium 500 mg oral tablet, extended release: 1 tab(s) orally once a day (at bedtime) (11 Sep 2024 21:38)  lactulose 10 g/15 mL oral syrup: 30 milliliter(s) orally every 8 hours as needed for  constipation (11 Sep 2024 21:40)  levothyroxine 88 mcg (0.088 mg) oral tablet: 1 tab(s) orally once a day (11 Sep 2024 21:39)  LORazepam 1 mg oral tablet: 1 tab(s) orally once a day (at bedtime) (11 Sep 2024 21:40)  Melatonin 3 mg oral tablet: 1 tab(s) orally once a day (at bedtime) (11 Sep 2024 21:40)  polyethylene glycol 3350 oral powder for reconstitution: 17 gram(s) orally once a day (11 Sep 2024 21:40)  senna leaf extract oral tablet: 2 tab(s) orally once a day (at bedtime) (11 Sep 2024 21:40)  venlafaxine 75 mg oral capsule, extended release: 375 milligram(s) orally once a day (11 Sep 2024 21:39)      MEDICATIONS  (STANDING):  ampicillin/sulbactam  IVPB 3 Gram(s) IV Intermittent every 12 hours  buPROPion XL (24-Hour) . 150 milliGRAM(s) Oral daily  chlorhexidine 2% Cloths 1 Application(s) Topical daily  diphenhydrAMINE 25 milliGRAM(s) Oral once  divalproex  milliGRAM(s) Oral daily  divalproex  milliGRAM(s) Oral at bedtime  heparin   Injectable 5000 Unit(s) SubCutaneous every 12 hours  influenza  Vaccine (HIGH DOSE) 0.5 milliLiter(s) IntraMuscular once  levothyroxine 88 MICROGram(s) Oral daily  LORazepam     Tablet 1 milliGRAM(s) Oral at bedtime  pantoprazole    Tablet 40 milliGRAM(s) Oral before breakfast  sodium bicarbonate 1300 milliGRAM(s) Oral two times a day  venlafaxine XR. 375 milliGRAM(s) Oral daily      TELEMETRY: 	  not on tele  ECG:  	  RADIOLOGY:   DIAGNOSTIC TESTING:  [ ] Echocardiogram:  [ ]  Catheterization:  [ ] Stress Test:    OTHER: 	    LABS:	 	                            9.8    10.94 )-----------( 218      ( 13 Sep 2024 07:15 )             30.3     09-13    140  |  111<H>  |  46<H>  ----------------------------<  135<H>  4.4   |  13<L>  |  3.13<H>    Ca    9.2      13 Sep 2024 07:08    TPro  6.8  /  Alb  3.5  /  TBili  0.2  /  DBili  x   /  AST  10  /  ALT  14  /  AlkPhos  92  09-13             No

## 2024-10-20 ENCOUNTER — EMERGENCY (EMERGENCY)
Facility: HOSPITAL | Age: 84
LOS: 1 days | Discharge: HOME | End: 2024-10-20
Attending: STUDENT IN AN ORGANIZED HEALTH CARE EDUCATION/TRAINING PROGRAM
Payer: MEDICARE

## 2024-10-20 VITALS
HEIGHT: 66 IN | TEMPERATURE: 98 F | WEIGHT: 130.07 LBS | OXYGEN SATURATION: 96 % | SYSTOLIC BLOOD PRESSURE: 90 MMHG | RESPIRATION RATE: 18 BRPM | HEART RATE: 100 BPM | DIASTOLIC BLOOD PRESSURE: 70 MMHG

## 2024-10-20 VITALS
SYSTOLIC BLOOD PRESSURE: 120 MMHG | RESPIRATION RATE: 17 BRPM | OXYGEN SATURATION: 98 % | DIASTOLIC BLOOD PRESSURE: 71 MMHG | HEART RATE: 88 BPM | TEMPERATURE: 98 F

## 2024-10-20 DIAGNOSIS — Z90.89 ACQUIRED ABSENCE OF OTHER ORGANS: Chronic | ICD-10-CM

## 2024-10-20 DIAGNOSIS — Z98.890 OTHER SPECIFIED POSTPROCEDURAL STATES: Chronic | ICD-10-CM

## 2024-10-20 DIAGNOSIS — H26.9 UNSPECIFIED CATARACT: Chronic | ICD-10-CM

## 2024-10-20 PROCEDURE — 99284 EMERGENCY DEPT VISIT MOD MDM: CPT | Mod: 25,GC

## 2024-10-20 PROCEDURE — 12011 RPR F/E/E/N/L/M 2.5 CM/<: CPT

## 2024-10-20 PROCEDURE — 72125 CT NECK SPINE W/O DYE: CPT | Mod: 26,MC

## 2024-10-20 PROCEDURE — 70450 CT HEAD/BRAIN W/O DYE: CPT | Mod: 26,MC

## 2024-10-20 RX ORDER — ACETAMINOPHEN 325 MG
975 TABLET ORAL ONCE
Refills: 0 | Status: COMPLETED | OUTPATIENT
Start: 2024-10-20 | End: 2024-10-20

## 2024-10-20 RX ORDER — TETANUS TOXOID, REDUCED DIPHTHERIA TOXOID AND ACELLULAR PERTUSSIS VACCINE, ADSORBED 5; 2.5; 8; 8; 2.5 [IU]/.5ML; [IU]/.5ML; UG/.5ML; UG/.5ML; UG/.5ML
0.5 SUSPENSION INTRAMUSCULAR ONCE
Refills: 0 | Status: COMPLETED | OUTPATIENT
Start: 2024-10-20 | End: 2024-10-20

## 2024-10-20 RX ADMIN — TETANUS TOXOID, REDUCED DIPHTHERIA TOXOID AND ACELLULAR PERTUSSIS VACCINE, ADSORBED 0.5 MILLILITER(S): 5; 2.5; 8; 8; 2.5 SUSPENSION INTRAMUSCULAR at 04:51

## 2024-10-20 RX ADMIN — Medication 975 MILLIGRAM(S): at 04:47

## 2024-10-20 NOTE — ED PROVIDER NOTE - PATIENT PORTAL LINK FT
You can access the FollowMyHealth Patient Portal offered by [facility] by registering at the following website: [email or web address]. By joining [employer]’s FollowMyHealth portal, you will also be able to view your health information using other applications (apps) compatible with our system.

## 2024-10-20 NOTE — ED PROCEDURE NOTE - ATTENDING CONTRIBUTION TO CARE
Luciano Casey MD (Attending Physician):    I performed a history and physical exam of the patient and discussed their management with the resident/fellow/ACP/student. I have reviewed the resident/fellow/ACP/student note and agree with the documented findings and plan of care, except as noted. I have personally performed a substantive portion of the visit including all aspects of the medical decision making. My medical decision making and observations are found below. Please refer to any progress notes for updates on clinical course.    Please see procedure note.

## 2024-10-20 NOTE — ED PROVIDER NOTE - NSFOLLOWUPINSTRUCTIONS_ED_ALL_ED_FT
You came to the emergency room because he fell.  It seems like it was a mechanical fall, that is to say it was not your heart or your head.  I am glad that your head is doing okay, the spine imaging in the head imaging is okay.  The sutures that I gave you, they will come off by themselves.  There is nothing to do.  You will dab it for 24 hours should you start to wash it in the shower, and then afterwards just be gentle with it.  Reasons to come back to the emergency room include but are not limited to chest pain shortness of breath, confusion lethargy, unsteady gait.

## 2024-10-20 NOTE — ED PROVIDER NOTE - CLINICAL SUMMARY MEDICAL DECISION MAKING FREE TEXT BOX
84-year-old female not on blood thinners,  osteoporosis, kidney disease, presenting after a fall.  It was mechanical in nature, the patient was in her wheelchair and then went forward the patient did not lose consciousness, no nausea no vomiting.  No retrograde amnesia.  The patient fell forward hit her head she is not complaining of any headaches or vision changes or speech changes.  No confusion or lethargy.  Patient is unsure about her last tetanus shot.    Vital signs reviewed, not in shock state.  The patient has  no fluid coming from ears no fluid coming from her nose she has 2 punctate lesions that are superficial on the superior portion of her forehead into the midline bleeding has been stanched.  The patient cranial nerves II through XII intact, the patient is spontaneously moving all extremities.  Sensation is grossly intact in the upper and lower extremities bilaterally as well.  There is no rib or flank pain, there is no  unstable pelvis   On walking x 1.  Axial loading bilaterally in the upper and lower extremities negative for pain.     Will do CT head neck, no other signs of trauma.   Likely will give tetanus.  And then we will give pain control, and likely repair lacerations and dc.

## 2024-10-20 NOTE — ED ADULT NURSE NOTE - OBJECTIVE STATEMENT
PT is a 83yo F coming from home c/o fall. As per EMS, pt was getting out of bed and tripped and fell striking head on carpet, pt then called son and EMS, denies LOC and AC use, positive head strike. PMH of kidney disease, hypothyroid. PT A,Ox4, ambulatory at baseline. Respirations even and unlabored, abd soft, nondistended and nontender, skin warm, dry, laceration noted to forehead, IGLESIAS. Denies HA, CP, SOB, n/v/d, fever, chills and urinary symptoms. Stretcher locked in lowest position, appropriate side rails up for safety, pt instructed to call for RN if anything needed.

## 2024-10-20 NOTE — ED PROVIDER NOTE - ATTENDING CONTRIBUTION TO CARE
Luciano Casey MD (Attending Physician):    I performed a history and physical exam of the patient and discussed their management with the resident/fellow/ACP/student. I have reviewed the resident/fellow/ACP/student note and agree with the documented findings and plan of care, except as noted. I have personally performed a substantive portion of the visit including all aspects of the medical decision making. My medical decision making and observations are found below. Please refer to any progress notes for updates on clinical course.    HPI:  84-year-old female not on blood thinners with pmhx of osteoporosis, kidney disease presents with forehead lacerations after a mechanical fall. The patient was in her wheelchair and tried to reach for an object and accidentally fell out striking her head. Patient did not lose consciousness. Denies any nausea or vomiting.  No retrograde amnesia.  Not complaining of any headaches, vision changes, speech changes, or extremity weakness/sensory changes.  No confusion or lethargy.  Patient is unsure about her last tetanus shot.    PE:  GEN - NAD, well appearing, A&Ox3  HEAD - +Two superficial, vertical lacerations on the superior aspect of mid-forehead that are mildly oozing blood  EYES - PERRL, EOMI  ENT - Airway patent, mucous membranes moist, oropharynx wnl  NECK - Supple, non-tender without lymphadenopathy, no masses  PULMONARY - CTA b/l, symmetric breath sounds, no W/R/R  CARDIAC - +S1S2, RRR, no M/G/R, no JVD  CHEST - B/l chest wall NT  ABDOMEN - +BS, ND, NT, soft, no guarding, no rebound, no masses, no rigidity   - No CVA TTP b/l  BACK - No midline tenderness  HIP/PELVIS - Stable, NT  EXTREMITIES - FROM, symmetric pulses, no edema. Normal sensation to all extremities. All extremities NT.  NEUROLOGIC - Alert, speech clear, moving all extremities spontaneously, CN II-XII grossly intact, no pronator drift, +unable to assess gait at this time, strength and sensation grossly intact, FTN negative b/l  PSYCH - Normal mood/affect, normal insight    MDM:  DDx includes, but not limited to: intracranial bleed, c-spine fracture, lacerations to forehead. Low suspicion for fractures to any other part of her body. CT head/c-spine, tylenol, tetanus shot, repair forehead lacerations. Dispo pending w/u.

## 2024-10-20 NOTE — ED PROCEDURE NOTE - PROCEDURE ADDITIONAL DETAILS
Patient has 2 wounds 1 is linear 1 is punctate the linear 1 is on the left side is approximate 1 cm well-approximated.  It is not actually punctate after exploring it.  The 1 on the left side was closed with 2 interrupted sutures gut 05.   The wound on the right was sealed with Mastisol.  covered with Band-Aid.

## 2024-10-20 NOTE — ED ADULT NURSE NOTE - NS ED NOTE ABUSE SUSPICION NEGLECT YN
HCC coding opportunities       Chart reviewed, no opportunity found: CHART REVIEWED, NO OPPORTUNITY FOUND        Patients Insurance        Commercial Insurance: [employer]        No

## 2024-10-20 NOTE — ED ADULT NURSE NOTE - CAS ELECT INFOMATION PROVIDED
escorted via wheelchair, son took pt home, vitals stable, red socks provided, forehead suture intact/DC instructions

## 2024-10-20 NOTE — ED ADULT NURSE NOTE - NSFALLHARMRISKINTERV_ED_ALL_ED
Assistance OOB with selected safe patient handling equipment if applicable/Assistance with ambulation/Communicate risk of Fall with Harm to all staff, patient, and family/Monitor gait and stability/Provide visual cue: red socks, yellow wristband, yellow gown, etc/Reinforce activity limits and safety measures with patient and family/Bed in lowest position, wheels locked, appropriate side rails in place/Call bell, personal items and telephone in reach/Instruct patient to call for assistance before getting out of bed/chair/stretcher/Non-slip footwear applied when patient is off stretcher/[place] to call system/Physically safe environment - no spills, clutter or unnecessary equipment/Purposeful Proactive Rounding/Room/bathroom lighting operational, light cord in reach

## 2024-10-20 NOTE — ED PROVIDER NOTE - CARE PLAN
1 Principal Discharge DX:	Contusion of soft tissue   Principal Discharge DX:	Laceration of forehead

## 2024-11-20 PROCEDURE — 70450 CT HEAD/BRAIN W/O DYE: CPT | Mod: MC

## 2024-11-20 PROCEDURE — 90715 TDAP VACCINE 7 YRS/> IM: CPT

## 2024-11-20 PROCEDURE — 99284 EMERGENCY DEPT VISIT MOD MDM: CPT | Mod: 25

## 2024-11-20 PROCEDURE — 12011 RPR F/E/E/N/L/M 2.5 CM/<: CPT

## 2024-11-20 PROCEDURE — 90471 IMMUNIZATION ADMIN: CPT

## 2024-11-20 PROCEDURE — 72125 CT NECK SPINE W/O DYE: CPT | Mod: MC

## 2025-01-29 NOTE — OCCUPATIONAL THERAPY INITIAL EVALUATION ADULT - DIAGNOSIS, OT EVAL
Additional History: Patient reports the raised, crusty, wart-y growth popped up 6-8 weeks ago. Has self treated the spot with Efudex BID, will be for 2 weeks on Sunday. States the spot is sore but not draining. Pt presents with post op pain, decreased strength, balance, ADLs and functional moblity
